# Patient Record
Sex: MALE | Race: WHITE | NOT HISPANIC OR LATINO | RURAL
[De-identification: names, ages, dates, MRNs, and addresses within clinical notes are randomized per-mention and may not be internally consistent; named-entity substitution may affect disease eponyms.]

---

## 2017-12-21 ENCOUNTER — HISTORICAL (OUTPATIENT)
Dept: ADMINISTRATIVE | Facility: HOSPITAL | Age: 52
End: 2017-12-21

## 2017-12-22 LAB
LAB AP CLINICAL INFORMATION: NORMAL
LAB AP COMMENTS: NORMAL
LAB AP DIAGNOSIS - HISTORICAL: NORMAL
LAB AP GROSS PATHOLOGY - HISTORICAL: NORMAL
LAB AP SPECIMEN SUBMITTED - HISTORICAL: NORMAL

## 2023-12-30 ENCOUNTER — HOSPITAL ENCOUNTER (EMERGENCY)
Facility: HOSPITAL | Age: 58
Discharge: HOME | End: 2023-12-30
Attending: EMERGENCY MEDICINE
Payer: OTHER GOVERNMENT

## 2023-12-30 VITALS
WEIGHT: 150 LBS | SYSTOLIC BLOOD PRESSURE: 128 MMHG | HEIGHT: 70 IN | OXYGEN SATURATION: 98 % | BODY MASS INDEX: 21.47 KG/M2 | HEART RATE: 90 BPM | RESPIRATION RATE: 16 BRPM | DIASTOLIC BLOOD PRESSURE: 70 MMHG | TEMPERATURE: 98 F

## 2023-12-30 DIAGNOSIS — F41.9 ANXIETY: ICD-10-CM

## 2023-12-30 DIAGNOSIS — J98.01 BRONCHOSPASM: ICD-10-CM

## 2023-12-30 DIAGNOSIS — J18.9 PNEUMONIA OF LEFT LOWER LOBE DUE TO INFECTIOUS ORGANISM: Primary | ICD-10-CM

## 2023-12-30 DIAGNOSIS — R06.00 DYSPNEA: ICD-10-CM

## 2023-12-30 LAB
ANION GAP SERPL CALCULATED.3IONS-SCNC: 14 MMOL/L (ref 7–16)
BASOPHILS # BLD AUTO: 0.07 K/UL (ref 0–0.2)
BASOPHILS NFR BLD AUTO: 0.8 % (ref 0–1)
BUN SERPL-MCNC: 21 MG/DL (ref 7–18)
BUN/CREAT SERPL: 14 (ref 6–20)
CALCIUM SERPL-MCNC: 9.1 MG/DL (ref 8.5–10.1)
CHLORIDE SERPL-SCNC: 103 MMOL/L (ref 98–107)
CO2 SERPL-SCNC: 25 MMOL/L (ref 21–32)
CREAT SERPL-MCNC: 1.47 MG/DL (ref 0.7–1.3)
DIFFERENTIAL METHOD BLD: ABNORMAL
EGFR (NO RACE VARIABLE) (RUSH/TITUS): 56 ML/MIN/1.73M2
EOSINOPHIL # BLD AUTO: 0.14 K/UL (ref 0–0.5)
EOSINOPHIL NFR BLD AUTO: 1.5 % (ref 1–4)
ERYTHROCYTE [DISTWIDTH] IN BLOOD BY AUTOMATED COUNT: 13.3 % (ref 11.5–14.5)
GLUCOSE SERPL-MCNC: 167 MG/DL (ref 74–106)
HCT VFR BLD AUTO: 41.7 % (ref 40–54)
HGB BLD-MCNC: 13.1 G/DL (ref 13.5–18)
IMM GRANULOCYTES # BLD AUTO: 0.04 K/UL (ref 0–0.04)
IMM GRANULOCYTES NFR BLD: 0.4 % (ref 0–0.4)
LYMPHOCYTES # BLD AUTO: 1.26 K/UL (ref 1–4.8)
LYMPHOCYTES NFR BLD AUTO: 13.7 % (ref 27–41)
MCH RBC QN AUTO: 30.9 PG (ref 27–31)
MCHC RBC AUTO-ENTMCNC: 31.4 G/DL (ref 32–36)
MCV RBC AUTO: 98.3 FL (ref 80–96)
MONOCYTES # BLD AUTO: 0.6 K/UL (ref 0–0.8)
MONOCYTES NFR BLD AUTO: 6.5 % (ref 2–6)
MPC BLD CALC-MCNC: 10.3 FL (ref 9.4–12.4)
NEUTROPHILS # BLD AUTO: 7.07 K/UL (ref 1.8–7.7)
NEUTROPHILS NFR BLD AUTO: 77.1 % (ref 53–65)
NRBC # BLD AUTO: 0 X10E3/UL
NRBC, AUTO (.00): 0 %
PLATELET # BLD AUTO: 424 K/UL (ref 150–400)
POTASSIUM SERPL-SCNC: 4.6 MMOL/L (ref 3.5–5.1)
RBC # BLD AUTO: 4.24 M/UL (ref 4.6–6.2)
SODIUM SERPL-SCNC: 137 MMOL/L (ref 136–145)
WBC # BLD AUTO: 9.18 K/UL (ref 4.5–11)

## 2023-12-30 PROCEDURE — 96366 THER/PROPH/DIAG IV INF ADDON: CPT

## 2023-12-30 PROCEDURE — 96365 THER/PROPH/DIAG IV INF INIT: CPT

## 2023-12-30 PROCEDURE — 96375 TX/PRO/DX INJ NEW DRUG ADDON: CPT

## 2023-12-30 PROCEDURE — 80048 BASIC METABOLIC PNL TOTAL CA: CPT | Performed by: EMERGENCY MEDICINE

## 2023-12-30 PROCEDURE — 99284 EMERGENCY DEPT VISIT MOD MDM: CPT | Mod: ICN,,, | Performed by: EMERGENCY MEDICINE

## 2023-12-30 PROCEDURE — 99285 EMERGENCY DEPT VISIT HI MDM: CPT | Mod: 25

## 2023-12-30 PROCEDURE — 63600175 PHARM REV CODE 636 W HCPCS: Performed by: EMERGENCY MEDICINE

## 2023-12-30 PROCEDURE — 93005 ELECTROCARDIOGRAM TRACING: CPT | Performed by: INTERNAL MEDICINE

## 2023-12-30 PROCEDURE — 25000003 PHARM REV CODE 250: Performed by: EMERGENCY MEDICINE

## 2023-12-30 PROCEDURE — 25000242 PHARM REV CODE 250 ALT 637 W/ HCPCS: Performed by: EMERGENCY MEDICINE

## 2023-12-30 PROCEDURE — 93010 ELECTROCARDIOGRAM REPORT: CPT | Mod: ICN,,, | Performed by: INTERNAL MEDICINE

## 2023-12-30 PROCEDURE — 93005 ELECTROCARDIOGRAM TRACING: CPT

## 2023-12-30 PROCEDURE — 85025 COMPLETE CBC W/AUTO DIFF WBC: CPT | Performed by: EMERGENCY MEDICINE

## 2023-12-30 RX ORDER — DEXAMETHASONE SODIUM PHOSPHATE 4 MG/ML
8 INJECTION, SOLUTION INTRA-ARTICULAR; INTRALESIONAL; INTRAMUSCULAR; INTRAVENOUS; SOFT TISSUE
Status: COMPLETED | OUTPATIENT
Start: 2023-12-30 | End: 2023-12-30

## 2023-12-30 RX ORDER — PREDNISONE 50 MG/1
50 TABLET ORAL DAILY
Qty: 6 TABLET | Refills: 0 | Status: ON HOLD | OUTPATIENT
Start: 2023-12-30 | End: 2024-01-11 | Stop reason: HOSPADM

## 2023-12-30 RX ORDER — IPRATROPIUM BROMIDE AND ALBUTEROL SULFATE 2.5; .5 MG/3ML; MG/3ML
3 SOLUTION RESPIRATORY (INHALATION)
Status: COMPLETED | OUTPATIENT
Start: 2023-12-30 | End: 2023-12-30

## 2023-12-30 RX ORDER — AMOXICILLIN AND CLAVULANATE POTASSIUM 875; 125 MG/1; MG/1
1 TABLET, FILM COATED ORAL 2 TIMES DAILY
Qty: 20 TABLET | Refills: 0 | Status: ON HOLD | OUTPATIENT
Start: 2023-12-30 | End: 2024-01-11 | Stop reason: HOSPADM

## 2023-12-30 RX ORDER — LEVALBUTEROL INHALATION SOLUTION 1.25 MG/3ML
1.25 SOLUTION RESPIRATORY (INHALATION)
Status: DISCONTINUED | OUTPATIENT
Start: 2023-12-30 | End: 2023-12-30

## 2023-12-30 RX ORDER — IPRATROPIUM BROMIDE AND ALBUTEROL SULFATE 2.5; .5 MG/3ML; MG/3ML
3 SOLUTION RESPIRATORY (INHALATION)
Status: DISCONTINUED | OUTPATIENT
Start: 2023-12-30 | End: 2023-12-30

## 2023-12-30 RX ORDER — ALBUTEROL SULFATE 90 UG/1
1-2 AEROSOL, METERED RESPIRATORY (INHALATION) EVERY 6 HOURS PRN
Qty: 6.7 G | Refills: 1 | Status: SHIPPED | OUTPATIENT
Start: 2023-12-30 | End: 2024-01-22

## 2023-12-30 RX ADMIN — IPRATROPIUM BROMIDE AND ALBUTEROL SULFATE 3 ML: .5; 3 SOLUTION RESPIRATORY (INHALATION) at 08:12

## 2023-12-30 RX ADMIN — DEXAMETHASONE SODIUM PHOSPHATE 8 MG: 4 INJECTION, SOLUTION INTRA-ARTICULAR; INTRALESIONAL; INTRAMUSCULAR; INTRAVENOUS; SOFT TISSUE at 08:12

## 2023-12-30 RX ADMIN — AMPICILLIN AND SULBACTAM 3 G: 2; 1 INJECTION, POWDER, FOR SOLUTION INTRAVENOUS at 08:12

## 2023-12-30 RX ADMIN — SODIUM CHLORIDE 1000 ML: 9 INJECTION, SOLUTION INTRAVENOUS at 08:12

## 2023-12-30 NOTE — DISCHARGE INSTRUCTIONS
Take medications as prescribed.  Use inhaler every 4 hours as needed for shortness of breath or wheezing.  Follow up in clinic with primary care provider in 2-3 days for recheck.  Return to emergency department for any worsening or further problems.

## 2023-12-30 NOTE — ED PROVIDER NOTES
Encounter Date: 12/30/2023       History     Chief Complaint   Patient presents with    Anxiety     57 Y/O MALE WITH HISTORY OF ALS AND COPD SAYS HE GOT MORE SHORT OF BREATH A COUPLE OF HOURS PRIOR TO ARRIVAL.  HE SAYS HE THINKS HE WAS ALSO HAVING A PANIC ATTACK.  HE DENIES ANY FEVER.  HE NOTES NO REMITTING OR EXACERBATING FACTORS.        Review of patient's allergies indicates:   Allergen Reactions    Azithromycin Swelling and Hives     No past medical history on file.  No past surgical history on file.  No family history on file.     Review of Systems   All other systems reviewed and are negative.      Physical Exam     Initial Vitals [12/30/23 0526]   BP Pulse Resp Temp SpO2   (!) 149/89 110 20 97.9 °F (36.6 °C) 96 %      MAP       --         Physical Exam    Nursing note and vitals reviewed.  Constitutional: He appears well-developed and well-nourished.   HENT:   Head: Normocephalic and atraumatic.   Nose: Nose normal.   Mouth/Throat: Oropharynx is clear and moist.   Eyes: Conjunctivae and EOM are normal. Pupils are equal, round, and reactive to light.   Neck: Neck supple.   Normal range of motion.  Cardiovascular:  Normal rate, regular rhythm and normal heart sounds.           Pulmonary/Chest: He has wheezes.   Abdominal: Abdomen is soft. Bowel sounds are normal.   Musculoskeletal:         General: Normal range of motion.      Cervical back: Normal range of motion and neck supple.     Neurological: He is alert and oriented to person, place, and time. GCS score is 15. GCS eye subscore is 4. GCS verbal subscore is 5. GCS motor subscore is 6.   Skin: Skin is warm and dry. Capillary refill takes less than 2 seconds.   Psychiatric:   ANXIOUS-APPEARING.           Medical Screening Exam   See Full Note    ED Course   Procedures  Labs Reviewed   BASIC METABOLIC PANEL - Abnormal; Notable for the following components:       Result Value    Glucose 167 (*)     BUN 21 (*)     Creatinine 1.47 (*)     eGFR 56 (*)     All  other components within normal limits   CBC WITH DIFFERENTIAL - Abnormal; Notable for the following components:    RBC 4.24 (*)     Hemoglobin 13.1 (*)     MCV 98.3 (*)     MCHC 31.4 (*)     Platelet Count 424 (*)     Neutrophils % 77.1 (*)     Lymphocytes % 13.7 (*)     Monocytes % 6.5 (*)     All other components within normal limits   CBC W/ AUTO DIFFERENTIAL    Narrative:     The following orders were created for panel order CBC auto differential.  Procedure                               Abnormality         Status                     ---------                               -----------         ------                     CBC with Differential[[id number]]        Abnormal            Final result                 Please view results for these tests on the individual orders.          Imaging Results              X-Ray Chest AP Portable (Final result)  Result time 12/30/23 08:54:23      Final result by Mauro Lew MD (12/30/23 08:54:23)                   Impression:      Bibasilar opacities as detailed may reflect edema or infiltrates.  Small pleural effusions.      Electronically signed by: Mauro Lew  Date:    12/30/2023  Time:    08:54               Narrative:    EXAMINATION:  XR CHEST AP PORTABLE    CLINICAL HISTORY:  Dyspnea, unspecified    TECHNIQUE:  Single frontal view of the chest was performed.    COMPARISON:  None    FINDINGS:  Cardiac silhouette obscured by interstitial and basilar densities bilaterally.  No pneumothorax.  Suspect small pleural effusions.                                       Medications   ampicillin-sulbactam (UNASYN) 3 g in sodium chloride 0.9 % 100 mL IVPB (MB+) (0 g Intravenous Stopped 12/30/23 1004)   sodium chloride 0.9% bolus 1,000 mL 1,000 mL (0 mLs Intravenous Stopped 12/30/23 0945)   dexAMETHasone injection 8 mg (8 mg Intravenous Given 12/30/23 0817)   albuterol-ipratropium 2.5 mg-0.5 mg/3 mL nebulizer solution 3 mL (3 mLs Nebulization Given 12/30/23 0828)     Medical Decision  Making  Amount and/or Complexity of Data Reviewed  Labs: ordered.  Radiology: ordered.    Risk  Prescription drug management.               ED Course as of 12/31/23 2017   Sat Dec 30, 2023   0747 Medical decision-making:  Differential diagnosis includes anxiety, panic attack, pneumonia.  All labs and imaging ordered and interpreted by me.  CBC is normal. [BB]   0748 BNP is normal except elevated glucose, BUN, and creatinine.  I do not have any old values to compare to. [BB]   0748 Chest x-ray by my interpretation shows left lower lobe pneumonia. [BB]   0753 On my exam patient has mild wheezes present. [BB]      ED Course User Index  [BB] Johnathan Torres MD                           Clinical Impression:   Final diagnoses:  [R06.00] Dyspnea  [J18.9] Pneumonia of left lower lobe due to infectious organism (Primary)  [J98.01] Bronchospasm  [F41.9] Anxiety        ED Disposition Condition    Discharge Stable          ED Prescriptions       Medication Sig Dispense Start Date End Date Auth. Provider    amoxicillin-clavulanate 875-125mg (AUGMENTIN) 875-125 mg per tablet Take 1 tablet by mouth 2 (two) times daily. 20 tablet 12/30/2023 -- Johnathan Torres MD    predniSONE (DELTASONE) 50 MG Tab Take 1 tablet (50 mg total) by mouth once daily. 6 tablet 12/30/2023 -- Johnathan Torres MD    albuterol (PROVENTIL/VENTOLIN HFA) 90 mcg/actuation inhaler Inhale 1-2 puffs into the lungs every 6 (six) hours as needed for Wheezing. Rescue 6.7 g 12/30/2023 12/29/2024 Johnathan Torres MD          Follow-up Information    None          Johnathan Torres MD  12/31/23 2017

## 2024-01-04 ENCOUNTER — HOSPITAL ENCOUNTER (INPATIENT)
Facility: HOSPITAL | Age: 59
LOS: 7 days | Discharge: HOME | DRG: 871 | End: 2024-01-11
Attending: STUDENT IN AN ORGANIZED HEALTH CARE EDUCATION/TRAINING PROGRAM | Admitting: STUDENT IN AN ORGANIZED HEALTH CARE EDUCATION/TRAINING PROGRAM
Payer: OTHER GOVERNMENT

## 2024-01-04 DIAGNOSIS — R94.39 ABNORMAL CARDIOVASCULAR STRESS TEST: ICD-10-CM

## 2024-01-04 DIAGNOSIS — A41.9 SEVERE SEPSIS: Primary | ICD-10-CM

## 2024-01-04 DIAGNOSIS — I10 PRIMARY HYPERTENSION: ICD-10-CM

## 2024-01-04 DIAGNOSIS — R65.20 SEVERE SEPSIS: Primary | ICD-10-CM

## 2024-01-04 DIAGNOSIS — I50.9 NEW ONSET OF CONGESTIVE HEART FAILURE: ICD-10-CM

## 2024-01-04 DIAGNOSIS — R07.9 CHEST PAIN: ICD-10-CM

## 2024-01-04 DIAGNOSIS — F10.11 H/O ETOH ABUSE: ICD-10-CM

## 2024-01-04 DIAGNOSIS — I50.21 ACUTE SYSTOLIC HEART FAILURE: ICD-10-CM

## 2024-01-04 DIAGNOSIS — R06.02 SOB (SHORTNESS OF BREATH): ICD-10-CM

## 2024-01-04 DIAGNOSIS — I50.9 HEART FAILURE: ICD-10-CM

## 2024-01-04 DIAGNOSIS — E78.2 MIXED HYPERLIPIDEMIA: ICD-10-CM

## 2024-01-04 DIAGNOSIS — I34.0 NONRHEUMATIC MITRAL VALVE REGURGITATION: ICD-10-CM

## 2024-01-04 DIAGNOSIS — J96.01 ACUTE HYPOXIC RESPIRATORY FAILURE: ICD-10-CM

## 2024-01-04 DIAGNOSIS — R06.02 SHORTNESS OF BREATH: ICD-10-CM

## 2024-01-04 PROBLEM — J44.1 COPD EXACERBATION: Status: ACTIVE | Noted: 2024-01-04

## 2024-01-04 PROBLEM — J44.9 OSA AND COPD OVERLAP SYNDROME: Status: ACTIVE | Noted: 2024-01-04

## 2024-01-04 PROBLEM — G47.33 OSA AND COPD OVERLAP SYNDROME: Status: ACTIVE | Noted: 2024-01-04

## 2024-01-04 PROBLEM — E78.5 HLD (HYPERLIPIDEMIA): Status: ACTIVE | Noted: 2024-01-04

## 2024-01-04 PROBLEM — J69.0 ASPIRATION PNEUMONIA: Status: ACTIVE | Noted: 2024-01-04

## 2024-01-04 PROBLEM — F32.A DEPRESSION: Status: ACTIVE | Noted: 2024-01-04

## 2024-01-04 LAB
ALBUMIN SERPL BCP-MCNC: 3.7 G/DL (ref 3.5–5)
ALBUMIN/GLOB SERPL: 1.2 {RATIO}
ALP SERPL-CCNC: 117 U/L (ref 45–115)
ALT SERPL W P-5'-P-CCNC: 143 U/L (ref 16–61)
AMORPHOUS CRYSTALS, UA (OHS): ABNORMAL /HPF
AMPHET UR QL SCN: NEGATIVE
ANION GAP SERPL CALCULATED.3IONS-SCNC: 12 MMOL/L (ref 7–16)
AST SERPL W P-5'-P-CCNC: 108 U/L (ref 15–37)
BACTERIA #/AREA URNS HPF: ABNORMAL /HPF
BARBITURATES UR QL SCN: NEGATIVE
BASOPHILS # BLD AUTO: 0.02 K/UL (ref 0–0.2)
BASOPHILS NFR BLD AUTO: 0.2 % (ref 0–1)
BENZODIAZ METAB UR QL SCN: NEGATIVE
BILIRUB SERPL-MCNC: 0.9 MG/DL (ref ?–1.2)
BILIRUB UR QL STRIP: NEGATIVE
BUN SERPL-MCNC: 33 MG/DL (ref 7–18)
BUN/CREAT SERPL: 31 (ref 6–20)
CALCIUM SERPL-MCNC: 9 MG/DL (ref 8.5–10.1)
CANNABINOIDS UR QL SCN: NEGATIVE
CHLORIDE SERPL-SCNC: 99 MMOL/L (ref 98–107)
CK SERPL-CCNC: 255 U/L (ref 39–308)
CLARITY UR: CLEAR
CO2 SERPL-SCNC: 27 MMOL/L (ref 21–32)
COCAINE UR QL SCN: NEGATIVE
COLOR UR: YELLOW
CREAT SERPL-MCNC: 1.08 MG/DL (ref 0.7–1.3)
DIFFERENTIAL METHOD BLD: ABNORMAL
EGFR (NO RACE VARIABLE) (RUSH/TITUS): 81 ML/MIN/1.73M2
EOSINOPHIL # BLD AUTO: 0.02 K/UL (ref 0–0.5)
EOSINOPHIL NFR BLD AUTO: 0.2 % (ref 1–4)
ERYTHROCYTE [DISTWIDTH] IN BLOOD BY AUTOMATED COUNT: 13 % (ref 11.5–14.5)
FLUAV AG UPPER RESP QL IA.RAPID: NEGATIVE
FLUBV AG UPPER RESP QL IA.RAPID: NEGATIVE
GLOBULIN SER-MCNC: 3.2 G/DL (ref 2–4)
GLUCOSE SERPL-MCNC: 112 MG/DL (ref 74–106)
GLUCOSE UR STRIP-MCNC: NORMAL MG/DL
HBV SURFACE AG SERPL QL IA: NORMAL
HCO3 UR-SCNC: 26.6 MMOL/L (ref 21–28)
HCT VFR BLD AUTO: 42 % (ref 40–54)
HCT VFR BLD CALC: 46 % (ref 35–51)
HCV AB SER QL: NORMAL
HGB BLD-MCNC: 14 G/DL (ref 13.5–18)
HYALINE CASTS #/AREA URNS LPF: ABNORMAL /LPF
IMM GRANULOCYTES # BLD AUTO: 0.05 K/UL (ref 0–0.04)
IMM GRANULOCYTES NFR BLD: 0.4 % (ref 0–0.4)
KETONES UR STRIP-SCNC: NEGATIVE MG/DL
LACTATE SERPL-SCNC: 2.3 MMOL/L (ref 0.4–2)
LACTATE SERPL-SCNC: 3.1 MMOL/L (ref 0.4–2)
LDH SERPL L TO P-CCNC: 1.9 MMOL/L (ref 0.3–1.2)
LEUKOCYTE ESTERASE UR QL STRIP: NEGATIVE
LYMPHOCYTES # BLD AUTO: 2.22 K/UL (ref 1–4.8)
LYMPHOCYTES NFR BLD AUTO: 17 % (ref 27–41)
MCH RBC QN AUTO: 31.5 PG (ref 27–31)
MCHC RBC AUTO-ENTMCNC: 33.3 G/DL (ref 32–36)
MCV RBC AUTO: 94.6 FL (ref 80–96)
MONOCYTES # BLD AUTO: 1.47 K/UL (ref 0–0.8)
MONOCYTES NFR BLD AUTO: 11.2 % (ref 2–6)
MPC BLD CALC-MCNC: 10.9 FL (ref 9.4–12.4)
MUCOUS, UA: ABNORMAL /LPF
NEUTROPHILS # BLD AUTO: 9.3 K/UL (ref 1.8–7.7)
NEUTROPHILS NFR BLD AUTO: 71 % (ref 53–65)
NITRITE UR QL STRIP: NEGATIVE
NRBC # BLD AUTO: 0 X10E3/UL
NRBC, AUTO (.00): 0 %
NT-PROBNP SERPL-MCNC: ABNORMAL PG/ML (ref 1–125)
OPIATES UR QL SCN: NEGATIVE
PCO2 BLDA: 41 MMHG (ref 35–48)
PCP UR QL SCN: NEGATIVE
PH SMN: 7.42 [PH] (ref 7.35–7.45)
PH UR STRIP: 5.5 PH UNITS
PLATELET # BLD AUTO: 368 K/UL (ref 150–400)
PO2 BLDA: 85 MMHG (ref 83–108)
POC BASE EXCESS: 1.9 MMOL/L (ref -2–3)
POC CO2: 27.9 MMOL/L
POC IONIZED CALCIUM: 1.02 MMOL/L (ref 1.15–1.35)
POC SATURATED O2: 97 % (ref 95–98)
POCT GLUCOSE: 134 MG/DL (ref 60–95)
POTASSIUM BLD-SCNC: 4.2 MMOL/L (ref 3.4–4.5)
POTASSIUM SERPL-SCNC: 5 MMOL/L (ref 3.5–5.1)
PROT SERPL-MCNC: 6.9 G/DL (ref 6.4–8.2)
PROT UR QL STRIP: 70
RBC # BLD AUTO: 4.44 M/UL (ref 4.6–6.2)
RBC # UR STRIP: NEGATIVE /UL
RBC #/AREA URNS HPF: 1 /HPF
SARS-COV-2 RDRP RESP QL NAA+PROBE: NEGATIVE
SODIUM BLD-SCNC: 129 MMOL/L (ref 136–145)
SODIUM SERPL-SCNC: 133 MMOL/L (ref 136–145)
SP GR UR STRIP: 1.04
SQUAMOUS #/AREA URNS LPF: ABNORMAL /HPF
TROPONIN I SERPL DL<=0.01 NG/ML-MCNC: 49.5 PG/ML
TROPONIN I SERPL DL<=0.01 NG/ML-MCNC: 52.6 PG/ML
TSH SERPL DL<=0.005 MIU/L-ACNC: 1.41 UIU/ML (ref 0.36–3.74)
UROBILINOGEN UR STRIP-ACNC: 2 MG/DL
WBC # BLD AUTO: 13.08 K/UL (ref 4.5–11)
WBC #/AREA URNS HPF: 2 /HPF

## 2024-01-04 PROCEDURE — 63600175 PHARM REV CODE 636 W HCPCS: Performed by: STUDENT IN AN ORGANIZED HEALTH CARE EDUCATION/TRAINING PROGRAM

## 2024-01-04 PROCEDURE — 63600175 PHARM REV CODE 636 W HCPCS: Performed by: NURSE PRACTITIONER

## 2024-01-04 PROCEDURE — 81003 URINALYSIS AUTO W/O SCOPE: CPT | Performed by: NURSE PRACTITIONER

## 2024-01-04 PROCEDURE — 84443 ASSAY THYROID STIM HORMONE: CPT | Performed by: STUDENT IN AN ORGANIZED HEALTH CARE EDUCATION/TRAINING PROGRAM

## 2024-01-04 PROCEDURE — 83605 ASSAY OF LACTIC ACID: CPT

## 2024-01-04 PROCEDURE — 99285 EMERGENCY DEPT VISIT HI MDM: CPT | Mod: 25

## 2024-01-04 PROCEDURE — 99285 EMERGENCY DEPT VISIT HI MDM: CPT | Mod: ,,, | Performed by: NURSE PRACTITIONER

## 2024-01-04 PROCEDURE — 86803 HEPATITIS C AB TEST: CPT | Performed by: STUDENT IN AN ORGANIZED HEALTH CARE EDUCATION/TRAINING PROGRAM

## 2024-01-04 PROCEDURE — 82803 BLOOD GASES ANY COMBINATION: CPT

## 2024-01-04 PROCEDURE — 25000003 PHARM REV CODE 250: Performed by: STUDENT IN AN ORGANIZED HEALTH CARE EDUCATION/TRAINING PROGRAM

## 2024-01-04 PROCEDURE — 83880 ASSAY OF NATRIURETIC PEPTIDE: CPT | Performed by: NURSE PRACTITIONER

## 2024-01-04 PROCEDURE — 94761 N-INVAS EAR/PLS OXIMETRY MLT: CPT

## 2024-01-04 PROCEDURE — 27000221 HC OXYGEN, UP TO 24 HOURS

## 2024-01-04 PROCEDURE — 82947 ASSAY GLUCOSE BLOOD QUANT: CPT

## 2024-01-04 PROCEDURE — 84132 ASSAY OF SERUM POTASSIUM: CPT

## 2024-01-04 PROCEDURE — 85025 COMPLETE CBC W/AUTO DIFF WBC: CPT | Performed by: NURSE PRACTITIONER

## 2024-01-04 PROCEDURE — 85014 HEMATOCRIT: CPT

## 2024-01-04 PROCEDURE — 84484 ASSAY OF TROPONIN QUANT: CPT | Performed by: NURSE PRACTITIONER

## 2024-01-04 PROCEDURE — 83605 ASSAY OF LACTIC ACID: CPT | Performed by: STUDENT IN AN ORGANIZED HEALTH CARE EDUCATION/TRAINING PROGRAM

## 2024-01-04 PROCEDURE — 94640 AIRWAY INHALATION TREATMENT: CPT

## 2024-01-04 PROCEDURE — 87340 HEPATITIS B SURFACE AG IA: CPT | Performed by: STUDENT IN AN ORGANIZED HEALTH CARE EDUCATION/TRAINING PROGRAM

## 2024-01-04 PROCEDURE — 87635 SARS-COV-2 COVID-19 AMP PRB: CPT | Performed by: NURSE PRACTITIONER

## 2024-01-04 PROCEDURE — 86704 HEP B CORE ANTIBODY TOTAL: CPT | Mod: 90 | Performed by: STUDENT IN AN ORGANIZED HEALTH CARE EDUCATION/TRAINING PROGRAM

## 2024-01-04 PROCEDURE — 84484 ASSAY OF TROPONIN QUANT: CPT | Performed by: STUDENT IN AN ORGANIZED HEALTH CARE EDUCATION/TRAINING PROGRAM

## 2024-01-04 PROCEDURE — 80307 DRUG TEST PRSMV CHEM ANLYZR: CPT | Performed by: STUDENT IN AN ORGANIZED HEALTH CARE EDUCATION/TRAINING PROGRAM

## 2024-01-04 PROCEDURE — 84295 ASSAY OF SERUM SODIUM: CPT

## 2024-01-04 PROCEDURE — 82330 ASSAY OF CALCIUM: CPT

## 2024-01-04 PROCEDURE — 25000242 PHARM REV CODE 250 ALT 637 W/ HCPCS: Performed by: STUDENT IN AN ORGANIZED HEALTH CARE EDUCATION/TRAINING PROGRAM

## 2024-01-04 PROCEDURE — 96374 THER/PROPH/DIAG INJ IV PUSH: CPT

## 2024-01-04 PROCEDURE — 87804 INFLUENZA ASSAY W/OPTIC: CPT | Performed by: NURSE PRACTITIONER

## 2024-01-04 PROCEDURE — 99223 1ST HOSP IP/OBS HIGH 75: CPT | Mod: ,,, | Performed by: STUDENT IN AN ORGANIZED HEALTH CARE EDUCATION/TRAINING PROGRAM

## 2024-01-04 PROCEDURE — 87040 BLOOD CULTURE FOR BACTERIA: CPT | Performed by: STUDENT IN AN ORGANIZED HEALTH CARE EDUCATION/TRAINING PROGRAM

## 2024-01-04 PROCEDURE — 82550 ASSAY OF CK (CPK): CPT | Performed by: NURSE PRACTITIONER

## 2024-01-04 PROCEDURE — 80053 COMPREHEN METABOLIC PANEL: CPT | Performed by: NURSE PRACTITIONER

## 2024-01-04 PROCEDURE — 11000001 HC ACUTE MED/SURG PRIVATE ROOM

## 2024-01-04 RX ORDER — LORAZEPAM 2 MG/ML
2 INJECTION INTRAMUSCULAR
Status: DISCONTINUED | OUTPATIENT
Start: 2024-01-04 | End: 2024-01-06

## 2024-01-04 RX ORDER — IPRATROPIUM BROMIDE AND ALBUTEROL SULFATE 2.5; .5 MG/3ML; MG/3ML
3 SOLUTION RESPIRATORY (INHALATION) EVERY 6 HOURS
Status: DISCONTINUED | OUTPATIENT
Start: 2024-01-04 | End: 2024-01-11 | Stop reason: HOSPADM

## 2024-01-04 RX ORDER — FUROSEMIDE 10 MG/ML
40 INJECTION INTRAMUSCULAR; INTRAVENOUS ONCE
Status: DISCONTINUED | OUTPATIENT
Start: 2024-01-04 | End: 2024-01-05

## 2024-01-04 RX ORDER — SODIUM CHLORIDE 9 MG/ML
1000 INJECTION, SOLUTION INTRAVENOUS
Status: DISCONTINUED | OUTPATIENT
Start: 2024-01-04 | End: 2024-01-04

## 2024-01-04 RX ORDER — CHLORDIAZEPOXIDE HYDROCHLORIDE 25 MG/1
25 CAPSULE, GELATIN COATED ORAL DAILY
Status: DISCONTINUED | OUTPATIENT
Start: 2024-01-04 | End: 2024-01-04

## 2024-01-04 RX ORDER — IBUPROFEN 200 MG
16 TABLET ORAL
Status: DISCONTINUED | OUTPATIENT
Start: 2024-01-04 | End: 2024-01-11 | Stop reason: HOSPADM

## 2024-01-04 RX ORDER — GLUCAGON 1 MG
1 KIT INJECTION
Status: DISCONTINUED | OUTPATIENT
Start: 2024-01-04 | End: 2024-01-11 | Stop reason: HOSPADM

## 2024-01-04 RX ORDER — FUROSEMIDE 10 MG/ML
40 INJECTION INTRAMUSCULAR; INTRAVENOUS ONCE
Status: DISCONTINUED | OUTPATIENT
Start: 2024-01-04 | End: 2024-01-04

## 2024-01-04 RX ORDER — ACETYLCYSTEINE 200 MG/ML
2 SOLUTION ORAL; RESPIRATORY (INHALATION) 3 TIMES DAILY
Status: DISCONTINUED | OUTPATIENT
Start: 2024-01-04 | End: 2024-01-05

## 2024-01-04 RX ORDER — SODIUM CHLORIDE 0.9 % (FLUSH) 0.9 %
10 SYRINGE (ML) INJECTION EVERY 12 HOURS PRN
Status: DISCONTINUED | OUTPATIENT
Start: 2024-01-04 | End: 2024-01-11 | Stop reason: HOSPADM

## 2024-01-04 RX ORDER — ACETAMINOPHEN 500 MG
1000 TABLET ORAL EVERY 8 HOURS PRN
Status: DISCONTINUED | OUTPATIENT
Start: 2024-01-04 | End: 2024-01-11 | Stop reason: HOSPADM

## 2024-01-04 RX ORDER — ENOXAPARIN SODIUM 100 MG/ML
40 INJECTION SUBCUTANEOUS EVERY 24 HOURS
Status: DISCONTINUED | OUTPATIENT
Start: 2024-01-04 | End: 2024-01-11 | Stop reason: HOSPADM

## 2024-01-04 RX ORDER — LEVOFLOXACIN 5 MG/ML
750 INJECTION, SOLUTION INTRAVENOUS
Status: COMPLETED | OUTPATIENT
Start: 2024-01-04 | End: 2024-01-08

## 2024-01-04 RX ORDER — NALOXONE HCL 0.4 MG/ML
0.02 VIAL (ML) INJECTION
Status: DISCONTINUED | OUTPATIENT
Start: 2024-01-04 | End: 2024-01-11 | Stop reason: HOSPADM

## 2024-01-04 RX ORDER — FUROSEMIDE 10 MG/ML
40 INJECTION INTRAMUSCULAR; INTRAVENOUS
Status: DISCONTINUED | OUTPATIENT
Start: 2024-01-04 | End: 2024-01-04

## 2024-01-04 RX ORDER — ONDANSETRON 2 MG/ML
4 INJECTION INTRAMUSCULAR; INTRAVENOUS EVERY 8 HOURS PRN
Status: DISCONTINUED | OUTPATIENT
Start: 2024-01-04 | End: 2024-01-11 | Stop reason: HOSPADM

## 2024-01-04 RX ORDER — ALPRAZOLAM 0.5 MG/1
1 TABLET ORAL EVERY 4 HOURS PRN
Status: DISCONTINUED | OUTPATIENT
Start: 2024-01-04 | End: 2024-01-06

## 2024-01-04 RX ORDER — IBUPROFEN 200 MG
24 TABLET ORAL
Status: DISCONTINUED | OUTPATIENT
Start: 2024-01-04 | End: 2024-01-11 | Stop reason: HOSPADM

## 2024-01-04 RX ADMIN — FUROSEMIDE 40 MG: 10 INJECTION, SOLUTION INTRAMUSCULAR; INTRAVENOUS at 05:01

## 2024-01-04 RX ADMIN — ACETYLCYSTEINE 2 ML: 200 INHALANT RESPIRATORY (INHALATION) at 10:01

## 2024-01-04 RX ADMIN — AMPICILLIN AND SULBACTAM 3 G: 2; 1 INJECTION, POWDER, FOR SOLUTION INTRAMUSCULAR; INTRAVENOUS at 05:01

## 2024-01-04 RX ADMIN — FUROSEMIDE 40 MG: 10 INJECTION, SOLUTION INTRAVENOUS at 04:01

## 2024-01-04 RX ADMIN — ENOXAPARIN SODIUM 40 MG: 40 INJECTION SUBCUTANEOUS at 05:01

## 2024-01-04 RX ADMIN — LEVOFLOXACIN 750 MG: 750 INJECTION, SOLUTION INTRAVENOUS at 05:01

## 2024-01-04 RX ADMIN — IPRATROPIUM BROMIDE AND ALBUTEROL SULFATE 3 ML: .5; 3 SOLUTION RESPIRATORY (INHALATION) at 07:01

## 2024-01-04 NOTE — ED TRIAGE NOTES
PATIENT PRESENTS TO ER WITH COMPLAINT OF SOB. REPORTS THAT HE WAS DIAGNOSED WITH PNEUMONIA AND ON STEROIDS. PROGRESSIVELY SOB PER REPORT

## 2024-01-04 NOTE — ASSESSMENT & PLAN NOTE
Patient with Hypoxic Respiratory failure which is Acute.  he is not on home oxygen. Supplemental oxygen was provided and noted-      .   Signs/symptoms of respiratory failure include- tachypnea, increased work of breathing, and respiratory distress. Contributing diagnoses includes - Aspiration, CHF, and Pneumonia Labs and images were reviewed. Patient Has not had a recent ABG. Will treat underlying causes and adjust management of respiratory failure as follows-     Abg pending  Multifactorial, fever, cough with rll opacity in the setting of etoh use concerning for aspiration pna  Will cover with unasy, levaquin, is, chest pt, mucolytics    BNP up with edema on xr and le edema  Will diurese  Hold fluids as above  Low sodium diet    Noted UA findings, complex picture

## 2024-01-04 NOTE — ASSESSMENT & PLAN NOTE
"This patient does have evidence of infective focus  My overall impression is sepsis.  Source: Respiratory  Antibiotics given-   Antibiotics (72h ago, onward)      Start     Stop Route Frequency Ordered    01/04/24 1815  ampicillin-sulbactam (UNASYN) 3 g in sodium chloride 0.9 % 100 mL IVPB (MB+)         -- IV Every 6 hours (non-standard times) 01/04/24 1707 01/04/24 1815  levoFLOXacin 750 mg/150 mL IVPB 750 mg         -- IV Every 24 hours (non-standard times) 01/04/24 1707          Latest lactate reviewed-  No results for input(s): "LACTATE" in the last 72 hours.  Organ dysfunction indicated by Acute respiratory failure    Fluid challenge Not needed - patient is not hypotensive      Post- resuscitation assessment No - Post resuscitation assessment not needed       Will Not start Pressors- Levophed for MAP of 65  Source control achieved by:     Patient volume overload, hold fluid for now  Abx  Cultures  Lactate    "

## 2024-01-04 NOTE — SUBJECTIVE & OBJECTIVE
No past medical history on file.    No past surgical history on file.    Review of patient's allergies indicates:   Allergen Reactions    Azithromycin Swelling and Hives       No current facility-administered medications on file prior to encounter.     Current Outpatient Medications on File Prior to Encounter   Medication Sig    albuterol (PROVENTIL/VENTOLIN HFA) 90 mcg/actuation inhaler Inhale 1-2 puffs into the lungs every 6 (six) hours as needed for Wheezing. Rescue    amoxicillin-clavulanate 875-125mg (AUGMENTIN) 875-125 mg per tablet Take 1 tablet by mouth 2 (two) times daily.    predniSONE (DELTASONE) 50 MG Tab Take 1 tablet (50 mg total) by mouth once daily.     Family History    None       Tobacco Use    Smoking status: Not on file    Smokeless tobacco: Not on file   Substance and Sexual Activity    Alcohol use: Not on file    Drug use: Not on file    Sexual activity: Not on file     Review of Systems   Constitutional:  Positive for fatigue and fever. Negative for chills and unexpected weight change.   HENT:  Negative for congestion, mouth sores and sore throat.    Eyes:  Negative for photophobia and visual disturbance.   Respiratory:  Positive for cough and shortness of breath. Negative for chest tightness and wheezing.    Cardiovascular:  Positive for leg swelling. Negative for chest pain and palpitations.   Gastrointestinal:  Negative for abdominal pain, diarrhea, nausea and vomiting.   Endocrine: Negative for cold intolerance and heat intolerance.   Genitourinary:  Negative for difficulty urinating, dysuria, frequency and urgency.   Musculoskeletal:  Negative for arthralgias, back pain and myalgias.   Skin:  Negative for pallor and rash.   Neurological:  Positive for weakness. Negative for tremors, seizures, syncope, numbness and headaches.   Hematological:  Does not bruise/bleed easily.   Psychiatric/Behavioral:  Negative for agitation, confusion, hallucinations and suicidal ideas.      Objective:      Vital Signs (Most Recent):  Temp: 97.8 °F (36.6 °C) (01/04/24 1417)  Pulse: (!) 112 (01/04/24 1713)  Resp: (!) 21 (01/04/24 1713)  BP: (!) 144/113 (01/04/24 1704)  SpO2: 100 % (01/04/24 1713) Vital Signs (24h Range):  Temp:  [97.8 °F (36.6 °C)] 97.8 °F (36.6 °C)  Pulse:  [111-113] 112  Resp:  [20-28] 21  SpO2:  [89 %-100 %] 100 %  BP: (124-148)/() 144/113     Weight: 68 kg (150 lb)  Body mass index is 21.52 kg/m².     Physical Exam  Vitals reviewed.   Constitutional:       Appearance: Normal appearance.   HENT:      Head: Normocephalic and atraumatic.      Nose: Nose normal.   Eyes:      Extraocular Movements: Extraocular movements intact.      Conjunctiva/sclera: Conjunctivae normal.   Neck:      Trachea: Trachea normal.   Cardiovascular:      Rate and Rhythm: Normal rate and regular rhythm.      Pulses: Normal pulses.      Heart sounds: Normal heart sounds.   Pulmonary:      Effort: Respiratory distress present.      Breath sounds: Normal air entry. Rales present.   Abdominal:      General: Bowel sounds are normal.      Palpations: Abdomen is soft.   Musculoskeletal:      Cervical back: Neck supple.      Right lower leg: Edema present.      Left lower leg: Edema present.      Comments: Moves all extremities, normal tone   Skin:     General: Skin is warm and dry.   Neurological:      General: No focal deficit present.      Mental Status: He is alert and oriented to person, place, and time.      Cranial Nerves: Cranial nerves 2-12 are intact.      Comments: Grossly normal motor and sensory function without focal deficit appreciated.   Psychiatric:         Mood and Affect: Affect normal.         Behavior: Behavior is cooperative.      Comments: Abnormal behavior and thought content                Significant Labs: All pertinent labs within the past 24 hours have been reviewed.  Cmp, cbc, cardiac biomarkers  Significant Imaging: I have reviewed all pertinent imaging results/findings within the past 24  hours.  Cxr with bilateral effusions and likely pneumonia on right
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
## 2024-01-04 NOTE — ASSESSMENT & PLAN NOTE
Chronic, controlled. Latest blood pressure and vitals reviewed-     Temp:  [97.8 °F (36.6 °C)]   Pulse:  [111-113]   Resp:  [20-28]   BP: (124-148)/()   SpO2:  [89 %-100 %] .   Home meds for hypertension were reviewed and noted below.       While in the hospital, will manage blood pressure as follows; Continue home antihypertensive regimen    Will utilize p.r.n. blood pressure medication only if patient's blood pressure greater than 180/110 and he develops symptoms such as worsening chest pain or shortness of breath.

## 2024-01-04 NOTE — ASSESSMENT & PLAN NOTE
Patient's COPD is with exacerbation noted by continued dyspnea and use of accessory muscles for breathing currently.  Patient is currently off COPD Pathway. Continue scheduled inhalers Antibiotics and Supplemental oxygen and monitor respiratory status closely.     Cpap at night, steroids if no better in am

## 2024-01-04 NOTE — ASSESSMENT & PLAN NOTE
Patient has persistent depression which is unknown and is currently controlled. Will Begin anti-depressant medications. We will not consult psychiatry at this time. Patient does not display psychosis at this time. Continue to monitor closely and adjust plan of care as needed.
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
## 2024-01-04 NOTE — Clinical Note
The catheter was inserted into the, insertion attempt was made into the and was inserted over the wire into the left ventricle.

## 2024-01-04 NOTE — ASSESSMENT & PLAN NOTE
Patient's COPD is with exacerbation noted by continued dyspnea and use of accessory muscles for breathing currently.  Patient is currently off COPD Pathway. Continue scheduled inhalers Antibiotics and Supplemental oxygen and monitor respiratory status closely.     Believe this is multifactorial, copd may be contributing. Consider steroids if no better in am

## 2024-01-04 NOTE — ED PROVIDER NOTES
Encounter Date: 1/4/2024       History     Chief Complaint   Patient presents with    Pneumonia            58 year old male presents to ED with complaint of shortness of breath. Patient reports he was seen in ED several days ago and was diagnosed with pneumonia. He was placed on steroids and antibiotics. He states shortness of breath is worsening and he is getting weaker. He reports falling off the couch on yesterday and attributes weakness to diagnosis of ALS and taking Tessalon Perles his significant other gave him. Denies fever; reports chills. Denies chest pain, vomiting; reports diarrhea with incontinent episode on yesterday after falling off the couch.     The history is provided by the patient.     Review of patient's allergies indicates:   Allergen Reactions    Azithromycin Swelling and Hives     Past Medical History:   Diagnosis Date    CHF (congestive heart failure)     Hypertension      Past Surgical History:   Procedure Laterality Date    LEFT HEART CATHETERIZATION Left 1/10/2024    Procedure: Left heart cath;  Surgeon: Elijah Lozano MD;  Location: [email or web address] CATH LAB;  Service: Cardiology;  Laterality: Left;     History reviewed. No pertinent family history.  Social History     Tobacco Use    Smoking status: Every Day     Current packs/day: 0.50     Types: Cigarettes   Substance Use Topics    Alcohol use: Yes     Comment: been in rehab before    Drug use: Not Currently     Review of Systems   Constitutional:  Positive for chills. Negative for fever.   HENT:  Negative for sinus pressure and sinus pain.    Eyes:  Negative for photophobia and visual disturbance.   Respiratory:  Positive for cough and shortness of breath.    Cardiovascular:  Negative for chest pain and palpitations.   Gastrointestinal:  Positive for diarrhea. Negative for nausea and vomiting.   Endocrine: Negative for cold intolerance and heat intolerance.   Genitourinary:  Negative for dysuria and urgency.   Musculoskeletal:  Positive  for gait problem. Negative for arthralgias.   Neurological:  Positive for weakness. Negative for dizziness.   Hematological:  Negative for adenopathy. Does not bruise/bleed easily.   Psychiatric/Behavioral:  Negative for agitation and confusion.    All other systems reviewed and are negative.      Physical Exam     Initial Vitals   BP Pulse Resp Temp SpO2   01/04/24 1414 01/04/24 1414 01/04/24 1414 01/04/24 1417 01/04/24 1414   (!) 124/93 (!) 112 20 97.8 °F (36.6 °C) 100 %      MAP       --                Physical Exam    Nursing note and vitals reviewed.  Constitutional: He appears well-developed and well-nourished.   HENT:   Head: Normocephalic and atraumatic.   Eyes: EOM are normal. Pupils are equal, round, and reactive to light.   Neck: Neck supple.   Normal range of motion.  Cardiovascular:  Normal rate and regular rhythm.           No murmur heard.  Pulmonary/Chest: He has no wheezes. He has no rhonchi.   Abdominal: Abdomen is soft. He exhibits no distension. There is no abdominal tenderness.   Musculoskeletal:         General: No tenderness or edema.      Cervical back: Normal range of motion and neck supple.     Lymphadenopathy:     He has no cervical adenopathy.   Neurological: He is alert and oriented to person, place, and time. He displays normal reflexes. No cranial nerve deficit or sensory deficit.   Skin: Skin is warm and dry.   Psychiatric: He has a normal mood and affect. Thought content normal.         Medical Screening Exam   See Full Note    ED Course   Procedures  Labs Reviewed   COMPREHENSIVE METABOLIC PANEL - Abnormal; Notable for the following components:       Result Value    Sodium 133 (*)     Glucose 112 (*)     BUN 33 (*)     BUN/Creatinine Ratio 31 (*)     Alk Phos 117 (*)      (*)      (*)     All other components within normal limits   NT-PRO NATRIURETIC PEPTIDE - Abnormal; Notable for the following components:    ProBNP 14,857 (*)     All other components within normal  limits   URINALYSIS, REFLEX TO URINE CULTURE - Abnormal; Notable for the following components:    Protein, UA 70 (*)     Urobilinogen, UA 2 (*)     Specific Gravity, UA 1.037 (*)     All other components within normal limits   CBC WITH DIFFERENTIAL - Abnormal; Notable for the following components:    WBC 13.08 (*)     RBC 4.44 (*)     MCH 31.5 (*)     Neutrophils % 71.0 (*)     Lymphocytes % 17.0 (*)     Monocytes % 11.2 (*)     Eosinophils % 0.2 (*)     Neutrophils, Abs 9.30 (*)     Monocytes, Absolute 1.47 (*)     Immature Granulocytes, Absolute 0.05 (*)     All other components within normal limits   URINALYSIS, MICROSCOPIC - Abnormal; Notable for the following components:    Bacteria, UA Occasional (*)     Squamous Epithelial Cells, UA Occasional (*)     Hyaline Casts, UA 2-5 (*)     Amorphous Crystals, UA Few (*)     Mucous Occasional (*)     All other components within normal limits   LACTIC ACID, PLASMA - Abnormal; Notable for the following components:    Lactic Acid 3.1 (*)     All other components within normal limits   LACTIC ACID, PLASMA - Abnormal; Notable for the following components:    Lactic Acid 2.3 (*)     All other components within normal limits   BASIC METABOLIC PANEL - Abnormal; Notable for the following components:    Sodium 135 (*)     Chloride 96 (*)     CO2 34 (*)     Glucose 70 (*)     BUN 29 (*)     Creatinine 1.32 (*)     BUN/Creatinine Ratio 22 (*)     All other components within normal limits   CBC WITH DIFFERENTIAL - Abnormal; Notable for the following components:    RBC 4.35 (*)     Neutrophils % 76.3 (*)     Lymphocytes % 12.6 (*)     Monocytes % 9.8 (*)     Eosinophils % 0.4 (*)     Immature Granulocytes % 0.6 (*)     Neutrophils, Abs 8.14 (*)     Monocytes, Absolute 1.05 (*)     Immature Granulocytes, Absolute 0.06 (*)     All other components within normal limits   HEPATIC FUNCTION PANEL - Abnormal; Notable for the following components:    Albumin 3.2 (*)     Bilirubin, Direct  0.3 (*)     AST 76 (*)      (*)     All other components within normal limits   POCT GLUCOSE MONITORING CONTINUOUS - Abnormal; Notable for the following components:    POC Glucose 112 (*)     All other components within normal limits   POCT GLUCOSE MONITORING CONTINUOUS - Abnormal; Notable for the following components:    POC Glucose 140 (*)     All other components within normal limits   RAPID INFLUENZA A/B - Normal   SARS-COV-2 RNA AMPLIFICATION, QUAL - Normal    Narrative:     Negative SARS-CoV results should not be used as the sole basis for treatment or patient management decisions; negative results should be considered in the context of a patient's recent exposures, history and the presene of clinical signs and symptoms consistent with COVID-19.  Negative results should be treated as presumptive and confirmed by molecular assay, if necessary for patient management.   TROPONIN I - Normal   CK - Normal   TROPONIN I - Normal   TSH - Normal   DRUG SCREEN, URINE (BEAKER) - Normal   HEPATITIS C ANTIBODY - Normal   HEPATITIS B SURFACE ANTIGEN - Normal   LACTIC ACID, PLASMA - Normal   MAGNESIUM - Normal   PHOSPHORUS - Normal   CBC W/ AUTO DIFFERENTIAL    Narrative:     The following orders were created for panel order CBC Auto Differential.  Procedure                               Abnormality         Status                     ---------                               -----------         ------                     CBC with Differential[[id number]]       Abnormal            Final result                 Please view results for these tests on the individual orders.   CBC W/ AUTO DIFFERENTIAL    Narrative:     The following orders were created for panel order CBC with Automated Differential.  Procedure                               Abnormality         Status                     ---------                               -----------         ------                     CBC with Differential[[id number]]       Abnormal             Final result                 Please view results for these tests on the individual orders.   POCT GLUCOSE MONITORING CONTINUOUS          Imaging Results              US Abdomen Limited_Liver (Final result)  Result time 01/04/24 19:41:46      Final result by Mauro Lew MD (01/04/24 19:41:46)                   Impression:      Ascites and right-sided pleural effusion.  Increased echotexture of the liver favoring fatty infiltration.  A CT might be able to assess better for cirrhosis.    Gallbladder wall thickening versus pericholecystic fluid.  Negative sonographic Roque sign.  No cholelithiasis.      Electronically signed by: Mauro Lew  Date:    01/04/2024  Time:    19:41               Narrative:    EXAMINATION:  US ABDOMEN LIMITED_LIVER    CLINICAL HISTORY:  hepatitis;    TECHNIQUE:  Limited ultrasound of the right upper quadrant of the abdomen  was performed.  Ultrasound images captured and stored.    COMPARISON:  None.    FINDINGS:  Liver: Normal in size, measuring 17.6 cm. No focal hepatic lesion.  There is increased hepatic parenchymal echogenicity.  No discrete nodularity of the liver surface is identified.  Hepatic and portal veins are patent with normal direction of flow.    Gallbladder: There is no stone.  There appears to be gallbladder wall thickening or a small amount of pericholecystic fluid.  Negative sonographic Roque sign.    Biliary system: The common duct is not dilated, measuring 4 mm.  No intrahepatic ductal dilatation.    Right kidney: Normal in size and echotexture, measuring 10 cm.    Miscellaneous: Small amount of upper abdominal ascites.  Right-sided pleural effusion is seen.                                       X-Ray Chest 1 View (Final result)  Result time 01/04/24 14:52:46      Final result by Mauro Lew MD (01/04/24 14:52:46)                   Impression:      Similar appearance of the chest.  The basilar densities with atelectasis and pleural effusions are similar.   Underlying interstitial edema may also be present.      Electronically signed by: Mauro Lew  Date:    01/04/2024  Time:    14:52               Narrative:    EXAMINATION:  XR CHEST 1 VIEW    CLINICAL HISTORY:  shortness of breath;    TECHNIQUE:  Single frontal view of the chest was performed.    COMPARISON:  12/30/2023    FINDINGS:  The cardiac silhouette is obscured secondary to interstitial and basilar opacities.  Small pleural effusions.  No pneumothorax.                                       Medications   ampicillin-sulbactam (UNASYN) 3 g in sodium chloride 0.9 % 100 mL IVPB (MB+) (0 g Intravenous Stopped 1/9/24 4035)   levoFLOXacin 750 mg/150 mL IVPB 750 mg (0 mg Intravenous Stopped 1/8/24 2015)   regadenoson injection 0.4 mg (0.4 mg Intravenous Given 1/9/24 1020)     Medical Decision Making  58 year old male presents to ED with complaint of shortness of breath. Patient reports he was seen in ED several days ago and was diagnosed with pneumonia. He was placed on steroids and antibiotics. He states shortness of breath is worsening and he is getting weaker. He reports falling off the couch on yesterday and attributes weakness to diagnosis of ALS and taking Tessalon Perles his significant other gave him. Denies fever; reports chills. Denies chest pain, vomiting; reports diarrhea with incontinent episode on yesterday after falling off the couch.     Labs, diagnostics obtained. IV Lasix, NS administered. Case discussed with Dr. Armendariz for admission    Amount and/or Complexity of Data Reviewed  Labs: ordered.     Details: Sodium 133 (*)  Glucose 112 (*)  BUN 33 (*)  BUN/Creatinine Ratio 31 (*)  Alk Phos 117 (*)   (*)   (*)  All other components within normal limits  CBC WITH DIFFERENTIAL - Abnormal; Notable for the following components:  WBC 13.08 (*)  RBC 4.44 (*)  MCH 31.5 (*)  Neutrophils % 71.0 (*)  Lymphocytes % 17.0 (*)  Monocytes % 11.2 (*)  Eosinophils % 0.2 (*)  Neutrophils, Abs 9.30  (*)  Monocytes, Absolute 1.47 (*)  Immature Granulocytes, Absolute 0.05 (*)    Radiology: ordered.     Details: Similar appearance of chest from 12/30    Risk  Prescription drug management.  Decision regarding hospitalization.                                      Clinical Impression:   Final diagnoses:  [R06.02] Shortness of breath        ED Disposition Condition    Admit                 Nelly Singh, FNP  01/25/24 7703

## 2024-01-05 PROBLEM — K75.9 HEPATITIS: Status: ACTIVE | Noted: 2024-01-05

## 2024-01-05 PROBLEM — I50.23 ACUTE ON CHRONIC SYSTOLIC HEART FAILURE: Status: ACTIVE | Noted: 2024-01-05

## 2024-01-05 PROBLEM — I50.9 NEW ONSET OF CONGESTIVE HEART FAILURE: Status: ACTIVE | Noted: 2024-01-05

## 2024-01-05 PROBLEM — J90 PLEURAL EFFUSION: Status: ACTIVE | Noted: 2024-01-05

## 2024-01-05 PROBLEM — J44.9 COPD (CHRONIC OBSTRUCTIVE PULMONARY DISEASE): Status: ACTIVE | Noted: 2024-01-04

## 2024-01-05 LAB
ALBUMIN SERPL BCP-MCNC: 3.2 G/DL (ref 3.5–5)
ALP SERPL-CCNC: 115 U/L (ref 45–115)
ALT SERPL W P-5'-P-CCNC: 129 U/L (ref 16–61)
ANION GAP SERPL CALCULATED.3IONS-SCNC: 9 MMOL/L (ref 7–16)
AORTIC ROOT ANNULUS: 2.68 CM
AORTIC VALVE CUSP SEPERATION: 2.19 CM
AST SERPL W P-5'-P-CCNC: 76 U/L (ref 15–37)
AV INDEX (PROSTH): 1.03
AV MEAN GRADIENT: 2 MMHG
AV PEAK GRADIENT: 3 MMHG
AV VALVE AREA BY VELOCITY RATIO: 2.63 CM²
AV VALVE AREA: 3.04 CM²
AV VELOCITY RATIO: 0.89
BASOPHILS # BLD AUTO: 0.03 K/UL (ref 0–0.2)
BASOPHILS NFR BLD AUTO: 0.3 % (ref 0–1)
BILIRUB DIRECT SERPL-MCNC: 0.3 MG/DL (ref 0–0.2)
BILIRUB SERPL-MCNC: 0.7 MG/DL (ref ?–1.2)
BSA FOR ECHO PROCEDURE: 1.83 M2
BUN SERPL-MCNC: 29 MG/DL (ref 7–18)
BUN/CREAT SERPL: 22 (ref 6–20)
CALCIUM SERPL-MCNC: 8.6 MG/DL (ref 8.5–10.1)
CHLORIDE SERPL-SCNC: 96 MMOL/L (ref 98–107)
CO2 SERPL-SCNC: 34 MMOL/L (ref 21–32)
CREAT SERPL-MCNC: 1.32 MG/DL (ref 0.7–1.3)
CV ECHO LV RWT: 0.3 CM
DIFFERENTIAL METHOD BLD: ABNORMAL
DOP CALC AO PEAK VEL: 0.82 M/S
DOP CALC AO VTI: 10.9 CM
DOP CALC LVOT AREA: 3 CM2
DOP CALC LVOT DIAMETER: 1.94 CM
DOP CALC LVOT PEAK VEL: 0.73 M/S
DOP CALC LVOT STROKE VOLUME: 33.09 CM3
DOP CALC MV VTI: 18.8 CM
DOP CALCLVOT PEAK VEL VTI: 11.2 CM
E WAVE DECELERATION TIME: 73.99 MSEC
ECHO LV POSTERIOR WALL: 0.9 CM (ref 0.6–1.1)
EGFR (NO RACE VARIABLE) (RUSH/TITUS): 63 ML/MIN/1.73M2
EOSINOPHIL # BLD AUTO: 0.04 K/UL (ref 0–0.5)
EOSINOPHIL NFR BLD AUTO: 0.4 % (ref 1–4)
ERYTHROCYTE [DISTWIDTH] IN BLOOD BY AUTOMATED COUNT: 12.9 % (ref 11.5–14.5)
FRACTIONAL SHORTENING: 13 % (ref 28–44)
GLUCOSE SERPL-MCNC: 112 MG/DL (ref 70–105)
GLUCOSE SERPL-MCNC: 116 MG/DL (ref 70–105)
GLUCOSE SERPL-MCNC: 140 MG/DL (ref 70–105)
GLUCOSE SERPL-MCNC: 140 MG/DL (ref 70–105)
GLUCOSE SERPL-MCNC: 70 MG/DL (ref 74–106)
GLUCOSE SERPL-MCNC: 87 MG/DL (ref 70–105)
HCT VFR BLD AUTO: 41.1 % (ref 40–54)
HGB BLD-MCNC: 13.5 G/DL (ref 13.5–18)
IMM GRANULOCYTES # BLD AUTO: 0.06 K/UL (ref 0–0.04)
IMM GRANULOCYTES NFR BLD: 0.6 % (ref 0–0.4)
INTERVENTRICULAR SEPTUM: 1.02 CM (ref 0.6–1.1)
IVC DIAMETER: 2.19 CM
LA MAJOR: 3.55 CM
LACTATE SERPL-SCNC: 1.7 MMOL/L (ref 0.4–2)
LEFT ATRIUM SIZE: 3.69 CM
LEFT INTERNAL DIMENSION IN SYSTOLE: 5.2 CM (ref 2.1–4)
LEFT VENTRICLE DIASTOLIC VOLUME INDEX: 80.13 ML/M2
LEFT VENTRICLE DIASTOLIC VOLUME: 148.24 ML
LEFT VENTRICLE MASS INDEX: 127 G/M2
LEFT VENTRICLE SYSTOLIC VOLUME INDEX: 70 ML/M2
LEFT VENTRICLE SYSTOLIC VOLUME: 129.43 ML
LEFT VENTRICULAR INTERNAL DIMENSION IN DIASTOLE: 6 CM (ref 3.5–6)
LEFT VENTRICULAR MASS: 234.22 G
LVCO: -6.3
LVOT MG: 1.2 MMHG
LVOT MV: 0.52 CM/S
LYMPHOCYTES # BLD AUTO: 1.34 K/UL (ref 1–4.8)
LYMPHOCYTES NFR BLD AUTO: 12.6 % (ref 27–41)
MAGNESIUM SERPL-MCNC: 2 MG/DL (ref 1.7–2.3)
MCH RBC QN AUTO: 31 PG (ref 27–31)
MCHC RBC AUTO-ENTMCNC: 32.8 G/DL (ref 32–36)
MCV RBC AUTO: 94.5 FL (ref 80–96)
MONOCYTES # BLD AUTO: 1.05 K/UL (ref 0–0.8)
MONOCYTES NFR BLD AUTO: 9.8 % (ref 2–6)
MPC BLD CALC-MCNC: 10.7 FL (ref 9.4–12.4)
MV MEAN GRADIENT: 3 MMHG
MV PEAK GRADIENT: 5 MMHG
MV STENOSIS PRESSURE HALF TIME: 21.46 MS
MV VALVE AREA BY CONTINUITY EQUATION: 1.76 CM2
MV VALVE AREA P 1/2 METHOD: 10.25 CM2
NEUTROPHILS # BLD AUTO: 8.14 K/UL (ref 1.8–7.7)
NEUTROPHILS NFR BLD AUTO: 76.3 % (ref 53–65)
NRBC # BLD AUTO: 0 X10E3/UL
NRBC, AUTO (.00): 0 %
OHS LV EJECTION FRACTION SIMPSONS BIPLANE MOD: 15 %
PHOSPHATE SERPL-MCNC: 3.3 MG/DL (ref 2.5–4.5)
PISA MRMAX VEL: 4.8 M/S
PISA TR MAX VEL: 3.41 M/S
PLATELET # BLD AUTO: 351 K/UL (ref 150–400)
POTASSIUM SERPL-SCNC: 3.7 MMOL/L (ref 3.5–5.1)
PROT SERPL-MCNC: 6.8 G/DL (ref 6.4–8.2)
PV PEAK GRADIENT: 1 MMHG
PV PEAK VELOCITY: 0.57 M/S
RA MAJOR: 4.48 CM
RA PRESSURE ESTIMATED: 15 MMHG
RBC # BLD AUTO: 4.35 M/UL (ref 4.6–6.2)
RIGHT VENTRICULAR END-DIASTOLIC DIMENSION: 5.17 CM
RV TB RVSP: 18 MMHG
SODIUM SERPL-SCNC: 135 MMOL/L (ref 136–145)
TDI LATERAL: 0.17 M/S
TDI SEPTAL: 0.05 M/S
TDI: 0.11 M/S
TR MAX PG: 47 MMHG
TRICUSPID ANNULAR PLANE SYSTOLIC EXCURSION: 1.55 CM
TV REST PULMONARY ARTERY PRESSURE: 62 MMHG
WBC # BLD AUTO: 10.66 K/UL (ref 4.5–11)
Z-SCORE OF LEFT VENTRICULAR DIMENSION IN END DIASTOLE: 1.55
Z-SCORE OF LEFT VENTRICULAR DIMENSION IN END SYSTOLE: 3.91

## 2024-01-05 PROCEDURE — 85025 COMPLETE CBC W/AUTO DIFF WBC: CPT | Performed by: STUDENT IN AN ORGANIZED HEALTH CARE EDUCATION/TRAINING PROGRAM

## 2024-01-05 PROCEDURE — 83735 ASSAY OF MAGNESIUM: CPT | Performed by: STUDENT IN AN ORGANIZED HEALTH CARE EDUCATION/TRAINING PROGRAM

## 2024-01-05 PROCEDURE — 25000003 PHARM REV CODE 250: Performed by: STUDENT IN AN ORGANIZED HEALTH CARE EDUCATION/TRAINING PROGRAM

## 2024-01-05 PROCEDURE — 84100 ASSAY OF PHOSPHORUS: CPT | Performed by: STUDENT IN AN ORGANIZED HEALTH CARE EDUCATION/TRAINING PROGRAM

## 2024-01-05 PROCEDURE — 94667 MNPJ CHEST WALL 1ST: CPT

## 2024-01-05 PROCEDURE — 82962 GLUCOSE BLOOD TEST: CPT

## 2024-01-05 PROCEDURE — 94640 AIRWAY INHALATION TREATMENT: CPT

## 2024-01-05 PROCEDURE — 80076 HEPATIC FUNCTION PANEL: CPT | Performed by: STUDENT IN AN ORGANIZED HEALTH CARE EDUCATION/TRAINING PROGRAM

## 2024-01-05 PROCEDURE — 63600175 PHARM REV CODE 636 W HCPCS: Performed by: STUDENT IN AN ORGANIZED HEALTH CARE EDUCATION/TRAINING PROGRAM

## 2024-01-05 PROCEDURE — 27000221 HC OXYGEN, UP TO 24 HOURS

## 2024-01-05 PROCEDURE — 99900035 HC TECH TIME PER 15 MIN (STAT)

## 2024-01-05 PROCEDURE — 80048 BASIC METABOLIC PNL TOTAL CA: CPT | Performed by: STUDENT IN AN ORGANIZED HEALTH CARE EDUCATION/TRAINING PROGRAM

## 2024-01-05 PROCEDURE — 94668 MNPJ CHEST WALL SBSQ: CPT

## 2024-01-05 PROCEDURE — 25000003 PHARM REV CODE 250: Performed by: NURSE PRACTITIONER

## 2024-01-05 PROCEDURE — 97165 OT EVAL LOW COMPLEX 30 MIN: CPT

## 2024-01-05 PROCEDURE — 27000190 HC CPAP FULL FACE MASK W/VALVE

## 2024-01-05 PROCEDURE — 94761 N-INVAS EAR/PLS OXIMETRY MLT: CPT

## 2024-01-05 PROCEDURE — 99223 1ST HOSP IP/OBS HIGH 75: CPT | Mod: ,,, | Performed by: STUDENT IN AN ORGANIZED HEALTH CARE EDUCATION/TRAINING PROGRAM

## 2024-01-05 PROCEDURE — 11000001 HC ACUTE MED/SURG PRIVATE ROOM

## 2024-01-05 PROCEDURE — 97162 PT EVAL MOD COMPLEX 30 MIN: CPT

## 2024-01-05 PROCEDURE — 25000242 PHARM REV CODE 250 ALT 637 W/ HCPCS: Performed by: STUDENT IN AN ORGANIZED HEALTH CARE EDUCATION/TRAINING PROGRAM

## 2024-01-05 PROCEDURE — 99900031 HC PATIENT EDUCATION (STAT)

## 2024-01-05 PROCEDURE — 99233 SBSQ HOSP IP/OBS HIGH 50: CPT | Mod: ,,, | Performed by: STUDENT IN AN ORGANIZED HEALTH CARE EDUCATION/TRAINING PROGRAM

## 2024-01-05 RX ORDER — LOSARTAN POTASSIUM 25 MG/1
25 TABLET ORAL DAILY
Status: DISCONTINUED | OUTPATIENT
Start: 2024-01-05 | End: 2024-01-11 | Stop reason: HOSPADM

## 2024-01-05 RX ORDER — METHOCARBAMOL 500 MG/1
500 TABLET, FILM COATED ORAL 2 TIMES DAILY
COMMUNITY
Start: 2023-10-04 | End: 2024-01-30

## 2024-01-05 RX ORDER — FUROSEMIDE 10 MG/ML
40 INJECTION INTRAMUSCULAR; INTRAVENOUS
Status: DISCONTINUED | OUTPATIENT
Start: 2024-01-05 | End: 2024-01-06

## 2024-01-05 RX ORDER — METOPROLOL SUCCINATE 200 MG/1
100 TABLET, EXTENDED RELEASE ORAL DAILY
Status: ON HOLD | COMMUNITY
Start: 2023-10-04 | End: 2024-01-11 | Stop reason: HOSPADM

## 2024-01-05 RX ORDER — LOSARTAN POTASSIUM 25 MG/1
25 TABLET ORAL DAILY
Status: DISCONTINUED | OUTPATIENT
Start: 2024-01-06 | End: 2024-01-05

## 2024-01-05 RX ORDER — ACETYLCYSTEINE 200 MG/ML
2 SOLUTION ORAL; RESPIRATORY (INHALATION)
Status: DISCONTINUED | OUTPATIENT
Start: 2024-01-05 | End: 2024-01-11 | Stop reason: HOSPADM

## 2024-01-05 RX ORDER — SILDENAFIL 100 MG/1
100 TABLET, FILM COATED ORAL WEEKLY
Status: ON HOLD | COMMUNITY
Start: 2023-10-04 | End: 2024-01-11 | Stop reason: HOSPADM

## 2024-01-05 RX ADMIN — LOSARTAN POTASSIUM 25 MG: 25 TABLET, FILM COATED ORAL at 08:01

## 2024-01-05 RX ADMIN — AMPICILLIN AND SULBACTAM 3 G: 2; 1 INJECTION, POWDER, FOR SOLUTION INTRAMUSCULAR; INTRAVENOUS at 01:01

## 2024-01-05 RX ADMIN — AMPICILLIN AND SULBACTAM 3 G: 2; 1 INJECTION, POWDER, FOR SOLUTION INTRAMUSCULAR; INTRAVENOUS at 11:01

## 2024-01-05 RX ADMIN — FUROSEMIDE 40 MG: 10 INJECTION, SOLUTION INTRAVENOUS at 12:01

## 2024-01-05 RX ADMIN — IPRATROPIUM BROMIDE AND ALBUTEROL SULFATE 3 ML: .5; 3 SOLUTION RESPIRATORY (INHALATION) at 01:01

## 2024-01-05 RX ADMIN — IPRATROPIUM BROMIDE AND ALBUTEROL SULFATE 3 ML: .5; 3 SOLUTION RESPIRATORY (INHALATION) at 06:01

## 2024-01-05 RX ADMIN — IPRATROPIUM BROMIDE AND ALBUTEROL SULFATE 3 ML: .5; 3 SOLUTION RESPIRATORY (INHALATION) at 12:01

## 2024-01-05 RX ADMIN — LEVOFLOXACIN 750 MG: 750 INJECTION, SOLUTION INTRAVENOUS at 07:01

## 2024-01-05 RX ADMIN — ACETYLCYSTEINE 2 ML: 200 INHALANT RESPIRATORY (INHALATION) at 06:01

## 2024-01-05 RX ADMIN — IPRATROPIUM BROMIDE AND ALBUTEROL SULFATE 3 ML: .5; 3 SOLUTION RESPIRATORY (INHALATION) at 07:01

## 2024-01-05 RX ADMIN — ACETAMINOPHEN 1000 MG: 500 TABLET ORAL at 10:01

## 2024-01-05 RX ADMIN — AMPICILLIN AND SULBACTAM 3 G: 2; 1 INJECTION, POWDER, FOR SOLUTION INTRAMUSCULAR; INTRAVENOUS at 06:01

## 2024-01-05 RX ADMIN — ACETYLCYSTEINE 2 ML: 200 SOLUTION ORAL; RESPIRATORY (INHALATION) at 07:01

## 2024-01-05 RX ADMIN — ACETYLCYSTEINE 2 ML: 200 SOLUTION ORAL; RESPIRATORY (INHALATION) at 01:01

## 2024-01-05 RX ADMIN — ENOXAPARIN SODIUM 40 MG: 40 INJECTION SUBCUTANEOUS at 06:01

## 2024-01-05 NOTE — PLAN OF CARE
Problem: Occupational Therapy  Goal: Occupational Therapy Goal  Description: STG:  Pt will perform grooming with setup  Pt will bathe with Lamberto with setup at EOB  Pt will perform UE dressing with Lianet  Pt will perform LE dressing with Lamberto  Pt will sit EOB x 10 min with SBA   Pt will transfer bed/chair/bsc with CGA with RW  Pt will perform standing task x 2 min with CGA with RW   Pt will tolerate 15 minutes of tx without fatigue      LT.Restore to max I with self care and mobility.      Outcome: Ongoing, Progressing
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
## 2024-01-05 NOTE — SUBJECTIVE & OBJECTIVE
Interval History: naeo    Review of Systems   Constitutional:  Positive for fatigue and fever. Negative for chills and unexpected weight change.   HENT:  Negative for congestion, mouth sores and sore throat.    Eyes:  Negative for photophobia and visual disturbance.   Respiratory:  Positive for cough and shortness of breath. Negative for chest tightness and wheezing.    Cardiovascular:  Positive for leg swelling. Negative for chest pain and palpitations.   Gastrointestinal:  Negative for abdominal pain, diarrhea, nausea and vomiting.   Endocrine: Negative for cold intolerance and heat intolerance.   Genitourinary:  Negative for difficulty urinating, dysuria, frequency and urgency.   Musculoskeletal:  Negative for arthralgias, back pain and myalgias.   Skin:  Negative for pallor and rash.   Neurological:  Positive for weakness. Negative for tremors, seizures, syncope, numbness and headaches.   Hematological:  Does not bruise/bleed easily.   Psychiatric/Behavioral:  Negative for agitation, confusion, hallucinations and suicidal ideas.      Objective:     Vital Signs (Most Recent):  Temp: 97.5 °F (36.4 °C) (01/05/24 1155)  Pulse: 103 (01/05/24 1319)  Resp: 20 (01/05/24 1319)  BP: 118/84 (01/05/24 1300)  SpO2: 100 % (01/05/24 1319) Vital Signs (24h Range):  Temp:  [97.5 °F (36.4 °C)-97.8 °F (36.6 °C)] 97.5 °F (36.4 °C)  Pulse:  [] 103  Resp:  [18-28] 20  SpO2:  [89 %-100 %] 100 %  BP: (108-148)/() 118/84     Weight: 68 kg (150 lb)  Body mass index is 21.52 kg/m².    Intake/Output Summary (Last 24 hours) at 1/5/2024 1338  Last data filed at 1/5/2024 1321  Gross per 24 hour   Intake 490 ml   Output 5500 ml   Net -5010 ml         Physical Exam  Vitals reviewed.   Constitutional:       Appearance: Normal appearance.   HENT:      Head: Normocephalic and atraumatic.      Nose: Nose normal.   Eyes:      Extraocular Movements: Extraocular movements intact.      Conjunctiva/sclera: Conjunctivae normal.   Neck:       Trachea: Trachea normal.   Cardiovascular:      Rate and Rhythm: Normal rate and regular rhythm.      Pulses: Normal pulses.      Heart sounds: Normal heart sounds.   Pulmonary:      Breath sounds: Normal air entry. Rales present.   Abdominal:      General: Bowel sounds are normal.      Palpations: Abdomen is soft.   Musculoskeletal:      Cervical back: Neck supple.      Right lower leg: Edema present.      Left lower leg: Edema present.      Comments: Moves all extremities, normal tone   Skin:     General: Skin is warm and dry.   Neurological:      General: No focal deficit present.      Mental Status: He is alert and oriented to person, place, and time.      Cranial Nerves: Cranial nerves 2-12 are intact.      Comments: Grossly normal motor and sensory function without focal deficit appreciated.   Psychiatric:         Mood and Affect: Affect normal.         Behavior: Behavior is cooperative.      Comments: Abnormal behavior and thought content             Significant Labs: All pertinent labs within the past 24 hours have been reviewed.  Cbc, bmp reviewed  Significant Imaging: I have reviewed all pertinent imaging results/findings within the past 24 hours.  Echo reviewed severely reduced LVEF

## 2024-01-05 NOTE — PT/OT/SLP EVAL
Physical Therapy Evaluation and Treatment    Patient Name: Derek Moreno   MRN: [id number]  Recent Surgery: * No surgery found *      Recommendations:     Discharge Recommendations: Low Intensity Therapy (vs moderate intensity rehab)   Discharge Equipment Recommendations: to be determined by next level of care   Barriers to discharge: Increased level of assist and Ongoing medical treatment    Assessment:     Derek Moreno is a 58 y.o. male admitted with a medical diagnosis of Severe sepsis. He presents with the following impairments/functional limitations: weakness, impaired endurance, impaired self care skills, impaired functional mobility, gait instability, impaired balance, impaired cognition, decreased lower extremity function, decreased safety awareness, pain, impaired skin, edema.   Patient participated with PT evaluation but a very unreliable historian with conflicting responses and tangential thoughts. Patient demonstrates significant edema/erythema/pain B LE limiting gait tolerance. PT to follow to optimize independent mobility. D/c plan to be determined by rate of recovery and support systems in place.    Rehab Prognosis: Good; patient would benefit from acute PT services to address these deficits and reach maximum level of function.    Plan:     During this hospitalization, patient to be seen 5 x/week to address the above listed problems via gait training, therapeutic activities, therapeutic exercises    Plan of Care Expires: 02/05/24    Subjective     Chief Complaint: Severe sepsis ; edema/pain B LE  Patient Comments/Goals: Patient with tangential thoughts and moderate distress; labile emotions  Pain/Comfort:  Pain Rating 1: 6/10 (up to 8/10 during weight bearing)  Location - Side 1: Bilateral  Location 1: foot  Pain Addressed 1: Reposition, Distraction, Cessation of Activity  Pain Rating Post-Intervention 1: 6/10    Social History:  Living Environment: Patient home situation unknown. Patient  "with different answer to the question each time asked. From conversion van to multiple different types of residences to climbing in and out of the window via a ladder.   Prior Level of Function: Prior to admission, patient was independent  Equipment Used at Home: none  DME owned (not currently used):  unknown  Assistance Upon Discharge:  unknown    Objective:     Communicated with Jesiska Delacruz RN prior to session. Patient found supine with blood pressure cuff, pulse ox (continuous), telemetry, peripheral IV, oxygen upon PT entry to room.    General Precautions: Standard, fall   Orthopedic Precautions: N/A   Braces: N/A    Respiratory Status: Nasal cannula, flow 2 L/min    Exams:  Cognition: Patient is oriented to Person and Place  RLE ROM: WFL except painful at ankle with edema/erythema  RLE Strength: Deficits: 4/5  LLE ROM:  as per right  LLE Strength: Deficits: 4/5  Sensation:    -       Intact  Skin Integrity/Edema:     -       Skin integrity: significant erythema distal to knee bilaterally  -       Edema: Moderate bilateral distal to knee    Functional Mobility:  Gait belt applied -  applied and then removed as pt claimed the belt was triggering a panic attack.  Bed Mobility  Rolling Left: stand by assistance  Rolling Right: stand by assistance  Supine to Sit: stand by assistance   Sit to Supine: stand by assistance   Transfers  Sit to Stand: contact guard assistance with hand-held assist x 2 and with cues for sequencing  Gait  Patient ambulated 5' forward and retro with hand-held assist and contact guard assistance. Patient demonstrates steady gait, decreased step length, decreased weight shift, and decreased foot clearance. C/o "needles shooting feet". All lines remained intact throughout ambulation trail.  Balance  Sitting: stand by assistance  Standing: contact guard assistance and HHA x 2      Therapeutic Activities and Exercises:   Patient educated on role of acute care PT and PT POC, safety while in " hospital including calling nurse for mobility, and call light usage  Patient educated about importance of OOB mobility and remaining up in chair most of the day.    AM-PAC 6 CLICK MOBILITY  Total Score:17    Patient left HOB elevated with all lines intact, call button in reach, RN notified, and bed alarm on.    GOALS:   Multidisciplinary Problems       Physical Therapy Goals          Problem: Physical Therapy    Goal Priority Disciplines Outcome Goal Variances Interventions   Physical Therapy Goal     PT, PT/OT Ongoing, Progressing     Description: Short Term Goals to be met by: 2024    Patient will increase functional independence with mobility by performin. Supine to sit with independently  2. Sit to stand transfer with independently using lowest level of assistive device  3. Bed to chair transfer with independently using lowest level of assistive device  4. Gait  x 100 feet with independently using lowest level of assistive device  5. Lower extremity exercise program x30 reps per handout, with assistance as needed    Long Term Goals to be met by: 2024    Pt will regain full independent functional mobility with no assistive device to return to home situation and prior activities of daily living.                        History:   History reviewed. No pertinent past medical history.    History reviewed. No pertinent surgical history.    Time Tracking:     PT Received On: 24  PT Start Time: 911  PT Stop Time: 931  PT Total Time (min): 20 min     Billable Minutes: Evaluation Moderate complexity    2024

## 2024-01-05 NOTE — ASSESSMENT & PLAN NOTE
Chronic, controlled. Latest blood pressure and vitals reviewed-     Temp:  [97.5 °F (36.4 °C)-97.8 °F (36.6 °C)]   Pulse:  []   Resp:  [18-28]   BP: (108-148)/()   SpO2:  [89 %-100 %] .   Home meds for hypertension were reviewed and noted below.       While in the hospital, will manage blood pressure as follows; Continue home antihypertensive regimen    Will utilize p.r.n. blood pressure medication only if patient's blood pressure greater than 180/110 and he develops symptoms such as worsening chest pain or shortness of breath.  Bp reasonably controlled, continue current regimen

## 2024-01-05 NOTE — ASSESSMENT & PLAN NOTE
This patient does have evidence of infective focus  My overall impression is sepsis.  Source: Respiratory  Antibiotics given-   Antibiotics (72h ago, onward)      Start     Stop Route Frequency Ordered    01/04/24 1815  ampicillin-sulbactam (UNASYN) 3 g in sodium chloride 0.9 % 100 mL IVPB (MB+)         -- IV Every 6 hours (non-standard times) 01/04/24 1707    01/04/24 1815  levoFLOXacin 750 mg/150 mL IVPB 750 mg         -- IV Every 24 hours (non-standard times) 01/04/24 1707          Latest lactate reviewed-  Recent Labs   Lab 01/04/24  1822 01/04/24  2102 01/04/24  2338   LACTATE 3.1* 2.3* 1.7     Organ dysfunction indicated by Acute respiratory failure    Fluid challenge Not needed - patient is not hypotensive      Post- resuscitation assessment No - Post resuscitation assessment not needed       Will Not start Pressors- Levophed for MAP of 65  Source control achieved by:     Patient volume overload, hold fluid for now  Abx  Cultures  Lactate    Continue abx

## 2024-01-05 NOTE — ASSESSMENT & PLAN NOTE
Patient with Hypoxic Respiratory failure which is Acute.  he is not on home oxygen. Supplemental oxygen was provided and noted- Oxygen Concentration (%):  [28-30] 28    .   Signs/symptoms of respiratory failure include- tachypnea, increased work of breathing, and respiratory distress. Contributing diagnoses includes - Aspiration, CHF, and Pneumonia Labs and images were reviewed. Patient Has not had a recent ABG. Will treat underlying causes and adjust management of respiratory failure as follows-     Abg pending  Multifactorial, fever, cough with rll opacity in the setting of etoh use concerning for aspiration pna  Will cover with unasy, levaquin, is, chest pt, mucolytics    BNP up with edema on xr and le edema  Will diurese  Hold fluids as above  Low sodium diet  Strict intake and output    Noted UA findings, complex picture    Continue diuresis  Will consult Cardiology given what appears to be severely reduced LVEF

## 2024-01-05 NOTE — PT/OT/SLP EVAL
Occupational Therapy   Evaluation    Name: Derek Moreno  MRN: [id number]  Admitting Diagnosis: Severe sepsis  Recent Surgery: * No surgery found *      Recommendations:     Discharge Recommendations: Low Intensity Therapy  Discharge Equipment Recommendations:  to be determined by next level of care  Barriers to discharge:  Decreased caregiver support, Inaccessible home environment    Assessment:     Derek Moreno is a 58 y.o. male with a medical diagnosis of Severe sepsis.  He presents with weakness and decline in ADLs. Performance deficits affecting function: weakness, impaired endurance, impaired self care skills, impaired functional mobility, gait instability, impaired balance, pain, decreased safety awareness.      Rehab Prognosis: Good; patient would benefit from acute skilled OT services to address these deficits and reach maximum level of function.       Plan:     Patient to be seen 5 x/week to address the above listed problems via self-care/home management, therapeutic activities, therapeutic exercises  Plan of Care Expires: 02/02/24  Plan of Care Reviewed with: patient    Subjective     Chief Complaint: sepsis  Patient/Family Comments/goals: pt agreeable to OT eval    Occupational Profile:  Living Environment: pt living situation unknown  Previous level of function: independent with ADL tasks  Roles and Routines: perform self care  Equipment Used at Home: none  Assistance upon Discharge: swing bed versus home with home health    Pain/Comfort:  Pain Rating 1: 6/10  Location - Side 1: Bilateral  Location 1: foot  Pain Addressed 1: Reposition, Distraction, Cessation of Activity    Patients cultural, spiritual, Pentec[employer]al conflicts given the current situation: no    Objective:     Communicated with: LUIS Rosen prior to session.  Patient found supine with blood pressure cuff, oxygen, peripheral IV, pulse ox (continuous), telemetry upon OT entry to room.    General Precautions: Standard,  fall  Orthopedic Precautions: N/A  Braces: N/A  Respiratory Status: Nasal cannula, flow 2 L/min    Occupational Performance:    Bed Mobility:    Patient completed Supine to Sit with modified independence  Patient completed Sit to Supine with modified independence    Functional Mobility/Transfers:  Patient completed Sit <> Stand Transfer with contact guard assistance  with  hand-held assist   Functional Mobility: pt performed steps in room with CGA with hand held assist    Activities of Daily Living:  Lower Body Dressing: maximal assistance to hero socks    Cognitive/Visual Perceptual:  Cognitive/Psychosocial Skills:     -       Oriented to: Person, Place, Time, and Situation   -       Follows Commands/attention:Follows multistep  commands    Physical Exam:  Balance:    -       sitting balance WFL; standing balance fair  Upper Extremity Range of Motion:     -       Right Upper Extremity: WFL  -       Left Upper Extremity: WFL  Upper Extremity Strength:    -       Right Upper Extremity: 4/5  -       Left Upper Extremity: 4/5  Gross motor coordination:   WFL    AMPAC 6 Click ADL:  AMPAC Total Score: 21    Treatment & Education:  Pt educated on OT role/POC.   Importance of OOB activity with staff assistance.  Importance of sitting up in the chair throughout the day as tolerated, especially for meals   Safety during functional t/f and mobility with use of RW  Importance of assisting with self-care activities   All questions/concerns answered within OT scope of practice      Patient left HOB elevated with all lines intact, call button in reach, and LUIS Rosen notified    GOALS:   Multidisciplinary Problems       Occupational Therapy Goals          Problem: Occupational Therapy    Goal Priority Disciplines Outcome Interventions   Occupational Therapy Goal     OT, PT/OT Ongoing, Progressing    Description: STG:  Pt will perform grooming with setup  Pt will bathe with Lamberto with setup at EOB  Pt will perform UE dressing with  Lianet  Pt will perform LE dressing with Lamberto  Pt will sit EOB x 10 min with SBA   Pt will transfer bed/chair/bsc with CGA with RW  Pt will perform standing task x 2 min with CGA with RW   Pt will tolerate 15 minutes of tx without fatigue      LT.Restore to max I with self care and mobility.                           History:     No past medical history on file.    No past surgical history on file.    Time Tracking:     OT Date of Treatment: 24  OT Start Time: 915  OT Stop Time: 932  OT Total Time (min): 17 min    Billable Minutes:Evaluation OT min complexity eval    2024

## 2024-01-05 NOTE — NURSING
Pt recv'd from ER via bed, A&Ox3, Resp even and non-labored, +BS, +Pedal pulses. No c/o voiced. NAD.

## 2024-01-05 NOTE — PHARMACY MED REC
"Admission Medication History     The home medication history was taken by America Freed.    You may go to "Admission" then "Reconcile Home Medications" tabs to review and/or act upon these items.     The home medication list has been updated by the Pharmacy department.   Please read ALL comments highlighted in yellow.   Please address this information as you see fit.    Feel free to contact us if you have any questions or require assistance.  I was unable to get a specific history from the patient, he [facility]  I spoke with [facility] pharmacies in [place], [place] and [facility] and besides what is shown on admission, the patient's medication history with them ends in 2021. This was the most current list up until 2021.  Combivent inhaler  Duloxetine 30 mg BID (patient stated he took 90 mg in the mornings)   Lisinopril 40 mg daily  Simvastatin 80 mg (1/2 tab in the evenings 40 mg)  Omeprazole 20 mg BID  Melatonin 3 mg (9 mg before bed)  Loratadine 10 mg daily  Loperamide QID for diarrrhea  Lidocaine patch for 12 hrs daily  Meclizine 25 mg  Hydroxyzine pamoate 25 mg TID  Guaifenesin 400 mg BID  Gabapentin 300 mg (600 mg BID)  Ibuprofen 800 mg (patient stated he took three times daily)  Fluticasone   Fish oil 1000 mg BID  Cyanocobalamin 1000 mcg monthly  Trazodone for sleep  [facility] stated it was noted that patient has tolerated Quetiapine 100 mg.    The patient did state he had stayed on the "third floor" of the [facility] in [facility] for rehab and mental health for a length of time.  I have been unable to determine a timeline for this stay or if he received any discharge medications.           Medications listed below were obtained from: Patient/family, [employer], and Patient's pharmacy  (Not in a hospital admission)        Current Outpatient Medications on File Prior to Encounter   Medication Sig Dispense Refill Last Dose    methocarbamoL (ROBAXIN) " 500 MG Tab Take 500 mg by mouth 2 (two) times a day.       metoprolol succinate (TOPROL-XL) 200 MG 24 hr tablet Take 100 mg by mouth once daily. Take 1/2 tablet (100 mg) daily       sildenafiL (VIAGRA) 100 MG tablet Take 100 mg by mouth once a week. As needed prior to sexual activity LIMIT 18 DOSES PER 90 DAYS       albuterol (PROVENTIL/VENTOLIN HFA) 90 mcg/actuation inhaler Inhale 1-2 puffs into the lungs every 6 (six) hours as needed for Wheezing. Rescue 6.7 g 1     amoxicillin-clavulanate 875-125mg (AUGMENTIN) 875-125 mg per tablet Take 1 tablet by mouth 2 (two) times daily. 20 tablet 0     predniSONE (DELTASONE) 50 MG Tab Take 1 tablet (50 mg total) by mouth once daily. 6 tablet 0        Potential issues to be addressed PRIOR TO DISCHARGE  Please discuss with the patient barriers to adherence with medication treatment plans  Patient requires education regarding drug therapies       America Freed  EXT [id number]                 .

## 2024-01-05 NOTE — ASSESSMENT & PLAN NOTE
Patient's COPD is with exacerbation noted by continued dyspnea and use of accessory muscles for breathing currently.  Patient is currently off COPD Pathway. Continue scheduled inhalers Antibiotics and Supplemental oxygen and monitor respiratory status closely.     Believe this is multifactorial, copd may be contributing. Consider steroids if no better in am  Not exacerbation  No steroids for now

## 2024-01-05 NOTE — ASSESSMENT & PLAN NOTE
Patient has persistent depression which is unknown and is currently controlled. Will Begin anti-depressant medications. We will not consult psychiatry at this time. Patient does not display psychosis at this time. Continue to monitor closely and adjust plan of care as needed.    In my opinion patient has some undiagnosed psychiatric illness, his line thought and speaking continues to be abnormal

## 2024-01-05 NOTE — ASSESSMENT & PLAN NOTE
Continue IV diuresis  Fluid restriction to 1.5L daily  Sodium restriction to <2g daily  Daily weights  Strict I/O  Will address GDMT prior to discharge   Consult cardiology

## 2024-01-05 NOTE — HOSPITAL COURSE
1/5-breathing better today  1/6-breathing better  1/7-agitated overnight  1/8- still with oxygen requirement    Patient admitted with hypoxic respiratory failure with pneumonia and COPD.  He ws treated with IV abx, and nebs.   Echo cardiogram done and patient was found to have EF 15%.  This was previously unknown.  Ischemic eval pursued with lexiscan that was abnormal.  This was followed by [facility] which showed old 100% RCA oclusion, Left side was clean.  There appeared to be some collateral flow from the left backfilling the RCA past the RCA occlusion. Patient was started on GDMT.  He is a  and [facility]. But he would like to see [facility].    From a pneumonia standpoint patietn gradually improved and was weaned off oxygen.

## 2024-01-05 NOTE — PLAN OF CARE
Problem: Physical Therapy  Goal: Physical Therapy Goal  Description: Short Term Goals to be met by: 2024    Patient will increase functional independence with mobility by performin. Supine to sit with independently  2. Sit to stand transfer with independently using lowest level of assistive device  3. Bed to chair transfer with independently using lowest level of assistive device  4. Gait  x 100 feet with independently using lowest level of assistive device  5. Lower extremity exercise program x30 reps per handout, with assistance as needed    Long Term Goals to be met by: 2024    Pt will regain full independent functional mobility with no assistive device to return to home situation and prior activities of daily living.   Outcome: Ongoing, Progressing     Patient participated with PT evaluation but a very unreliable historian with conflicting responses and tangential thoughts. Patient demonstrates significant edema/erythema/pain B LE limiting gait tolerance. PT to follow to optimize independent mobility. D/c plan to be determined by rate of recovery and support systems in place.

## 2024-01-05 NOTE — PLAN OF CARE
Problem: Adult Inpatient Plan of Care  Goal: Plan of Care Review  Outcome: Ongoing, Progressing  Goal: Patient-Specific Goal (Individualized)  Outcome: Ongoing, Progressing  Goal: Absence of Hospital-Acquired Illness or Injury  Outcome: Ongoing, Progressing  Goal: Optimal Comfort and Wellbeing  Outcome: Ongoing, Progressing  Goal: Readiness for Transition of Care  Outcome: Ongoing, Progressing     Problem: Gas Exchange Impaired  Goal: Optimal Gas Exchange  Outcome: Ongoing, Progressing     Problem: Airway Clearance Ineffective  Goal: Effective Airway Clearance  Outcome: Ongoing, Progressing     Problem: Adjustment to Illness (Sepsis/Septic Shock)  Goal: Optimal Coping  Outcome: Ongoing, Progressing     Problem: Bleeding (Sepsis/Septic Shock)  Goal: Absence of Bleeding  Outcome: Ongoing, Progressing     Problem: Glycemic Control Impaired (Sepsis/Septic Shock)  Goal: Blood Glucose Level Within Desired Range  Outcome: Ongoing, Progressing     Problem: Infection Progression (Sepsis/Septic Shock)  Goal: Absence of Infection Signs and Symptoms  Outcome: Ongoing, Progressing     Problem: Nutrition Impaired (Sepsis/Septic Shock)  Goal: Optimal Nutrition Intake  Outcome: Ongoing, Progressing

## 2024-01-06 LAB
ANION GAP SERPL CALCULATED.3IONS-SCNC: 9 MMOL/L (ref 7–16)
BASOPHILS # BLD AUTO: 0.05 K/UL (ref 0–0.2)
BASOPHILS NFR BLD AUTO: 0.6 % (ref 0–1)
BUN SERPL-MCNC: 30 MG/DL (ref 7–18)
BUN/CREAT SERPL: 21 (ref 6–20)
CALCIUM SERPL-MCNC: 8.7 MG/DL (ref 8.5–10.1)
CHLORIDE SERPL-SCNC: 96 MMOL/L (ref 98–107)
CO2 SERPL-SCNC: 32 MMOL/L (ref 21–32)
CREAT SERPL-MCNC: 1.4 MG/DL (ref 0.7–1.3)
DIFFERENTIAL METHOD BLD: ABNORMAL
EGFR (NO RACE VARIABLE) (RUSH/TITUS): 59 ML/MIN/1.73M2
EOSINOPHIL # BLD AUTO: 0.1 K/UL (ref 0–0.5)
EOSINOPHIL NFR BLD AUTO: 1.1 % (ref 1–4)
ERYTHROCYTE [DISTWIDTH] IN BLOOD BY AUTOMATED COUNT: 13 % (ref 11.5–14.5)
GLUCOSE SERPL-MCNC: 104 MG/DL (ref 74–106)
GLUCOSE SERPL-MCNC: 109 MG/DL (ref 70–105)
GLUCOSE SERPL-MCNC: 114 MG/DL (ref 70–105)
GLUCOSE SERPL-MCNC: 142 MG/DL (ref 70–105)
HBV CORE AB SERPL QL IA: NEGATIVE
HCT VFR BLD AUTO: 41.5 % (ref 40–54)
HGB BLD-MCNC: 13.6 G/DL (ref 13.5–18)
IMM GRANULOCYTES # BLD AUTO: 0.06 K/UL (ref 0–0.04)
IMM GRANULOCYTES NFR BLD: 0.7 % (ref 0–0.4)
LYMPHOCYTES # BLD AUTO: 1.29 K/UL (ref 1–4.8)
LYMPHOCYTES NFR BLD AUTO: 14.6 % (ref 27–41)
MAGNESIUM SERPL-MCNC: 2.3 MG/DL (ref 1.7–2.3)
MCH RBC QN AUTO: 31.2 PG (ref 27–31)
MCHC RBC AUTO-ENTMCNC: 32.8 G/DL (ref 32–36)
MCV RBC AUTO: 95.2 FL (ref 80–96)
MONOCYTES # BLD AUTO: 0.94 K/UL (ref 0–0.8)
MONOCYTES NFR BLD AUTO: 10.6 % (ref 2–6)
MPC BLD CALC-MCNC: 10.8 FL (ref 9.4–12.4)
NEUTROPHILS # BLD AUTO: 6.42 K/UL (ref 1.8–7.7)
NEUTROPHILS NFR BLD AUTO: 72.4 % (ref 53–65)
NRBC # BLD AUTO: 0 X10E3/UL
NRBC, AUTO (.00): 0 %
PHOSPHATE SERPL-MCNC: 5 MG/DL (ref 2.5–4.5)
PLATELET # BLD AUTO: 324 K/UL (ref 150–400)
POTASSIUM SERPL-SCNC: 3.9 MMOL/L (ref 3.5–5.1)
RBC # BLD AUTO: 4.36 M/UL (ref 4.6–6.2)
SODIUM SERPL-SCNC: 133 MMOL/L (ref 136–145)
WBC # BLD AUTO: 8.86 K/UL (ref 4.5–11)

## 2024-01-06 PROCEDURE — 94640 AIRWAY INHALATION TREATMENT: CPT

## 2024-01-06 PROCEDURE — 85025 COMPLETE CBC W/AUTO DIFF WBC: CPT | Performed by: STUDENT IN AN ORGANIZED HEALTH CARE EDUCATION/TRAINING PROGRAM

## 2024-01-06 PROCEDURE — 25000003 PHARM REV CODE 250: Performed by: STUDENT IN AN ORGANIZED HEALTH CARE EDUCATION/TRAINING PROGRAM

## 2024-01-06 PROCEDURE — 25000003 PHARM REV CODE 250: Performed by: NURSE PRACTITIONER

## 2024-01-06 PROCEDURE — 99232 SBSQ HOSP IP/OBS MODERATE 35: CPT | Mod: ,,, | Performed by: STUDENT IN AN ORGANIZED HEALTH CARE EDUCATION/TRAINING PROGRAM

## 2024-01-06 PROCEDURE — 83735 ASSAY OF MAGNESIUM: CPT | Performed by: STUDENT IN AN ORGANIZED HEALTH CARE EDUCATION/TRAINING PROGRAM

## 2024-01-06 PROCEDURE — 82962 GLUCOSE BLOOD TEST: CPT

## 2024-01-06 PROCEDURE — S4991 NICOTINE PATCH NONLEGEND: HCPCS | Performed by: INTERNAL MEDICINE

## 2024-01-06 PROCEDURE — 80048 BASIC METABOLIC PNL TOTAL CA: CPT | Performed by: STUDENT IN AN ORGANIZED HEALTH CARE EDUCATION/TRAINING PROGRAM

## 2024-01-06 PROCEDURE — 11000001 HC ACUTE MED/SURG PRIVATE ROOM

## 2024-01-06 PROCEDURE — 94668 MNPJ CHEST WALL SBSQ: CPT

## 2024-01-06 PROCEDURE — 25000003 PHARM REV CODE 250: Performed by: INTERNAL MEDICINE

## 2024-01-06 PROCEDURE — 99900035 HC TECH TIME PER 15 MIN (STAT)

## 2024-01-06 PROCEDURE — 63600175 PHARM REV CODE 636 W HCPCS: Performed by: STUDENT IN AN ORGANIZED HEALTH CARE EDUCATION/TRAINING PROGRAM

## 2024-01-06 PROCEDURE — 25000242 PHARM REV CODE 250 ALT 637 W/ HCPCS: Performed by: STUDENT IN AN ORGANIZED HEALTH CARE EDUCATION/TRAINING PROGRAM

## 2024-01-06 PROCEDURE — 84100 ASSAY OF PHOSPHORUS: CPT | Performed by: STUDENT IN AN ORGANIZED HEALTH CARE EDUCATION/TRAINING PROGRAM

## 2024-01-06 PROCEDURE — 94761 N-INVAS EAR/PLS OXIMETRY MLT: CPT

## 2024-01-06 RX ORDER — FUROSEMIDE 40 MG/1
40 TABLET ORAL DAILY
Status: DISCONTINUED | OUTPATIENT
Start: 2024-01-07 | End: 2024-01-08

## 2024-01-06 RX ORDER — IBUPROFEN 200 MG
1 TABLET ORAL DAILY
Status: DISCONTINUED | OUTPATIENT
Start: 2024-01-06 | End: 2024-01-11 | Stop reason: HOSPADM

## 2024-01-06 RX ADMIN — ALPRAZOLAM 1 MG: 0.5 TABLET ORAL at 02:01

## 2024-01-06 RX ADMIN — IPRATROPIUM BROMIDE AND ALBUTEROL SULFATE 3 ML: .5; 3 SOLUTION RESPIRATORY (INHALATION) at 07:01

## 2024-01-06 RX ADMIN — NICOTINE 1 PATCH: 21 PATCH, EXTENDED RELEASE TRANSDERMAL at 09:01

## 2024-01-06 RX ADMIN — AMPICILLIN AND SULBACTAM 3 G: 2; 1 INJECTION, POWDER, FOR SOLUTION INTRAMUSCULAR; INTRAVENOUS at 05:01

## 2024-01-06 RX ADMIN — ACETYLCYSTEINE 2 ML: 200 SOLUTION ORAL; RESPIRATORY (INHALATION) at 01:01

## 2024-01-06 RX ADMIN — IPRATROPIUM BROMIDE AND ALBUTEROL SULFATE 3 ML: .5; 3 SOLUTION RESPIRATORY (INHALATION) at 01:01

## 2024-01-06 RX ADMIN — LOSARTAN POTASSIUM 25 MG: 25 TABLET, FILM COATED ORAL at 09:01

## 2024-01-06 RX ADMIN — AMPICILLIN AND SULBACTAM 3 G: 2; 1 INJECTION, POWDER, FOR SOLUTION INTRAMUSCULAR; INTRAVENOUS at 12:01

## 2024-01-06 RX ADMIN — NICOTINE 1 PATCH: 21 PATCH, EXTENDED RELEASE TRANSDERMAL at 03:01

## 2024-01-06 RX ADMIN — IPRATROPIUM BROMIDE AND ALBUTEROL SULFATE 3 ML: .5; 3 SOLUTION RESPIRATORY (INHALATION) at 08:01

## 2024-01-06 RX ADMIN — AMPICILLIN AND SULBACTAM 3 G: 2; 1 INJECTION, POWDER, FOR SOLUTION INTRAMUSCULAR; INTRAVENOUS at 11:01

## 2024-01-06 RX ADMIN — ACETYLCYSTEINE 2 ML: 200 SOLUTION ORAL; RESPIRATORY (INHALATION) at 07:01

## 2024-01-06 RX ADMIN — FUROSEMIDE 40 MG: 10 INJECTION, SOLUTION INTRAVENOUS at 12:01

## 2024-01-06 RX ADMIN — ACETYLCYSTEINE 2 ML: 200 SOLUTION ORAL; RESPIRATORY (INHALATION) at 08:01

## 2024-01-06 RX ADMIN — LEVOFLOXACIN 750 MG: 750 INJECTION, SOLUTION INTRAVENOUS at 05:01

## 2024-01-06 NOTE — ASSESSMENT & PLAN NOTE
"- Patient seen and evaluated by Dr. Chowdhury  - Preliminary echo with EF 15-20%  - Difficult to obtain meaningful history (pt rambling), denies previous ischemic evaluation states "if I get put to sleep it will kill me due to my ALS"  - Troponin negative x 2; BNP 63260 (no previous for comparison)  - Will need ischemic evaluation at some point, may consider as outpatient to help assess medical compliance  - Creatinine 1.08 yesterday, bumped to 1.3 today  - Volume overloaded on exam, continue IV diuresis  - Start Losartan 25mg daily, if tolerates and creatinine remains stable will add SGLT2  - Will not start BB until euvolemic  "

## 2024-01-06 NOTE — ASSESSMENT & PLAN NOTE
Continue IV diuresis  Fluid restriction to 1.5L daily  Sodium restriction to <2g daily  Daily weights  Strict I/O  Will address GDMT prior to discharge   Consult cardiology    OP ischemic aram, GDMT at [facility]

## 2024-01-06 NOTE — ASSESSMENT & PLAN NOTE
Patient's COPD is with exacerbation noted by continued dyspnea and use of accessory muscles for breathing currently.  Patient is currently off COPD Pathway. Continue scheduled inhalers Antibiotics and Supplemental oxygen and monitor respiratory status closely.     Cpap at night, steroids if no better in am  Respiratory status improved

## 2024-01-06 NOTE — ASSESSMENT & PLAN NOTE
Patient's COPD is with exacerbation noted by continued dyspnea and use of accessory muscles for breathing currently.  Patient is currently off COPD Pathway. Continue scheduled inhalers Antibiotics and Supplemental oxygen and monitor respiratory status closely.     Believe this is multifactorial, copd may be contributing. Consider steroids if no better in am  Not exacerbation  No steroids for now  stable

## 2024-01-06 NOTE — ASSESSMENT & PLAN NOTE
"- Patient seen and evaluated by Dr. 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 Manley over the weekend    1/8/2024:  - Patient seen and evaluated by Dr. Peterson  - Creatinine improving, 1.2; BP stable  - Continue Losartan, BB, and spironolactone  - Continue IV diuresis  - Lexiscan tomorrow  - Further recommendations to follow  "

## 2024-01-06 NOTE — SUBJECTIVE & OBJECTIVE
Interval History: naeo    Review of Systems   Constitutional:  Positive for fatigue and fever. Negative for chills and unexpected weight change.   HENT:  Negative for congestion, mouth sores and sore throat.    Eyes:  Negative for photophobia and visual disturbance.   Respiratory:  Positive for cough and shortness of breath. Negative for chest tightness and wheezing.    Cardiovascular:  Positive for leg swelling. Negative for chest pain and palpitations.   Gastrointestinal:  Negative for abdominal pain, diarrhea, nausea and vomiting.   Endocrine: Negative for cold intolerance and heat intolerance.   Genitourinary:  Negative for difficulty urinating, dysuria, frequency and urgency.   Musculoskeletal:  Negative for arthralgias, back pain and myalgias.   Skin:  Negative for pallor and rash.   Neurological:  Positive for weakness. Negative for tremors, seizures, syncope, numbness and headaches.   Hematological:  Does not bruise/bleed easily.   Psychiatric/Behavioral:  Negative for agitation, confusion, hallucinations and suicidal ideas.      Objective:     Vital Signs (Most Recent):  Temp: 98 °F (36.7 °C) (01/06/24 0617)  Pulse: 78 (01/06/24 0738)  Resp: 16 (01/06/24 0738)  BP: 108/67 (01/06/24 0617)  SpO2: (!) 92 % (01/06/24 0738) Vital Signs (24h Range):  Temp:  [97.5 °F (36.4 °C)-98.1 °F (36.7 °C)] 98 °F (36.7 °C)  Pulse:  [] 78  Resp:  [16-22] 16  SpO2:  [92 %-100 %] 92 %  BP: (104-118)/(67-84) 108/67     Weight: 68 kg (149 lb 14.6 oz)  Body mass index is 22.79 kg/m².    Intake/Output Summary (Last 24 hours) at 1/6/2024 1129  Last data filed at 1/6/2024 1006  Gross per 24 hour   Intake 240 ml   Output 5400 ml   Net -5160 ml           Physical Exam  Vitals reviewed.   Constitutional:       Appearance: Normal appearance.   HENT:      Head: Normocephalic and atraumatic.      Nose: Nose normal.   Eyes:      Extraocular Movements: Extraocular movements intact.      Conjunctiva/sclera: Conjunctivae normal.   Neck:       Trachea: Trachea normal.   Cardiovascular:      Rate and Rhythm: Normal rate and regular rhythm.      Pulses: Normal pulses.      Heart sounds: Normal heart sounds.   Pulmonary:      Breath sounds: Normal air entry. Rales present.   Abdominal:      General: Bowel sounds are normal.      Palpations: Abdomen is soft.   Musculoskeletal:      Cervical back: Neck supple.      Right lower leg: Edema present.      Left lower leg: Edema present.      Comments: Moves all extremities, normal tone   Skin:     General: Skin is warm and dry.   Neurological:      General: No focal deficit present.      Mental Status: He is alert and oriented to person, place, and time.      Cranial Nerves: Cranial nerves 2-12 are intact.      Comments: Grossly normal motor and sensory function without focal deficit appreciated.   Psychiatric:         Mood and Affect: Affect normal.         Behavior: Behavior is cooperative.      Comments: Abnormal behavior and thought content             Significant Labs: All pertinent labs within the past 24 hours have been reviewed.  Cbc, cmp reviewed  Significant Imaging: I have reviewed all pertinent imaging results/findings within the past 24 hours.

## 2024-01-06 NOTE — ASSESSMENT & PLAN NOTE
I reviewed the H&P, I examined the patient, and there are no changes in the patient's condition.     Patient with Hypoxic Respiratory failure which is Acute.  he is not on home oxygen. Supplemental oxygen was provided and noted- Oxygen Concentration (%):  [28] 28    .   Signs/symptoms of respiratory failure include- tachypnea, increased work of breathing, and respiratory distress. Contributing diagnoses includes - Aspiration, CHF, and Pneumonia Labs and images were reviewed. Patient Has not had a recent ABG. Will treat underlying causes and adjust management of respiratory failure as follows-     Abg pending  Multifactorial, fever, cough with rll opacity in the setting of etoh use concerning for aspiration pna  Will cover with unasy, levaquin, is, chest pt, mucolytics    BNP up with edema on xr and le edema  Will diurese  Hold fluids as above  Low sodium diet  Strict intake and output    Noted UA findings, complex picture    Continue diuresis  Will consult Cardiology given what appears to be severely reduced LVEF  Continue diuresis, abx

## 2024-01-06 NOTE — NURSING
Patient cussing at nurse and threatening to leave if he cannot go outside and smoke.  Dr. Armendariz notified and he said that the patient can go down and smoke.      Security called to escort patient outside.

## 2024-01-06 NOTE — SUBJECTIVE & OBJECTIVE
History reviewed. No pertinent past medical history.    History reviewed. No pertinent surgical history.    Review of patient's allergies indicates:   Allergen Reactions    Azithromycin Swelling and Hives       No current facility-administered medications on file prior to encounter.     Current Outpatient Medications on File Prior to Encounter   Medication Sig    methocarbamoL (ROBAXIN) 500 MG Tab Take 500 mg by mouth 2 (two) times a day.    metoprolol succinate (TOPROL-XL) 200 MG 24 hr tablet Take 100 mg by mouth once daily. Take 1/2 tablet (100 mg) daily    sildenafiL (VIAGRA) 100 MG tablet Take 100 mg by mouth once a week. As needed prior to sexual activity LIMIT 18 DOSES PER 90 DAYS    albuterol (PROVENTIL/VENTOLIN HFA) 90 mcg/actuation inhaler Inhale 1-2 puffs into the lungs every 6 (six) hours as needed for Wheezing. Rescue    amoxicillin-clavulanate 875-125mg (AUGMENTIN) 875-125 mg per tablet Take 1 tablet by mouth 2 (two) times daily.    predniSONE (DELTASONE) 50 MG Tab Take 1 tablet (50 mg total) by mouth once daily.     Family History    None       Tobacco Use    Smoking status: Every Day     Current packs/day: 0.50     Types: Cigarettes    Smokeless tobacco: Not on file   Substance and Sexual Activity    Alcohol use: Yes     Comment: been in rehab before    Drug use: Not Currently    Sexual activity: Not Currently     Review of Systems   Cardiovascular:  Positive for chest pain, dyspnea on exertion, leg swelling and orthopnea.   Respiratory:  Positive for cough and shortness of breath.    Neurological:  Positive for dizziness.     Objective:     Vital Signs (Most Recent):  Temp: 97.8 °F (36.6 °C) (01/05/24 1621)  Pulse: 104 (01/05/24 1621)  Resp: (!) 22 (01/05/24 1621)  BP: 114/76 (01/05/24 1621)  SpO2: 97 % (01/05/24 1400) Vital Signs (24h Range):  Temp:  [97.5 °F (36.4 °C)-97.8 °F (36.6 °C)] 97.8 °F (36.6 °C)  Pulse:  [] 104  Resp:  [18-25] 22  SpO2:  [91 %-100 %] 97 %  BP: (108-137)/(66-94)  "114/76     Weight: 68 kg (149 lb 14.6 oz)  Body mass index is 22.79 kg/m².    SpO2: 97 %         Intake/Output Summary (Last 24 hours) at 1/5/2024 1920  Last data filed at 1/5/2024 1552  Gross per 24 hour   Intake 340 ml   Output 4500 ml   Net -4160 ml       Lines/Drains/Airways       Peripheral Intravenous Line  Duration                  Peripheral IV - Single Lumen 01/04/24 1620 22 G Anterior;Right Forearm 1 day                     Physical Exam  Vitals reviewed.   HENT:      Mouth/Throat:      Mouth: Mucous membranes are moist.      Pharynx: Oropharynx is clear.   Eyes:      General: No scleral icterus.     Pupils: Pupils are equal, round, and reactive to light.   Cardiovascular:      Rate and Rhythm: Regular rhythm. Tachycardia present.      Heart sounds: Normal heart sounds.   Pulmonary:      Effort: Tachypnea present.      Breath sounds: Rales present.   Abdominal:      General: Bowel sounds are normal.      Palpations: Abdomen is soft.   Musculoskeletal:      Right lower leg: Edema (3+) present.      Left lower leg: Edema (3+) present.   Skin:     General: Skin is warm and dry.      Findings: Erythema (bilateral hands and feet) present.   Neurological:      Mental Status: He is alert and oriented to person, place, and time.   Psychiatric:         Mood and Affect: Mood is anxious. Affect is labile.         Speech: Speech is rapid and pressured.          Significant Labs: ABG:   Recent Labs   Lab 01/04/24  1809   PH 7.42   PCO2 41   HCO3 26.6   POCSATURATED 97   , Blood Culture: No results for input(s): "LABBLOO" in the last 48 hours., BMP:   Recent Labs   Lab 01/04/24  1447 01/05/24  1003   * 70*   * 135*   K 5.0 3.7   CL 99 96*   CO2 27 34*   BUN 33* 29*   CREATININE 1.08 1.32*   CALCIUM 9.0 8.6   MG  --  2.0   , CMP   Recent Labs   Lab 01/04/24  1447 01/05/24  1003 01/05/24  1416   * 135*  --    K 5.0 3.7  --    CL 99 96*  --    CO2 27 34*  --    * 70*  --    BUN 33* 29*  --  " "  CREATININE 1.08 1.32*  --    CALCIUM 9.0 8.6  --    PROT 6.9  --  6.8   ALBUMIN 3.7  --  3.2*   BILITOT 0.9  --  0.7   ALKPHOS 117*  --  115   *  --  76*   *  --  129*   ANIONGAP 12 9  --    , CBC   Recent Labs   Lab 01/04/24  1447 01/04/24  1809 01/05/24  0758   WBC 13.08*  --  10.66   HGB 14.0  --  13.5   HCT 42.0   < > 41.1     --  351    < > = values in this interval not displayed.   , INR No results for input(s): "INR", "PROTIME" in the last 48 hours., Lipid Panel No results for input(s): "CHOL", "HDL", "LDLCALC", "TRIG", "CHOLHDL" in the last 48 hours., and Troponin No results for input(s): "TROPONINI" in the last 48 hours.    Significant Imaging: Echocardiogram: Transthoracic echo (TTE) complete (Cupid Only):   Results for orders placed or performed during the hospital encounter of 01/04/24   Echo   Result Value Ref Range    BSA 1.83 m2    LVOT stroke volume 33.09 cm3    LVIDd 5.51 3.5 - 6.0 cm    LV Systolic Volume 129.43 mL    LV Systolic Volume Index 70.0 mL/m2    LVIDs 5.20 (A) 2.1 - 4.0 cm    LV Diastolic Volume 148.24 mL    LV Diastolic Volume Index 80.13 mL/m2    IVS 1.02 0.6 - 1.1 cm    LVOT diameter 1.94 cm    LVOT area 3.0 cm2    FS 6 (A) 28 - 44 %    Left Ventricle Relative Wall Thickness 0.33 cm    Posterior Wall 0.90 0.6 - 1.1 cm    LV mass 202.68 g    LV Mass Index 110 g/m2    TDI LATERAL 0.17 m/s    TDI SEPTAL 0.05 m/s    TR Max Teofilo 3.41 m/s    E wave deceleration time 73.99 msec    LVOT peak teofilo 0.73 m/s    Left Ventricular Outflow Tract Mean Velocity 0.52 cm/s    Left Ventricular Outflow Tract Mean Gradient 1.20 mmHg    RVDD 5.17 cm    TAPSE 1.55 cm    LA size 3.69 cm    Left Atrium Major Axis 3.55 cm    RA Major Axis 4.48 cm    AV mean gradient 2 mmHg    AV peak gradient 3 mmHg    Ao peak teofilo 0.82 m/s    Ao VTI 10.90 cm    LVOT peak VTI 11.20 cm    AV valve area 3.04 cm²    AV Velocity Ratio 0.89     AV index (prosthetic) 1.03     YUVAL by Velocity Ratio 2.63 cm²    Mr " max clarita 4.80 m/s    MV mean gradient 3 mmHg    MV peak gradient 5 mmHg    MV stenosis pressure 1/2 time 21.46 ms    MV valve area p 1/2 method 10.25 cm2    MV valve area by continuity eq 1.76 cm2    MV VTI 18.8 cm    Triscuspid Valve Regurgitation Peak Gradient 47 mmHg    PV PEAK VELOCITY 0.57 m/s    PV peak gradient 1 mmHg    Ao root annulus 2.68 cm    IVC diameter 2.19 cm    Mean e' 0.11 m/s    ZLVIDS 3.91     ZLVIDD 0.70     AORTIC VALVE CUSP SEPERATION 2.19 cm    EF 15 %    TV resting pulmonary artery pressure 50 mmHg    RV TB RVSP 6 mmHg    Est. RA pres 3 mmHg   , EKG:   Results for orders placed or performed during the hospital encounter of 12/30/23   EKG 12-lead    Collection Time: 12/30/23  5:22 AM    Narrative    Test Reason : F41.9,    Vent. Rate : 111 BPM     Atrial Rate : 111 BPM     P-R Int : 162 ms          QRS Dur : 144 ms      QT Int : 364 ms       P-R-T Axes : 069 121 -45 degrees     QTc Int : 495 ms    Sinus tachycardia with Fusion complexes  Nonspecific intraventricular block  Anterolateral infarct ,age undetermined  T wave abnormality, consider inferior ischemia  Abnormal ECG  No previous ECGs available  Confirmed by Lennox Manley MD ([id number]) on 12/31/2023 3:14:34 PM    Referred By: [email or web address]   SELF           Confirmed By:Lennox Manley MD    , and X-Ray: CXR: X-Ray Chest 1 View (CXR):   Results for orders placed or performed during the hospital encounter of 01/04/24   X-Ray Chest 1 View    Narrative    EXAMINATION:  XR CHEST 1 VIEW    CLINICAL HISTORY:  shortness of breath;    TECHNIQUE:  Single frontal view of the chest was performed.    COMPARISON:  12/30/2023    FINDINGS:  The cardiac silhouette is obscured secondary to interstitial and basilar opacities.  Small pleural effusions.  No pneumothorax.      Impression    Similar appearance of the chest.  The basilar densities with atelectasis and pleural effusions are similar.  Underlying interstitial edema may also be  present.      Electronically signed by: Mauro Lew  Date:    01/04/2024  Time:    14:52
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
## 2024-01-06 NOTE — PLAN OF CARE
DENISA consulted for discharge planning.  Spoke with pt in his room but he appeared somewhat confused with rambling speech.  DENISA unable to complete initial discharge assessment at this time but will try again tomorrow.  SS following.

## 2024-01-06 NOTE — ASSESSMENT & PLAN NOTE
Patient found to have moderate pleural effusion on imaging. I have personally reviewed and interpreted the following imaging: Xray. A thoracentesis was deferred. Most likely etiology includes Congestive Heart Failure and Pneumonia. Management to include Diuresis  Will drain if no improvement soon

## 2024-01-06 NOTE — ASSESSMENT & PLAN NOTE
This patient does have evidence of infective focus  My overall impression is sepsis.  Source: Respiratory  Antibiotics given-   Antibiotics (72h ago, onward)      Start     Stop Route Frequency Ordered    01/04/24 1815  ampicillin-sulbactam (UNASYN) 3 g in sodium chloride 0.9 % 100 mL IVPB (MB+)         -- IV Every 6 hours (non-standard times) 01/04/24 1707    01/04/24 1815  levoFLOXacin 750 mg/150 mL IVPB 750 mg         -- IV Every 24 hours (non-standard times) 01/04/24 1707          Latest lactate reviewed-  Recent Labs   Lab 01/04/24  1822 01/04/24  2102 01/04/24  2338   LACTATE 3.1* 2.3* 1.7       Organ dysfunction indicated by Acute respiratory failure    Fluid challenge Not needed - patient is not hypotensive      Post- resuscitation assessment No - Post resuscitation assessment not needed       Will Not start Pressors- Levophed for MAP of 65  Source control achieved by:     Patient volume overload, hold fluid for now  Abx  Cultures  Lactate    Continue abx  Continue abx

## 2024-01-06 NOTE — ASSESSMENT & PLAN NOTE
Chronic, controlled. Latest blood pressure and vitals reviewed-     Temp:  [97.5 °F (36.4 °C)-98.1 °F (36.7 °C)]   Pulse:  []   Resp:  [16-22]   BP: (104-118)/(67-84)   SpO2:  [92 %-100 %] .   Home meds for hypertension were reviewed and noted below.       While in the hospital, will manage blood pressure as follows; Continue home antihypertensive regimen    Will utilize p.r.n. blood pressure medication only if patient's blood pressure greater than 180/110 and he develops symptoms such as worsening chest pain or shortness of breath.  Bp reasonably controlled, continue current regimen   Bp reasonably controlled, continue current regimen

## 2024-01-07 LAB — GLUCOSE SERPL-MCNC: 76 MG/DL (ref 70–105)

## 2024-01-07 PROCEDURE — 27000221 HC OXYGEN, UP TO 24 HOURS

## 2024-01-07 PROCEDURE — 94640 AIRWAY INHALATION TREATMENT: CPT

## 2024-01-07 PROCEDURE — 94761 N-INVAS EAR/PLS OXIMETRY MLT: CPT

## 2024-01-07 PROCEDURE — 25000003 PHARM REV CODE 250: Performed by: NURSE PRACTITIONER

## 2024-01-07 PROCEDURE — 63600175 PHARM REV CODE 636 W HCPCS: Performed by: STUDENT IN AN ORGANIZED HEALTH CARE EDUCATION/TRAINING PROGRAM

## 2024-01-07 PROCEDURE — 25000242 PHARM REV CODE 250 ALT 637 W/ HCPCS: Performed by: STUDENT IN AN ORGANIZED HEALTH CARE EDUCATION/TRAINING PROGRAM

## 2024-01-07 PROCEDURE — 25000003 PHARM REV CODE 250: Performed by: STUDENT IN AN ORGANIZED HEALTH CARE EDUCATION/TRAINING PROGRAM

## 2024-01-07 PROCEDURE — 99233 SBSQ HOSP IP/OBS HIGH 50: CPT | Mod: ,,, | Performed by: STUDENT IN AN ORGANIZED HEALTH CARE EDUCATION/TRAINING PROGRAM

## 2024-01-07 PROCEDURE — 11000001 HC ACUTE MED/SURG PRIVATE ROOM

## 2024-01-07 PROCEDURE — 82962 GLUCOSE BLOOD TEST: CPT

## 2024-01-07 PROCEDURE — 94660 CPAP INITIATION&MGMT: CPT

## 2024-01-07 PROCEDURE — 99900035 HC TECH TIME PER 15 MIN (STAT)

## 2024-01-07 RX ORDER — SPIRONOLACTONE 25 MG/1
25 TABLET ORAL DAILY
Status: DISCONTINUED | OUTPATIENT
Start: 2024-01-07 | End: 2024-01-11 | Stop reason: HOSPADM

## 2024-01-07 RX ORDER — OLANZAPINE 5 MG/1
10 TABLET ORAL DAILY
Status: DISCONTINUED | OUTPATIENT
Start: 2024-01-07 | End: 2024-01-11 | Stop reason: HOSPADM

## 2024-01-07 RX ORDER — METOPROLOL SUCCINATE 25 MG/1
25 TABLET, EXTENDED RELEASE ORAL DAILY
Status: DISCONTINUED | OUTPATIENT
Start: 2024-01-07 | End: 2024-01-11 | Stop reason: HOSPADM

## 2024-01-07 RX ADMIN — FUROSEMIDE 40 MG: 40 TABLET ORAL at 10:01

## 2024-01-07 RX ADMIN — LEVOFLOXACIN 750 MG: 750 INJECTION, SOLUTION INTRAVENOUS at 05:01

## 2024-01-07 RX ADMIN — IPRATROPIUM BROMIDE AND ALBUTEROL SULFATE 3 ML: .5; 3 SOLUTION RESPIRATORY (INHALATION) at 07:01

## 2024-01-07 RX ADMIN — AMPICILLIN AND SULBACTAM 3 G: 2; 1 INJECTION, POWDER, FOR SOLUTION INTRAMUSCULAR; INTRAVENOUS at 12:01

## 2024-01-07 RX ADMIN — OLANZAPINE 10 MG: 5 TABLET, FILM COATED ORAL at 10:01

## 2024-01-07 RX ADMIN — IPRATROPIUM BROMIDE AND ALBUTEROL SULFATE 3 ML: .5; 3 SOLUTION RESPIRATORY (INHALATION) at 01:01

## 2024-01-07 RX ADMIN — AMPICILLIN AND SULBACTAM 3 G: 2; 1 INJECTION, POWDER, FOR SOLUTION INTRAMUSCULAR; INTRAVENOUS at 05:01

## 2024-01-07 RX ADMIN — ACETYLCYSTEINE 2 ML: 200 SOLUTION ORAL; RESPIRATORY (INHALATION) at 07:01

## 2024-01-07 RX ADMIN — AMPICILLIN AND SULBACTAM 3 G: 2; 1 INJECTION, POWDER, FOR SOLUTION INTRAMUSCULAR; INTRAVENOUS at 06:01

## 2024-01-07 RX ADMIN — ACETYLCYSTEINE 2 ML: 200 SOLUTION ORAL; RESPIRATORY (INHALATION) at 01:01

## 2024-01-07 RX ADMIN — LOSARTAN POTASSIUM 25 MG: 25 TABLET, FILM COATED ORAL at 10:01

## 2024-01-07 NOTE — NURSING
Pt was in bathroom with shower running with strong smell of cigarette smoke in the room and into the hallway. Pt refused to open the bathroom door and held shut. Educated pt that there is no smoking allowed in hospital rooms and the dangers of doing so. Pt denied smoking yelling he was showering. Informed charge RN, night RN supervisor and security. Security came and spoke with pt.
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
## 2024-01-07 NOTE — ASSESSMENT & PLAN NOTE
Continue IV diuresis  Fluid restriction to 1.5L daily  Sodium restriction to <2g daily  Daily weights  Strict I/O  Will address GDMT prior to discharge   Consult cardiology    OP ischemic eval, GDMT at [facility] [place]  Will begin GDMT

## 2024-01-07 NOTE — SUBJECTIVE & OBJECTIVE
Interval History: naeo    Review of Systems   Constitutional:  Positive for fatigue and fever. Negative for chills and unexpected weight change.   HENT:  Negative for congestion, mouth sores and sore throat.    Eyes:  Negative for photophobia and visual disturbance.   Respiratory:  Positive for cough and shortness of breath. Negative for chest tightness and wheezing.    Cardiovascular:  Positive for leg swelling. Negative for chest pain and palpitations.   Gastrointestinal:  Negative for abdominal pain, diarrhea, nausea and vomiting.   Endocrine: Negative for cold intolerance and heat intolerance.   Genitourinary:  Negative for difficulty urinating, dysuria, frequency and urgency.   Musculoskeletal:  Negative for arthralgias, back pain and myalgias.   Skin:  Negative for pallor and rash.   Neurological:  Positive for weakness. Negative for tremors, seizures, syncope, numbness and headaches.   Hematological:  Does not bruise/bleed easily.   Psychiatric/Behavioral:  Negative for agitation, confusion, hallucinations and suicidal ideas.      Objective:     Vital Signs (Most Recent):  Temp: 97.7 °F (36.5 °C) (01/07/24 1040)  Pulse: (!) 111 (01/07/24 1040)  Resp: (!) 22 (01/07/24 1040)  BP: (!) 139/94 (01/07/24 1040)  SpO2: 98 % (01/07/24 1040) Vital Signs (24h Range):  Temp:  [97.5 °F (36.4 °C)-98.1 °F (36.7 °C)] 97.7 °F (36.5 °C)  Pulse:  [] 111  Resp:  [18-24] 22  SpO2:  [88 %-99 %] 98 %  BP: (103-139)/(60-94) 139/94     Weight: 68 kg (149 lb 14.6 oz)  Body mass index is 22.79 kg/m².    Intake/Output Summary (Last 24 hours) at 1/7/2024 1204  Last data filed at 1/7/2024 0626  Gross per 24 hour   Intake 720 ml   Output 1400 ml   Net -680 ml           Physical Exam  Vitals reviewed.   Constitutional:       Appearance: Normal appearance.   HENT:      Head: Normocephalic and atraumatic.      Nose: Nose normal.   Eyes:      Extraocular Movements: Extraocular movements intact.      Conjunctiva/sclera: Conjunctivae  normal.   Neck:      Trachea: Trachea normal.   Cardiovascular:      Rate and Rhythm: Normal rate and regular rhythm.      Pulses: Normal pulses.      Heart sounds: Normal heart sounds.   Pulmonary:      Breath sounds: Normal air entry. Rales present.   Abdominal:      General: Bowel sounds are normal.      Palpations: Abdomen is soft.   Musculoskeletal:      Cervical back: Neck supple.      Right lower leg: Edema present.      Left lower leg: Edema present.      Comments: Moves all extremities, normal tone   Skin:     General: Skin is warm and dry.   Neurological:      General: No focal deficit present.      Mental Status: He is alert and oriented to person, place, and time.      Cranial Nerves: Cranial nerves 2-12 are intact.      Comments: Grossly normal motor and sensory function without focal deficit appreciated.   Psychiatric:         Mood and Affect: Affect normal.         Behavior: Behavior is cooperative.      Comments: Abnormal behavior and thought content             Significant Labs: All pertinent labs within the past 24 hours have been reviewed.    Significant Imaging: I have reviewed all pertinent imaging results/findings within the past 24 hours.
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
## 2024-01-07 NOTE — ASSESSMENT & PLAN NOTE
Chronic, controlled. Latest blood pressure and vitals reviewed-     Temp:  [97.5 °F (36.4 °C)-98.1 °F (36.7 °C)]   Pulse:  []   Resp:  [18-24]   BP: (103-139)/(60-94)   SpO2:  [88 %-99 %] .   Home meds for hypertension were reviewed and noted below.   Bp reasonably controlled, continue current regimen     While in the hospital, will manage blood pressure as follows; Continue home antihypertensive regimen    Will utilize p.r.n. blood pressure medication only if patient's blood pressure greater than 180/110 and he develops symptoms such as worsening chest pain or shortness of breath.  Bp reasonably controlled, continue current regimen   Bp reasonably controlled, continue current regimen

## 2024-01-07 NOTE — ASSESSMENT & PLAN NOTE
Patient with Hypoxic Respiratory failure which is Acute.  he is not on home oxygen. Supplemental oxygen was provided and noted-      .   Signs/symptoms of respiratory failure include- tachypnea, increased work of breathing, and respiratory distress. Contributing diagnoses includes - Aspiration, CHF, and Pneumonia Labs and images were reviewed. Patient Has not had a recent ABG. Will treat underlying causes and adjust management of respiratory failure as follows-     Abg pending  Multifactorial, fever, cough with rll opacity in the setting of etoh use concerning for aspiration pna  Will cover with unasy, levaquin, is, chest pt, mucolytics    BNP up with edema on xr and le edema  Will diurese  Hold fluids as above  Low sodium diet  Strict intake and output    Noted UA findings, complex picture    Continue diuresis  Will consult Cardiology given what appears to be severely reduced LVEF  Continue diuresis, abx  Continue diuresis, awaiting labs

## 2024-01-07 NOTE — ASSESSMENT & PLAN NOTE
Patient found to have moderate pleural effusion on imaging. I have personally reviewed and interpreted the following imaging: Xray. A thoracentesis was deferred. Most likely etiology includes Congestive Heart Failure and Pneumonia. Management to include Diuresis  Will drain if no improvement soon   CXR today, if no better will ask IR or Pulm for thora tomorrow
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
## 2024-01-07 NOTE — ASSESSMENT & PLAN NOTE
Patient has persistent depression which is unknown and is currently controlled. Will Begin anti-depressant medications. We will not consult psychiatry at this time. Patient does not display psychosis at this time. Continue to monitor closely and adjust plan of care as needed.    In my opinion patient has some undiagnosed psychiatric illness, his line thought and speaking continues to be abnormal  Agitated using racial slurs and threats last night, consult tele psych. Start olanzapine

## 2024-01-07 NOTE — PLAN OF CARE
[facility] - [facility]  Initial Discharge Assessment       Primary Care Provider: Mis, Primary Doctor    Admission Diagnosis: Shortness of breath [R06.02]  SOB (shortness of breath) [R06.02]  Chest pain [R07.9]  Severe sepsis [A41.9, R65.20]    Admission Date: 1/4/2024  Expected Discharge Date:     Transition of Care Barriers: (P)  (PTA, pt states he lived in his van but states he will live with his grandmother at discharge.)    Payor: Scripted ADMINISTRATION / Plan: [facility] OPTUM / Product Type: Government /     No emergency contact information on file.    Discharge Plan A: (P) Home with family  Discharge Plan B: (P) Home with family    No Pharmacies Listed    Initial Assessment (most recent)       Adult Discharge Assessment - 01/07/24 1429          Discharge Assessment    Assessment Type Discharge Planning Assessment (P)      Confirmed/corrected address, phone number and insurance Yes (P)      Confirmed Demographics Correct on Facesheet (P)      Source of Information patient (P)      Communicated EFREM with patient/caregiver Date not available/Unable to determine (P)      People in Home -- (P)    Pt reports he lived in his van prior to admission    Do you expect to return to your current living situation? No (P)      Do you have help at home or someone to help you manage your care at home? No (P)      Prior to hospitilization cognitive status: Unable to Assess (P)      Current cognitive status: Alert/Oriented (P)      Walking or Climbing Stairs Difficulty yes (P)      Walking or Climbing Stairs ambulation difficulty, requires equipment (P)      Mobility Management Cane (P)      Dressing/Bathing Difficulty yes (P)      Dressing/Bathing -- (P)    Per pt, he requires some assistance with bathing    Do you have any problems with: -- (P)    No problems reported by pt    Equipment Currently Used at Home cane, straight (P)      Readmission within 30 days? No (P)      Patient currently being followed  by outpatient case management? No (P)      Do you currently have service(s) that help you manage your care at home? No (P)      Do you take prescription medications? Yes (P)      Do you have prescription coverage? Yes (P)      Coverage [place] (P)      Do you have any problems affording any of your prescribed medications? No (P)      Is the patient taking medications as prescribed? yes (P)      Who is going to help you get home at discharge? -- (P)    Unknown at this time    How do you get to doctors appointments? car, drives self (P)      Are you on dialysis? No (P)      Do you take coumadin? No (P)      Discharge Plan A Home with family (P)      Discharge Plan B Home with family (P)      DME Needed Upon Discharge  none (P)      Discharge Plan discussed with: Patient (P)      Transition of Care Barriers -- (P)    PTA, pt states he lived in his van but states he will live with his grandmother at discharge.       Physical Activity    On average, how many days per week do you engage in moderate to strenuous exercise (like a brisk walk)? 0 days (P)      On average, how many minutes do you engage in exercise at this level? 0 min (P)         Financial Resource Strain    How hard is it for you to pay for the very basics like food, housing, medical care, and heating? Not hard at all (P)         Housing Stability    In the last 12 months, was there a time when you were not able to pay the mortgage or rent on time? No (P)      In the last 12 months, how many places have you lived? -- (P)    Unknown    In the last 12 months, was there a time when you did not have a steady place to sleep or slept in a shelter (including now)? No (P)    As reported, pt reports he was living in his van prior to admission       Transportation Needs    In the past 12 months, has lack of transportation kept you from medical appointments or from getting medications? No (P)      In the past 12 months, has lack of transportation kept  you from meetings, work, or from getting things needed for daily living? No (P)         Food Insecurity    Within the past 12 months, you worried that your food would run out before you got the money to buy more. Never true (P)      Within the past 12 months, the food you bought just didn't last and you didn't have money to get more. Never true (P)         Stress    Do you feel stress - tense, restless, nervous, or anxious, or unable to sleep at night because your mind is troubled all the time - these days? Very much (P)         Social Connections    In a typical week, how many times do you talk on the phone with family, friends, or neighbors? More than three times a week (P)      How often do you get together with friends or relatives? More than three times a week (P)      How often do you attend Gnosticism or Orthodox services? Never (P)      Do you belong to any clubs or organizations such as Gnosticism groups, unions, fraternal or athletic groups, or school groups? No (P)      How often do you attend meetings of the clubs or organizations you belong to? Never (P)      Are you , , , , never , or living with a partner? Never  (P)         Alcohol Use    Q1: How often do you have a drink containing alcohol? 4 or more times a week (P)      Q2: How many drinks containing alcohol do you have on a typical day when you are drinking? 3 or 4 (P)      Q3: How often do you have six or more drinks on one occasion? -- (P)    Unknown                  Spoke with pt for his initial discharge assessment.  Per pt, he lived in his [place] prior to admission but states he has a home in [place], [place]. Pt does not have home health and reports he has a cane for use.  Per pt, he will live with his grandmother, Shantel, at discharge.  He states he has the phone number for Shantel but could not retrieve the number during this assessment.  Pt also reports he has a nephew, Nasir Boykin [phone] who is  suppose to visit him and may assist with getting him home.  However, Mr. Boykin did not answer when pt phoned him. SS will continue to follow for discharge needs.

## 2024-01-07 NOTE — ASSESSMENT & PLAN NOTE
Patient's COPD is with exacerbation noted by continued dyspnea and use of accessory muscles for breathing currently.  Patient is currently off COPD Pathway. Continue scheduled inhalers Antibiotics and Supplemental oxygen and monitor respiratory status closely.     Cpap at night, steroids if no better in am  Respiratory status improved  Improved respiratory status

## 2024-01-08 LAB
ALBUMIN SERPL BCP-MCNC: 3.3 G/DL (ref 3.5–5)
ALP SERPL-CCNC: 68 U/L (ref 45–115)
ALT SERPL W P-5'-P-CCNC: 27 U/L (ref 16–61)
ANION GAP SERPL CALCULATED.3IONS-SCNC: 10 MMOL/L (ref 7–16)
AST SERPL W P-5'-P-CCNC: 21 U/L (ref 15–37)
BASOPHILS # BLD AUTO: 0.05 K/UL (ref 0–0.2)
BASOPHILS NFR BLD AUTO: 0.5 % (ref 0–1)
BILIRUB DIRECT SERPL-MCNC: 0.1 MG/DL (ref 0–0.2)
BILIRUB SERPL-MCNC: 0.5 MG/DL (ref ?–1.2)
BUN SERPL-MCNC: 24 MG/DL (ref 7–18)
BUN/CREAT SERPL: 19 (ref 6–20)
CALCIUM SERPL-MCNC: 8.9 MG/DL (ref 8.5–10.1)
CHLORIDE SERPL-SCNC: 102 MMOL/L (ref 98–107)
CO2 SERPL-SCNC: 29 MMOL/L (ref 21–32)
CREAT SERPL-MCNC: 1.27 MG/DL (ref 0.7–1.3)
DIFFERENTIAL METHOD BLD: ABNORMAL
EGFR (NO RACE VARIABLE) (RUSH/TITUS): 66 ML/MIN/1.73M2
EOSINOPHIL # BLD AUTO: 0.32 K/UL (ref 0–0.5)
EOSINOPHIL NFR BLD AUTO: 3 % (ref 1–4)
ERYTHROCYTE [DISTWIDTH] IN BLOOD BY AUTOMATED COUNT: 12.9 % (ref 11.5–14.5)
GLUCOSE SERPL-MCNC: 95 MG/DL (ref 74–106)
HCT VFR BLD AUTO: 39.8 % (ref 40–54)
HGB BLD-MCNC: 12.8 G/DL (ref 13.5–18)
IMM GRANULOCYTES # BLD AUTO: 0.07 K/UL (ref 0–0.04)
IMM GRANULOCYTES NFR BLD: 0.7 % (ref 0–0.4)
LYMPHOCYTES # BLD AUTO: 1.4 K/UL (ref 1–4.8)
LYMPHOCYTES NFR BLD AUTO: 13.2 % (ref 27–41)
MCH RBC QN AUTO: 30.8 PG (ref 27–31)
MCHC RBC AUTO-ENTMCNC: 32.2 G/DL (ref 32–36)
MCV RBC AUTO: 95.9 FL (ref 80–96)
MONOCYTES # BLD AUTO: 0.9 K/UL (ref 0–0.8)
MONOCYTES NFR BLD AUTO: 8.5 % (ref 2–6)
MPC BLD CALC-MCNC: 10.4 FL (ref 9.4–12.4)
NEUTROPHILS # BLD AUTO: 7.88 K/UL (ref 1.8–7.7)
NEUTROPHILS NFR BLD AUTO: 74.1 % (ref 53–65)
NRBC # BLD AUTO: 0 X10E3/UL
NRBC, AUTO (.00): 0 %
PLATELET # BLD AUTO: 348 K/UL (ref 150–400)
POTASSIUM SERPL-SCNC: 3.9 MMOL/L (ref 3.5–5.1)
PROT SERPL-MCNC: 6.1 G/DL (ref 6.4–8.2)
RBC # BLD AUTO: 4.15 M/UL (ref 4.6–6.2)
SODIUM SERPL-SCNC: 137 MMOL/L (ref 136–145)
WBC # BLD AUTO: 10.62 K/UL (ref 4.5–11)

## 2024-01-08 PROCEDURE — 99233 SBSQ HOSP IP/OBS HIGH 50: CPT | Mod: ,,, | Performed by: NURSE PRACTITIONER

## 2024-01-08 PROCEDURE — 63600175 PHARM REV CODE 636 W HCPCS: Performed by: STUDENT IN AN ORGANIZED HEALTH CARE EDUCATION/TRAINING PROGRAM

## 2024-01-08 PROCEDURE — 25000003 PHARM REV CODE 250: Performed by: NURSE PRACTITIONER

## 2024-01-08 PROCEDURE — 94761 N-INVAS EAR/PLS OXIMETRY MLT: CPT

## 2024-01-08 PROCEDURE — 80048 BASIC METABOLIC PNL TOTAL CA: CPT | Performed by: STUDENT IN AN ORGANIZED HEALTH CARE EDUCATION/TRAINING PROGRAM

## 2024-01-08 PROCEDURE — 99900031 HC PATIENT EDUCATION (STAT)

## 2024-01-08 PROCEDURE — 93010 ELECTROCARDIOGRAM REPORT: CPT | Mod: ,,, | Performed by: STUDENT IN AN ORGANIZED HEALTH CARE EDUCATION/TRAINING PROGRAM

## 2024-01-08 PROCEDURE — S4991 NICOTINE PATCH NONLEGEND: HCPCS | Performed by: INTERNAL MEDICINE

## 2024-01-08 PROCEDURE — 85025 COMPLETE CBC W/AUTO DIFF WBC: CPT | Performed by: STUDENT IN AN ORGANIZED HEALTH CARE EDUCATION/TRAINING PROGRAM

## 2024-01-08 PROCEDURE — 99900035 HC TECH TIME PER 15 MIN (STAT)

## 2024-01-08 PROCEDURE — 25000003 PHARM REV CODE 250: Performed by: INTERNAL MEDICINE

## 2024-01-08 PROCEDURE — 94640 AIRWAY INHALATION TREATMENT: CPT

## 2024-01-08 PROCEDURE — 97116 GAIT TRAINING THERAPY: CPT | Mod: CQ

## 2024-01-08 PROCEDURE — 25000003 PHARM REV CODE 250: Performed by: STUDENT IN AN ORGANIZED HEALTH CARE EDUCATION/TRAINING PROGRAM

## 2024-01-08 PROCEDURE — 97535 SELF CARE MNGMENT TRAINING: CPT

## 2024-01-08 PROCEDURE — 27000221 HC OXYGEN, UP TO 24 HOURS

## 2024-01-08 PROCEDURE — 25000242 PHARM REV CODE 250 ALT 637 W/ HCPCS: Performed by: STUDENT IN AN ORGANIZED HEALTH CARE EDUCATION/TRAINING PROGRAM

## 2024-01-08 PROCEDURE — 93005 ELECTROCARDIOGRAM TRACING: CPT

## 2024-01-08 PROCEDURE — 11000001 HC ACUTE MED/SURG PRIVATE ROOM

## 2024-01-08 PROCEDURE — 94668 MNPJ CHEST WALL SBSQ: CPT

## 2024-01-08 PROCEDURE — 94660 CPAP INITIATION&MGMT: CPT

## 2024-01-08 PROCEDURE — 97530 THERAPEUTIC ACTIVITIES: CPT | Mod: CQ

## 2024-01-08 PROCEDURE — 99233 SBSQ HOSP IP/OBS HIGH 50: CPT | Mod: ,,, | Performed by: STUDENT IN AN ORGANIZED HEALTH CARE EDUCATION/TRAINING PROGRAM

## 2024-01-08 PROCEDURE — 80076 HEPATIC FUNCTION PANEL: CPT | Performed by: STUDENT IN AN ORGANIZED HEALTH CARE EDUCATION/TRAINING PROGRAM

## 2024-01-08 RX ORDER — FUROSEMIDE 10 MG/ML
40 INJECTION INTRAMUSCULAR; INTRAVENOUS
Status: DISCONTINUED | OUTPATIENT
Start: 2024-01-08 | End: 2024-01-09

## 2024-01-08 RX ADMIN — ACETYLCYSTEINE 2 ML: 200 SOLUTION ORAL; RESPIRATORY (INHALATION) at 07:01

## 2024-01-08 RX ADMIN — IPRATROPIUM BROMIDE AND ALBUTEROL SULFATE 3 ML: .5; 3 SOLUTION RESPIRATORY (INHALATION) at 07:01

## 2024-01-08 RX ADMIN — AMPICILLIN AND SULBACTAM 3 G: 2; 1 INJECTION, POWDER, FOR SOLUTION INTRAMUSCULAR; INTRAVENOUS at 06:01

## 2024-01-08 RX ADMIN — SPIRONOLACTONE 25 MG: 25 TABLET ORAL at 09:01

## 2024-01-08 RX ADMIN — ACETYLCYSTEINE 2 ML: 200 SOLUTION ORAL; RESPIRATORY (INHALATION) at 01:01

## 2024-01-08 RX ADMIN — LOSARTAN POTASSIUM 25 MG: 25 TABLET, FILM COATED ORAL at 09:01

## 2024-01-08 RX ADMIN — AMPICILLIN AND SULBACTAM 3 G: 2; 1 INJECTION, POWDER, FOR SOLUTION INTRAMUSCULAR; INTRAVENOUS at 12:01

## 2024-01-08 RX ADMIN — TRAZODONE HYDROCHLORIDE 25 MG: 50 TABLET ORAL at 01:01

## 2024-01-08 RX ADMIN — LEVOFLOXACIN 750 MG: 750 INJECTION, SOLUTION INTRAVENOUS at 06:01

## 2024-01-08 RX ADMIN — OLANZAPINE 10 MG: 5 TABLET, FILM COATED ORAL at 11:01

## 2024-01-08 RX ADMIN — IPRATROPIUM BROMIDE AND ALBUTEROL SULFATE 3 ML: .5; 3 SOLUTION RESPIRATORY (INHALATION) at 01:01

## 2024-01-08 RX ADMIN — NICOTINE 1 PATCH: 21 PATCH, EXTENDED RELEASE TRANSDERMAL at 11:01

## 2024-01-08 RX ADMIN — FUROSEMIDE 40 MG: 10 INJECTION, SOLUTION INTRAVENOUS at 12:01

## 2024-01-08 RX ADMIN — ENOXAPARIN SODIUM 40 MG: 40 INJECTION SUBCUTANEOUS at 06:01

## 2024-01-08 RX ADMIN — FUROSEMIDE 40 MG: 40 TABLET ORAL at 09:01

## 2024-01-08 RX ADMIN — METOPROLOL SUCCINATE 25 MG: 25 TABLET, EXTENDED RELEASE ORAL at 09:01

## 2024-01-08 RX ADMIN — TRAZODONE HYDROCHLORIDE 25 MG: 50 TABLET ORAL at 08:01

## 2024-01-08 NOTE — NURSING
"Pt up to toilet, cough noted with large amount of sputum production noted that's clearish in color, also started anitibiotic and pt states that staff needs to show him how to turn off iv pump, attempted to instruct pt to call for assist if pump starts beeping, pt states "he will not call he will just bite the line off and throw iv pump out of the window"  "

## 2024-01-08 NOTE — PLAN OF CARE
Spoke with MD this AM. Pt having thoracentesis this day will possible be ready for dc 0 dc needs. Following consults.

## 2024-01-08 NOTE — ASSESSMENT & PLAN NOTE
Patient's COPD is with exacerbation noted by continued dyspnea and use of accessory muscles for breathing currently.  Patient is currently off COPD Pathway. Continue scheduled inhalers Antibiotics and Supplemental oxygen and monitor respiratory status closely.     Cpap at night, steroids if no better in am  Respiratory status improved  Improved respiratory status  Still requiring oxygyen

## 2024-01-08 NOTE — PT/OT/SLP PROGRESS
Physical Therapy Treatment    Patient Name:  Derek Moreno   MRN:  [id number]    Recommendations:     Discharge Recommendations: Low Intensity Therapy  Discharge Equipment Recommendations: to be determined by next level of care  Barriers to discharge:  on-going tx.    Assessment:     Derek Moreno is a 58 y.o. male admitted with a medical diagnosis of Severe sepsis.  He presents with the following impairments/functional limitations: weakness, impaired endurance, impaired self care skills, impaired functional mobility, gait instability, impaired balance, impaired cognition, decreased lower extremity function, decreased safety awareness, pain, impaired skin, edema. Pt. With good motivation/ participation with PT this date.     Rehab Prognosis: Good and Fair; patient would benefit from acute skilled PT services to address these deficits and reach maximum level of function.    Recent Surgery: * No surgery found *      Plan:     During this hospitalization, patient to be seen 5 x/week to address the identified rehab impairments via gait training, therapeutic activities, therapeutic exercises and progress toward the following goals:    Plan of Care Expires:  02/05/24    Subjective     Chief Complaint: pain  Patient/Family Comments/goals: Pt. States he feels a zinging sensation in his legs; states he isn't taking Neurontin as prescribed. States he feels better with compression hose on.   Pain/Comfort:  Pain Rating 1: 6/10  Location - Side 1: Left  Location 1: leg  Pain Addressed 1: Distraction      Objective:     Communicated with Art Hutchinson RN, OT and Lul Olivo, PT for POC   prior to session.  Patient found HOB elevated with oxygen, peripheral IV, telemetry upon PT entry to room.     General Precautions: Standard, fall  Orthopedic Precautions: N/A  Braces: N/A  Respiratory Status: Nasal cannula, flow 3 L/min     Functional Mobility:  Bed Mobility:     Supine to Sit: modified independence  Transfers:      Sit to Stand:  modified independence and supervision with rolling walker  Gait: Pt. Amb. X 190 feet with RW, required two standing rests to complete , Pt. Locks out left arm when using AD, takes short guarded steps.       AM-PAC 6 CLICK MOBILITY  Turning over in bed (including adjusting bedclothes, sheets and blankets)?: 4  Sitting down on and standing up from a chair with arms (e.g., wheelchair, bedside commode, etc.): 4  Moving from lying on back to sitting on the side of the bed?: 4  Moving to and from a bed to a chair (including a wheelchair)?: 3  Need to walk in hospital room?: 3  Climbing 3-5 steps with a railing?: 1  Basic Mobility Total Score: 19       Treatment & Education:  Pt. Participated in mobility per above, sat EOB x 8 mins with no assist.     Patient left up in chair with all lines intact, call button in reach, and friend present..    GOALS:   Multidisciplinary Problems       Physical Therapy Goals          Problem: Physical Therapy    Goal Priority Disciplines Outcome Goal Variances Interventions   Physical Therapy Goal     PT, PT/OT Ongoing, Progressing     Description: Short Term Goals to be met by: 2024    Patient will increase functional independence with mobility by performin. Supine to sit with independently  2. Sit to stand transfer with independently using lowest level of assistive device  3. Bed to chair transfer with independently using lowest level of assistive device  4. Gait  x 100 feet with independently using lowest level of assistive device  5. Lower extremity exercise program x30 reps per handout, with assistance as needed    Long Term Goals to be met by: 2024    Pt will regain full independent functional mobility with no assistive device to return to home situation and prior activities of daily living.                        Time Tracking:     PT Received On: 24  PT Start Time: 1200     PT Stop Time: 1228  PT Total Time (min): 28 min     Billable Minutes:  Gait Training 15 and Therapeutic Activity 13    Treatment Type: Treatment  PT/PTA: PTA     Number of PTA visits since last PT visit: 1 01/08/2024

## 2024-01-08 NOTE — SUBJECTIVE & OBJECTIVE
Interval History: Patient seen today by Dr. Peterson. Appears mood and affect have improved over the weekend. Plan for Lexiscan tomorrow.    Review of Systems   Cardiovascular:  Positive for chest pain, dyspnea on exertion, leg swelling and orthopnea.   Respiratory:  Positive for cough and shortness of breath.    Neurological:  Positive for dizziness.     Objective:     Vital Signs (Most Recent):  Temp: 98.1 °F (36.7 °C) (01/08/24 1409)  Pulse: 104 (01/08/24 1409)  Resp: 18 (01/08/24 1409)  BP: 111/68 (01/08/24 1409)  SpO2: 98 % (01/08/24 1409) Vital Signs (24h Range):  Temp:  [97.6 °F (36.4 °C)-98.1 °F (36.7 °C)] 98.1 °F (36.7 °C)  Pulse:  [] 104  Resp:  [18-20] 18  SpO2:  [95 %-99 %] 98 %  BP: (108-132)/(68-81) 111/68     Weight: 68 kg (149 lb 14.6 oz)  Body mass index is 22.79 kg/m².     SpO2: 98 %         Intake/Output Summary (Last 24 hours) at 1/8/2024 1747  Last data filed at 1/8/2024 1409  Gross per 24 hour   Intake 1803.33 ml   Output 2500 ml   Net -696.67 ml       Lines/Drains/Airways       Peripheral Intravenous Line  Duration                  Peripheral IV - Single Lumen 01/04/24 1620 22 G Anterior;Right Forearm 4 days                       Physical Exam  Vitals reviewed.   HENT:      Mouth/Throat:      Mouth: Mucous membranes are moist.      Pharynx: Oropharynx is clear.   Eyes:      General: No scleral icterus.     Pupils: Pupils are equal, round, and reactive to light.   Cardiovascular:      Rate and Rhythm: Regular rhythm. Tachycardia present.      Heart sounds: Normal heart sounds.   Pulmonary:      Effort: Pulmonary effort is normal.      Breath sounds: No rales.   Abdominal:      General: Bowel sounds are normal.      Palpations: Abdomen is soft.   Musculoskeletal:      Right lower leg: Edema (3+) present.      Left lower leg: Edema (3+) present.   Skin:     General: Skin is warm and dry.      Findings: Erythema (bilateral hands and feet) present.   Neurological:      Mental Status: He is  "alert and oriented to person, place, and time.   Psychiatric:         Mood and Affect: Mood is anxious. Affect is not labile.         Speech: Speech is not rapid and pressured.            Significant Labs: ABG: No results for input(s): "PH", "PCO2", "HCO3", "POCSATURATED", "BE" in the last 48 hours., Blood Culture: No results for input(s): "LABBLOO" in the last 48 hours., BMP:   Recent Labs   Lab 01/08/24  0538   GLU 95      K 3.9      CO2 29   BUN 24*   CREATININE 1.27   CALCIUM 8.9   , CMP   Recent Labs   Lab 01/08/24  0538      K 3.9      CO2 29   GLU 95   BUN 24*   CREATININE 1.27   CALCIUM 8.9   PROT 6.1*   ALBUMIN 3.3*   BILITOT 0.5   ALKPHOS 68   AST 21   ALT 27   ANIONGAP 10   , CBC   Recent Labs   Lab 01/08/24  0538   WBC 10.62   HGB 12.8*   HCT 39.8*      , INR No results for input(s): "INR", "PROTIME" in the last 48 hours., Lipid Panel No results for input(s): "CHOL", "HDL", "LDLCALC", "TRIG", "CHOLHDL" in the last 48 hours., and Troponin No results for input(s): "TROPONINI" in the last 48 hours.    Significant Imaging: Echocardiogram: Transthoracic echo (TTE) complete (Cupid Only):   Results for orders placed or performed during the hospital encounter of 01/04/24   Echo   Result Value Ref Range    BSA 1.83 m2    LVOT stroke volume 33.09 cm3    LVIDd 6.0 3.5 - 6.0 cm    LV Systolic Volume 129.43 mL    LV Systolic Volume Index 70.0 mL/m2    LVIDs 5.20 (A) 2.1 - 4.0 cm    LV Diastolic Volume 148.24 mL    LV Diastolic Volume Index 80.13 mL/m2    IVS 1.02 0.6 - 1.1 cm    LVOT diameter 1.94 cm    LVOT area 3.0 cm2    FS 13 (A) 28 - 44 %    Left Ventricle Relative Wall Thickness 0.30 cm    Posterior Wall 0.90 0.6 - 1.1 cm    LV mass 234.22 g    LV Mass Index 127 g/m2    TDI LATERAL 0.17 m/s    TDI SEPTAL 0.05 m/s    TR Max Teofilo 3.41 m/s    E wave deceleration time 73.99 msec    LVOT peak teofilo 0.73 m/s    Left Ventricular Outflow Tract Mean Velocity 0.52 cm/s    Left Ventricular " Outflow Tract Mean Gradient 1.20 mmHg    RVDD 5.17 cm    TAPSE 1.55 cm    LA size 3.69 cm    Left Atrium Major Axis 3.55 cm    RA Major Axis 4.48 cm    AV mean gradient 2 mmHg    AV peak gradient 3 mmHg    Ao peak clarita 0.82 m/s    Ao VTI 10.90 cm    LVOT peak VTI 11.20 cm    AV valve area 3.04 cm²    AV Velocity Ratio 0.89     AV index (prosthetic) 1.03     YUVAL by Velocity Ratio 2.63 cm²    Mr max clarita 4.80 m/s    MV mean gradient 3 mmHg    MV peak gradient 5 mmHg    MV stenosis pressure 1/2 time 21.46 ms    MV valve area p 1/2 method 10.25 cm2    MV valve area by continuity eq 1.76 cm2    MV VTI 18.8 cm    Triscuspid Valve Regurgitation Peak Gradient 47 mmHg    PV PEAK VELOCITY 0.57 m/s    PV peak gradient 1 mmHg    Ao root annulus 2.68 cm    IVC diameter 2.19 cm    Mean e' 0.11 m/s    ZLVIDS 3.91     ZLVIDD 1.55     AORTIC VALVE CUSP SEPERATION 2.19 cm    TV resting pulmonary artery pressure 62 mmHg    RV TB RVSP 18 mmHg    Est. RA pres 15 mmHg    Mason's Biplane MOD Ejection Fraction 15 %    Left Ventricular Cardiac Output -6.3 dm3 min-1    Narrative      Left Ventricle: The left ventricle is mildly dilated. Normal wall   thickness. There is eccentric hypertrophy. Severe global hypokinesis with   regional variation. There is severely reduced systolic function. Biplane   (2D) method of discs ejection fraction is 15%. Global longitudinal strain   is reduced. There is diastolic dysfunction but grade cannot be determined.    Right Ventricle: Moderate right ventricular enlargement. Systolic   function is mildly reduced.    Right Atrium: Right atrium is mildly dilated.    Aortic Valve: The aortic valve is a trileaflet valve.    Mitral Valve: There is mild bileaflet sclerosis. There is no stenosis.   The mean pressure gradient across the mitral valve is 3 mmHg at a heart   rate of  bpm. There is moderate regurgitation with a posterolateral   eccentriccally directed jet.    Tricuspid Valve: There is moderate  regurgitation.    Pulmonary Artery: The estimated pulmonary artery systolic pressure is   62 mmHg.    IVC/SVC: Elevated venous pressure at 15 mmHg.    Pericardium: Large left pleural effusion.

## 2024-01-08 NOTE — ASSESSMENT & PLAN NOTE
Patient with Hypoxic Respiratory failure which is Acute.  he is not on home oxygen. Supplemental oxygen was provided and noted- Oxygen Concentration (%):  [28] 28    .   Signs/symptoms of respiratory failure include- tachypnea, increased work of breathing, and respiratory distress. Contributing diagnoses includes - Aspiration, CHF, and Pneumonia Labs and images were reviewed. Patient Has not had a recent ABG. Will treat underlying causes and adjust management of respiratory failure as follows-     Abg pending  Multifactorial, fever, cough with rll opacity in the setting of etoh use concerning for aspiration pna  Will cover with unasy, levaquin, is, chest pt, mucolytics    BNP up with edema on xr and le edema  Will diurese  Hold fluids as above  Low sodium diet  Strict intake and output    Noted UA findings, complex picture    Continue diuresis  Will consult Cardiology given what appears to be severely reduced LVEF  Continue diuresis, abx  Continue diuresis, awaiting labs  Renal function improved, will begin IV diuresis again. He is still requiring oxygen

## 2024-01-08 NOTE — ASSESSMENT & PLAN NOTE
Chronic, controlled. Latest blood pressure and vitals reviewed-     Temp:  [97.6 °F (36.4 °C)-97.9 °F (36.6 °C)]   Pulse:  []   Resp:  [16-20]   BP: (108-132)/(61-81)   SpO2:  [91 %-99 %] .   Home meds for hypertension were reviewed and noted below.   Bp reasonably controlled, continue current regimen     While in the hospital, will manage blood pressure as follows; Continue home antihypertensive regimen    Will utilize p.r.n. blood pressure medication only if patient's blood pressure greater than 180/110 and he develops symptoms such as worsening chest pain or shortness of breath.  Bp reasonably controlled, continue current regimen

## 2024-01-08 NOTE — ASSESSMENT & PLAN NOTE
Patient has persistent depression which is unknown and is currently controlled. Will Begin anti-depressant medications. We will not consult psychiatry at this time. Patient does not display psychosis at this time. Continue to monitor closely and adjust plan of care as needed.    In my opinion patient has some undiagnosed psychiatric illness, his line thought and speaking continues to be abnormal  Agitated using racial slurs and threats last night, consult tele psych. Start olanzapine  Telepsych consulted

## 2024-01-08 NOTE — PT/OT/SLP PROGRESS
Occupational Therapy   Treatment    Name: Derek Moreno  MRN: [id number]  Admitting Diagnosis:  Severe sepsis       Recommendations:     Discharge Recommendations: Low Intensity Therapy  Discharge Equipment Recommendations:  to be determined by next level of care  Barriers to discharge:  Decreased caregiver support, Inaccessible home environment    Assessment:     Derek Moreno is a 58 y.o. male with a medical diagnosis of Severe sepsis.  He presents with weakness and decline in ADLs. Performance deficits affecting function are weakness, impaired endurance, impaired self care skills, impaired functional mobility, gait instability, impaired balance, pain, decreased safety awareness.     Rehab Prognosis:  Fair; patient would benefit from acute skilled OT services to address these deficits and reach maximum level of function.       Plan:     Patient to be seen 5 x/week to address the above listed problems via self-care/home management, therapeutic activities, therapeutic exercises  Plan of Care Expires: 02/02/24  Plan of Care Reviewed with: patient    Subjective     Chief Complaint: sepsis  Patient/Family Comments/goals: pt agreeable to OT tx  Pain/Comfort:  Pain Rating 1: 6/10  Location - Side 1: Bilateral  Location 1: foot    Objective:     Communicated with: LUIS Cordova prior to session.  Patient found sitting edge of bed with oxygen, peripheral IV, telemetry upon OT entry to room.    General Precautions: Standard, fall    Orthopedic Precautions:N/A  Braces: N/A  Respiratory Status: Nasal cannula, flow 3 L/min     Occupational Performance:     Bed Mobility:    Not performed     Functional Mobility/Transfers:  Patient completed Sit <> Stand Transfer with stand by assistance  with  rolling walker   Patient completed Bed <> Chair Transfer using Step Transfer technique with stand by assistance with rolling walker  Functional Mobility: pt performed steps to wheelchair with SBA with RW    Activities of Daily  Living:  Lower Body Dressing: modified independence to hero shoes with shoe horn      AMPAC 6 Click ADL:      Treatment & Education:  Pt performed ADL training as listed above.     Patient left  in wheelchiar in front lobby  with all lines intact and LUIS Cordova notified    GOALS:   Multidisciplinary Problems       Occupational Therapy Goals          Problem: Occupational Therapy    Goal Priority Disciplines Outcome Interventions   Occupational Therapy Goal     OT, PT/OT Ongoing, Progressing    Description: STG:  Pt will perform grooming with setup  Pt will bathe with Lamberto with setup at EOB  Pt will perform UE dressing with Lianet  Pt will perform LE dressing with Lamberto  Pt will sit EOB x 10 min with SBA   Pt will transfer bed/chair/bsc with CGA with RW  Pt will perform standing task x 2 min with CGA with RW   Pt will tolerate 15 minutes of tx without fatigue      LT.Restore to max I with self care and mobility.                           Time Tracking:     OT Date of Treatment: 24  OT Start Time: 0959  OT Stop Time: 1020  OT Total Time (min): 21 min    Billable Minutes:Self Care/Home Management 15 minutes    OT/ANAY: OT          2024
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
## 2024-01-08 NOTE — ASSESSMENT & PLAN NOTE
Patient found to have moderate pleural effusion on imaging. I have personally reviewed and interpreted the following imaging: Xray. A thoracentesis was deferred. Most likely etiology includes Congestive Heart Failure and Pneumonia. Management to include Diuresis  Will drain if no improvement soon   CXR today, if no better will ask IR or Pulm for thora tomorrow  CXR better effusions decreased, discussed case with Dr. Chowdhury but will hold off on thora for now given improvement with diuresis.

## 2024-01-08 NOTE — ASSESSMENT & PLAN NOTE
Continue IV diuresis  Fluid restriction to 1.5L daily  Sodium restriction to <2g daily  Daily weights  Strict I/O  Will address GDMT prior to discharge   Consult cardiology    OP ischemic eval, GDMT at [facility]  Will begin GDMT   Still requiring oxygen, cxr improved, will continue with IV diuresis

## 2024-01-08 NOTE — SUBJECTIVE & OBJECTIVE
Interval History: naeo    Review of Systems   Constitutional:  Positive for fatigue and fever. Negative for chills and unexpected weight change.   HENT:  Negative for congestion, mouth sores and sore throat.    Eyes:  Negative for photophobia and visual disturbance.   Respiratory:  Positive for cough and shortness of breath. Negative for chest tightness and wheezing.    Cardiovascular:  Positive for leg swelling. Negative for chest pain and palpitations.   Gastrointestinal:  Negative for abdominal pain, diarrhea, nausea and vomiting.   Endocrine: Negative for cold intolerance and heat intolerance.   Genitourinary:  Negative for difficulty urinating, dysuria, frequency and urgency.   Musculoskeletal:  Negative for arthralgias, back pain and myalgias.   Skin:  Negative for pallor and rash.   Neurological:  Positive for weakness. Negative for tremors, seizures, syncope, numbness and headaches.   Hematological:  Does not bruise/bleed easily.   Psychiatric/Behavioral:  Negative for agitation, confusion, hallucinations and suicidal ideas.      Objective:     Vital Signs (Most Recent):  Temp: 97.6 °F (36.4 °C) (01/08/24 0740)  Pulse: 98 (01/08/24 0740)  Resp: 18 (01/08/24 0740)  BP: 108/68 (01/08/24 0740)  SpO2: 98 % (01/08/24 0740) Vital Signs (24h Range):  Temp:  [97.6 °F (36.4 °C)-97.9 °F (36.6 °C)] 97.6 °F (36.4 °C)  Pulse:  [] 98  Resp:  [16-20] 18  SpO2:  [91 %-99 %] 98 %  BP: (108-132)/(61-81) 108/68     Weight: 68 kg (149 lb 14.6 oz)  Body mass index is 22.79 kg/m².    Intake/Output Summary (Last 24 hours) at 1/8/2024 1124  Last data filed at 1/8/2024 0659  Gross per 24 hour   Intake 1803.33 ml   Output 1100 ml   Net 703.33 ml           Physical Exam  Vitals reviewed.   Constitutional:       Appearance: Normal appearance.   HENT:      Head: Normocephalic and atraumatic.      Nose: Nose normal.   Eyes:      Extraocular Movements: Extraocular movements intact.      Conjunctiva/sclera: Conjunctivae normal.    Neck:      Trachea: Trachea normal.   Cardiovascular:      Rate and Rhythm: Normal rate and regular rhythm.      Pulses: Normal pulses.      Heart sounds: Normal heart sounds.   Pulmonary:      Breath sounds: Normal air entry. Rales present.   Abdominal:      General: Bowel sounds are normal.      Palpations: Abdomen is soft.   Musculoskeletal:      Cervical back: Neck supple.      Right lower leg: Edema present.      Left lower leg: Edema present.      Comments: Moves all extremities, normal tone   Skin:     General: Skin is warm and dry.   Neurological:      General: No focal deficit present.      Mental Status: He is alert and oriented to person, place, and time.      Cranial Nerves: Cranial nerves 2-12 are intact.      Comments: Grossly normal motor and sensory function without focal deficit appreciated.   Psychiatric:         Mood and Affect: Affect normal.         Behavior: Behavior is cooperative.      Comments: Abnormal behavior and thought content             Significant Labs: All pertinent labs within the past 24 hours have been reviewed.  Bmp, cbc reviewed  Significant Imaging: I have reviewed all pertinent imaging results/findings within the past 24 hours.  Cxr reviewed, improved from prior. Bilateral effusions smaller. No consolidation

## 2024-01-08 NOTE — PLAN OF CARE
Problem: Adult Inpatient Plan of Care  Goal: Plan of Care Review  1/8/2024 1704 by Art Hutchinson RN  Outcome: Ongoing, Progressing  1/8/2024 1703 by Art Hutchinson RN  Outcome: Ongoing, Progressing  Goal: Patient-Specific Goal (Individualized)  1/8/2024 1704 by Art Hutchinson RN  Outcome: Ongoing, Progressing  1/8/2024 1703 by Art Hutchinson RN  Outcome: Ongoing, Progressing  Goal: Absence of Hospital-Acquired Illness or Injury  1/8/2024 1704 by Art Hutchinson RN  Outcome: Ongoing, Progressing  1/8/2024 1703 by Art Hutchinson RN  Outcome: Ongoing, Progressing  Goal: Optimal Comfort and Wellbeing  1/8/2024 1704 by Art Hutchinson RN  Outcome: Ongoing, Progressing  1/8/2024 1703 by Art Hutchinson, RN  Outcome: Ongoing, Progressing  Goal: Readiness for Transition of Care  1/8/2024 1704 by Art Hutchinson, RN  Outcome: Ongoing, Progressing  1/8/2024 1703 by Art Hutchinson RN  Outcome: Ongoing, Progressing     Problem: Gas Exchange Impaired  Goal: Optimal Gas Exchange  1/8/2024 1704 by Art Hutchinson RN  Outcome: Ongoing, Progressing  1/8/2024 1703 by Art Hutchinson RN  Outcome: Ongoing, Progressing     Problem: Airway Clearance Ineffective  Goal: Effective Airway Clearance  1/8/2024 1704 by Art Hutchinson, RN  Outcome: Ongoing, Progressing  1/8/2024 1703 by Art Hutchinson RN  Outcome: Ongoing, Progressing     Problem: Adjustment to Illness (Sepsis/Septic Shock)  Goal: Optimal Coping  1/8/2024 1704 by Art Hutchinson, RN  Outcome: Ongoing, Progressing  1/8/2024 1703 by Art Hutchinson RN  Outcome: Ongoing, Progressing     Problem: Bleeding (Sepsis/Septic Shock)  Goal: Absence of Bleeding  1/8/2024 1704 by Art Hutchinson, RN  Outcome: Ongoing, Progressing  1/8/2024 1703 by Hutchinson, Art S., RN  Outcome: Ongoing, Progressing     Problem: Glycemic Control Impaired (Sepsis/Septic Shock)  Goal: Blood Glucose Level Within Desired Range  1/8/2024  1704 by Art Hutchinson RN  Outcome: Ongoing, Progressing  1/8/2024 1703 by Art Hutchinson RN  Outcome: Ongoing, Progressing     Problem: Infection Progression (Sepsis/Septic Shock)  Goal: Absence of Infection Signs and Symptoms  1/8/2024 1704 by Art Hutchinson, RN  Outcome: Ongoing, Progressing  1/8/2024 1703 by Art Hutchinson RN  Outcome: Ongoing, Progressing     Problem: Nutrition Impaired (Sepsis/Septic Shock)  Goal: Optimal Nutrition Intake  1/8/2024 1704 by Art Hutchinson RN  Outcome: Ongoing, Progressing  1/8/2024 1703 by Art Hutchinson RN  Outcome: Ongoing, Progressing     Problem: Fall Injury Risk  Goal: Absence of Fall and Fall-Related Injury  1/8/2024 1704 by Art Hutchinson RN  Outcome: Ongoing, Progressing  1/8/2024 1703 by Art Hutchinson RN  Outcome: Ongoing, Progressing

## 2024-01-08 NOTE — PLAN OF CARE
Problem: Adult Inpatient Plan of Care  Goal: Plan of Care Review  Outcome: Ongoing, Progressing  Goal: Patient-Specific Goal (Individualized)  Outcome: Ongoing, Progressing  Goal: Absence of Hospital-Acquired Illness or Injury  Outcome: Ongoing, Progressing  Goal: Optimal Comfort and Wellbeing  Outcome: Ongoing, Progressing  Goal: Readiness for Transition of Care  Outcome: Ongoing, Progressing     Problem: Gas Exchange Impaired  Goal: Optimal Gas Exchange  Outcome: Ongoing, Progressing     Problem: Airway Clearance Ineffective  Goal: Effective Airway Clearance  Outcome: Ongoing, Progressing     Problem: Adjustment to Illness (Sepsis/Septic Shock)  Goal: Optimal Coping  Outcome: Ongoing, Progressing     Problem: Bleeding (Sepsis/Septic Shock)  Goal: Absence of Bleeding  Outcome: Ongoing, Progressing     Problem: Glycemic Control Impaired (Sepsis/Septic Shock)  Goal: Blood Glucose Level Within Desired Range  Outcome: Ongoing, Progressing     Problem: Infection Progression (Sepsis/Septic Shock)  Goal: Absence of Infection Signs and Symptoms  Outcome: Ongoing, Progressing     Problem: Nutrition Impaired (Sepsis/Septic Shock)  Goal: Optimal Nutrition Intake  Outcome: Ongoing, Progressing     Problem: Fall Injury Risk  Goal: Absence of Fall and Fall-Related Injury  Outcome: Ongoing, Progressing
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
## 2024-01-08 NOTE — ASSESSMENT & PLAN NOTE
"This patient does have evidence of infective focus  My overall impression is sepsis.  Source: Respiratory  Antibiotics given-   Antibiotics (72h ago, onward)      Start     Stop Route Frequency Ordered    01/04/24 1815  ampicillin-sulbactam (UNASYN) 3 g in sodium chloride 0.9 % 100 mL IVPB (MB+)         01/09/24 1814 IV Every 6 hours (non-standard times) 01/04/24 1707    01/04/24 1815  levoFLOXacin 750 mg/150 mL IVPB 750 mg         01/09/24 1814 IV Every 24 hours (non-standard times) 01/04/24 1707          Latest lactate reviewed-  No results for input(s): "LACTATE" in the last 72 hours.    Organ dysfunction indicated by Acute respiratory failure    Fluid challenge Not needed - patient is not hypotensive      Post- resuscitation assessment No - Post resuscitation assessment not needed       Will Not start Pressors- Levophed for MAP of 65  Source control achieved by:     Patient volume overload, hold fluid for now  Abx  Cultures  Lactate  Continue abx    "

## 2024-01-09 PROBLEM — R94.39 ABNORMAL CARDIOVASCULAR STRESS TEST: Status: ACTIVE | Noted: 2024-01-09

## 2024-01-09 LAB
ANION GAP SERPL CALCULATED.3IONS-SCNC: 12 MMOL/L (ref 7–16)
BASOPHILS # BLD AUTO: 0.05 K/UL (ref 0–0.2)
BASOPHILS NFR BLD AUTO: 0.5 % (ref 0–1)
BUN SERPL-MCNC: 32 MG/DL (ref 7–18)
BUN/CREAT SERPL: 20 (ref 6–20)
CALCIUM SERPL-MCNC: 8.9 MG/DL (ref 8.5–10.1)
CHLORIDE SERPL-SCNC: 102 MMOL/L (ref 98–107)
CO2 SERPL-SCNC: 28 MMOL/L (ref 21–32)
CREAT SERPL-MCNC: 1.6 MG/DL (ref 0.7–1.3)
DIFFERENTIAL METHOD BLD: ABNORMAL
EGFR (NO RACE VARIABLE) (RUSH/TITUS): 50 ML/MIN/1.73M2
EOSINOPHIL # BLD AUTO: 0.36 K/UL (ref 0–0.5)
EOSINOPHIL NFR BLD AUTO: 3.9 % (ref 1–4)
ERYTHROCYTE [DISTWIDTH] IN BLOOD BY AUTOMATED COUNT: 12.8 % (ref 11.5–14.5)
GLUCOSE SERPL-MCNC: 81 MG/DL (ref 74–106)
HCT VFR BLD AUTO: 39.4 % (ref 40–54)
HGB BLD-MCNC: 12.9 G/DL (ref 13.5–18)
IMM GRANULOCYTES # BLD AUTO: 0.06 K/UL (ref 0–0.04)
IMM GRANULOCYTES NFR BLD: 0.7 % (ref 0–0.4)
LYMPHOCYTES # BLD AUTO: 1.61 K/UL (ref 1–4.8)
LYMPHOCYTES NFR BLD AUTO: 17.5 % (ref 27–41)
MCH RBC QN AUTO: 31.2 PG (ref 27–31)
MCHC RBC AUTO-ENTMCNC: 32.7 G/DL (ref 32–36)
MCV RBC AUTO: 95.4 FL (ref 80–96)
MONOCYTES # BLD AUTO: 0.93 K/UL (ref 0–0.8)
MONOCYTES NFR BLD AUTO: 10.1 % (ref 2–6)
MPC BLD CALC-MCNC: 10.5 FL (ref 9.4–12.4)
NEUTROPHILS # BLD AUTO: 6.2 K/UL (ref 1.8–7.7)
NEUTROPHILS NFR BLD AUTO: 67.3 % (ref 53–65)
NRBC # BLD AUTO: 0 X10E3/UL
NRBC, AUTO (.00): 0 %
PLATELET # BLD AUTO: 329 K/UL (ref 150–400)
POTASSIUM SERPL-SCNC: 3.7 MMOL/L (ref 3.5–5.1)
RBC # BLD AUTO: 4.13 M/UL (ref 4.6–6.2)
SODIUM SERPL-SCNC: 138 MMOL/L (ref 136–145)
WBC # BLD AUTO: 9.21 K/UL (ref 4.5–11)

## 2024-01-09 PROCEDURE — 25000242 PHARM REV CODE 250 ALT 637 W/ HCPCS: Performed by: STUDENT IN AN ORGANIZED HEALTH CARE EDUCATION/TRAINING PROGRAM

## 2024-01-09 PROCEDURE — 94761 N-INVAS EAR/PLS OXIMETRY MLT: CPT

## 2024-01-09 PROCEDURE — 80048 BASIC METABOLIC PNL TOTAL CA: CPT | Performed by: STUDENT IN AN ORGANIZED HEALTH CARE EDUCATION/TRAINING PROGRAM

## 2024-01-09 PROCEDURE — 25000003 PHARM REV CODE 250: Performed by: NURSE PRACTITIONER

## 2024-01-09 PROCEDURE — 94668 MNPJ CHEST WALL SBSQ: CPT

## 2024-01-09 PROCEDURE — 99900035 HC TECH TIME PER 15 MIN (STAT)

## 2024-01-09 PROCEDURE — 63600175 PHARM REV CODE 636 W HCPCS: Performed by: STUDENT IN AN ORGANIZED HEALTH CARE EDUCATION/TRAINING PROGRAM

## 2024-01-09 PROCEDURE — 11000001 HC ACUTE MED/SURG PRIVATE ROOM

## 2024-01-09 PROCEDURE — 94660 CPAP INITIATION&MGMT: CPT

## 2024-01-09 PROCEDURE — 25000003 PHARM REV CODE 250: Performed by: INTERNAL MEDICINE

## 2024-01-09 PROCEDURE — 85025 COMPLETE CBC W/AUTO DIFF WBC: CPT | Performed by: STUDENT IN AN ORGANIZED HEALTH CARE EDUCATION/TRAINING PROGRAM

## 2024-01-09 PROCEDURE — 99233 SBSQ HOSP IP/OBS HIGH 50: CPT | Mod: ,,, | Performed by: FAMILY MEDICINE

## 2024-01-09 PROCEDURE — 27000221 HC OXYGEN, UP TO 24 HOURS

## 2024-01-09 PROCEDURE — 63600175 PHARM REV CODE 636 W HCPCS: Performed by: FAMILY MEDICINE

## 2024-01-09 PROCEDURE — 25000003 PHARM REV CODE 250: Performed by: FAMILY MEDICINE

## 2024-01-09 PROCEDURE — 25000003 PHARM REV CODE 250: Performed by: STUDENT IN AN ORGANIZED HEALTH CARE EDUCATION/TRAINING PROGRAM

## 2024-01-09 PROCEDURE — 94640 AIRWAY INHALATION TREATMENT: CPT

## 2024-01-09 PROCEDURE — S4991 NICOTINE PATCH NONLEGEND: HCPCS | Performed by: INTERNAL MEDICINE

## 2024-01-09 PROCEDURE — 99233 SBSQ HOSP IP/OBS HIGH 50: CPT | Mod: ,,, | Performed by: NURSE PRACTITIONER

## 2024-01-09 RX ORDER — FUROSEMIDE 40 MG/1
40 TABLET ORAL DAILY
Status: DISCONTINUED | OUTPATIENT
Start: 2024-01-10 | End: 2024-01-11 | Stop reason: HOSPADM

## 2024-01-09 RX ORDER — GABAPENTIN 300 MG/1
300 CAPSULE ORAL 3 TIMES DAILY
Status: DISCONTINUED | OUTPATIENT
Start: 2024-01-09 | End: 2024-01-11 | Stop reason: HOSPADM

## 2024-01-09 RX ORDER — REGADENOSON 0.08 MG/ML
0.4 INJECTION, SOLUTION INTRAVENOUS ONCE
Status: COMPLETED | OUTPATIENT
Start: 2024-01-09 | End: 2024-01-09

## 2024-01-09 RX ADMIN — REGADENOSON 0.4 MG: 0.08 INJECTION, SOLUTION INTRAVENOUS at 10:01

## 2024-01-09 RX ADMIN — LOSARTAN POTASSIUM 25 MG: 25 TABLET, FILM COATED ORAL at 12:01

## 2024-01-09 RX ADMIN — AMPICILLIN AND SULBACTAM 3 G: 2; 1 INJECTION, POWDER, FOR SOLUTION INTRAMUSCULAR; INTRAVENOUS at 12:01

## 2024-01-09 RX ADMIN — TRAZODONE HYDROCHLORIDE 25 MG: 50 TABLET ORAL at 08:01

## 2024-01-09 RX ADMIN — IPRATROPIUM BROMIDE AND ALBUTEROL SULFATE 3 ML: .5; 3 SOLUTION RESPIRATORY (INHALATION) at 07:01

## 2024-01-09 RX ADMIN — IPRATROPIUM BROMIDE AND ALBUTEROL SULFATE 3 ML: .5; 3 SOLUTION RESPIRATORY (INHALATION) at 02:01

## 2024-01-09 RX ADMIN — ACETYLCYSTEINE 2 ML: 200 SOLUTION ORAL; RESPIRATORY (INHALATION) at 07:01

## 2024-01-09 RX ADMIN — NICOTINE 1 PATCH: 21 PATCH, EXTENDED RELEASE TRANSDERMAL at 12:01

## 2024-01-09 RX ADMIN — METOPROLOL SUCCINATE 25 MG: 25 TABLET, EXTENDED RELEASE ORAL at 12:01

## 2024-01-09 RX ADMIN — ENOXAPARIN SODIUM 40 MG: 40 INJECTION SUBCUTANEOUS at 05:01

## 2024-01-09 RX ADMIN — IPRATROPIUM BROMIDE AND ALBUTEROL SULFATE 3 ML: .5; 3 SOLUTION RESPIRATORY (INHALATION) at 08:01

## 2024-01-09 RX ADMIN — AMPICILLIN AND SULBACTAM 3 G: 2; 1 INJECTION, POWDER, FOR SOLUTION INTRAMUSCULAR; INTRAVENOUS at 05:01

## 2024-01-09 RX ADMIN — FUROSEMIDE 40 MG: 10 INJECTION, SOLUTION INTRAVENOUS at 06:01

## 2024-01-09 RX ADMIN — ACETAMINOPHEN 1000 MG: 500 TABLET ORAL at 01:01

## 2024-01-09 RX ADMIN — AMPICILLIN AND SULBACTAM 3 G: 2; 1 INJECTION, POWDER, FOR SOLUTION INTRAMUSCULAR; INTRAVENOUS at 01:01

## 2024-01-09 RX ADMIN — SPIRONOLACTONE 25 MG: 25 TABLET ORAL at 12:01

## 2024-01-09 RX ADMIN — ACETYLCYSTEINE 2 ML: 200 SOLUTION ORAL; RESPIRATORY (INHALATION) at 08:01

## 2024-01-09 RX ADMIN — GABAPENTIN 300 MG: 300 CAPSULE ORAL at 09:01

## 2024-01-09 RX ADMIN — ACETYLCYSTEINE 2 ML: 200 SOLUTION ORAL; RESPIRATORY (INHALATION) at 02:01

## 2024-01-09 NOTE — PT/OT/SLP PROGRESS
Physical Therapy      Patient Name:  Derek Moreno   MRN:  [id number]    Patient not seen today secondary to Off the floor for procedure/surgery. Will follow-up 1/10/24.

## 2024-01-09 NOTE — SUBJECTIVE & OBJECTIVE
Interval History: Patient seen today, underwent NM stress test that was positive for ischemia and large defect that improves with stress, likely artifact. Reports sob and lower extremity edema improved with diuresis. Plan for [facility] tomorrow.    Review of Systems   Cardiovascular:  Positive for chest pain, dyspnea on exertion, leg swelling and orthopnea.   Respiratory:  Positive for cough and shortness of breath.    Neurological:  Positive for dizziness.     Objective:     Vital Signs (Most Recent):  Temp: 97.9 °F (36.6 °C) (01/09/24 1440)  Pulse: 99 (01/09/24 1440)  Resp: 18 (01/09/24 1440)  BP: 106/69 (01/09/24 1440)  SpO2: 97 % (01/09/24 1440) Vital Signs (24h Range):  Temp:  [97.3 °F (36.3 °C)-98.3 °F (36.8 °C)] 97.9 °F (36.6 °C)  Pulse:  [] 99  Resp:  [18-26] 18  SpO2:  [97 %-99 %] 97 %  BP: (106-129)/(65-81) 106/69     Weight: 68 kg (149 lb 14.6 oz)  Body mass index is 22.79 kg/m².     SpO2: 97 %         Intake/Output Summary (Last 24 hours) at 1/9/2024 1740  Last data filed at 1/9/2024 1200  Gross per 24 hour   Intake 1000 ml   Output 2600 ml   Net -1600 ml       Lines/Drains/Airways       Peripheral Intravenous Line  Duration                  Peripheral IV - Single Lumen 01/04/24 1620 22 G Anterior;Right Forearm 5 days                       Physical Exam  Vitals reviewed.   HENT:      Mouth/Throat:      Mouth: Mucous membranes are moist.      Pharynx: Oropharynx is clear.   Eyes:      General: No scleral icterus.     Pupils: Pupils are equal, round, and reactive to light.   Cardiovascular:      Rate and Rhythm: Regular rhythm. Tachycardia present.      Heart sounds: Normal heart sounds.   Pulmonary:      Effort: Pulmonary effort is normal.      Breath sounds: No rales.   Abdominal:      General: Bowel sounds are normal.      Palpations: Abdomen is soft.   Musculoskeletal:      Right lower leg: Edema (improving) present.      Left lower leg: Edema (improving) present.   Skin:     General: Skin is warm and  "dry.      Findings: Erythema (bilateral hands and feet) present.   Neurological:      Mental Status: He is alert and oriented to person, place, and time.   Psychiatric:         Mood and Affect: Mood is anxious. Affect is not labile.         Speech: Speech is not rapid and pressured.            Significant Labs: ABG: No results for input(s): "PH", "PCO2", "HCO3", "POCSATURATED", "BE" in the last 48 hours., Blood Culture: No results for input(s): "LABBLOO" in the last 48 hours., BMP:   Recent Labs   Lab 01/08/24  0538 01/09/24  0302   GLU 95 81    138   K 3.9 3.7    102   CO2 29 28   BUN 24* 32*   CREATININE 1.27 1.60*   CALCIUM 8.9 8.9   , CMP   Recent Labs   Lab 01/08/24  0538 01/09/24  0302    138   K 3.9 3.7    102   CO2 29 28   GLU 95 81   BUN 24* 32*   CREATININE 1.27 1.60*   CALCIUM 8.9 8.9   PROT 6.1*  --    ALBUMIN 3.3*  --    BILITOT 0.5  --    ALKPHOS 68  --    AST 21  --    ALT 27  --    ANIONGAP 10 12   , CBC   Recent Labs   Lab 01/08/24 0538 01/09/24  0302   WBC 10.62 9.21   HGB 12.8* 12.9*   HCT 39.8* 39.4*    329   , INR No results for input(s): "INR", "PROTIME" in the last 48 hours., Lipid Panel No results for input(s): "CHOL", "HDL", "LDLCALC", "TRIG", "CHOLHDL" in the last 48 hours., and Troponin No results for input(s): "TROPONINI" in the last 48 hours.    Significant Imaging: Echocardiogram: Transthoracic echo (TTE) complete (Cupid Only):   Results for orders placed or performed during the hospital encounter of 01/04/24   Echo   Result Value Ref Range    BSA 1.83 m2    LVOT stroke volume 33.09 cm3    LVIDd 6.0 3.5 - 6.0 cm    LV Systolic Volume 129.43 mL    LV Systolic Volume Index 70.0 mL/m2    LVIDs 5.20 (A) 2.1 - 4.0 cm    LV Diastolic Volume 148.24 mL    LV Diastolic Volume Index 80.13 mL/m2    IVS 1.02 0.6 - 1.1 cm    LVOT diameter 1.94 cm    LVOT area 3.0 cm2    FS 13 (A) 28 - 44 %    Left Ventricle Relative Wall Thickness 0.30 cm    Posterior Wall 0.90 0.6 - " 1.1 cm    LV mass 234.22 g    LV Mass Index 127 g/m2    TDI LATERAL 0.17 m/s    TDI SEPTAL 0.05 m/s    TR Max Teofilo 3.41 m/s    E wave deceleration time 73.99 msec    LVOT peak teofilo 0.73 m/s    Left Ventricular Outflow Tract Mean Velocity 0.52 cm/s    Left Ventricular Outflow Tract Mean Gradient 1.20 mmHg    RVDD 5.17 cm    TAPSE 1.55 cm    LA size 3.69 cm    Left Atrium Major Axis 3.55 cm    RA Major Axis 4.48 cm    AV mean gradient 2 mmHg    AV peak gradient 3 mmHg    Ao peak teofilo 0.82 m/s    Ao VTI 10.90 cm    LVOT peak VTI 11.20 cm    AV valve area 3.04 cm²    AV Velocity Ratio 0.89     AV index (prosthetic) 1.03     YUVAL by Velocity Ratio 2.63 cm²    Mr max teofilo 4.80 m/s    MV mean gradient 3 mmHg    MV peak gradient 5 mmHg    MV stenosis pressure 1/2 time 21.46 ms    MV valve area p 1/2 method 10.25 cm2    MV valve area by continuity eq 1.76 cm2    MV VTI 18.8 cm    Triscuspid Valve Regurgitation Peak Gradient 47 mmHg    PV PEAK VELOCITY 0.57 m/s    PV peak gradient 1 mmHg    Ao root annulus 2.68 cm    IVC diameter 2.19 cm    Mean e' 0.11 m/s    ZLVIDS 3.91     ZLVIDD 1.55     AORTIC VALVE CUSP SEPERATION 2.19 cm    TV resting pulmonary artery pressure 62 mmHg    RV TB RVSP 18 mmHg    Est. RA pres 15 mmHg    Mason's Biplane MOD Ejection Fraction 15 %    Left Ventricular Cardiac Output -6.3 dm3 min-1    Narrative      Left Ventricle: The left ventricle is mildly dilated. Normal wall   thickness. There is eccentric hypertrophy. Severe global hypokinesis with   regional variation. There is severely reduced systolic function. Biplane   (2D) method of discs ejection fraction is 15%. Global longitudinal strain   is reduced. There is diastolic dysfunction but grade cannot be determined.    Right Ventricle: Moderate right ventricular enlargement. Systolic   function is mildly reduced.    Right Atrium: Right atrium is mildly dilated.    Aortic Valve: The aortic valve is a trileaflet valve.    Mitral Valve: There is mild  bileaflet sclerosis. There is no stenosis.   The mean pressure gradient across the mitral valve is 3 mmHg at a heart   rate of  bpm. There is moderate regurgitation with a posterolateral   eccentriccally directed jet.    Tricuspid Valve: There is moderate regurgitation.    Pulmonary Artery: The estimated pulmonary artery systolic pressure is   62 mmHg.    IVC/SVC: Elevated venous pressure at 15 mmHg.    Pericardium: Large left pleural effusion.        and Stress Test: NM Myocardial Perfusion Spect Multi Pharmacologic  Order: [id number]  Status: Final result       Visible to patient: Yes (not seen)       Next appt: None    0 Result Notes  Details    Reading Physician Reading Date Result Priority   Ignacio Chowdhury MD  [phone] 1/9/2024 Routine     Narrative & Impression  EXAMINATION:  NM MYOCARDIAL PERFUSION SPECT MULTI PHARM     CLINICAL HISTORY:  Heart failure, known or suspected, initial workup;     TECHNIQUE:  SPECT images in short, vertical and horizontal long axis were acquired 30 minutes after the injection of 11 mCi of Tc-99m sestamibi at rest and 37 mCi during a cardiac stress. The clinical stress and ECG portion of the study is to be read separately.     COMPARISON:  None.     FINDINGS:  The quality of the study is good VS is compromised by patient motion/GI activity adjacent to the inferior wall.     Large size, severe intensity inferior defect which is improved with stress as compared to rest, likely artifact.  There is small size, medium intensity apical defect worse with stress as compared to rest, possible ischemia.  Wall motion is globally impaired.     The gated post-stress images reveal impaired wall motion and diminished systolic wall thickening with an estimated LVEF of 31 %. The LV cavity (is) dilated with an end-diastolic volume of (263 ml- normal less than 140) ml and an end-systolic volume of (181 ml- normal less than 70) ml.     Normal TID ratio- 1.07     Impression:     1. Large size, severe  intensity inferior defect which is improved with stress as compared to rest, likely artifact.  There is reversible small size, medium intensity apical defect, possible ischemia.  Wall motion is globally impaired.  2. the global left ventricular systolic function is severely reduced with an LV ejection fraction of 31 % and evidence of LV dilatation. Wall motion is impaired.        Electronically signed by: Ignacio Chowdhury  Date:                                            01/09/2024  Time:                                           12:48
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
## 2024-01-09 NOTE — ASSESSMENT & PLAN NOTE
"- Patient seen and evaluated by Dr. Chowdhury  - Preliminary echo with EF 15-20%  - Difficult to obtain meaningful history (pt rambling), denies previous ischemic evaluation states "if I get put to sleep it will kill me, I have ALS"  - Troponin negative x 2; BNP 81700 (no previous for comparison)  - Will need ischemic evaluation at some point, may consider as outpatient to help assess medical compliance  - Creatinine 1.08 yesterday, bumped to 1.3 today  - Volume overloaded on exam, continue IV diuresis  - Start Losartan 25mg daily, if tolerates and creatinine remains stable will add SGLT2  - Will not start BB until euvolemic    1/7/2024:  - Seen by Dr. Manley over the weekend    1/8/2024:  - Patient seen and evaluated by Dr. Peterson  - Creatinine improving, 1.2; BP stable  - Continue Losartan, BB, and spironolactone  - Continue IV diuresis  - Lexiscan tomorrow  - Further recommendations to follow    1/9/2024:  - Patient seen and evaluated by Dr. Galvan  - Jhoniscan abnormal. Plan for [facility] tomorrow. Procedure, risk, and benefits discussed in detail with patient. He verbalized understanding and wishes to proceed. Consent obtained and on chart.  - Creatinine bumped to 1.6, continue diuresis and repeat BMP in am  - Further recommendations to follow  "

## 2024-01-09 NOTE — NURSING
Pt has been educated about being npo several times this shift for procedure tomorrow, pt found to be drinking at least 1 cup of ice water, states if he wants to drink anything he will drink, no one can tell him what to do and that he has not fully decided of he is going to let procedure be done at present time

## 2024-01-09 NOTE — PT/OT/SLP PROGRESS
Occupational Therapy      Patient Name:  Derek Moreno   MRN:  [id number]    Patient not seen today secondary to Off the floor for procedure/surgery (pt gone for lexiscan). Will follow-up 1/10/24.    1/9/2024

## 2024-01-10 LAB
ANION GAP SERPL CALCULATED.3IONS-SCNC: 11 MMOL/L (ref 7–16)
BACTERIA BLD CULT: NORMAL
BACTERIA BLD CULT: NORMAL
BASOPHILS # BLD AUTO: 0.08 K/UL (ref 0–0.2)
BASOPHILS NFR BLD AUTO: 0.9 % (ref 0–1)
BUN SERPL-MCNC: 30 MG/DL (ref 7–18)
BUN/CREAT SERPL: 21 (ref 6–20)
CALCIUM SERPL-MCNC: 8.9 MG/DL (ref 8.5–10.1)
CHLORIDE SERPL-SCNC: 100 MMOL/L (ref 98–107)
CO2 SERPL-SCNC: 30 MMOL/L (ref 21–32)
CREAT SERPL-MCNC: 1.4 MG/DL (ref 0.7–1.3)
CV STRESS BASE HR: 90 BPM
DIASTOLIC BLOOD PRESSURE: 80 MMHG
DIFFERENTIAL METHOD BLD: ABNORMAL
EGFR (NO RACE VARIABLE) (RUSH/TITUS): 59 ML/MIN/1.73M2
EOSINOPHIL # BLD AUTO: 0.36 K/UL (ref 0–0.5)
EOSINOPHIL NFR BLD AUTO: 4 % (ref 1–4)
ERYTHROCYTE [DISTWIDTH] IN BLOOD BY AUTOMATED COUNT: 12.9 % (ref 11.5–14.5)
GLUCOSE SERPL-MCNC: 96 MG/DL (ref 74–106)
HCT VFR BLD AUTO: 40.6 % (ref 40–54)
HGB BLD-MCNC: 13.3 G/DL (ref 13.5–18)
IMM GRANULOCYTES # BLD AUTO: 0.08 K/UL (ref 0–0.04)
IMM GRANULOCYTES NFR BLD: 0.9 % (ref 0–0.4)
LYMPHOCYTES # BLD AUTO: 2.18 K/UL (ref 1–4.8)
LYMPHOCYTES NFR BLD AUTO: 24 % (ref 27–41)
MCH RBC QN AUTO: 31.4 PG (ref 27–31)
MCHC RBC AUTO-ENTMCNC: 32.8 G/DL (ref 32–36)
MCV RBC AUTO: 95.8 FL (ref 80–96)
MONOCYTES # BLD AUTO: 0.93 K/UL (ref 0–0.8)
MONOCYTES NFR BLD AUTO: 10.2 % (ref 2–6)
MPC BLD CALC-MCNC: 10.3 FL (ref 9.4–12.4)
NEUTROPHILS # BLD AUTO: 5.46 K/UL (ref 1.8–7.7)
NEUTROPHILS NFR BLD AUTO: 60 % (ref 53–65)
NRBC # BLD AUTO: 0 X10E3/UL
NRBC, AUTO (.00): 0 %
OHS CV CPX 1 MINUTE RECOVERY HEART RATE: 96 BPM
OHS CV CPX 85 PERCENT MAX PREDICTED HEART RATE MALE: 139
OHS CV CPX ESTIMATED METS: 1
OHS CV CPX MAX PREDICTED HEART RATE: 164
OHS CV CPX PATIENT IS FEMALE: 0
OHS CV CPX PATIENT IS MALE: 1
OHS CV CPX PEAK DIASTOLIC BLOOD PRESSURE: 80 MMHG
OHS CV CPX PEAK HEAR RATE: 99 BPM
OHS CV CPX PEAK RATE PRESSURE PRODUCT: NORMAL
OHS CV CPX PEAK SYSTOLIC BLOOD PRESSURE: 125 MMHG
OHS CV CPX PERCENT MAX PREDICTED HEART RATE ACHIEVED: 60
OHS CV CPX RATE PRESSURE PRODUCT PRESENTING: 9990
PLATELET # BLD AUTO: 308 K/UL (ref 150–400)
POTASSIUM SERPL-SCNC: 4.1 MMOL/L (ref 3.5–5.1)
RBC # BLD AUTO: 4.24 M/UL (ref 4.6–6.2)
SODIUM SERPL-SCNC: 137 MMOL/L (ref 136–145)
STRESS ECHO POST EXERCISE DUR MIN: 1 MINUTES
SYSTOLIC BLOOD PRESSURE: 111 MMHG
WBC # BLD AUTO: 9.09 K/UL (ref 4.5–11)

## 2024-01-10 PROCEDURE — 11000001 HC ACUTE MED/SURG PRIVATE ROOM

## 2024-01-10 PROCEDURE — 99232 SBSQ HOSP IP/OBS MODERATE 35: CPT | Mod: ,,, | Performed by: FAMILY MEDICINE

## 2024-01-10 PROCEDURE — 99152 MOD SED SAME PHYS/QHP 5/>YRS: CPT | Mod: ,,, | Performed by: HOSPITALIST

## 2024-01-10 PROCEDURE — B2111ZZ FLUOROSCOPY OF MULTIPLE CORONARY ARTERIES USING LOW OSMOLAR CONTRAST: ICD-10-PCS | Performed by: HOSPITALIST

## 2024-01-10 PROCEDURE — 99152 MOD SED SAME PHYS/QHP 5/>YRS: CPT | Performed by: HOSPITALIST

## 2024-01-10 PROCEDURE — C1894 INTRO/SHEATH, NON-LASER: HCPCS | Performed by: HOSPITALIST

## 2024-01-10 PROCEDURE — 27000221 HC OXYGEN, UP TO 24 HOURS

## 2024-01-10 PROCEDURE — 25000003 PHARM REV CODE 250: Performed by: FAMILY MEDICINE

## 2024-01-10 PROCEDURE — 25000003 PHARM REV CODE 250: Performed by: STUDENT IN AN ORGANIZED HEALTH CARE EDUCATION/TRAINING PROGRAM

## 2024-01-10 PROCEDURE — 25000003 PHARM REV CODE 250: Performed by: NURSE PRACTITIONER

## 2024-01-10 PROCEDURE — 94668 MNPJ CHEST WALL SBSQ: CPT

## 2024-01-10 PROCEDURE — 85025 COMPLETE CBC W/AUTO DIFF WBC: CPT | Performed by: STUDENT IN AN ORGANIZED HEALTH CARE EDUCATION/TRAINING PROGRAM

## 2024-01-10 PROCEDURE — 4A023N7 MEASUREMENT OF CARDIAC SAMPLING AND PRESSURE, LEFT HEART, PERCUTANEOUS APPROACH: ICD-10-PCS | Performed by: HOSPITALIST

## 2024-01-10 PROCEDURE — 94640 AIRWAY INHALATION TREATMENT: CPT

## 2024-01-10 PROCEDURE — 93458 L HRT ARTERY/VENTRICLE ANGIO: CPT | Mod: 26,,, | Performed by: HOSPITALIST

## 2024-01-10 PROCEDURE — 25000242 PHARM REV CODE 250 ALT 637 W/ HCPCS: Performed by: STUDENT IN AN ORGANIZED HEALTH CARE EDUCATION/TRAINING PROGRAM

## 2024-01-10 PROCEDURE — 99153 MOD SED SAME PHYS/QHP EA: CPT | Performed by: HOSPITALIST

## 2024-01-10 PROCEDURE — 93458 L HRT ARTERY/VENTRICLE ANGIO: CPT | Performed by: HOSPITALIST

## 2024-01-10 PROCEDURE — 25000003 PHARM REV CODE 250: Performed by: INTERNAL MEDICINE

## 2024-01-10 PROCEDURE — 63600175 PHARM REV CODE 636 W HCPCS: Performed by: HOSPITALIST

## 2024-01-10 PROCEDURE — 94761 N-INVAS EAR/PLS OXIMETRY MLT: CPT

## 2024-01-10 PROCEDURE — 80048 BASIC METABOLIC PNL TOTAL CA: CPT | Performed by: STUDENT IN AN ORGANIZED HEALTH CARE EDUCATION/TRAINING PROGRAM

## 2024-01-10 PROCEDURE — 25000003 PHARM REV CODE 250: Performed by: HOSPITALIST

## 2024-01-10 PROCEDURE — 25500020 PHARM REV CODE 255: Performed by: HOSPITALIST

## 2024-01-10 PROCEDURE — 99900035 HC TECH TIME PER 15 MIN (STAT)

## 2024-01-10 PROCEDURE — C1887 CATHETER, GUIDING: HCPCS | Performed by: HOSPITALIST

## 2024-01-10 RX ORDER — HEPARIN SODIUM 5000 [USP'U]/ML
INJECTION, SOLUTION INTRAVENOUS; SUBCUTANEOUS
Status: DISCONTINUED | OUTPATIENT
Start: 2024-01-10 | End: 2024-01-10 | Stop reason: HOSPADM

## 2024-01-10 RX ORDER — VERAPAMIL HYDROCHLORIDE 2.5 MG/ML
INJECTION, SOLUTION INTRAVENOUS
Status: DISCONTINUED | OUTPATIENT
Start: 2024-01-10 | End: 2024-01-10 | Stop reason: HOSPADM

## 2024-01-10 RX ORDER — LIDOCAINE HYDROCHLORIDE 10 MG/ML
INJECTION INFILTRATION; PERINEURAL
Status: DISCONTINUED | OUTPATIENT
Start: 2024-01-10 | End: 2024-01-10 | Stop reason: HOSPADM

## 2024-01-10 RX ORDER — MIDAZOLAM HYDROCHLORIDE 1 MG/ML
INJECTION INTRAMUSCULAR; INTRAVENOUS
Status: DISCONTINUED | OUTPATIENT
Start: 2024-01-10 | End: 2024-01-10 | Stop reason: HOSPADM

## 2024-01-10 RX ORDER — SODIUM CHLORIDE 9 MG/ML
INJECTION, SOLUTION INTRAVENOUS
Status: DISCONTINUED | OUTPATIENT
Start: 2024-01-10 | End: 2024-01-11 | Stop reason: HOSPADM

## 2024-01-10 RX ORDER — FENTANYL CITRATE 50 UG/ML
INJECTION, SOLUTION INTRAMUSCULAR; INTRAVENOUS
Status: DISCONTINUED | OUTPATIENT
Start: 2024-01-10 | End: 2024-01-10 | Stop reason: HOSPADM

## 2024-01-10 RX ADMIN — IPRATROPIUM BROMIDE AND ALBUTEROL SULFATE 3 ML: .5; 3 SOLUTION RESPIRATORY (INHALATION) at 12:01

## 2024-01-10 RX ADMIN — IPRATROPIUM BROMIDE AND ALBUTEROL SULFATE 3 ML: .5; 3 SOLUTION RESPIRATORY (INHALATION) at 07:01

## 2024-01-10 RX ADMIN — ACETAMINOPHEN 1000 MG: 500 TABLET ORAL at 09:01

## 2024-01-10 RX ADMIN — GABAPENTIN 300 MG: 300 CAPSULE ORAL at 09:01

## 2024-01-10 RX ADMIN — METOPROLOL SUCCINATE 25 MG: 25 TABLET, EXTENDED RELEASE ORAL at 12:01

## 2024-01-10 RX ADMIN — ACETYLCYSTEINE 2 ML: 200 SOLUTION ORAL; RESPIRATORY (INHALATION) at 12:01

## 2024-01-10 RX ADMIN — TRAZODONE HYDROCHLORIDE 25 MG: 50 TABLET ORAL at 09:01

## 2024-01-10 RX ADMIN — ACETYLCYSTEINE 2 ML: 200 SOLUTION ORAL; RESPIRATORY (INHALATION) at 07:01

## 2024-01-10 RX ADMIN — FUROSEMIDE 40 MG: 40 TABLET ORAL at 12:01

## 2024-01-10 RX ADMIN — LOSARTAN POTASSIUM 25 MG: 25 TABLET, FILM COATED ORAL at 12:01

## 2024-01-10 RX ADMIN — ACETAMINOPHEN 1000 MG: 500 TABLET ORAL at 12:01

## 2024-01-10 RX ADMIN — GABAPENTIN 300 MG: 300 CAPSULE ORAL at 12:01

## 2024-01-10 RX ADMIN — SPIRONOLACTONE 25 MG: 25 TABLET ORAL at 12:01

## 2024-01-10 NOTE — NURSING
"Patient up in , states is going outside to smoke. Explained to patient that he is scheduled for a left heart catheterization any moment, and that he needs to stay on the floor. Patient becomes belligerent, yells profanities and states that he will indeed leave the floor, further states "the doctor told me I could." Again attempted to explain to the patient that he is scheduled for procedure any moment and should not leave the floor, patient yells profanities and "yells I can do any (expletive) thing I please, you can't stop me!" Patient states that he wants to take his medicine before he leaves floor; attempted to explain to patient that it would be dangerous to give the patient medication that could make him weak or disoriented.Patient yells "then keep your (expletive) medicine!" Patient leaves floor in , no immediate distress noted. Charge nurse and manager notified.  "

## 2024-01-10 NOTE — ASSESSMENT & PLAN NOTE
Patient's COPD is with exacerbation noted by continued dyspnea and use of accessory muscles for breathing currently.  Patient is currently off COPD Pathway. Continue scheduled inhalers Antibiotics and Supplemental oxygen and monitor respiratory status closely.     Believe this is multifactorial, copd may be contributing. Consider steroids if no better in am  Not exacerbation  No steroids for now  Stable  1/9 - patient is not benefitting from ongoing smoking

## 2024-01-10 NOTE — ASSESSMENT & PLAN NOTE
Patient with Hypoxic Respiratory failure which is Acute.  he is not on home oxygen. Supplemental oxygen was provided and noted- Oxygen Concentration (%):  [28-35] 28    .   Signs/symptoms of respiratory failure include- tachypnea, increased work of breathing, and respiratory distress. Contributing diagnoses includes - Aspiration, CHF, and Pneumonia Labs and images were reviewed. Patient Has not had a recent ABG. Will treat underlying causes and adjust management of respiratory failure as follows-     Abg pending  Multifactorial, fever, cough with rll opacity in the setting of etoh use concerning for aspiration pna  Will cover with unasy, levaquin, is, chest pt, mucolytics    BNP up with edema on xr and le edema  Will diurese  Hold fluids as above  Low sodium diet  Strict intake and output    Noted UA findings, complex picture    Continue diuresis  Will consult Cardiology given what appears to be severely reduced LVEF  Continue diuresis, abx  Continue diuresis, awaiting labs  Renal function improved, will begin IV diuresis again. He is still requiring oxygen

## 2024-01-10 NOTE — PT/OT/SLP PROGRESS
Physical Therapy      Patient Name:  Derek Moreno   MRN:  [id number]    Patient not seen today secondary to Off the floor for procedure/surgery. Will follow-up tomorrow.

## 2024-01-10 NOTE — ASSESSMENT & PLAN NOTE
"This patient does have evidence of infective focus  My overall impression is sepsis.  Source: Respiratory  Antibiotics given-   Antibiotics (72h ago, onward)      None          Latest lactate reviewed-  No results for input(s): "LACTATE" in the last 72 hours.    Organ dysfunction indicated by Acute respiratory failure    Fluid challenge Not needed - patient is not hypotensive      Post- resuscitation assessment No - Post resuscitation assessment not needed       Will Not start Pressors- Levophed for MAP of 65  Source control achieved by:     Patient volume overload, hold fluid for now  Abx  Cultures  Lactate  Continue abx  1/9 - abx complete, sepsis resolved    "

## 2024-01-10 NOTE — ASSESSMENT & PLAN NOTE
Patient has persistent depression which is unknown and is currently controlled. Will Begin anti-depressant medications. We will not consult psychiatry at this time. Patient does not display psychosis at this time. Continue to monitor closely and adjust plan of care as needed.    In my opinion patient has some undiagnosed psychiatric illness, his line thought and speaking continues to be abnormal  Agitated using racial slurs and threats last night, consult tele psych. Start olanzapine  Telepsych consulted    1/9 - agree with above. Continue olanzapine

## 2024-01-10 NOTE — SUBJECTIVE & OBJECTIVE
Interval History: two visits today with patient, before and after valeria. Explained results. Agreeable to heart cath tomorrow.    Review of Systems  Objective:     Vital Signs (Most Recent):  Temp: 98.1 °F (36.7 °C) (01/09/24 1810)  Pulse: 103 (01/09/24 2022)  Resp: 18 (01/09/24 2022)  BP: 115/74 (01/09/24 1810)  SpO2: 98 % (01/09/24 2022) Vital Signs (24h Range):  Temp:  [97.3 °F (36.3 °C)-98.3 °F (36.8 °C)] 98.1 °F (36.7 °C)  Pulse:  [] 103  Resp:  [18-26] 18  SpO2:  [96 %-99 %] 98 %  BP: (106-129)/(65-81) 115/74     Weight: 68 kg (149 lb 14.6 oz)  Body mass index is 22.79 kg/m².    Intake/Output Summary (Last 24 hours) at 1/9/2024 2034  Last data filed at 1/9/2024 1200  Gross per 24 hour   Intake 1000 ml   Output 2600 ml   Net -1600 ml         Physical Exam  Vitals reviewed.   Constitutional:       General: He is not in acute distress.     Appearance: He is not toxic-appearing.   HENT:      Head: Normocephalic and atraumatic.   Cardiovascular:      Rate and Rhythm: Normal rate and regular rhythm.      Heart sounds: Normal heart sounds.   Pulmonary:      Effort: Pulmonary effort is normal. No respiratory distress.      Breath sounds: Rhonchi present.   Abdominal:      General: Abdomen is flat. There is no distension.      Palpations: Abdomen is soft.      Tenderness: There is no abdominal tenderness.   Skin:     General: Skin is warm and dry.   Neurological:      General: No focal deficit present.      Mental Status: He is alert and oriented to person, place, and time.   Psychiatric:      Comments: Thought content -disorganized              Significant Labs: All pertinent labs within the past 24 hours have been reviewed.  BMP:   Recent Labs   Lab 01/09/24 0302   GLU 81      K 3.7      CO2 28   BUN 32*   CREATININE 1.60*   CALCIUM 8.9     CBC:   Recent Labs   Lab 01/08/24  0538 01/09/24 0302   WBC 10.62 9.21   HGB 12.8* 12.9*   HCT 39.8* 39.4*    329       Significant Imaging: I have  reviewed all pertinent imaging results/findings within the past 24 hours.

## 2024-01-10 NOTE — NURSING
Initiated 20 ga IV catheter to left forearm, good blood return noted. Removed old 22 ga IV catheter from right forearm, tip intact.

## 2024-01-10 NOTE — NURSING
Patient now AAOx4. Resp even and unlabored. Reminded patient again of NPO status, verbalized understanding.
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
## 2024-01-10 NOTE — ASSESSMENT & PLAN NOTE
Chronic, controlled. Latest blood pressure and vitals reviewed-     Temp:  [97.3 °F (36.3 °C)-98.3 °F (36.8 °C)]   Pulse:  []   Resp:  [18-26]   BP: (106-129)/(65-81)   SpO2:  [96 %-99 %] .   Home meds for hypertension were reviewed and noted below.   Bp reasonably controlled, continue current regimen     While in the hospital, will manage blood pressure as follows; Continue home antihypertensive regimen    Will utilize p.r.n. blood pressure medication only if patient's blood pressure greater than 180/110 and he develops symptoms such as worsening chest pain or shortness of breath.  Bp reasonably controlled, continue current regimen

## 2024-01-10 NOTE — NURSING
Spoke with Kami Aguilar NP (cardiology) via phone; states they will call before they send for patient for procedure. Further stated to allow patient to stay off unit smoking until time for procedure, as it may be awhile.

## 2024-01-10 NOTE — PT/OT/SLP PROGRESS
Occupational Therapy      Patient Name:  Derek Moreno   MRN:  [id number]    Patient not seen today secondary to Off the floor for procedure/surgery (pt gone for heart cath). Will follow-up 1/11/24.    1/10/2024

## 2024-01-11 VITALS
DIASTOLIC BLOOD PRESSURE: 81 MMHG | WEIGHT: 149.94 LBS | BODY MASS INDEX: 22.72 KG/M2 | OXYGEN SATURATION: 96 % | TEMPERATURE: 98 F | SYSTOLIC BLOOD PRESSURE: 117 MMHG | RESPIRATION RATE: 20 BRPM | HEIGHT: 68 IN | HEART RATE: 100 BPM

## 2024-01-11 LAB
ANION GAP SERPL CALCULATED.3IONS-SCNC: 11 MMOL/L (ref 7–16)
BASOPHILS # BLD AUTO: 0.07 K/UL (ref 0–0.2)
BASOPHILS NFR BLD AUTO: 0.8 % (ref 0–1)
BUN SERPL-MCNC: 31 MG/DL (ref 7–18)
BUN/CREAT SERPL: 19 (ref 6–20)
CALCIUM SERPL-MCNC: 9 MG/DL (ref 8.5–10.1)
CHLORIDE SERPL-SCNC: 105 MMOL/L (ref 98–107)
CO2 SERPL-SCNC: 28 MMOL/L (ref 21–32)
CREAT SERPL-MCNC: 1.6 MG/DL (ref 0.7–1.3)
DIFFERENTIAL METHOD BLD: ABNORMAL
EGFR (NO RACE VARIABLE) (RUSH/TITUS): 50 ML/MIN/1.73M2
EOSINOPHIL # BLD AUTO: 0.26 K/UL (ref 0–0.5)
EOSINOPHIL NFR BLD AUTO: 3.1 % (ref 1–4)
ERYTHROCYTE [DISTWIDTH] IN BLOOD BY AUTOMATED COUNT: 13.1 % (ref 11.5–14.5)
GLUCOSE SERPL-MCNC: 115 MG/DL (ref 74–106)
HCT VFR BLD AUTO: 40.9 % (ref 40–54)
HGB BLD-MCNC: 13.2 G/DL (ref 13.5–18)
IMM GRANULOCYTES # BLD AUTO: 0.06 K/UL (ref 0–0.04)
IMM GRANULOCYTES NFR BLD: 0.7 % (ref 0–0.4)
LYMPHOCYTES # BLD AUTO: 1.79 K/UL (ref 1–4.8)
LYMPHOCYTES NFR BLD AUTO: 21.3 % (ref 27–41)
MCH RBC QN AUTO: 31.1 PG (ref 27–31)
MCHC RBC AUTO-ENTMCNC: 32.3 G/DL (ref 32–36)
MCV RBC AUTO: 96.2 FL (ref 80–96)
MONOCYTES # BLD AUTO: 0.86 K/UL (ref 0–0.8)
MONOCYTES NFR BLD AUTO: 10.3 % (ref 2–6)
MPC BLD CALC-MCNC: 10.2 FL (ref 9.4–12.4)
NEUTROPHILS # BLD AUTO: 5.35 K/UL (ref 1.8–7.7)
NEUTROPHILS NFR BLD AUTO: 63.8 % (ref 53–65)
NRBC # BLD AUTO: 0 X10E3/UL
NRBC, AUTO (.00): 0 %
PLATELET # BLD AUTO: 302 K/UL (ref 150–400)
POTASSIUM SERPL-SCNC: 4 MMOL/L (ref 3.5–5.1)
RBC # BLD AUTO: 4.25 M/UL (ref 4.6–6.2)
SODIUM SERPL-SCNC: 140 MMOL/L (ref 136–145)
WBC # BLD AUTO: 8.39 K/UL (ref 4.5–11)

## 2024-01-11 PROCEDURE — 85025 COMPLETE CBC W/AUTO DIFF WBC: CPT | Performed by: STUDENT IN AN ORGANIZED HEALTH CARE EDUCATION/TRAINING PROGRAM

## 2024-01-11 PROCEDURE — 94761 N-INVAS EAR/PLS OXIMETRY MLT: CPT

## 2024-01-11 PROCEDURE — 94640 AIRWAY INHALATION TREATMENT: CPT

## 2024-01-11 PROCEDURE — S4991 NICOTINE PATCH NONLEGEND: HCPCS | Performed by: INTERNAL MEDICINE

## 2024-01-11 PROCEDURE — 25000003 PHARM REV CODE 250: Performed by: STUDENT IN AN ORGANIZED HEALTH CARE EDUCATION/TRAINING PROGRAM

## 2024-01-11 PROCEDURE — 99900035 HC TECH TIME PER 15 MIN (STAT)

## 2024-01-11 PROCEDURE — 25000242 PHARM REV CODE 250 ALT 637 W/ HCPCS: Performed by: STUDENT IN AN ORGANIZED HEALTH CARE EDUCATION/TRAINING PROGRAM

## 2024-01-11 PROCEDURE — 25000003 PHARM REV CODE 250: Performed by: FAMILY MEDICINE

## 2024-01-11 PROCEDURE — 99239 HOSP IP/OBS DSCHRG MGMT >30: CPT | Mod: ,,, | Performed by: FAMILY MEDICINE

## 2024-01-11 PROCEDURE — 80048 BASIC METABOLIC PNL TOTAL CA: CPT | Performed by: STUDENT IN AN ORGANIZED HEALTH CARE EDUCATION/TRAINING PROGRAM

## 2024-01-11 PROCEDURE — 99233 SBSQ HOSP IP/OBS HIGH 50: CPT | Mod: ,,, | Performed by: STUDENT IN AN ORGANIZED HEALTH CARE EDUCATION/TRAINING PROGRAM

## 2024-01-11 PROCEDURE — 25000003 PHARM REV CODE 250: Performed by: INTERNAL MEDICINE

## 2024-01-11 PROCEDURE — 25000003 PHARM REV CODE 250: Performed by: NURSE PRACTITIONER

## 2024-01-11 RX ORDER — SPIRONOLACTONE 25 MG/1
25 TABLET ORAL DAILY
Qty: 30 TABLET | Refills: 11 | Status: SHIPPED | OUTPATIENT
Start: 2024-01-12 | End: 2025-01-11

## 2024-01-11 RX ORDER — OLANZAPINE 10 MG/1
10 TABLET ORAL DAILY
Qty: 30 TABLET | Refills: 11 | Status: ON HOLD | OUTPATIENT
Start: 2024-01-12 | End: 2024-02-02 | Stop reason: HOSPADM

## 2024-01-11 RX ORDER — LOSARTAN POTASSIUM 25 MG/1
25 TABLET ORAL DAILY
Qty: 90 TABLET | Refills: 3 | Status: ON HOLD | OUTPATIENT
Start: 2024-01-12 | End: 2024-02-02 | Stop reason: HOSPADM

## 2024-01-11 RX ORDER — FUROSEMIDE 40 MG/1
40 TABLET ORAL DAILY
Qty: 30 TABLET | Refills: 11 | Status: ON HOLD | OUTPATIENT
Start: 2024-01-12 | End: 2024-02-02 | Stop reason: HOSPADM

## 2024-01-11 RX ORDER — METOPROLOL SUCCINATE 25 MG/1
25 TABLET, EXTENDED RELEASE ORAL DAILY
Qty: 30 TABLET | Refills: 11 | Status: SHIPPED | OUTPATIENT
Start: 2024-01-12 | End: 2024-01-25 | Stop reason: DRUGHIGH

## 2024-01-11 RX ORDER — GABAPENTIN 300 MG/1
300 CAPSULE ORAL 3 TIMES DAILY
Qty: 90 CAPSULE | Refills: 11 | Status: SHIPPED | OUTPATIENT
Start: 2024-01-11 | End: 2025-01-10

## 2024-01-11 RX ADMIN — SPIRONOLACTONE 25 MG: 25 TABLET ORAL at 09:01

## 2024-01-11 RX ADMIN — NICOTINE 1 PATCH: 21 PATCH, EXTENDED RELEASE TRANSDERMAL at 09:01

## 2024-01-11 RX ADMIN — IPRATROPIUM BROMIDE AND ALBUTEROL SULFATE 3 ML: .5; 3 SOLUTION RESPIRATORY (INHALATION) at 12:01

## 2024-01-11 RX ADMIN — METOPROLOL SUCCINATE 25 MG: 25 TABLET, EXTENDED RELEASE ORAL at 09:01

## 2024-01-11 RX ADMIN — GABAPENTIN 300 MG: 300 CAPSULE ORAL at 09:01

## 2024-01-11 RX ADMIN — OLANZAPINE 10 MG: 5 TABLET, FILM COATED ORAL at 09:01

## 2024-01-11 RX ADMIN — IPRATROPIUM BROMIDE AND ALBUTEROL SULFATE 3 ML: .5; 3 SOLUTION RESPIRATORY (INHALATION) at 07:01

## 2024-01-11 RX ADMIN — ACETYLCYSTEINE 2 ML: 200 SOLUTION ORAL; RESPIRATORY (INHALATION) at 07:01

## 2024-01-11 RX ADMIN — LOSARTAN POTASSIUM 25 MG: 25 TABLET, FILM COATED ORAL at 09:01

## 2024-01-11 RX ADMIN — FUROSEMIDE 40 MG: 40 TABLET ORAL at 09:01

## 2024-01-11 NOTE — ASSESSMENT & PLAN NOTE
Patient with Hypoxic Respiratory failure which is Acute.  he is not on home oxygen. Supplemental oxygen was provided and noted- Oxygen Concentration (%):  [28] 28    .   Signs/symptoms of respiratory failure include- tachypnea, increased work of breathing, and respiratory distress. Contributing diagnoses includes - Aspiration, CHF, and Pneumonia Labs and images were reviewed. Patient Has not had a recent ABG. Will treat underlying causes and adjust management of respiratory failure as follows-     Abg pending  Multifactorial, fever, cough with rll opacity in the setting of etoh use concerning for aspiration pna  Will cover with unasy, levaquin, is, chest pt, mucolytics    BNP up with edema on xr and le edema  Will diurese  Hold fluids as above  Low sodium diet  Strict intake and output    Noted UA findings, complex picture    Continue diuresis  Will consult Cardiology given what appears to be severely reduced LVEF  Continue diuresis, abx  Continue diuresis, awaiting labs  Renal function improved, will begin IV diuresis again. He is still requiring oxygen    1/10 - clinically pneumonia has resolved.  D/c planning.

## 2024-01-11 NOTE — ASSESSMENT & PLAN NOTE
"- Patient seen and evaluated by Dr. Chowdhury  - Preliminary echo with EF 15-20%  - Difficult to obtain meaningful history (pt rambling), denies previous ischemic evaluation states "if I get put to sleep it will kill me, I have ALS"  - Troponin negative x 2; BNP 97993 (no previous for comparison)  - Will need ischemic evaluation at some point, may consider as outpatient to help assess medical compliance  - Creatinine 1.08 yesterday, bumped to 1.3 today  - Volume overloaded on exam, continue IV diuresis  - Start Losartan 25mg daily, if tolerates and creatinine remains stable will add SGLT2  - Will not start BB until euvolemic    1/7/2024:  - Seen by Dr. Manley over the weekend    1/8/2024:  - Patient seen and evaluated by Dr. Peterson  - Creatinine improving, 1.2; BP stable  - Continue Losartan, BB, and spironolactone  - Continue IV diuresis  - Lexiscan tomorrow  - Further recommendations to follow    1/9/2024:  - Patient seen and evaluated by Dr. Galvan  [facility]. Procedure, risk, and benefits discussed in detail with patient. He verbalized understanding and wishes to proceed. Consent obtained and on chart.  - Creatinine bumped to 1.6, continue diuresis and repeat BMP in am  - Further recommendations to follow    1/11/2024:  - Patient seen and evaluated by Dr. Chowdhury  - [facility] with  of RCA, mixed cardiomyopathy  - Continue Losartan, Toprol, and aldactone at discharge  - Consider adding SGLT2 as outpatient  - Follow up with MADHAVI Lagos NP in 2 weeks    "

## 2024-01-11 NOTE — PLAN OF CARE
[facility]  Discharge Final Note    Primary Care Provider: No, Primary Doctor    Expected Discharge Date: 1/11/2024    Final Discharge Note (most recent)       Final Note - 01/11/24 1000          Final Note    Assessment Type Final Discharge Note     Anticipated Discharge Disposition Home or Self Care        Post-Acute Status    Discharge Delays None known at this time                     Important Message from Medicare             Contact Info       Lisette Lagos ACNP   Specialty: Cardiology    [address] [facility] [address]   Phone: [phone]       Next Steps: Follow up in 2 week(s)    Instructions: Schedule follow up with MADHAVI Lagos NP in 2 weeks; Hospital discharge, seen by Dr. Manley inpatient        Pt for dc home today with no needs

## 2024-01-11 NOTE — ASSESSMENT & PLAN NOTE
Patient's COPD is with exacerbation noted by continued dyspnea and use of accessory muscles for breathing currently.  Patient is currently off COPD Pathway. Continue scheduled inhalers Antibiotics and Supplemental oxygen and monitor respiratory status closely.     Believe this is multifactorial, copd may be contributing. Consider steroids if no better in am  Not exacerbation  No steroids for now  Stable  1/9 - patient is not benefitting from ongoing smoking  1/10 - Encouraged smoking cessation.
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
## 2024-01-11 NOTE — SUBJECTIVE & OBJECTIVE
Interval History: Patient seen today s/p [facility] yesterday which revealed  of RCA, left dominant system, CAD does not correlate with degree of heart failure; likely mixed cardiomyopathy.    Review of Systems   Cardiovascular:  Positive for chest pain, dyspnea on exertion, leg swelling and orthopnea.   Respiratory:  Positive for cough and shortness of breath.    Neurological:  Positive for dizziness.     Objective:     Vital Signs (Most Recent):  Temp: 98 °F (36.7 °C) (01/11/24 1014)  Pulse: 100 (01/11/24 1236)  Resp: 20 (01/11/24 1236)  BP: 117/81 (01/11/24 1014)  SpO2: 96 % (01/11/24 1236) Vital Signs (24h Range):  Temp:  [97.6 °F (36.4 °C)-98 °F (36.7 °C)] 98 °F (36.7 °C)  Pulse:  [] 100  Resp:  [14-20] 20  SpO2:  [95 %-99 %] 96 %  BP: ()/(47-81) 117/81     Weight: 68 kg (149 lb 14.6 oz)  Body mass index is 22.79 kg/m².     SpO2: 96 %         Intake/Output Summary (Last 24 hours) at 1/11/2024 1642  Last data filed at 1/11/2024 0646  Gross per 24 hour   Intake 1200 ml   Output 400 ml   Net 800 ml       Lines/Drains/Airways       None                      Physical Exam  Vitals reviewed.   HENT:      Mouth/Throat:      Mouth: Mucous membranes are moist.      Pharynx: Oropharynx is clear.   Eyes:      General: No scleral icterus.     Pupils: Pupils are equal, round, and reactive to light.   Cardiovascular:      Rate and Rhythm: Normal rate and regular rhythm.      Heart sounds: Normal heart sounds.   Pulmonary:      Effort: Pulmonary effort is normal.      Breath sounds: No rales.   Abdominal:      General: Bowel sounds are normal.      Palpations: Abdomen is soft.   Musculoskeletal:      Right lower leg: Edema (improving) present.      Left lower leg: Edema (improving) present.   Skin:     General: Skin is warm and dry.      Findings: No erythema.   Neurological:      Mental Status: He is alert and oriented to person, place, and time.   Psychiatric:         Mood and Affect: Mood is anxious. Affect is not  "labile.         Speech: Speech is not rapid and pressured.            Significant Labs: ABG: No results for input(s): "PH", "PCO2", "HCO3", "POCSATURATED", "BE" in the last 48 hours., Blood Culture: No results for input(s): "LABBLOO" in the last 48 hours., BMP:   Recent Labs   Lab 01/10/24  0429 01/11/24  0322   GLU 96 115*    140   K 4.1 4.0    105   CO2 30 28   BUN 30* 31*   CREATININE 1.40* 1.60*   CALCIUM 8.9 9.0   , CMP   Recent Labs   Lab 01/10/24  0429 01/11/24  0322    140   K 4.1 4.0    105   CO2 30 28   GLU 96 115*   BUN 30* 31*   CREATININE 1.40* 1.60*   CALCIUM 8.9 9.0   ANIONGAP 11 11   , CBC   Recent Labs   Lab 01/10/24  0429 01/11/24  0322   WBC 9.09 8.39   HGB 13.3* 13.2*   HCT 40.6 40.9    302   , INR No results for input(s): "INR", "PROTIME" in the last 48 hours., Lipid Panel No results for input(s): "CHOL", "HDL", "LDLCALC", "TRIG", "CHOLHDL" in the last 48 hours., and Troponin No results for input(s): "TROPONINI" in the last 48 hours.    Significant Imaging: Cardiac Cath: Physicians    Panel Physicians Referring Physician Case Authorizing Physician   Elijah Lozano MD (Primary) Self, Aaareferral Kami Aguilar, FNP     Indications    New onset of congestive heart failure [I50.9 (ICD-10-CM)]   Abnormal cardiovascular stress test [R94.39 (ICD-10-CM)]     Summary         The Ost RCA to Prox RCA lesion was 100% stenosed.    The pre-procedure left ventricular end diastolic pressure was 22.    The estimated blood loss was none.     The procedure log was documented by Documenter: Marian Quintanilla and verified by Elijah Lozano MD.     Date: 1/10/2024  Time: 4:44 PM     and Echocardiogram: Transthoracic echo (TTE) complete (Cupid Only):   Results for orders placed or performed during the hospital encounter of 01/04/24   Echo   Result Value Ref Range    BSA 1.83 m2    LVOT stroke volume 33.09 cm3    LVIDd 6.0 3.5 - 6.0 cm    LV Systolic Volume 129.43 mL    LV " Systolic Volume Index 70.0 mL/m2    LVIDs 5.20 (A) 2.1 - 4.0 cm    LV Diastolic Volume 148.24 mL    LV Diastolic Volume Index 80.13 mL/m2    IVS 1.02 0.6 - 1.1 cm    LVOT diameter 1.94 cm    LVOT area 3.0 cm2    FS 13 (A) 28 - 44 %    Left Ventricle Relative Wall Thickness 0.30 cm    Posterior Wall 0.90 0.6 - 1.1 cm    LV mass 234.22 g    LV Mass Index 127 g/m2    TDI LATERAL 0.17 m/s    TDI SEPTAL 0.05 m/s    TR Max Teofilo 3.41 m/s    E wave deceleration time 73.99 msec    LVOT peak teofilo 0.73 m/s    Left Ventricular Outflow Tract Mean Velocity 0.52 cm/s    Left Ventricular Outflow Tract Mean Gradient 1.20 mmHg    RVDD 5.17 cm    TAPSE 1.55 cm    LA size 3.69 cm    Left Atrium Major Axis 3.55 cm    RA Major Axis 4.48 cm    AV mean gradient 2 mmHg    AV peak gradient 3 mmHg    Ao peak teofilo 0.82 m/s    Ao VTI 10.90 cm    LVOT peak VTI 11.20 cm    AV valve area 3.04 cm²    AV Velocity Ratio 0.89     AV index (prosthetic) 1.03     YUVAL by Velocity Ratio 2.63 cm²    Mr max teofilo 4.80 m/s    MV mean gradient 3 mmHg    MV peak gradient 5 mmHg    MV stenosis pressure 1/2 time 21.46 ms    MV valve area p 1/2 method 10.25 cm2    MV valve area by continuity eq 1.76 cm2    MV VTI 18.8 cm    Triscuspid Valve Regurgitation Peak Gradient 47 mmHg    PV PEAK VELOCITY 0.57 m/s    PV peak gradient 1 mmHg    Ao root annulus 2.68 cm    IVC diameter 2.19 cm    Mean e' 0.11 m/s    ZLVIDS 3.91     ZLVIDD 1.55     AORTIC VALVE CUSP SEPERATION 2.19 cm    TV resting pulmonary artery pressure 62 mmHg    RV TB RVSP 18 mmHg    Est. RA pres 15 mmHg    Mason's Biplane MOD Ejection Fraction 15 %    Left Ventricular Cardiac Output -6.3 dm3 min-1    Narrative      Left Ventricle: The left ventricle is mildly dilated. Normal wall   thickness. There is eccentric hypertrophy. Severe global hypokinesis with   regional variation. There is severely reduced systolic function. Biplane   (2D) method of discs ejection fraction is 15%. Global longitudinal strain   is  reduced. There is diastolic dysfunction but grade cannot be determined.    Right Ventricle: Moderate right ventricular enlargement. Systolic   function is mildly reduced.    Right Atrium: Right atrium is mildly dilated.    Aortic Valve: The aortic valve is a trileaflet valve.    Mitral Valve: There is mild bileaflet sclerosis. There is no stenosis.   The mean pressure gradient across the mitral valve is 3 mmHg at a heart   rate of  bpm. There is moderate regurgitation with a posterolateral   eccentriccally directed jet.    Tricuspid Valve: There is moderate regurgitation.    Pulmonary Artery: The estimated pulmonary artery systolic pressure is   62 mmHg.    IVC/SVC: Elevated venous pressure at 15 mmHg.    Pericardium: Large left pleural effusion.

## 2024-01-11 NOTE — ASSESSMENT & PLAN NOTE
1/10 - [facility] today. Patient with Single vessel CAD, old total occlusion of RCA.

## 2024-01-11 NOTE — PROGRESS NOTES
[facility] - [facility] Medical Telemetry  Cardiology  Progress Note    Patient Name: Derek Moreno  MRN: [id number]  Admission Date: 1/4/2024  Hospital Length of Stay: 7 days  Code Status: Prior   Attending Physician: No att. providers found   Primary Care Physician: No, Primary Doctor  Expected Discharge Date: 1/11/2024  Principal Problem:Severe sepsis    Subjective:     Hospital Course:   No notes on file    Interval History: Patient seen today s/p LHC yesterday which revealed  of RCA, left dominant system, CAD does not correlate with degree of heart failure; likely mixed cardiomyopathy.    Review of Systems   Cardiovascular:  Positive for chest pain, dyspnea on exertion, leg swelling and orthopnea.   Respiratory:  Positive for cough and shortness of breath.    Neurological:  Positive for dizziness.     Objective:     Vital Signs (Most Recent):  Temp: 98 °F (36.7 °C) (01/11/24 1014)  Pulse: 100 (01/11/24 1236)  Resp: 20 (01/11/24 1236)  BP: 117/81 (01/11/24 1014)  SpO2: 96 % (01/11/24 1236) Vital Signs (24h Range):  Temp:  [97.6 °F (36.4 °C)-98 °F (36.7 °C)] 98 °F (36.7 °C)  Pulse:  [] 100  Resp:  [14-20] 20  SpO2:  [95 %-99 %] 96 %  BP: ()/(47-81) 117/81     Weight: 68 kg (149 lb 14.6 oz)  Body mass index is 22.79 kg/m².     SpO2: 96 %         Intake/Output Summary (Last 24 hours) at 1/11/2024 1642  Last data filed at 1/11/2024 0646  Gross per 24 hour   Intake 1200 ml   Output 400 ml   Net 800 ml       Lines/Drains/Airways       None                      Physical Exam  Vitals reviewed.   HENT:      Mouth/Throat:      Mouth: Mucous membranes are moist.      Pharynx: Oropharynx is clear.   Eyes:      General: No scleral icterus.     Pupils: Pupils are equal, round, and reactive to light.   Cardiovascular:      Rate and Rhythm: Normal rate and regular rhythm.      Heart sounds: Normal heart sounds.   Pulmonary:      Effort: Pulmonary effort is normal.      Breath sounds: No rales.  "  Abdominal:      General: Bowel sounds are normal.      Palpations: Abdomen is soft.   Musculoskeletal:      Right lower leg: Edema (improving) present.      Left lower leg: Edema (improving) present.   Skin:     General: Skin is warm and dry.      Findings: No erythema.   Neurological:      Mental Status: He is alert and oriented to person, place, and time.   Psychiatric:         Mood and Affect: Mood is anxious. Affect is not labile.         Speech: Speech is not rapid and pressured.            Significant Labs: ABG: No results for input(s): "PH", "PCO2", "HCO3", "POCSATURATED", "BE" in the last 48 hours., Blood Culture: No results for input(s): "LABBLOO" in the last 48 hours., BMP:   Recent Labs   Lab 01/10/24  0429 01/11/24  0322   GLU 96 115*    140   K 4.1 4.0    105   CO2 30 28   BUN 30* 31*   CREATININE 1.40* 1.60*   CALCIUM 8.9 9.0   , CMP   Recent Labs   Lab 01/10/24  0429 01/11/24  0322    140   K 4.1 4.0    105   CO2 30 28   GLU 96 115*   BUN 30* 31*   CREATININE 1.40* 1.60*   CALCIUM 8.9 9.0   ANIONGAP 11 11   , CBC   Recent Labs   Lab 01/10/24  0429 01/11/24  0322   WBC 9.09 8.39   HGB 13.3* 13.2*   HCT 40.6 40.9    302   , INR No results for input(s): "INR", "PROTIME" in the last 48 hours., Lipid Panel No results for input(s): "CHOL", "HDL", "LDLCALC", "TRIG", "CHOLHDL" in the last 48 hours., and Troponin No results for input(s): "TROPONINI" in the last 48 hours.    Significant Imaging: Cardiac Cath: Physicians    Panel Physicians Referring Physician Case Authorizing Physician   Elijah Lozano MD (Primary) Self, Aaareferral Kami Aguilar, FNP     Indications    New onset of congestive heart failure [I50.9 (ICD-10-CM)]   Abnormal cardiovascular stress test [R94.39 (ICD-10-CM)]     Summary         The Ost RCA to Prox RCA lesion was 100% stenosed.    The pre-procedure left ventricular end diastolic pressure was 22.    The estimated blood loss was none.     The " procedure log was documented by Documenter: Marian Quintanilla and verified by Elijah Lozano MD.     Date: 1/10/2024  Time: 4:44 PM     and Echocardiogram: Transthoracic echo (TTE) complete (Cupid Only):   Results for orders placed or performed during the hospital encounter of 01/04/24   Echo   Result Value Ref Range    BSA 1.83 m2    LVOT stroke volume 33.09 cm3    LVIDd 6.0 3.5 - 6.0 cm    LV Systolic Volume 129.43 mL    LV Systolic Volume Index 70.0 mL/m2    LVIDs 5.20 (A) 2.1 - 4.0 cm    LV Diastolic Volume 148.24 mL    LV Diastolic Volume Index 80.13 mL/m2    IVS 1.02 0.6 - 1.1 cm    LVOT diameter 1.94 cm    LVOT area 3.0 cm2    FS 13 (A) 28 - 44 %    Left Ventricle Relative Wall Thickness 0.30 cm    Posterior Wall 0.90 0.6 - 1.1 cm    LV mass 234.22 g    LV Mass Index 127 g/m2    TDI LATERAL 0.17 m/s    TDI SEPTAL 0.05 m/s    TR Max Teofilo 3.41 m/s    E wave deceleration time 73.99 msec    LVOT peak teofilo 0.73 m/s    Left Ventricular Outflow Tract Mean Velocity 0.52 cm/s    Left Ventricular Outflow Tract Mean Gradient 1.20 mmHg    RVDD 5.17 cm    TAPSE 1.55 cm    LA size 3.69 cm    Left Atrium Major Axis 3.55 cm    RA Major Axis 4.48 cm    AV mean gradient 2 mmHg    AV peak gradient 3 mmHg    Ao peak teofilo 0.82 m/s    Ao VTI 10.90 cm    LVOT peak VTI 11.20 cm    AV valve area 3.04 cm²    AV Velocity Ratio 0.89     AV index (prosthetic) 1.03     YUVAL by Velocity Ratio 2.63 cm²    Mr max teofilo 4.80 m/s    MV mean gradient 3 mmHg    MV peak gradient 5 mmHg    MV stenosis pressure 1/2 time 21.46 ms    MV valve area p 1/2 method 10.25 cm2    MV valve area by continuity eq 1.76 cm2    MV VTI 18.8 cm    Triscuspid Valve Regurgitation Peak Gradient 47 mmHg    PV PEAK VELOCITY 0.57 m/s    PV peak gradient 1 mmHg    Ao root annulus 2.68 cm    IVC diameter 2.19 cm    Mean e' 0.11 m/s    ZLVIDS 3.91     ZLVIDD 1.55     AORTIC VALVE CUSP SEPERATION 2.19 cm    TV resting pulmonary artery pressure 62 mmHg    RV TB RVSP 18 mmHg     "Est. RA pres 15 mmHg    Mason's Biplane MOD Ejection Fraction 15 %    Left Ventricular Cardiac Output -6.3 dm3 min-1    Narrative      Left Ventricle: The left ventricle is mildly dilated. Normal wall   thickness. There is eccentric hypertrophy. Severe global hypokinesis with   regional variation. There is severely reduced systolic function. Biplane   (2D) method of discs ejection fraction is 15%. Global longitudinal strain   is reduced. There is diastolic dysfunction but grade cannot be determined.    Right Ventricle: Moderate right ventricular enlargement. Systolic   function is mildly reduced.    Right Atrium: Right atrium is mildly dilated.    Aortic Valve: The aortic valve is a trileaflet valve.    Mitral Valve: There is mild bileaflet sclerosis. There is no stenosis.   The mean pressure gradient across the mitral valve is 3 mmHg at a heart   rate of  bpm. There is moderate regurgitation with a posterolateral   eccentriccally directed jet.    Tricuspid Valve: There is moderate regurgitation.    Pulmonary Artery: The estimated pulmonary artery systolic pressure is   62 mmHg.    IVC/SVC: Elevated venous pressure at 15 mmHg.    Pericardium: Large left pleural effusion.       Assessment and Plan:     Brief HPI: 57 yo wm with pmh of COPD, heavy alcohol abuse, depression, other undiagnosed psychiatric illness, HLD, VISHAL who presents to ED for progressively worsening SOB and fatigue over the last few weeks.  Denies any chest pain. Has had cough and subjective fever at home. Admits to drinking 1 32oz beer daily.  Has experienced lower extremity swelling over the last few weeks as well.  He reports a history of "ALS" that he was diagnosed with 30years ago but he has "beat" it.  His speech is scattered and pressured.  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  Will check a UDS.  ROS otherwise negative.     Cardiology consulted " "for new heart failure.    New onset of congestive heart failure  - Patient seen and evaluated by Dr. Chowdhury  - Preliminary echo with EF 15-20%  - Difficult to obtain meaningful history (pt rambling), denies previous ischemic evaluation states "if I get put to sleep it will kill me, I have ALS"  - Troponin negative x 2; BNP 35213 (no previous for comparison)  - Will need ischemic evaluation at some point, may consider as outpatient to help assess medical compliance  - Creatinine 1.08 yesterday, bumped to 1.3 today  - Volume overloaded on exam, continue IV diuresis  - Start Losartan 25mg daily, if tolerates and creatinine remains stable will add SGLT2  - Will not start BB until euvolemic    1/7/2024:  - Seen by Dr. Manley over the weekend    1/8/2024:  - Patient seen and evaluated by Dr. Peterson  - Creatinine improving, 1.2; BP stable  - Continue Losartan, BB, and spironolactone  - Continue IV diuresis  - Lexiscan tomorrow  - Further recommendations to follow    1/9/2024:  - Patient seen and evaluated by Dr. Galvan  [facility]. Procedure, risk, and benefits discussed in detail with patient. He verbalized understanding and wishes to proceed. Consent obtained and on chart.  - Creatinine bumped to 1.6, continue diuresis and repeat BMP in am  - Further recommendations to follow    1/11/2024:  - Patient seen and evaluated by Dr. Chowdhury  - [facility] with  of RCA, mixed cardiomyopathy  - Continue Losartan, Toprol, and aldactone at discharge  - Consider adding SGLT2 as outpatient  - Follow up with MADHAVI Lagos NP in 2 weeks          VTE Risk Mitigation (From admission, onward)           Ordered     IP VTE HIGH RISK PATIENT  Once         01/04/24 1707                    Kami Aguilar, ANA PAULA  Cardiology  [facility] - [address] [place] [address]    "

## 2024-01-11 NOTE — SUBJECTIVE & OBJECTIVE
Interval History: Underwent LHC today.  Old 100% RCA,  Clean left side with distal RCA filling.     Review of Systems  Objective:     Vital Signs (Most Recent):  Temp: 97.9 °F (36.6 °C) (01/10/24 1830)  Pulse: 89 (01/10/24 1946)  Resp: 18 (01/10/24 1946)  BP: 110/61 (01/10/24 1830)  SpO2: 97 % (01/10/24 1946) Vital Signs (24h Range):  Temp:  [97.4 °F (36.3 °C)-98.1 °F (36.7 °C)] 97.9 °F (36.6 °C)  Pulse:  [] 89  Resp:  [14-20] 18  SpO2:  [93 %-99 %] 97 %  BP: ()/(56-74) 110/61     Weight: 68 kg (149 lb 14.6 oz)  Body mass index is 22.79 kg/m².    Intake/Output Summary (Last 24 hours) at 1/10/2024 2224  Last data filed at 1/10/2024 0624  Gross per 24 hour   Intake --   Output 1400 ml   Net -1400 ml         Physical Exam  Vitals reviewed.   Constitutional:       General: He is not in acute distress.     Appearance: He is not toxic-appearing.   HENT:      Head: Normocephalic and atraumatic.   Cardiovascular:      Rate and Rhythm: Normal rate and regular rhythm.      Heart sounds: Normal heart sounds.   Pulmonary:      Effort: Pulmonary effort is normal. No respiratory distress.      Breath sounds: Rhonchi present.   Abdominal:      General: Abdomen is flat. There is no distension.      Palpations: Abdomen is soft.      Tenderness: There is no abdominal tenderness.   Skin:     General: Skin is warm and dry.   Neurological:      General: No focal deficit present.      Mental Status: He is alert and oriented to person, place, and time.   Psychiatric:      Comments: Acknowledges depressed mood.  No DONATO.  Speech less pressured, perhaps olanzapine helping.               Significant Labs: All pertinent labs within the past 24 hours have been reviewed.  BMP:   Recent Labs   Lab 01/10/24  0429   GLU 96      K 4.1      CO2 30   BUN 30*   CREATININE 1.40*   CALCIUM 8.9     CBC:   Recent Labs   Lab 01/09/24  0302 01/10/24  0429   WBC 9.21 9.09   HGB 12.9* 13.3*   HCT 39.4* 40.6    308        Significant Imaging: I have reviewed all pertinent imaging results/findings within the past 24 hours.
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
## 2024-01-16 NOTE — ASSESSMENT & PLAN NOTE
1/10 - [facility] today. Patient with Single vessel CAD, old total occlusion of RCA.
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
## 2024-01-16 NOTE — ASSESSMENT & PLAN NOTE
Patient with Hypoxic Respiratory failure which is Acute.  he is not on home oxygen. Supplemental oxygen was provided and noted-      .   Signs/symptoms of respiratory failure include- tachypnea, increased work of breathing, and respiratory distress. Contributing diagnoses includes - Aspiration, CHF, and Pneumonia Labs and images were reviewed. Patient Has not had a recent ABG. Will treat underlying causes and adjust management of respiratory failure as follows-     Abg pending  Multifactorial, fever, cough with rll opacity in the setting of etoh use concerning for aspiration pna  Will cover with unasy, levaquin, is, chest pt, mucolytics    BNP up with edema on xr and le edema  Will diurese  Hold fluids as above  Low sodium diet  Strict intake and output    Noted UA findings, complex picture    Continue diuresis  Will consult Cardiology given what appears to be severely reduced LVEF  Continue diuresis, abx  Continue diuresis, awaiting labs  Renal function improved, will begin IV diuresis again. He is still requiring oxygen    1/10 - clinically pneumonia has resolved.  D/c planning.

## 2024-01-21 ENCOUNTER — HOSPITAL ENCOUNTER (OUTPATIENT)
Facility: HOSPITAL | Age: 59
LOS: 1 days | Discharge: HOME | End: 2024-01-22
Attending: EMERGENCY MEDICINE | Admitting: FAMILY MEDICINE
Payer: MEDICARE

## 2024-01-21 DIAGNOSIS — R07.9 CHEST PAIN, UNSPECIFIED TYPE: ICD-10-CM

## 2024-01-21 DIAGNOSIS — R07.9 CHEST PAIN: ICD-10-CM

## 2024-01-21 DIAGNOSIS — J18.9 PNEUMONIA DUE TO INFECTIOUS ORGANISM, UNSPECIFIED LATERALITY, UNSPECIFIED PART OF LUNG: Primary | ICD-10-CM

## 2024-01-21 LAB
ALBUMIN SERPL BCP-MCNC: 3.4 G/DL (ref 3.5–5)
ALBUMIN/GLOB SERPL: 0.9 {RATIO}
ALP SERPL-CCNC: 100 U/L (ref 45–115)
ALT SERPL W P-5'-P-CCNC: 55 U/L (ref 16–61)
ANION GAP SERPL CALCULATED.3IONS-SCNC: 10 MMOL/L (ref 7–16)
APTT PPP: 30.2 SECONDS (ref 25.2–37.3)
AST SERPL W P-5'-P-CCNC: 23 U/L (ref 15–37)
BASOPHILS # BLD AUTO: 0.08 K/UL (ref 0–0.2)
BASOPHILS NFR BLD AUTO: 0.9 % (ref 0–1)
BILIRUB SERPL-MCNC: 0.5 MG/DL (ref ?–1.2)
BUN SERPL-MCNC: 18 MG/DL (ref 7–18)
BUN/CREAT SERPL: 15 (ref 6–20)
CALCIUM SERPL-MCNC: 8.8 MG/DL (ref 8.5–10.1)
CHLORIDE SERPL-SCNC: 106 MMOL/L (ref 98–107)
CO2 SERPL-SCNC: 25 MMOL/L (ref 21–32)
CREAT SERPL-MCNC: 1.21 MG/DL (ref 0.7–1.3)
DIFFERENTIAL METHOD BLD: ABNORMAL
EGFR (NO RACE VARIABLE) (RUSH/TITUS): 70 ML/MIN/1.73M2
EOSINOPHIL # BLD AUTO: 0.13 K/UL (ref 0–0.5)
EOSINOPHIL NFR BLD AUTO: 1.4 % (ref 1–4)
ERYTHROCYTE [DISTWIDTH] IN BLOOD BY AUTOMATED COUNT: 13.1 % (ref 11.5–14.5)
GLOBULIN SER-MCNC: 3.9 G/DL (ref 2–4)
GLUCOSE SERPL-MCNC: 104 MG/DL (ref 74–106)
HCT VFR BLD AUTO: 38.4 % (ref 40–54)
HGB BLD-MCNC: 13 G/DL (ref 13.5–18)
IMM GRANULOCYTES # BLD AUTO: 0.05 K/UL (ref 0–0.04)
IMM GRANULOCYTES NFR BLD: 0.6 % (ref 0–0.4)
INR BLD: 1.09
LACTATE SERPL-SCNC: 1.2 MMOL/L (ref 0.4–2)
LYMPHOCYTES # BLD AUTO: 1.9 K/UL (ref 1–4.8)
LYMPHOCYTES NFR BLD AUTO: 21 % (ref 27–41)
MAGNESIUM SERPL-MCNC: 2 MG/DL (ref 1.7–2.3)
MCH RBC QN AUTO: 30.8 PG (ref 27–31)
MCHC RBC AUTO-ENTMCNC: 33.9 G/DL (ref 32–36)
MCV RBC AUTO: 91 FL (ref 80–96)
MONOCYTES # BLD AUTO: 0.56 K/UL (ref 0–0.8)
MONOCYTES NFR BLD AUTO: 6.2 % (ref 2–6)
MPC BLD CALC-MCNC: 11.2 FL (ref 9.4–12.4)
NEUTROPHILS # BLD AUTO: 6.32 K/UL (ref 1.8–7.7)
NEUTROPHILS NFR BLD AUTO: 69.9 % (ref 53–65)
NRBC # BLD AUTO: 0 X10E3/UL
NRBC, AUTO (.00): 0 %
NT-PROBNP SERPL-MCNC: 4949 PG/ML (ref 1–125)
PLATELET # BLD AUTO: 379 K/UL (ref 150–400)
POTASSIUM SERPL-SCNC: 3.6 MMOL/L (ref 3.5–5.1)
PROT SERPL-MCNC: 7.3 G/DL (ref 6.4–8.2)
PROTHROMBIN TIME: 14 SECONDS (ref 11.7–14.7)
RBC # BLD AUTO: 4.22 M/UL (ref 4.6–6.2)
SODIUM SERPL-SCNC: 137 MMOL/L (ref 136–145)
TROPONIN I SERPL DL<=0.01 NG/ML-MCNC: 18.5 PG/ML
WBC # BLD AUTO: 9.04 K/UL (ref 4.5–11)

## 2024-01-21 PROCEDURE — 99285 EMERGENCY DEPT VISIT HI MDM: CPT | Mod: ,,, | Performed by: EMERGENCY MEDICINE

## 2024-01-21 PROCEDURE — 83735 ASSAY OF MAGNESIUM: CPT | Performed by: EMERGENCY MEDICINE

## 2024-01-21 PROCEDURE — 80053 COMPREHEN METABOLIC PANEL: CPT | Performed by: EMERGENCY MEDICINE

## 2024-01-21 PROCEDURE — 83880 ASSAY OF NATRIURETIC PEPTIDE: CPT | Performed by: EMERGENCY MEDICINE

## 2024-01-21 PROCEDURE — 99285 EMERGENCY DEPT VISIT HI MDM: CPT | Mod: 25

## 2024-01-21 PROCEDURE — 25000003 PHARM REV CODE 250: Performed by: EMERGENCY MEDICINE

## 2024-01-21 PROCEDURE — 96365 THER/PROPH/DIAG IV INF INIT: CPT

## 2024-01-21 PROCEDURE — 85730 THROMBOPLASTIN TIME PARTIAL: CPT | Performed by: EMERGENCY MEDICINE

## 2024-01-21 PROCEDURE — 63600175 PHARM REV CODE 636 W HCPCS: Performed by: EMERGENCY MEDICINE

## 2024-01-21 PROCEDURE — 93010 ELECTROCARDIOGRAM REPORT: CPT | Mod: ,,, | Performed by: INTERNAL MEDICINE

## 2024-01-21 PROCEDURE — 25000242 PHARM REV CODE 250 ALT 637 W/ HCPCS: Performed by: EMERGENCY MEDICINE

## 2024-01-21 PROCEDURE — 85025 COMPLETE CBC W/AUTO DIFF WBC: CPT | Performed by: EMERGENCY MEDICINE

## 2024-01-21 PROCEDURE — 85610 PROTHROMBIN TIME: CPT | Performed by: EMERGENCY MEDICINE

## 2024-01-21 PROCEDURE — 83605 ASSAY OF LACTIC ACID: CPT | Performed by: EMERGENCY MEDICINE

## 2024-01-21 PROCEDURE — 99222 1ST HOSP IP/OBS MODERATE 55: CPT | Mod: GC,,, | Performed by: INTERNAL MEDICINE

## 2024-01-21 PROCEDURE — 87040 BLOOD CULTURE FOR BACTERIA: CPT | Performed by: EMERGENCY MEDICINE

## 2024-01-21 PROCEDURE — 84484 ASSAY OF TROPONIN QUANT: CPT | Performed by: EMERGENCY MEDICINE

## 2024-01-21 PROCEDURE — 93005 ELECTROCARDIOGRAM TRACING: CPT

## 2024-01-21 RX ORDER — IPRATROPIUM BROMIDE AND ALBUTEROL SULFATE 2.5; .5 MG/3ML; MG/3ML
6 SOLUTION RESPIRATORY (INHALATION)
Status: COMPLETED | OUTPATIENT
Start: 2024-01-21 | End: 2024-01-21

## 2024-01-21 RX ADMIN — IPRATROPIUM BROMIDE AND ALBUTEROL SULFATE 6 ML: .5; 3 SOLUTION RESPIRATORY (INHALATION) at 07:01

## 2024-01-21 RX ADMIN — SODIUM CHLORIDE 1000 ML: 9 INJECTION, SOLUTION INTRAVENOUS at 11:01

## 2024-01-21 RX ADMIN — DEXTROSE MONOHYDRATE 2 G: 5 INJECTION INTRAVENOUS at 11:01

## 2024-01-21 NOTE — Clinical Note
Diagnosis: Pneumonia due to infectious organism, unspecified laterality, unspecified part of lung [[id number]]   Future Attending Provider: VIRGINIA ZAVALA JR [[id number]]   Reason for IP Medical Treatment  (Clinical interventions that can only be accomplished in the IP setting? ) :: acute   I certify that Inpatient services for greater than or equal to 2 midnights are medically necessary:: Yes   Plans for Post-Acute care--if anticipated (pick the single best option):: A. No post acute care anticipated at this time

## 2024-01-22 VITALS
BODY MASS INDEX: 21.47 KG/M2 | HEART RATE: 100 BPM | HEIGHT: 70 IN | OXYGEN SATURATION: 95 % | TEMPERATURE: 98 F | SYSTOLIC BLOOD PRESSURE: 131 MMHG | DIASTOLIC BLOOD PRESSURE: 80 MMHG | RESPIRATION RATE: 12 BRPM | WEIGHT: 150 LBS

## 2024-01-22 PROBLEM — I25.10 CAD (CORONARY ARTERY DISEASE): Status: ACTIVE | Noted: 2024-01-22

## 2024-01-22 LAB
ANION GAP SERPL CALCULATED.3IONS-SCNC: 10 MMOL/L (ref 7–16)
BASOPHILS # BLD AUTO: 0.05 K/UL (ref 0–0.2)
BASOPHILS NFR BLD AUTO: 0.7 % (ref 0–1)
BILIRUB UR QL STRIP: NEGATIVE
BUN SERPL-MCNC: 22 MG/DL (ref 7–18)
BUN/CREAT SERPL: 16 (ref 6–20)
CALCIUM SERPL-MCNC: 8.5 MG/DL (ref 8.5–10.1)
CHLORIDE SERPL-SCNC: 108 MMOL/L (ref 98–107)
CLARITY UR: CLEAR
CO2 SERPL-SCNC: 25 MMOL/L (ref 21–32)
COLOR UR: YELLOW
CREAT SERPL-MCNC: 1.38 MG/DL (ref 0.7–1.3)
DIFFERENTIAL METHOD BLD: ABNORMAL
EGFR (NO RACE VARIABLE) (RUSH/TITUS): 60 ML/MIN/1.73M2
EOSINOPHIL # BLD AUTO: 0.22 K/UL (ref 0–0.5)
EOSINOPHIL NFR BLD AUTO: 3.2 % (ref 1–4)
ERYTHROCYTE [DISTWIDTH] IN BLOOD BY AUTOMATED COUNT: 13.2 % (ref 11.5–14.5)
GLUCOSE SERPL-MCNC: 86 MG/DL (ref 74–106)
GLUCOSE UR STRIP-MCNC: NORMAL MG/DL
HCT VFR BLD AUTO: 36.1 % (ref 40–54)
HGB BLD-MCNC: 12.1 G/DL (ref 13.5–18)
IMM GRANULOCYTES # BLD AUTO: 0.04 K/UL (ref 0–0.04)
IMM GRANULOCYTES NFR BLD: 0.6 % (ref 0–0.4)
KETONES UR STRIP-SCNC: NEGATIVE MG/DL
LEUKOCYTE ESTERASE UR QL STRIP: NEGATIVE
LYMPHOCYTES # BLD AUTO: 1.71 K/UL (ref 1–4.8)
LYMPHOCYTES NFR BLD AUTO: 24.6 % (ref 27–41)
MCH RBC QN AUTO: 30.9 PG (ref 27–31)
MCHC RBC AUTO-ENTMCNC: 33.5 G/DL (ref 32–36)
MCV RBC AUTO: 92.3 FL (ref 80–96)
MONOCYTES # BLD AUTO: 0.66 K/UL (ref 0–0.8)
MONOCYTES NFR BLD AUTO: 9.5 % (ref 2–6)
MPC BLD CALC-MCNC: 11.5 FL (ref 9.4–12.4)
NEUTROPHILS # BLD AUTO: 4.27 K/UL (ref 1.8–7.7)
NEUTROPHILS NFR BLD AUTO: 61.4 % (ref 53–65)
NITRITE UR QL STRIP: NEGATIVE
NRBC # BLD AUTO: 0 X10E3/UL
NRBC, AUTO (.00): 0 %
PH UR STRIP: 6 PH UNITS
PLATELET # BLD AUTO: 300 K/UL (ref 150–400)
POTASSIUM SERPL-SCNC: 3.7 MMOL/L (ref 3.5–5.1)
PROT UR QL STRIP: 10
RBC # BLD AUTO: 3.91 M/UL (ref 4.6–6.2)
RBC # UR STRIP: NEGATIVE /UL
SARS-COV-2 RDRP RESP QL NAA+PROBE: NEGATIVE
SODIUM SERPL-SCNC: 139 MMOL/L (ref 136–145)
SP GR UR STRIP: 1.03
TROPONIN I SERPL DL<=0.01 NG/ML-MCNC: 22.4 PG/ML
UROBILINOGEN UR STRIP-ACNC: 4 MG/DL
WBC # BLD AUTO: 6.95 K/UL (ref 4.5–11)

## 2024-01-22 PROCEDURE — 99900035 HC TECH TIME PER 15 MIN (STAT)

## 2024-01-22 PROCEDURE — 94640 AIRWAY INHALATION TREATMENT: CPT

## 2024-01-22 PROCEDURE — 25000003 PHARM REV CODE 250

## 2024-01-22 PROCEDURE — 25000003 PHARM REV CODE 250: Performed by: FAMILY MEDICINE

## 2024-01-22 PROCEDURE — 81003 URINALYSIS AUTO W/O SCOPE: CPT | Performed by: EMERGENCY MEDICINE

## 2024-01-22 PROCEDURE — 87635 SARS-COV-2 COVID-19 AMP PRB: CPT

## 2024-01-22 PROCEDURE — 85025 COMPLETE CBC W/AUTO DIFF WBC: CPT

## 2024-01-22 PROCEDURE — 96366 THER/PROPH/DIAG IV INF ADDON: CPT

## 2024-01-22 PROCEDURE — 80048 BASIC METABOLIC PNL TOTAL CA: CPT

## 2024-01-22 PROCEDURE — 63600175 PHARM REV CODE 636 W HCPCS

## 2024-01-22 PROCEDURE — G0378 HOSPITAL OBSERVATION PER HR: HCPCS

## 2024-01-22 PROCEDURE — 84484 ASSAY OF TROPONIN QUANT: CPT | Performed by: INTERNAL MEDICINE

## 2024-01-22 PROCEDURE — 96367 TX/PROPH/DG ADDL SEQ IV INF: CPT

## 2024-01-22 PROCEDURE — 63600175 PHARM REV CODE 636 W HCPCS: Performed by: FAMILY MEDICINE

## 2024-01-22 PROCEDURE — 25000242 PHARM REV CODE 250 ALT 637 W/ HCPCS

## 2024-01-22 PROCEDURE — 96375 TX/PRO/DX INJ NEW DRUG ADDON: CPT

## 2024-01-22 PROCEDURE — 99239 HOSP IP/OBS DSCHRG MGMT >30: CPT | Mod: ,,, | Performed by: FAMILY MEDICINE

## 2024-01-22 RX ORDER — ENOXAPARIN SODIUM 100 MG/ML
40 INJECTION SUBCUTANEOUS EVERY 24 HOURS
Status: DISCONTINUED | OUTPATIENT
Start: 2024-01-22 | End: 2024-01-22 | Stop reason: HOSPADM

## 2024-01-22 RX ORDER — ACETAMINOPHEN 500 MG
1000 TABLET ORAL EVERY 8 HOURS PRN
Status: DISCONTINUED | OUTPATIENT
Start: 2024-01-22 | End: 2024-01-22 | Stop reason: HOSPADM

## 2024-01-22 RX ORDER — SODIUM CHLORIDE 0.9 % (FLUSH) 0.9 %
10 SYRINGE (ML) INJECTION EVERY 12 HOURS PRN
Status: DISCONTINUED | OUTPATIENT
Start: 2024-01-22 | End: 2024-01-22 | Stop reason: HOSPADM

## 2024-01-22 RX ORDER — NALOXONE HCL 0.4 MG/ML
0.02 VIAL (ML) INJECTION
Status: DISCONTINUED | OUTPATIENT
Start: 2024-01-22 | End: 2024-01-22 | Stop reason: HOSPADM

## 2024-01-22 RX ORDER — GABAPENTIN 300 MG/1
300 CAPSULE ORAL 3 TIMES DAILY
Status: DISCONTINUED | OUTPATIENT
Start: 2024-01-22 | End: 2024-01-22 | Stop reason: HOSPADM

## 2024-01-22 RX ORDER — SPIRONOLACTONE 25 MG/1
25 TABLET ORAL DAILY
Status: DISCONTINUED | OUTPATIENT
Start: 2024-01-22 | End: 2024-01-22 | Stop reason: HOSPADM

## 2024-01-22 RX ORDER — TALC
6 POWDER (GRAM) TOPICAL NIGHTLY PRN
Status: DISCONTINUED | OUTPATIENT
Start: 2024-01-22 | End: 2024-01-22 | Stop reason: HOSPADM

## 2024-01-22 RX ORDER — GLUCAGON 1 MG
1 KIT INJECTION
Status: DISCONTINUED | OUTPATIENT
Start: 2024-01-22 | End: 2024-01-22 | Stop reason: HOSPADM

## 2024-01-22 RX ORDER — BUDESONIDE 0.5 MG/2ML
0.5 INHALANT ORAL EVERY 12 HOURS
Status: DISCONTINUED | OUTPATIENT
Start: 2024-01-22 | End: 2024-01-22 | Stop reason: HOSPADM

## 2024-01-22 RX ORDER — LOSARTAN POTASSIUM 25 MG/1
25 TABLET ORAL DAILY
Status: DISCONTINUED | OUTPATIENT
Start: 2024-01-22 | End: 2024-01-22 | Stop reason: HOSPADM

## 2024-01-22 RX ORDER — FUROSEMIDE 10 MG/ML
20 INJECTION INTRAMUSCULAR; INTRAVENOUS ONCE
Status: COMPLETED | OUTPATIENT
Start: 2024-01-22 | End: 2024-01-22

## 2024-01-22 RX ORDER — OLANZAPINE 5 MG/1
5 TABLET ORAL ONCE
Status: COMPLETED | OUTPATIENT
Start: 2024-01-22 | End: 2024-01-22

## 2024-01-22 RX ORDER — LEVOFLOXACIN 5 MG/ML
750 INJECTION, SOLUTION INTRAVENOUS
Status: DISCONTINUED | OUTPATIENT
Start: 2024-01-22 | End: 2024-01-22 | Stop reason: HOSPADM

## 2024-01-22 RX ORDER — IBUPROFEN 200 MG
24 TABLET ORAL
Status: DISCONTINUED | OUTPATIENT
Start: 2024-01-22 | End: 2024-01-22 | Stop reason: HOSPADM

## 2024-01-22 RX ORDER — ONDANSETRON 4 MG/1
8 TABLET, ORALLY DISINTEGRATING ORAL EVERY 8 HOURS PRN
Status: DISCONTINUED | OUTPATIENT
Start: 2024-01-22 | End: 2024-01-22 | Stop reason: HOSPADM

## 2024-01-22 RX ORDER — IBUPROFEN 200 MG
16 TABLET ORAL
Status: DISCONTINUED | OUTPATIENT
Start: 2024-01-22 | End: 2024-01-22 | Stop reason: HOSPADM

## 2024-01-22 RX ORDER — METOPROLOL SUCCINATE 25 MG/1
25 TABLET, EXTENDED RELEASE ORAL DAILY
Status: DISCONTINUED | OUTPATIENT
Start: 2024-01-22 | End: 2024-01-22 | Stop reason: HOSPADM

## 2024-01-22 RX ORDER — OLANZAPINE 5 MG/1
10 TABLET ORAL DAILY
Status: DISCONTINUED | OUTPATIENT
Start: 2024-01-22 | End: 2024-01-22 | Stop reason: HOSPADM

## 2024-01-22 RX ORDER — FUROSEMIDE 40 MG/1
40 TABLET ORAL DAILY
Status: DISCONTINUED | OUTPATIENT
Start: 2024-01-22 | End: 2024-01-22 | Stop reason: HOSPADM

## 2024-01-22 RX ORDER — KETOROLAC TROMETHAMINE 30 MG/ML
15 INJECTION, SOLUTION INTRAMUSCULAR; INTRAVENOUS ONCE
Status: COMPLETED | OUTPATIENT
Start: 2024-01-22 | End: 2024-01-22

## 2024-01-22 RX ORDER — ALBUTEROL SULFATE 90 UG/1
2 AEROSOL, METERED RESPIRATORY (INHALATION) EVERY 6 HOURS PRN
Qty: 6.7 G | Refills: 11 | Status: SHIPPED | OUTPATIENT
Start: 2024-01-22 | End: 2025-01-21

## 2024-01-22 RX ORDER — IPRATROPIUM BROMIDE AND ALBUTEROL SULFATE 2.5; .5 MG/3ML; MG/3ML
3 SOLUTION RESPIRATORY (INHALATION)
Status: DISCONTINUED | OUTPATIENT
Start: 2024-01-22 | End: 2024-01-22 | Stop reason: HOSPADM

## 2024-01-22 RX ADMIN — IPRATROPIUM BROMIDE AND ALBUTEROL SULFATE 3 ML: .5; 3 SOLUTION RESPIRATORY (INHALATION) at 07:01

## 2024-01-22 RX ADMIN — AMPICILLIN AND SULBACTAM 3 G: 2; 1 INJECTION, POWDER, FOR SOLUTION INTRAVENOUS at 10:01

## 2024-01-22 RX ADMIN — METOPROLOL SUCCINATE 25 MG: 25 TABLET, EXTENDED RELEASE ORAL at 10:01

## 2024-01-22 RX ADMIN — LOSARTAN POTASSIUM 25 MG: 25 TABLET, FILM COATED ORAL at 10:01

## 2024-01-22 RX ADMIN — OLANZAPINE 5 MG: 5 TABLET, FILM COATED ORAL at 10:01

## 2024-01-22 RX ADMIN — LEVOFLOXACIN 750 MG: 5 INJECTION, SOLUTION INTRAVENOUS at 04:01

## 2024-01-22 RX ADMIN — KETOROLAC TROMETHAMINE 15 MG: 30 INJECTION, SOLUTION INTRAMUSCULAR; INTRAVENOUS at 02:01

## 2024-01-22 RX ADMIN — GABAPENTIN 300 MG: 300 CAPSULE ORAL at 10:01

## 2024-01-22 RX ADMIN — SPIRONOLACTONE 25 MG: 25 TABLET ORAL at 10:01

## 2024-01-22 RX ADMIN — FUROSEMIDE 20 MG: 10 INJECTION, SOLUTION INTRAMUSCULAR; INTRAVENOUS at 10:01

## 2024-01-22 RX ADMIN — AMPICILLIN AND SULBACTAM 3 G: 2; 1 INJECTION, POWDER, FOR SOLUTION INTRAVENOUS at 02:01

## 2024-01-22 RX ADMIN — BUDESONIDE 0.5 MG: 0.5 SUSPENSION RESPIRATORY (INHALATION) at 07:01

## 2024-01-22 RX ADMIN — FUROSEMIDE 40 MG: 40 TABLET ORAL at 10:01

## 2024-01-22 RX ADMIN — IPRATROPIUM BROMIDE AND ALBUTEROL SULFATE 3 ML: .5; 3 SOLUTION RESPIRATORY (INHALATION) at 01:01

## 2024-01-22 NOTE — HPI
"Pt is a 59 yo male who presented to [facility] with a cc of "chest pain and sob." Pt states that he has had symptoms since 1/19/24. He was recently discharged from here on 1/11/24 after receiving treatment for COPD and Pneumonia for which he completed a course of Unasyn and Levaquin, as well as steroids. He also had a LHC at that time that showed 100% occlusion of RCA and 15% EF. Of note pt also states he has a history of ALS, depression, and PTSD. Pt states that he currently has no where to stay, and has been living with his nephew, tho he doesn't think he can go back there.    While in the ED, CXR showed pneumonia, tho this looks improved from last xray on 1/7/24. proBNP was 4,949, tho this is decreased from 27412 last hospitalization. He was noted to have mild anemia with H&H of 13/38.4, which appears to be pts baseline. EKG and Trop I were unremarkable.    Will admit patient for observation under the direct supervision of Dr. Abad for further evaluation and management. Pt is likely to discharge soon.   "

## 2024-01-22 NOTE — ASSESSMENT & PLAN NOTE
CXR shows pneumonia, tho improved from last CXR  - Will give unasyn and levaquin, as pt is a known alcoholic and there is risk for aspiration  - Duoneb q6h prn  - budesonide q12h  - CURB-65 of 0, pt will likely be able to discharge soon with PO abx    1/22 - This has been treated and has improving appearance on xray. I dont feel additional abx required at this time.

## 2024-01-22 NOTE — ED PROVIDER NOTES
Encounter Date: 1/21/2024    SCRIBE #1 NOTE: I, Jessica Gilberto, am scribing for, and in the presence of,  Donaldo Aguiar MD.       History     Chief Complaint   Patient presents with    Chest Pain     Patient is a 57 y/o Male that presents to the ED with complaints of chest pain and SOB. Patient states the chest pain started 1/19/2024. Patient states the pain feels like a belt across his chest and he took some medication and it knocked him out. Patient states he thought the pain would be gone but it did not go away. Patient states he has ALS and that his legs are usually swollen but for the past 4 days he has been in the bed and not walking around, so his legs would not be swollen. Patient also states it feels like his heart is pushed up into his throat. PMHx. CHF, and HTN. There are no other issues to report at this    The history is provided by the patient and the EMS personnel. No  was used.     Review of patient's allergies indicates:   Allergen Reactions    Azithromycin Swelling and Hives    Levofloxacin Itching and Rash     Past Medical History:   Diagnosis Date    CHF (congestive heart failure)     Hypertension      Past Surgical History:   Procedure Laterality Date    LEFT HEART CATHETERIZATION Left 1/10/2024    Procedure: Left heart cath;  Surgeon: Elijah Lozano MD;  Location: [facility] CATH LAB;  Service: Cardiology;  Laterality: Left;     History reviewed. No pertinent family history.  Social History     Tobacco Use    Smoking status: Every Day     Current packs/day: 0.50     Types: Cigarettes   Substance Use Topics    Alcohol use: Yes     Comment: been in rehab before    Drug use: Not Currently     Review of Systems   Cardiovascular:  Positive for chest pain. Negative for leg swelling.   Gastrointestinal:  Negative for abdominal pain.   Allergic/Immunologic:        Azithromycin   All other systems reviewed and are negative.      Physical Exam     Initial Vitals [01/21/24 1823]    BP Pulse Resp Temp SpO2   139/84 (!) 118 (!) 23 97.6 °F (36.4 °C) 95 %      MAP       --         Physical Exam    Nursing note and vitals reviewed.  Constitutional: He appears well-developed and well-nourished.   HENT:   Head: Normocephalic and atraumatic.   Mouth/Throat: Oropharynx is clear and moist.   Eyes: Conjunctivae and EOM are normal. Pupils are equal, round, and reactive to light.   Neck: Neck supple.   Normal range of motion.  Cardiovascular:  Normal rate, regular rhythm and normal heart sounds.           Chest Pain   Pulmonary/Chest: Breath sounds normal. He exhibits no tenderness.   Abdominal: Abdomen is soft.   Musculoskeletal:         General: Normal range of motion.      Cervical back: Normal range of motion and neck supple.     Neurological: He is alert and oriented to person, place, and time. GCS score is 15. GCS eye subscore is 4. GCS verbal subscore is 5. GCS motor subscore is 6.   Skin: Skin is warm.         ED Course   Procedures  Labs Reviewed   NT-PRO NATRIURETIC PEPTIDE - Abnormal; Notable for the following components:       Result Value    ProBNP 4,949 (*)     All other components within normal limits   COMPREHENSIVE METABOLIC PANEL - Abnormal; Notable for the following components:    Albumin 3.4 (*)     All other components within normal limits   CBC WITH DIFFERENTIAL - Abnormal; Notable for the following components:    RBC 4.22 (*)     Hemoglobin 13.0 (*)     Hematocrit 38.4 (*)     Neutrophils % 69.9 (*)     Lymphocytes % 21.0 (*)     Monocytes % 6.2 (*)     Immature Granulocytes % 0.6 (*)     Immature Granulocytes, Absolute 0.05 (*)     All other components within normal limits   URINALYSIS, REFLEX TO URINE CULTURE - Abnormal; Notable for the following components:    Protein, UA 10 (*)     Urobilinogen, UA 4 (*)     All other components within normal limits   BASIC METABOLIC PANEL - Abnormal; Notable for the following components:    Chloride 108 (*)     BUN 22 (*)     Creatinine 1.38  (*)     All other components within normal limits   CBC WITH DIFFERENTIAL - Abnormal; Notable for the following components:    RBC 3.91 (*)     Hemoglobin 12.1 (*)     Hematocrit 36.1 (*)     Lymphocytes % 24.6 (*)     Monocytes % 9.5 (*)     Immature Granulocytes % 0.6 (*)     All other components within normal limits   APTT - Normal   LACTIC ACID, PLASMA - Normal   MAGNESIUM - Normal   PROTIME-INR - Normal   TROPONIN I - Normal   SARS-COV-2 RNA AMPLIFICATION, QUAL - Normal    Narrative:     Negative SARS-CoV results should not be used as the sole basis for treatment or patient management decisions; negative results should be considered in the context of a patient's recent exposures, history and the presene of clinical signs and symptoms consistent with COVID-19.  Negative results should be treated as presumptive and confirmed by molecular assay, if necessary for patient management.   TROPONIN I - Normal   CULTURE, BLOOD   CULTURE, BLOOD   CBC W/ AUTO DIFFERENTIAL    Narrative:     The following orders were created for panel order CBC auto differential.  Procedure                               Abnormality         Status                     ---------                               -----------         ------                     CBC with Differential[[id number]]       Abnormal            Final result                 Please view results for these tests on the individual orders.   CBC W/ AUTO DIFFERENTIAL    Narrative:     The following orders were created for panel order CBC with Automated Differential.  Procedure                               Abnormality         Status                     ---------                               -----------         ------                     CBC with Differential[[id number]]       Abnormal            Final result                 Please view results for these tests on the individual orders.   EXTRA TUBES    Narrative:     The following orders were created for panel order EXTRA  TUBES.  Procedure                               Abnormality         Status                     ---------                               -----------         ------                     Light Blue Top Hold[[id number]]                             In process                 Light Green Top Hold[[id number]]                            In process                 Light Green Top Hold[[id number]]                            In process                 Lavender Top Hold[[id number]]                               In process                 Gold Top Hold[[id number]]                                   In process                   Please view results for these tests on the individual orders.   LIGHT BLUE TOP HOLD   LIGHT GREEN TOP HOLD   LIGHT GREEN TOP HOLD   LAVENDER TOP HOLD   GOLD TOP HOLD   EXTRA TUBES    Narrative:     The following orders were created for panel order EXTRA TUBES.  Procedure                               Abnormality         Status                     ---------                               -----------         ------                     Light Green Top Hold[[id number]]                            In process                 Lavender Top Hold[[id number]]                               In process                 Gold Top Hold[[id number]]                                   In process                   Please view results for these tests on the individual orders.   LIGHT GREEN TOP HOLD   LAVENDER TOP HOLD   GOLD TOP HOLD     EKG Readings: (Independently Interpreted)   Heart Rate: 113bpm.   Sinus tachycardia   Short UT interval   Possible left atrial abnormality   Rightward axis  IV conduction defect   Lateral infarct - age undetermined   QRS changes V3/V4 may be due to LVH but cannot rule out anterior infarct   Left ventricular hypertrophy   Inferior ST-T abnormality may be due to the hypertrophy and/or ischemia Abnormal ECG ..     ECG Results              EKG 12-lead (Final result)  Result time 01/23/24 04:06:24       Final result by Interface, Lab In [facility] (01/23/24 04:06:24)                   Narrative:    Test Reason : R07.9,    Vent. Rate : 113 BPM     Atrial Rate : 000 BPM     P-R Int : 090 ms          QRS Dur : 142 ms      QT Int : 368 ms       P-R-T Axes : 058 103 -67 degrees     QTc Int : 461 ms    Sinus tachycardia  Short KS interval  Possible left atrial abnormality  Rightward axis  IV conduction defect  Lateral infarct - age undetermined  QRS changes V3/V4 may be due to LVH but cannot rule out anterior infarct  Left ventricular hypertrophy  Inferior ST-T abnormality may be due to the hypertrophy and/or ischemia  Abnormal ECG    Confirmed by Pasquale Abad MD ([id number]) on 1/23/2024 4:06:12 AM    Referred By: [email or web address]   SELF           Confirmed By:Pasquale Abad MD                                  Imaging Results              X-Ray Chest AP Portable (Final result)  Result time 01/21/24 20:11:43      Final result by John Rushing DO (01/21/24 20:11:43)                   Impression:      As above.    Point of Service: [facility]      Electronically signed by: John Rushing  Date:    01/21/2024  Time:    20:11               Narrative:    EXAMINATION:  XR CHEST AP PORTABLE    CLINICAL HISTORY:  Chest pain, unspecified    COMPARISON:  Chest x-ray January 7, 2024    TECHNIQUE:  Frontal view/views of the chest.    FINDINGS:  Left lower lung atelectasis/infiltrate as well as right basilar atelectasis/infiltrate suspicious for pneumonia.  There is small left greater than right pleural fluid.  There is bilateral interstitial infiltrate/edema.  Heart partially obscured.  Suspect at least mild cardiomegaly and or pericardial effusion.  Visualized osseous and surrounding soft tissue structures appear grossly unchanged.                                       Medications   albuterol-ipratropium 2.5 mg-0.5 mg/3 mL nebulizer solution 6 mL (6 mLs Nebulization Given 1/21/24 1923)   sodium chloride 0.9% bolus 1,000 mL 1,000 mL (0  mLs Intravenous Stopped 1/22/24 0027)   cefTRIAXone (ROCEPHIN) 2 g in dextrose 5 % in water (D5W) 100 mL IVPB (MB+) (0 g Intravenous Stopped 1/22/24 0003)   ketorolac injection 15 mg (15 mg Intravenous Given 1/22/24 0226)   OLANZapine tablet 5 mg (5 mg Oral Given 1/22/24 1026)   furosemide injection 20 mg (20 mg Intravenous Given 1/22/24 1017)     Medical Decision Making  DYSPNEA, WEAKNESS, CHEST PAIN    Dxl  PNEUMONIA VS CHF VS OTHER    ADMIT    Amount and/or Complexity of Data Reviewed  Labs: ordered. Decision-making details documented in ED Course.  Radiology: ordered. Decision-making details documented in ED Course.  ECG/medicine tests: ordered. Decision-making details documented in ED Course.  Discussion of management or test interpretation with external provider(s): DISCUSSED WITH ADMITTING SERVICE.      Risk  Prescription drug management.              Attending Attestation:           Physician Attestation for Scribe:  Physician Attestation Statement for Scribe #1: I, Donaldo Aguiar MD, reviewed documentation, as scribed by Jessica Macias in my presence, and it is both accurate and complete.                                    Clinical Impression:  Final diagnoses:  [R07.9] Chest pain  [J18.9] Pneumonia due to infectious organism, unspecified laterality, unspecified part of lung (Primary)  [R07.9] Chest pain, unspecified type          ED Disposition Condition    Observation                 Donaldo Aguiar MD  03/06/24 9147

## 2024-01-22 NOTE — H&P
"  [facility] - Emergency Department  Hospital Medicine  History & Physical    Patient Name: Derek Moreon  MRN: [id number]  Patient Class: OP- Observation  Admission Date: 1/21/2024  Attending Physician: Pasquale Abad MD   Primary Care Provider: Mis Primary Doctor         Patient information was obtained from patient, past medical records, and ER records.     Subjective:     Principal Problem:Pneumonia due to infectious organism    Chief Complaint:   Chief Complaint   Patient presents with    Chest Pain        HPI: Pt is a 57 yo male who presented to [facility] ED with a cc of "chest pain and sob." Pt states that he has had symptoms since 1/19/24. He was recently discharged from here on 1/11/24 after receiving treatment for COPD and Pneumonia for which he completed a course of Unasyn and Levaquin, as well as steroids. He also had a LHC at that time that showed 100% occlusion of RCA and 15% EF. Of note pt also states he has a history of ALS, depression, and PTSD. Pt states that he currently has no where to stay, and has been living with his nephew, tho he doesn't think he can go back there.    While in the ED, CXR showed pneumonia, tho this looks improved from last xray on 1/7/24. proBNP was 4,949, tho this is decreased from 68011 last hospitalization. He was noted to have mild anemia with H&H of 13/38.4, which appears to be pts baseline. EKG and Trop I were unremarkable.    Will admit patient for observation under the direct supervision of Dr. Abad for further evaluation and management. Pt is likely to discharge soon.     Past Medical History:   Diagnosis Date    CHF (congestive heart failure)     Hypertension        Past Surgical History:   Procedure Laterality Date    LEFT HEART CATHETERIZATION Left 1/10/2024    Procedure: Left heart cath;  Surgeon: Elijah Lozano MD;  Location: [facility] CATH LAB;  Service: Cardiology;  Laterality: Left;       Review of patient's allergies indicates:   Allergen " Reactions    Azithromycin Swelling and Hives       No current facility-administered medications on file prior to encounter.     Current Outpatient Medications on File Prior to Encounter   Medication Sig    albuterol (PROVENTIL/VENTOLIN HFA) 90 mcg/actuation inhaler Inhale 1-2 puffs into the lungs every 6 (six) hours as needed for Wheezing. Rescue    furosemide (LASIX) 40 MG tablet Take 1 tablet (40 mg total) by mouth once daily.    gabapentin (NEURONTIN) 300 MG capsule Take 1 capsule (300 mg total) by mouth 3 (three) times daily.    losartan (COZAAR) 25 MG tablet Take 1 tablet (25 mg total) by mouth once daily.    methocarbamoL (ROBAXIN) 500 MG Tab Take 500 mg by mouth 2 (two) times a day.    metoprolol succinate (TOPROL-XL) 25 MG 24 hr tablet Take 1 tablet (25 mg total) by mouth once daily.    OLANZapine (ZYPREXA) 10 MG tablet Take 1 tablet (10 mg total) by mouth once daily.    spironolactone (ALDACTONE) 25 MG tablet Take 1 tablet (25 mg total) by mouth once daily.     Family History    None       Tobacco Use    Smoking status: Every Day     Current packs/day: 0.50     Types: Cigarettes    Smokeless tobacco: Not on file   Substance and Sexual Activity    Alcohol use: Yes     Comment: been in rehab before    Drug use: Not Currently    Sexual activity: Not Currently     Review of Systems   Constitutional:  Negative for chills, diaphoresis, fatigue and fever.   HENT:  Negative for congestion, rhinorrhea, sinus pressure, sinus pain and sore throat.    Respiratory:  Positive for chest tightness and shortness of breath. Negative for cough and wheezing.    Cardiovascular:  Positive for chest pain. Negative for palpitations and leg swelling.   Gastrointestinal:  Negative for abdominal distention, abdominal pain, constipation, diarrhea, nausea and vomiting.   Musculoskeletal:  Negative for arthralgias and myalgias.   Skin:  Negative for rash and wound.   Neurological:  Negative for dizziness, syncope, weakness,  light-headedness and headaches.   All other systems reviewed and are negative.    Objective:     Vital Signs (Most Recent):  Temp: 97.6 °F (36.4 °C) (01/21/24 1823)  Pulse: 95 (01/21/24 2333)  Resp: (!) 22 (01/21/24 2333)  BP: 121/76 (01/21/24 2333)  SpO2: 95 % (01/21/24 2333) Vital Signs (24h Range):  Temp:  [97.6 °F (36.4 °C)] 97.6 °F (36.4 °C)  Pulse:  [] 95  Resp:  [22-31] 22  SpO2:  [93 %-98 %] 95 %  BP: (116-139)/(65-86) 121/76     Weight: 68 kg (150 lb)  Body mass index is 21.52 kg/m².     Physical Exam  Vitals and nursing note reviewed.   Constitutional:       General: He is not in acute distress.     Appearance: Normal appearance. He is not ill-appearing or diaphoretic.   HENT:      Head: Normocephalic and atraumatic.      Right Ear: External ear normal.      Left Ear: External ear normal.      Nose: Nose normal.      Mouth/Throat:      Mouth: Mucous membranes are moist.      Pharynx: Oropharynx is clear.   Eyes:      General: No scleral icterus.     Conjunctiva/sclera: Conjunctivae normal.   Cardiovascular:      Rate and Rhythm: Normal rate and regular rhythm.      Pulses: Normal pulses.      Heart sounds: Normal heart sounds. No murmur heard.     No friction rub. No gallop.   Pulmonary:      Effort: Pulmonary effort is normal. No respiratory distress.      Breath sounds: Normal breath sounds. No wheezing, rhonchi or rales.   Abdominal:      General: Abdomen is flat. Bowel sounds are normal. There is no distension.      Palpations: Abdomen is soft.      Tenderness: There is no abdominal tenderness. There is no guarding or rebound.   Musculoskeletal:      Right lower leg: No edema.      Left lower leg: No edema.   Skin:     General: Skin is warm.      Coloration: Skin is not jaundiced.   Neurological:      General: No focal deficit present.      Mental Status: He is alert and oriented to person, place, and time. Mental status is at baseline.   Psychiatric:         Mood and Affect: Mood normal.          Behavior: Behavior normal.                Significant Labs: All pertinent labs within the past 24 hours have been reviewed.  Recent Lab Results         01/22/24  0056   01/21/24  1905   01/21/24  1836        Albumin/Globulin Ratio     0.9       Albumin     3.4       ALP     100       ALT     55       Anion Gap     10       Appearance, UA Clear           aPTT     30.2       AST     23       Baso #     0.08       Basophil %     0.9       Bilirubin (UA) Negative           BILIRUBIN TOTAL     0.5       BUN     18       BUN/CREAT RATIO     15       Calcium     8.8       Chloride     106       CO2     25       Color, UA Yellow           Creatinine     1.21       Differential Method     Auto       eGFR     70       Eos #     0.13       Eosinophil %     1.4       Globulin, Total     3.9       Glucose     104       Glucose, UA Normal           Hematocrit     38.4       Hemoglobin     13.0       Immature Grans (Abs)     0.05       Immature Granulocytes     0.6       INR     1.09       Ketones, UA Negative           Lactate, Vicente   1.2         Leukocytes, UA Negative           Lymph #     1.90       Lymph %     21.0       Magnesium      2.0       MCH     30.8       MCHC     33.9       MCV     91.0       Mono #     0.56       Mono %     6.2       MPV     11.2       Neutrophils, Abs     6.32       Neutrophils Relative     69.9       NITRITE UA Negative           nRBC     0.0       NT-proBNP     4,949       NUCLEATED RBC ABSOLUTE     0.00       Occult Blood UA Negative           pH, UA 6.0           Platelet Count     379       Potassium     3.6       PROTEIN TOTAL     7.3       Protein, UA 10           Protime     14.0       RBC     4.22       RDW     13.1       Sodium     137       Specific [place], UA 1.027           Troponin I High Sensitivity     18.5       UROBILINOGEN UA 4           WBC     9.04               Significant Imaging: I have reviewed all pertinent imaging results/findings within the past 24  hours.  Assessment/Plan:     * Pneumonia due to infectious organism  CXR shows pneumonia, tho improved from last CXR  - Will give unasyn and levaquin, as pt is a known alcoholic and there is risk for aspiration  - Duoneb q6h prn  - budesonide q12h  - CURB-65 of 0, pt will likely be able to discharge soon with PO abx      CAD (coronary artery disease)  - Recent LHC showed 100% occlusion of RCA, EF 15%  - Continue home meds    Depression  - Continue home meds    COPD (chronic obstructive pulmonary disease)  Patient's COPD is controlled currently.  Patient is currently off COPD Pathway. Continue scheduled inhalers Antibiotics and Supplemental oxygen and monitor respiratory status closely.   - Does not appear to be in acute exacerbation, will give abx as above, hold off on steroids for now      VTE Risk Mitigation (From admission, onward)           Ordered     enoxaparin injection 40 mg  Daily         01/22/24 0147     IP VTE HIGH RISK PATIENT  Once         01/22/24 0147     Place sequential compression device  Until discontinued         01/22/24 0147                           On 01/22/2024, patient should be placed in hospital observation services under my care in collaboration with Dr. Abad.         Ezequiel Rollins MD  Department of Hospital Medicine  [facility] - Emergency Department

## 2024-01-22 NOTE — ASSESSMENT & PLAN NOTE
Patient's COPD is controlled currently.  Patient is currently off COPD Pathway. Continue scheduled inhalers Antibiotics and Supplemental oxygen and monitor respiratory status closely.   - Does not appear to be in acute exacerbation, will give abx as above, hold off on steroids for now

## 2024-01-22 NOTE — ASSESSMENT & PLAN NOTE
CXR shows pneumonia, tho improved from last CXR  - Will give unasyn and levaquin, as pt is a known alcoholic and there is risk for aspiration  - Duoneb q6h prn  - xopenex q12h  - CURB-65 of 0, pt will likely be able to discharge soon with PO abx

## 2024-01-22 NOTE — SUBJECTIVE & OBJECTIVE
Past Medical History:   Diagnosis Date    CHF (congestive heart failure)     Hypertension        Past Surgical History:   Procedure Laterality Date    LEFT HEART CATHETERIZATION Left 1/10/2024    Procedure: Left heart cath;  Surgeon: Elijah Lozano MD;  Location: [facility], [place] CATH LAB;  Service: Cardiology;  Laterality: Left;       Review of patient's allergies indicates:   Allergen Reactions    Azithromycin Swelling and Hives       No current facility-administered medications on file prior to encounter.     Current Outpatient Medications on File Prior to Encounter   Medication Sig    albuterol (PROVENTIL/VENTOLIN HFA) 90 mcg/actuation inhaler Inhale 1-2 puffs into the lungs every 6 (six) hours as needed for Wheezing. Rescue    furosemide (LASIX) 40 MG tablet Take 1 tablet (40 mg total) by mouth once daily.    gabapentin (NEURONTIN) 300 MG capsule Take 1 capsule (300 mg total) by mouth 3 (three) times daily.    losartan (COZAAR) 25 MG tablet Take 1 tablet (25 mg total) by mouth once daily.    methocarbamoL (ROBAXIN) 500 MG Tab Take 500 mg by mouth 2 (two) times a day.    metoprolol succinate (TOPROL-XL) 25 MG 24 hr tablet Take 1 tablet (25 mg total) by mouth once daily.    OLANZapine (ZYPREXA) 10 MG tablet Take 1 tablet (10 mg total) by mouth once daily.    spironolactone (ALDACTONE) 25 MG tablet Take 1 tablet (25 mg total) by mouth once daily.     Family History    None       Tobacco Use    Smoking status: Every Day     Current packs/day: 0.50     Types: Cigarettes    Smokeless tobacco: Not on file   Substance and Sexual Activity    Alcohol use: Yes     Comment: been in rehab before    Drug use: Not Currently    Sexual activity: Not Currently     Review of Systems   Constitutional:  Negative for chills, diaphoresis, fatigue and fever.   HENT:  Negative for congestion, rhinorrhea, sinus pressure, sinus pain and sore throat.    Respiratory:  Positive for chest tightness and shortness of breath. Negative for cough  and wheezing.    Cardiovascular:  Positive for chest pain. Negative for palpitations and leg swelling.   Gastrointestinal:  Negative for abdominal distention, abdominal pain, constipation, diarrhea, nausea and vomiting.   Musculoskeletal:  Negative for arthralgias and myalgias.   Skin:  Negative for rash and wound.   Neurological:  Negative for dizziness, syncope, weakness, light-headedness and headaches.   All other systems reviewed and are negative.    Objective:     Vital Signs (Most Recent):  Temp: 97.6 °F (36.4 °C) (01/21/24 1823)  Pulse: 95 (01/21/24 2333)  Resp: (!) 22 (01/21/24 2333)  BP: 121/76 (01/21/24 2333)  SpO2: 95 % (01/21/24 2333) Vital Signs (24h Range):  Temp:  [97.6 °F (36.4 °C)] 97.6 °F (36.4 °C)  Pulse:  [] 95  Resp:  [22-31] 22  SpO2:  [93 %-98 %] 95 %  BP: (116-139)/(65-86) 121/76     Weight: 68 kg (150 lb)  Body mass index is 21.52 kg/m².     Physical Exam  Vitals and nursing note reviewed.   Constitutional:       General: He is not in acute distress.     Appearance: Normal appearance. He is not ill-appearing or diaphoretic.   HENT:      Head: Normocephalic and atraumatic.      Right Ear: External ear normal.      Left Ear: External ear normal.      Nose: Nose normal.      Mouth/Throat:      Mouth: Mucous membranes are moist.      Pharynx: Oropharynx is clear.   Eyes:      General: No scleral icterus.     Conjunctiva/sclera: Conjunctivae normal.   Cardiovascular:      Rate and Rhythm: Normal rate and regular rhythm.      Pulses: Normal pulses.      Heart sounds: Normal heart sounds. No murmur heard.     No friction rub. No gallop.   Pulmonary:      Effort: Pulmonary effort is normal. No respiratory distress.      Breath sounds: Normal breath sounds. No wheezing, rhonchi or rales.   Abdominal:      General: Abdomen is flat. Bowel sounds are normal. There is no distension.      Palpations: Abdomen is soft.      Tenderness: There is no abdominal tenderness. There is no guarding or rebound.    Musculoskeletal:      Right lower leg: No edema.      Left lower leg: No edema.   Skin:     General: Skin is warm.      Coloration: Skin is not jaundiced.   Neurological:      General: No focal deficit present.      Mental Status: He is alert and oriented to person, place, and time. Mental status is at baseline.   Psychiatric:         Mood and Affect: Mood normal.         Behavior: Behavior normal.                Significant Labs: All pertinent labs within the past 24 hours have been reviewed.  Recent Lab Results         01/22/24  0056   01/21/24  1905   01/21/24  1836        Albumin/Globulin Ratio     0.9       Albumin     3.4       ALP     100       ALT     55       Anion Gap     10       Appearance, UA Clear           aPTT     30.2       AST     23       Baso #     0.08       Basophil %     0.9       Bilirubin (UA) Negative           BILIRUBIN TOTAL     0.5       BUN     18       BUN/CREAT RATIO     15       Calcium     8.8       Chloride     106       CO2     25       Color, UA Yellow           Creatinine     1.21       Differential Method     Auto       eGFR     70       Eos #     0.13       Eosinophil %     1.4       Globulin, Total     3.9       Glucose     104       Glucose, UA Normal           Hematocrit     38.4       Hemoglobin     13.0       Immature Grans (Abs)     0.05       Immature Granulocytes     0.6       INR     1.09       Ketones, UA Negative           Lactate, Vicente   1.2         Leukocytes, UA Negative           Lymph #     1.90       Lymph %     21.0       Magnesium      2.0       MCH     30.8       MCHC     33.9       MCV     91.0       Mono #     0.56       Mono %     6.2       MPV     11.2       Neutrophils, Abs     6.32       Neutrophils Relative     69.9       NITRITE UA Negative           nRBC     0.0       NT-proBNP     4,949       NUCLEATED RBC ABSOLUTE     0.00       Occult Blood UA Negative           pH, UA 6.0           Platelet Count     379       Potassium     3.6       PROTEIN  TOTAL     7.3       Protein, UA 10           Protime     14.0       RBC     4.22       RDW     13.1       Sodium     137       Specific [employer], UA 1.027           Troponin I High Sensitivity     18.5       UROBILINOGEN UA 4           WBC     9.04               Significant Imaging: I have reviewed all pertinent imaging results/findings within the past 24 hours.

## 2024-01-24 NOTE — HOSPITAL COURSE
Patient is a 59 y/o   well known to me from recent admission.  Patient admitted with some chest tightness and SOB.  Infiltrate seen on CXR which is improved from discharge where he was adequately treated with abx.  No elevated WBC, no fever or purulent sputum.  Patient does have EF in 15 - 20 % range.  Had LHC prior to recent d/c.  Has total occlusion or Right with clean LAD and circumflex.  RCA backfilled with collaterals from left circulation.      Since arrival patient improved with a dose of lasix.  He feels he may have gotten anxious given difficult living situation.  He is discharged on 1/22 and instructed to keep cardiology appt. Next week.

## 2024-01-24 NOTE — DISCHARGE SUMMARY
"[facility] - Emergency Department  Hospital Medicine  Discharge Summary      Patient Name: Derek Moreno  MRN: [id number]  MED: [id number]  Patient Class: OP- Observation  Admission Date: 1/21/2024  Hospital Length of Stay: 1 days  Discharge Date and Time: 1/22/2024  3:40 PM  Attending Physician: Mis att. providers found   Discharging Provider: Jack Valdivia Jr, MD  Primary Care Provider: Mis, Primary Doctor    Primary Care Team: Networked reference to record PCT     HPI:   Pt is a 57 yo male who presented to [facility] ED with a cc of "chest pain and sob." Pt states that he has had symptoms since 1/19/24. He was recently discharged from here on 1/11/24 after receiving treatment for COPD and Pneumonia for which he completed a course of Unasyn and Levaquin, as well as steroids. He also had a LHC at that time that showed 100% occlusion of RCA and 15% EF. Of note pt also states he has a history of ALS, depression, and PTSD. Pt states that he currently has no where to stay, and has been living with his nephew, tho he doesn't think he can go back there.    While in the ED, CXR showed pneumonia, tho this looks improved from last xray on 1/7/24. proBNP was 4,949, tho this is decreased from 16860 last hospitalization. He was noted to have mild anemia with H&H of 13/38.4, which appears to be pts baseline. EKG and Trop I were unremarkable.    Will admit patient for observation under the direct supervision of Dr. Abad for further evaluation and management. Pt is likely to discharge soon.     * No surgery found *      Hospital Course:   Patient is a 57 y/o WM  well known to me from recent admission.  Patient admitted with some chest tightness and SOB.  Infiltrate seen on CXR which is improved from discharge where he was adequately treated with abx.  No elevated WBC, no fever or purulent sputum.  Patient does have EF in 15 - 20 % range.  Had LHC prior to recent d/c.  Has total occlusion or Right with clean " LAD and circumflex.  RCA backfilled with collaterals from left circulation.      Since arrival patient improved with a dose of lasix.  He feels he may have gotten anxious given difficult living situation.  He is discharged on 1/22 and instructed to keep cardiology appt. Next week.      Goals of Care Treatment Preferences:  Code Status: Full Code      Consults:     Psychiatric  Depression  - Continue home meds, olanzapine 10mg qHS. [facility]      Pulmonary  * Pneumonia due to infectious organism  CXR shows pneumonia, tho improved from last CXR  - Will give unasyn and levaquin, as pt is a known alcoholic and there is risk for aspiration  - Duoneb q6h prn  - budesonide q12h  - CURB-65 of 0, pt will likely be able to discharge soon with PO abx    1/22 - This has been treated and has improving appearance on xray. I dont feel additional abx required at this time.       COPD (chronic obstructive pulmonary disease)  Patient's COPD is controlled currently.  Patient is currently off COPD Pathway. Continue scheduled inhalers Antibiotics and Supplemental oxygen and monitor respiratory status closely.   - Does not appear to be in acute exacerbation, will give abx as above, hold off on steroids for now    Cardiac/Vascular  CAD (coronary artery disease)  - Recent LHC showed 100% occlusion of RCA, EF 15%  - Continue home meds      Final Active Diagnoses:    Diagnosis Date Noted POA    PRINCIPAL PROBLEM:  Pneumonia due to infectious organism [J18.9] 01/21/2024 Yes    CAD (coronary artery disease) [I25.10] 01/22/2024 Yes    COPD (chronic obstructive pulmonary disease) [J44.9] 01/04/2024 Yes    Depression [F32.A] 01/04/2024 Yes      Problems Resolved During this Admission:       Discharged Condition: good    Disposition: Home or Self Care    Follow Up:    Patient Instructions:   No discharge procedures on file.    Significant Diagnostic Studies: N/A    Pending Diagnostic Studies:       Procedure Component  Value Units Date/Time    EXTRA TUBES [[id number]] Collected: 01/21/24 1905    Order Status: Sent Lab Status: In process Updated: 01/21/24 1913    Specimen: Blood, Venous     Narrative:      The following orders were created for panel order EXTRA TUBES.  Procedure                               Abnormality         Status                     ---------                               -----------         ------                     Light Green Top Hold[[id number]]                            In process                 Lavender Top Hold[[id number]]                               In process                 Gold Top Hold[[id number]]                                   In process                   Please view results for these tests on the individual orders.    EXTRA TUBES [[id number]] Collected: 01/21/24 1836    Order Status: Sent Lab Status: In process Updated: 01/21/24 1840    Specimen: Blood, Venous     Narrative:      The following orders were created for panel order EXTRA TUBES.  Procedure                               Abnormality         Status                     ---------                               -----------         ------                     Light Blue Top Hold[[id number]]                             In process                 Light Green Top Hold[[id number]]                            In process                 Light Green Top Hold[[id number]]                            In process                 Lavender Top Hold[[id number]]                               In process                 Gold Top Hold[[id number]]                                   In process                   Please view results for these tests on the individual orders.           Medications:  Reconciled Home Medications:      Medication List        CHANGE how you take these medications      albuterol 90 mcg/actuation inhaler  Commonly known as: PROVENTIL/VENTOLIN HFA  Inhale 2 puffs into the lungs every 6 (six) hours as needed for Wheezing. Rescue  What  changed: how much to take            CONTINUE taking these medications      furosemide 40 MG tablet  Commonly known as: LASIX  Take 1 tablet (40 mg total) by mouth once daily.     gabapentin 300 MG capsule  Commonly known as: NEURONTIN  Take 1 capsule (300 mg total) by mouth 3 (three) times daily.     losartan 25 MG tablet  Commonly known as: COZAAR  Take 1 tablet (25 mg total) by mouth once daily.     methocarbamoL 500 MG Tab  Commonly known as: ROBAXIN  Take 500 mg by mouth 2 (two) times a day.     metoprolol succinate 25 MG 24 hr tablet  Commonly known as: TOPROL-XL  Take 1 tablet (25 mg total) by mouth once daily.     OLANZapine 10 MG tablet  Commonly known as: ZyPREXA  Take 1 tablet (10 mg total) by mouth once daily.     spironolactone 25 MG tablet  Commonly known as: ALDACTONE  Take 1 tablet (25 mg total) by mouth once daily.              Indwelling Lines/Drains at time of discharge:   Lines/Drains/Airways       None                   Time spent on the discharge of patient: 35 minutes         Jack Valdivia Jr, MD  Department of Hospital Medicine  [facility] - Emergency Department

## 2024-01-25 ENCOUNTER — OFFICE VISIT (OUTPATIENT)
Dept: CARDIOLOGY | Facility: CLINIC | Age: 59
End: 2024-01-25
Payer: OTHER GOVERNMENT

## 2024-01-25 VITALS
HEIGHT: 68 IN | SYSTOLIC BLOOD PRESSURE: 110 MMHG | DIASTOLIC BLOOD PRESSURE: 78 MMHG | WEIGHT: 157.81 LBS | BODY MASS INDEX: 23.92 KG/M2 | OXYGEN SATURATION: 95 %

## 2024-01-25 DIAGNOSIS — R00.0 TACHYCARDIA: Primary | ICD-10-CM

## 2024-01-25 DIAGNOSIS — I25.10 CORONARY ARTERY DISEASE INVOLVING NATIVE CORONARY ARTERY OF NATIVE HEART WITHOUT ANGINA PECTORIS: ICD-10-CM

## 2024-01-25 DIAGNOSIS — I10 HYPERTENSION, UNSPECIFIED TYPE: ICD-10-CM

## 2024-01-25 DIAGNOSIS — I50.20: ICD-10-CM

## 2024-01-25 DIAGNOSIS — I51.9 LEFT VENTRICULAR DIASTOLIC DYSFUNCTION: ICD-10-CM

## 2024-01-25 DIAGNOSIS — R06.02 SHORTNESS OF BREATH: ICD-10-CM

## 2024-01-25 DIAGNOSIS — F10.10 ETOH ABUSE: ICD-10-CM

## 2024-01-25 DIAGNOSIS — E78.5 HYPERLIPIDEMIA, UNSPECIFIED HYPERLIPIDEMIA TYPE: ICD-10-CM

## 2024-01-25 PROCEDURE — 99214 OFFICE O/P EST MOD 30 MIN: CPT | Mod: S$PBB,,, | Performed by: NURSE PRACTITIONER

## 2024-01-25 PROCEDURE — 99214 OFFICE O/P EST MOD 30 MIN: CPT | Mod: PBBFAC | Performed by: NURSE PRACTITIONER

## 2024-01-25 PROCEDURE — 93005 ELECTROCARDIOGRAM TRACING: CPT | Mod: PBBFAC | Performed by: INTERNAL MEDICINE

## 2024-01-25 PROCEDURE — 93010 ELECTROCARDIOGRAM REPORT: CPT | Mod: S$PBB,,, | Performed by: INTERNAL MEDICINE

## 2024-01-25 RX ORDER — METOPROLOL SUCCINATE 50 MG/1
50 TABLET, EXTENDED RELEASE ORAL DAILY
Qty: 30 TABLET | Refills: 11 | Status: ON HOLD | OUTPATIENT
Start: 2024-01-25 | End: 2024-02-02 | Stop reason: HOSPADM

## 2024-01-26 PROBLEM — J96.01 ACUTE HYPOXIC RESPIRATORY FAILURE: Status: RESOLVED | Noted: 2024-01-04 | Resolved: 2024-01-26

## 2024-01-26 PROBLEM — I51.9 LEFT VENTRICULAR DIASTOLIC DYSFUNCTION: Status: ACTIVE | Noted: 2024-01-26

## 2024-01-26 PROBLEM — I50.20: Status: ACTIVE | Noted: 2024-01-05

## 2024-01-26 PROBLEM — F10.10 ETOH ABUSE: Status: ACTIVE | Noted: 2024-01-04

## 2024-01-26 PROBLEM — R07.9 CHEST PAIN: Status: RESOLVED | Noted: 2024-01-21 | Resolved: 2024-01-26

## 2024-01-26 RX ORDER — ASPIRIN 81 MG/1
81 TABLET ORAL DAILY
Qty: 90 TABLET | Refills: 3 | Status: SHIPPED | OUTPATIENT
Start: 2024-01-26 | End: 2025-01-25

## 2024-01-26 NOTE — ASSESSMENT & PLAN NOTE
[facility] 1/10/2024- Dr. Lozano  Single vessel CAD with  of the Ostto prox RCA with collaterals from the left.  LVEDP 22 mmHg    No chest pain  Patient discharged from hospital without ASA. Start ASA 81 mg po daily  Patient is not on a statin. I reviewed his labs and and feel that this is most likely due to elevated liver function tests.  No lipid panel results available from recent hospital admission. He has not been seen in this system prior to admission. [facility] and [facility] the [facility]. We will need access to those records. I will defer statin therapy to his primary cardiologist- Dr. Chowdhury upon her review [facility]

## 2024-01-26 NOTE — ASSESSMENT & PLAN NOTE
No statin due to elevated LFT's  The patient has had issues with ETOHism for many years and continues to abuse ETOH. He states that he has been in [facility]. [facility] the [facility] but is currently not interested in rehab.

## 2024-01-26 NOTE — ASSESSMENT & PLAN NOTE
"T he patient has had issues with ETOHism for many years and continues to abuse ETOH. He states that he has been in [facility]. [facility] but is currently not interested in rehab. He states, " I carry a 32 ounce beer with me everywhere and drink it most but not every day.   "

## 2024-01-26 NOTE — ASSESSMENT & PLAN NOTE
"Patient is identified as having Combined Systolic and Diastolic heart failure that is Chronic. CHF is currently controlled. Latest ECHO performed and demonstrates- Results for orders placed during the hospital encounter of 01/04/24    Echo    Interpretation Summary    Left Ventricle: The left ventricle is mildly dilated. Normal wall thickness. There is eccentric hypertrophy. Severe global hypokinesis with regional variation. There is severely reduced systolic function. Biplane (2D) method of discs ejection fraction is 15%. Global longitudinal strain is reduced. There is diastolic dysfunction but grade cannot be determined.    Right Ventricle: Moderate right ventricular enlargement. Systolic function is mildly reduced.    Right Atrium: Right atrium is mildly dilated.    Aortic Valve: The aortic valve is a trileaflet valve.    Mitral Valve: There is mild bileaflet sclerosis. There is no stenosis. The mean pressure gradient across the mitral valve is 3 mmHg at a heart rate of  bpm. There is moderate regurgitation with a posterolateral eccentriccally directed jet.    Tricuspid Valve: There is moderate regurgitation.    Pulmonary Artery: The estimated pulmonary artery systolic pressure is 62 mmHg.    IVC/SVC: Elevated venous pressure at 15 mmHg.    Pericardium: Large left pleural effusion.  . Continue Beta Blocker, ACE/ARB, Furosemide, and Aldactone and monitor clinical status closely.     Patient continues to be SOB "all the time" but denies orthopnea or PND. He reports after he takes his Zyprexa that he has palpitations described as his heart racing. He would like to decrease the dose but I have recommended that he contact his PCP or psychiatric provider prior to making in changes.   I will increase his metoprolol to 50 mg po daily given his euvolemic state.   "

## 2024-01-26 NOTE — PROGRESS NOTES
PCP: No, Primary Doctor    Referring Provider:     Subjective:   Derek Moreno is a 58 y.o. male with hx of COPD, heavy ETOH abuse, depression, [facility] illness [facility]), LHD, VISHAL, and combined systolic and diastolic HF (LVEF 15% with moderate MR and LVEDP 22mmHg),  who presents for HD follow up from admission 1/4/2024-1/11/2024 with hypoxic respiratory failure due to pneumonia/COPD and new onset CHF. He presented to [facility] on 1/21/2023-1/22/2024  with decompensated CHF and responded to IV lasix.   [facility] 1/10/2024- Dr. Lozano  Single vessel CAD with  of the Ostto prox RCA with collaterals from the left.  LVEDP 22 mmHg    No chest pain  Patient discharged from hospital without ASA. Start ASA 81 mg po daily  Patient is not on a statin. I reviewed his labs and and feel that this is most likely due to elevated liver function tests.  No lipid panel results available from recent hospital admission. He has not been seen in this system prior to admission. [facility] and most likely has had recent lipid panel by the [facility]. We will need access to those records. I will defer statin therapy to his primary cardiologist- Dr. Chowdhury upon her review of his [facility] records    No statin due to elevated LFT's  The patient has had issues with ETOHism for many years and continues to abuse ETOH. He states that he has been in rehab before [facility]. [facility] but is currently not interested in rehab.        Fhx:History reviewed. No pertinent family history.  Shx:   Social History     Socioeconomic History    Marital status: Single   Tobacco Use    Smoking status: Every Day     Current packs/day: 0.50     Types: Cigarettes   Substance and Sexual Activity    Alcohol use: Yes     Comment: been in rehab before    Drug use: Not Currently    Sexual activity: Not Currently     Social Determinants of Health     Financial Resource Strain: Low Risk  (1/7/2024)    Overall Financial Resource Strain  (CARDIA)     Difficulty of Paying Living Expenses: Not hard at all   Food Insecurity: No Food Insecurity (1/7/2024)    Hunger Vital Sign     Worried About Running Out of Food in the Last Year: Never true     Ran Out of Food in the Last Year: Never true   Transportation Needs: No Transportation Needs (1/7/2024)    PRAPARE - Transportation     Lack of Transportation (Medical): No     Lack of Transportation (Non-Medical): No   Physical Activity: Inactive (1/7/2024)    Exercise Vital Sign     Days of Exercise per Week: 0 days     Minutes of Exercise per Session: 0 min   Stress: Stress Concern Present (1/7/2024)    [employer] - Occupational Stress Questionnaire     Feeling of Stress : Very much   Social Connections: Socially Isolated (1/7/2024)    Social Connection and Isolation Panel [NHANES]     Frequency of Communication with Friends and Family: More than three times a week     Frequency of Social Gatherings with Friends and Family: More than three times a week     Attends Sikh Services: Never     Active Member of Clubs or Organizations: No     Attends Club or Organization Meetings: Never     Marital Status: Never    Housing Stability: Unknown (1/7/2024)    Housing Stability Vital Sign     Unable to Pay for Housing in the Last Year: No     Unstable Housing in the Last Year: No       EKG 1/25/2024 sinus tachycardia;  bpm; LV with QRS widening and repolarization  1/21/2024 sinus tachycardia;  bpm; short OR interval; possible Left atrial abnormality; rightward axis; IV conduction defect; lateral infarct; LVH; inferior ST-TWA      1/9/2024 Lexiscan  Impression:     1. Large size, severe intensity inferior defect which is improved with stress as compared to rest, likely artifact.  There is reversible small size, medium intensity apical defect, possible ischemia.  Wall motion is globally impaired.  2. the global left ventricular systolic function is severely reduced with  an LV ejection fraction of 31 % and evidence of LV dilatation. Wall motion is impaired.        Electronically signed by: Ignacio Chowdhury  Date:                                            01/09/2024  Time:                                           12:48    ECHO Results for orders placed during the hospital encounter of 01/04/24    Echo    Interpretation Summary    Left Ventricle: The left ventricle is mildly dilated. Normal wall thickness. There is eccentric hypertrophy. Severe global hypokinesis with regional variation. There is severely reduced systolic function. Biplane (2D) method of discs ejection fraction is 15%. Global longitudinal strain is reduced. There is diastolic dysfunction but grade cannot be determined.    Right Ventricle: Moderate right ventricular enlargement. Systolic function is mildly reduced.    Right Atrium: Right atrium is mildly dilated.    Aortic Valve: The aortic valve is a trileaflet valve.    Mitral Valve: There is mild bileaflet sclerosis. There is no stenosis. The mean pressure gradient across the mitral valve is 3 mmHg at a heart rate of  bpm. There is moderate regurgitation with a posterolateral eccentriccally directed jet.    Tricuspid Valve: There is moderate regurgitation.    Pulmonary Artery: The estimated pulmonary artery systolic pressure is 62 mmHg.    IVC/SVC: Elevated venous pressure at 15 mmHg.    Pericardium: Large left pleural effusion.      [facility] Results for orders placed during the hospital encounter of 01/04/24    Cardiac catheterization    Conclusion     oRCA w/ good collateral L->R supply from anterior septals, filling retrograde from RPL branch; otherwise LCx w/ anomalous origin, MLI only    The Ost RCA to Prox RCA lesion was 100% stenosed.    The pre-procedure left ventricular end diastolic pressure was 22.    The estimated blood loss was <50 mL.    The procedure log was documented by Documenter: Marian Quintanilla and verified by Elijah Lozano MD.    Date:  "1/10/2024  Time: 4:44 PM        Lab Results   Component Value Date     01/22/2024    K 3.7 01/22/2024     (H) 01/22/2024    CO2 25 01/22/2024    BUN 22 (H) 01/22/2024    CREATININE 1.38 (H) 01/22/2024    CALCIUM 8.5 01/22/2024    ANIONGAP 10 01/22/2024       No results found for: "CHOL"  No results found for: "HDL"  No results found for: "LDLCALC"  No results found for: "TRIG"  No results found for: "CHOLHDL"    Lab Results   Component Value Date    WBC 6.95 01/22/2024    HGB 12.1 (L) 01/22/2024    HCT 36.1 (L) 01/22/2024    MCV 92.3 01/22/2024     01/22/2024           Current Outpatient Medications:     albuterol (PROVENTIL/VENTOLIN HFA) 90 mcg/actuation inhaler, Inhale 2 puffs into the lungs every 6 (six) hours as needed for Wheezing. Rescue, Disp: 6.7 g, Rfl: 11    furosemide (LASIX) 40 MG tablet, Take 1 tablet (40 mg total) by mouth once daily., Disp: 30 tablet, Rfl: 11    gabapentin (NEURONTIN) 300 MG capsule, Take 1 capsule (300 mg total) by mouth 3 (three) times daily., Disp: 90 capsule, Rfl: 11    losartan (COZAAR) 25 MG tablet, Take 1 tablet (25 mg total) by mouth once daily., Disp: 90 tablet, Rfl: 3    OLANZapine (ZYPREXA) 10 MG tablet, Take 1 tablet (10 mg total) by mouth once daily., Disp: 30 tablet, Rfl: 11    spironolactone (ALDACTONE) 25 MG tablet, Take 1 tablet (25 mg total) by mouth once daily., Disp: 30 tablet, Rfl: 11    methocarbamoL (ROBAXIN) 500 MG Tab, Take 500 mg by mouth 2 (two) times a day., Disp: , Rfl:     metoprolol succinate (TOPROL-XL) 50 MG 24 hr tablet, Take 1 tablet (50 mg total) by mouth once daily., Disp: 30 tablet, Rfl: 11  Meds reviewed and reconciled.      Review of Systems   Respiratory:  Positive for shortness of breath.    Cardiovascular:  Positive for leg swelling. Negative for chest pain, palpitations, orthopnea, claudication and PND.        Lower ext edema prior to admission; now resolved   Neurological:  Negative for dizziness, loss of consciousness " "and weakness.           Objective:   /78 (BP Location: Left arm, Patient Position: Sitting)   Ht 5' 8" (1.727 m)   Wt 71.6 kg (157 lb 12.8 oz)   SpO2 95%   BMI 23.99 kg/m²     Physical Exam  Vitals and nursing note reviewed.   Constitutional:       Appearance: Normal appearance. He is normal weight.      Comments: In a wheel chair   HENT:      Head: Normocephalic and atraumatic.   Neck:      Vascular: No carotid bruit.   Cardiovascular:      Rate and Rhythm: Normal rate and regular rhythm.      Pulses: Normal pulses.      Heart sounds: Normal heart sounds.   Pulmonary:      Effort: Pulmonary effort is normal.      Breath sounds: Normal breath sounds.   Abdominal:      Palpations: Abdomen is soft.   Musculoskeletal:      Cervical back: Neck supple.      Right lower leg: No edema.      Left lower leg: No edema.   Skin:     General: Skin is warm and dry.      Capillary Refill: Capillary refill takes less than 2 seconds.   Neurological:      Mental Status: He is alert.           Assessment:     1. Tachycardia  EKG 12-lead    metoprolol succinate (TOPROL-XL) 50 MG 24 hr tablet    EKG 12-lead      2. Shortness of breath  EKG 12-lead    EKG 12-lead      3. Coronary artery disease involving native coronary artery of native heart without angina pectoris        4. Hyperlipidemia, unspecified hyperlipidemia type        5. Hypertension, unspecified type        6. Systolic congestive heart failure with reduced left ventricular function, NYHA class 4        7. ETOH abuse        8. Left ventricular diastolic dysfunction              Plan:   HLD (hyperlipidemia)  No statin due to elevated LFT's  The patient has had issues with ETOHism for many years and continues to abuse ETOH. He states that he has been in [facility]. [facility] but is currently not interested in rehab.    HTN (hypertension)  110/78 mmHg    Systolic congestive heart failure with reduced left ventricular function, NYHA class " "4  Patient is identified as having Combined Systolic and Diastolic heart failure that is Chronic. CHF is currently controlled. Latest ECHO performed and demonstrates- Results for orders placed during the hospital encounter of 01/04/24    Echo    Interpretation Summary    Left Ventricle: The left ventricle is mildly dilated. Normal wall thickness. There is eccentric hypertrophy. Severe global hypokinesis with regional variation. There is severely reduced systolic function. Biplane (2D) method of discs ejection fraction is 15%. Global longitudinal strain is reduced. There is diastolic dysfunction but grade cannot be determined.    Right Ventricle: Moderate right ventricular enlargement. Systolic function is mildly reduced.    Right Atrium: Right atrium is mildly dilated.    Aortic Valve: The aortic valve is a trileaflet valve.    Mitral Valve: There is mild bileaflet sclerosis. There is no stenosis. The mean pressure gradient across the mitral valve is 3 mmHg at a heart rate of  bpm. There is moderate regurgitation with a posterolateral eccentriccally directed jet.    Tricuspid Valve: There is moderate regurgitation.    Pulmonary Artery: The estimated pulmonary artery systolic pressure is 62 mmHg.    IVC/SVC: Elevated venous pressure at 15 mmHg.    Pericardium: Large left pleural effusion.  . Continue Beta Blocker, ACE/ARB, Furosemide, and Aldactone and monitor clinical status closely.     Patient continues to be SOB "all the time" but denies orthopnea or PND. He reports after he takes his Zyprexa that he has palpitations described as his heart racing. He would like to decrease the dose but I have recommended that he contact his PCP or psychiatric provider prior to making in changes.   I will increase his metoprolol to 50 mg po daily given his euvolemic state.     ETOH abuse  T he patient has had issues with ETOHism for many years and continues to abuse ETOH. He states that he has been in [facility]. He " "is [facility] by [facility] but is currently not interested in rehab. He states, " I carry a 32 ounce beer with me everywhere and drink it most but not every day.     Left ventricular diastolic dysfunction  LVEDP 22 mmHg    RTC: 2/9/2024 at 9:15 with Dr. Chowdhury    "

## 2024-01-27 LAB
BACTERIA BLD CULT: NORMAL
BACTERIA BLD CULT: NORMAL

## 2024-01-30 ENCOUNTER — HOSPITAL ENCOUNTER (INPATIENT)
Facility: HOSPITAL | Age: 59
LOS: 3 days | Discharge: HOME | DRG: 291 | End: 2024-02-02
Attending: EMERGENCY MEDICINE | Admitting: HOSPITALIST
Payer: OTHER GOVERNMENT

## 2024-01-30 DIAGNOSIS — F10.10 ETOH ABUSE: ICD-10-CM

## 2024-01-30 DIAGNOSIS — G12.21 ALS (AMYOTROPHIC LATERAL SCLEROSIS): ICD-10-CM

## 2024-01-30 DIAGNOSIS — R07.9 CHEST PAIN: ICD-10-CM

## 2024-01-30 DIAGNOSIS — F17.210 TOBACCO SMOKER, LESS THAN 10 CIGARETTES PER DAY: ICD-10-CM

## 2024-01-30 DIAGNOSIS — I25.10 CORONARY ARTERY DISEASE INVOLVING NATIVE CORONARY ARTERY OF NATIVE HEART WITHOUT ANGINA PECTORIS: ICD-10-CM

## 2024-01-30 DIAGNOSIS — J44.9 CHRONIC OBSTRUCTIVE PULMONARY DISEASE, UNSPECIFIED COPD TYPE: ICD-10-CM

## 2024-01-30 DIAGNOSIS — G47.33 OSA AND COPD OVERLAP SYNDROME: ICD-10-CM

## 2024-01-30 DIAGNOSIS — J18.9 PNEUMONIA DUE TO INFECTIOUS ORGANISM, UNSPECIFIED LATERALITY, UNSPECIFIED PART OF LUNG: ICD-10-CM

## 2024-01-30 DIAGNOSIS — I50.43 ACUTE ON CHRONIC COMBINED SYSTOLIC AND DIASTOLIC HEART FAILURE: ICD-10-CM

## 2024-01-30 DIAGNOSIS — I50.9 ACUTE ON CHRONIC CONGESTIVE HEART FAILURE, UNSPECIFIED HEART FAILURE TYPE: Primary | ICD-10-CM

## 2024-01-30 DIAGNOSIS — I50.9 CHF (CONGESTIVE HEART FAILURE): ICD-10-CM

## 2024-01-30 DIAGNOSIS — J90 PLEURAL EFFUSION: ICD-10-CM

## 2024-01-30 DIAGNOSIS — J44.9 OSA AND COPD OVERLAP SYNDROME: ICD-10-CM

## 2024-01-30 DIAGNOSIS — I27.20 PULMONARY HYPERTENSION: ICD-10-CM

## 2024-01-30 DIAGNOSIS — F10.20 ALCOHOLISM: ICD-10-CM

## 2024-01-30 PROBLEM — I11.0: Status: ACTIVE | Noted: 2024-01-30

## 2024-01-30 PROBLEM — E55.9 VITAMIN D DEFICIENCY: Status: ACTIVE | Noted: 2024-01-30

## 2024-01-30 PROBLEM — I50.40: Status: ACTIVE | Noted: 2024-01-30

## 2024-01-30 LAB
25(OH)D3 SERPL-MCNC: 8.5 NG/ML
ALBUMIN SERPL BCP-MCNC: 3.4 G/DL (ref 3.5–5)
ALBUMIN/GLOB SERPL: 0.8 {RATIO}
ALP SERPL-CCNC: 88 U/L (ref 45–115)
ALT SERPL W P-5'-P-CCNC: 36 U/L (ref 16–61)
AMMONIA PLAS-SCNC: 27 ΜMOL/L (ref 11–32)
ANION GAP SERPL CALCULATED.3IONS-SCNC: 9 MMOL/L (ref 7–16)
AST SERPL W P-5'-P-CCNC: 36 U/L (ref 15–37)
BASOPHILS # BLD AUTO: 0.06 K/UL (ref 0–0.2)
BASOPHILS NFR BLD AUTO: 0.5 % (ref 0–1)
BILIRUB SERPL-MCNC: 1 MG/DL (ref ?–1.2)
BUN SERPL-MCNC: 17 MG/DL (ref 7–18)
BUN/CREAT SERPL: 13 (ref 6–20)
CALCIUM SERPL-MCNC: 8.6 MG/DL (ref 8.5–10.1)
CHLORIDE SERPL-SCNC: 99 MMOL/L (ref 98–107)
CO2 SERPL-SCNC: 25 MMOL/L (ref 21–32)
CREAT SERPL-MCNC: 1.27 MG/DL (ref 0.7–1.3)
CRP SERPL-MCNC: 10.4 MG/DL (ref 0–0.8)
D DIMER PPP FEU-MCNC: 3.28 ΜG/ML (ref 0–0.47)
DIFFERENTIAL METHOD BLD: ABNORMAL
EGFR (NO RACE VARIABLE) (RUSH/TITUS): 66 ML/MIN/1.73M2
EOSINOPHIL # BLD AUTO: 0.01 K/UL (ref 0–0.5)
EOSINOPHIL NFR BLD AUTO: 0.1 % (ref 1–4)
ERYTHROCYTE [DISTWIDTH] IN BLOOD BY AUTOMATED COUNT: 13.1 % (ref 11.5–14.5)
FOLATE SERPL-MCNC: >20 NG/ML (ref 3.1–17.5)
GLOBULIN SER-MCNC: 4.3 G/DL (ref 2–4)
GLUCOSE SERPL-MCNC: 130 MG/DL (ref 74–106)
HAV IGM SER QL: NORMAL
HBV CORE IGM SER QL: NORMAL
HBV SURFACE AG SERPL QL IA: NORMAL
HCO3 UR-SCNC: 24.8 MMOL/L (ref 24–28)
HCT VFR BLD AUTO: 38.5 % (ref 40–54)
HCT VFR BLD CALC: 43 % (ref 35–51)
HCV AB SER QL: NORMAL
HGB BLD-MCNC: 12.9 G/DL (ref 13.5–18)
HIV 1+O+2 AB SERPL QL: NORMAL
IMM GRANULOCYTES # BLD AUTO: 0.04 K/UL (ref 0–0.04)
IMM GRANULOCYTES NFR BLD: 0.3 % (ref 0–0.4)
INR BLD: 1.2
LACTATE SERPL-SCNC: 1.5 MMOL/L (ref 0.4–2)
LDH SERPL L TO P-CCNC: 1.3 MMOL/L (ref 0.3–1.2)
LYMPHOCYTES # BLD AUTO: 1.42 K/UL (ref 1–4.8)
LYMPHOCYTES NFR BLD AUTO: 11.8 % (ref 27–41)
MCH RBC QN AUTO: 30.8 PG (ref 27–31)
MCHC RBC AUTO-ENTMCNC: 33.5 G/DL (ref 32–36)
MCV RBC AUTO: 91.9 FL (ref 80–96)
MONOCYTES # BLD AUTO: 0.82 K/UL (ref 0–0.8)
MONOCYTES NFR BLD AUTO: 6.8 % (ref 2–6)
MPC BLD CALC-MCNC: 11.3 FL (ref 9.4–12.4)
NEUTROPHILS # BLD AUTO: 9.73 K/UL (ref 1.8–7.7)
NEUTROPHILS NFR BLD AUTO: 80.5 % (ref 53–65)
NRBC # BLD AUTO: 0 X10E3/UL
NRBC, AUTO (.00): 0 %
NT-PROBNP SERPL-MCNC: 9683 PG/ML (ref 1–125)
PCO2 BLDA: 42 MMHG (ref 41–51)
PH SMN: 7.38 [PH] (ref 7.32–7.42)
PLATELET # BLD AUTO: 359 K/UL (ref 150–400)
PLATELET MORPHOLOGY: ABNORMAL
PO2 BLDA: 62 MMHG (ref 25–40)
POC BASE EXCESS: -0.4 MMOL/L (ref -2–3)
POC CO2: 26.1 MMOL/L
POC IONIZED CALCIUM: 1.03 MMOL/L (ref 1.15–1.35)
POC SATURATED O2: 91 % (ref 40–70)
POCT GLUCOSE: 143 MG/DL (ref 60–95)
POTASSIUM BLD-SCNC: 4.3 MMOL/L (ref 3.4–4.5)
POTASSIUM SERPL-SCNC: 4.5 MMOL/L (ref 3.5–5.1)
PROT SERPL-MCNC: 7.7 G/DL (ref 6.4–8.2)
PROTHROMBIN TIME: 15.1 SECONDS (ref 11.7–14.7)
RBC # BLD AUTO: 4.19 M/UL (ref 4.6–6.2)
RBC MORPH BLD: NORMAL
SARS-COV-2 RDRP RESP QL NAA+PROBE: NEGATIVE
SODIUM BLD-SCNC: 130 MMOL/L (ref 136–145)
SODIUM SERPL-SCNC: 128 MMOL/L (ref 136–145)
TROPONIN I SERPL DL<=0.01 NG/ML-MCNC: 27.8 PG/ML
URATE SERPL-MCNC: 7.6 MG/DL (ref 3.5–7.2)
VIT B12 SERPL-MCNC: 360 PG/ML (ref 193–986)
WBC # BLD AUTO: 12.08 K/UL (ref 4.5–11)

## 2024-01-30 PROCEDURE — 82803 BLOOD GASES ANY COMBINATION: CPT

## 2024-01-30 PROCEDURE — 87635 SARS-COV-2 COVID-19 AMP PRB: CPT | Performed by: FAMILY MEDICINE

## 2024-01-30 PROCEDURE — 93010 ELECTROCARDIOGRAM REPORT: CPT | Mod: ,,, | Performed by: HOSPITALIST

## 2024-01-30 PROCEDURE — 83880 ASSAY OF NATRIURETIC PEPTIDE: CPT | Performed by: EMERGENCY MEDICINE

## 2024-01-30 PROCEDURE — 63600175 PHARM REV CODE 636 W HCPCS: Performed by: EMERGENCY MEDICINE

## 2024-01-30 PROCEDURE — 93005 ELECTROCARDIOGRAM TRACING: CPT

## 2024-01-30 PROCEDURE — 87641 MR-STAPH DNA AMP PROBE: CPT | Performed by: HOSPITALIST

## 2024-01-30 PROCEDURE — 94640 AIRWAY INHALATION TREATMENT: CPT

## 2024-01-30 PROCEDURE — 83605 ASSAY OF LACTIC ACID: CPT | Performed by: EMERGENCY MEDICINE

## 2024-01-30 PROCEDURE — 83605 ASSAY OF LACTIC ACID: CPT

## 2024-01-30 PROCEDURE — 84132 ASSAY OF SERUM POTASSIUM: CPT

## 2024-01-30 PROCEDURE — 94761 N-INVAS EAR/PLS OXIMETRY MLT: CPT

## 2024-01-30 PROCEDURE — 99406 BEHAV CHNG SMOKING 3-10 MIN: CPT | Mod: ,,, | Performed by: HOSPITALIST

## 2024-01-30 PROCEDURE — 25000003 PHARM REV CODE 250: Performed by: HOSPITALIST

## 2024-01-30 PROCEDURE — 99285 EMERGENCY DEPT VISIT HI MDM: CPT | Mod: ,,, | Performed by: EMERGENCY MEDICINE

## 2024-01-30 PROCEDURE — 82746 ASSAY OF FOLIC ACID SERUM: CPT | Performed by: HOSPITALIST

## 2024-01-30 PROCEDURE — 96375 TX/PRO/DX INJ NEW DRUG ADDON: CPT

## 2024-01-30 PROCEDURE — 25000242 PHARM REV CODE 250 ALT 637 W/ HCPCS: Performed by: INTERNAL MEDICINE

## 2024-01-30 PROCEDURE — 85014 HEMATOCRIT: CPT

## 2024-01-30 PROCEDURE — 85025 COMPLETE CBC W/AUTO DIFF WBC: CPT | Performed by: EMERGENCY MEDICINE

## 2024-01-30 PROCEDURE — 85379 FIBRIN DEGRADATION QUANT: CPT | Performed by: EMERGENCY MEDICINE

## 2024-01-30 PROCEDURE — 84295 ASSAY OF SERUM SODIUM: CPT

## 2024-01-30 PROCEDURE — 86140 C-REACTIVE PROTEIN: CPT | Performed by: HOSPITALIST

## 2024-01-30 PROCEDURE — 87389 HIV-1 AG W/HIV-1&-2 AB AG IA: CPT | Performed by: HOSPITALIST

## 2024-01-30 PROCEDURE — 84550 ASSAY OF BLOOD/URIC ACID: CPT | Performed by: HOSPITALIST

## 2024-01-30 PROCEDURE — 99222 1ST HOSP IP/OBS MODERATE 55: CPT | Mod: ,,, | Performed by: INTERNAL MEDICINE

## 2024-01-30 PROCEDURE — 11000001 HC ACUTE MED/SURG PRIVATE ROOM

## 2024-01-30 PROCEDURE — 80053 COMPREHEN METABOLIC PANEL: CPT | Performed by: EMERGENCY MEDICINE

## 2024-01-30 PROCEDURE — 87040 BLOOD CULTURE FOR BACTERIA: CPT | Mod: 91 | Performed by: EMERGENCY MEDICINE

## 2024-01-30 PROCEDURE — 96366 THER/PROPH/DIAG IV INF ADDON: CPT

## 2024-01-30 PROCEDURE — 63600175 PHARM REV CODE 636 W HCPCS: Performed by: HOSPITALIST

## 2024-01-30 PROCEDURE — 99900035 HC TECH TIME PER 15 MIN (STAT)

## 2024-01-30 PROCEDURE — 99285 EMERGENCY DEPT VISIT HI MDM: CPT | Mod: 25

## 2024-01-30 PROCEDURE — 82330 ASSAY OF CALCIUM: CPT

## 2024-01-30 PROCEDURE — 96365 THER/PROPH/DIAG IV INF INIT: CPT

## 2024-01-30 PROCEDURE — 84484 ASSAY OF TROPONIN QUANT: CPT | Performed by: EMERGENCY MEDICINE

## 2024-01-30 PROCEDURE — 25000242 PHARM REV CODE 250 ALT 637 W/ HCPCS: Performed by: EMERGENCY MEDICINE

## 2024-01-30 PROCEDURE — 84145 PROCALCITONIN (PCT): CPT | Mod: 90 | Performed by: HOSPITALIST

## 2024-01-30 PROCEDURE — 25500020 PHARM REV CODE 255: Performed by: EMERGENCY MEDICINE

## 2024-01-30 PROCEDURE — 82306 VITAMIN D 25 HYDROXY: CPT | Performed by: HOSPITALIST

## 2024-01-30 PROCEDURE — 85610 PROTHROMBIN TIME: CPT | Performed by: EMERGENCY MEDICINE

## 2024-01-30 PROCEDURE — 99223 1ST HOSP IP/OBS HIGH 75: CPT | Mod: 25,,, | Performed by: HOSPITALIST

## 2024-01-30 PROCEDURE — 82140 ASSAY OF AMMONIA: CPT | Performed by: HOSPITALIST

## 2024-01-30 PROCEDURE — 80074 ACUTE HEPATITIS PANEL: CPT | Performed by: HOSPITALIST

## 2024-01-30 PROCEDURE — G0378 HOSPITAL OBSERVATION PER HR: HCPCS

## 2024-01-30 PROCEDURE — 27000221 HC OXYGEN, UP TO 24 HOURS

## 2024-01-30 PROCEDURE — 82947 ASSAY GLUCOSE BLOOD QUANT: CPT

## 2024-01-30 RX ORDER — FUROSEMIDE 10 MG/ML
40 INJECTION INTRAMUSCULAR; INTRAVENOUS DAILY
Status: DISCONTINUED | OUTPATIENT
Start: 2024-01-30 | End: 2024-01-31

## 2024-01-30 RX ORDER — ONDANSETRON HYDROCHLORIDE 2 MG/ML
4 INJECTION, SOLUTION INTRAVENOUS EVERY 8 HOURS PRN
Status: DISCONTINUED | OUTPATIENT
Start: 2024-01-30 | End: 2024-02-02 | Stop reason: HOSPADM

## 2024-01-30 RX ORDER — DEXAMETHASONE SODIUM PHOSPHATE 4 MG/ML
8 INJECTION, SOLUTION INTRA-ARTICULAR; INTRALESIONAL; INTRAMUSCULAR; INTRAVENOUS; SOFT TISSUE
Status: COMPLETED | OUTPATIENT
Start: 2024-01-30 | End: 2024-01-30

## 2024-01-30 RX ORDER — LORAZEPAM 2 MG/ML
1 INJECTION INTRAMUSCULAR
Status: COMPLETED | OUTPATIENT
Start: 2024-01-30 | End: 2024-01-30

## 2024-01-30 RX ORDER — SODIUM CHLORIDE 0.9 % (FLUSH) 0.9 %
10 SYRINGE (ML) INJECTION
Status: DISCONTINUED | OUTPATIENT
Start: 2024-01-30 | End: 2024-02-02 | Stop reason: HOSPADM

## 2024-01-30 RX ORDER — LORAZEPAM 2 MG/ML
2 INJECTION INTRAMUSCULAR
Status: DISCONTINUED | OUTPATIENT
Start: 2024-01-30 | End: 2024-02-02 | Stop reason: HOSPADM

## 2024-01-30 RX ORDER — ACETAMINOPHEN 325 MG/1
650 TABLET ORAL EVERY 4 HOURS PRN
Status: DISCONTINUED | OUTPATIENT
Start: 2024-01-30 | End: 2024-02-02 | Stop reason: HOSPADM

## 2024-01-30 RX ORDER — LOSARTAN POTASSIUM 25 MG/1
25 TABLET ORAL DAILY
Status: DISCONTINUED | OUTPATIENT
Start: 2024-01-30 | End: 2024-01-31

## 2024-01-30 RX ORDER — CHOLECALCIFEROL (VITAMIN D3) 25 MCG
1000 TABLET ORAL DAILY
Status: DISCONTINUED | OUTPATIENT
Start: 2024-01-30 | End: 2024-02-02 | Stop reason: HOSPADM

## 2024-01-30 RX ORDER — ALLOPURINOL 100 MG/1
200 TABLET ORAL DAILY
Status: DISCONTINUED | OUTPATIENT
Start: 2024-01-31 | End: 2024-02-02 | Stop reason: HOSPADM

## 2024-01-30 RX ORDER — MORPHINE SULFATE 4 MG/ML
4 INJECTION, SOLUTION INTRAMUSCULAR; INTRAVENOUS
Status: COMPLETED | OUTPATIENT
Start: 2024-01-30 | End: 2024-01-30

## 2024-01-30 RX ORDER — METOPROLOL SUCCINATE 50 MG/1
50 TABLET, EXTENDED RELEASE ORAL DAILY
Status: DISCONTINUED | OUTPATIENT
Start: 2024-01-30 | End: 2024-01-31

## 2024-01-30 RX ORDER — NALOXONE HCL 0.4 MG/ML
0.02 VIAL (ML) INJECTION
Status: DISCONTINUED | OUTPATIENT
Start: 2024-01-30 | End: 2024-02-02 | Stop reason: HOSPADM

## 2024-01-30 RX ORDER — PROMETHAZINE HYDROCHLORIDE 25 MG/1
25 TABLET ORAL EVERY 6 HOURS PRN
Status: DISCONTINUED | OUTPATIENT
Start: 2024-01-30 | End: 2024-02-02 | Stop reason: HOSPADM

## 2024-01-30 RX ORDER — ASPIRIN 81 MG/1
81 TABLET ORAL DAILY
Status: DISCONTINUED | OUTPATIENT
Start: 2024-01-30 | End: 2024-02-02 | Stop reason: HOSPADM

## 2024-01-30 RX ORDER — LEVOFLOXACIN 5 MG/ML
750 INJECTION, SOLUTION INTRAVENOUS
Status: DISCONTINUED | OUTPATIENT
Start: 2024-01-30 | End: 2024-02-01

## 2024-01-30 RX ORDER — SPIRONOLACTONE 25 MG/1
25 TABLET ORAL DAILY
Status: DISCONTINUED | OUTPATIENT
Start: 2024-01-30 | End: 2024-02-02 | Stop reason: HOSPADM

## 2024-01-30 RX ORDER — IPRATROPIUM BROMIDE AND ALBUTEROL SULFATE 2.5; .5 MG/3ML; MG/3ML
3 SOLUTION RESPIRATORY (INHALATION) EVERY 6 HOURS
Status: DISCONTINUED | OUTPATIENT
Start: 2024-01-30 | End: 2024-02-02 | Stop reason: HOSPADM

## 2024-01-30 RX ORDER — GABAPENTIN 300 MG/1
300 CAPSULE ORAL 3 TIMES DAILY
Status: DISCONTINUED | OUTPATIENT
Start: 2024-01-30 | End: 2024-02-02 | Stop reason: HOSPADM

## 2024-01-30 RX ORDER — ENOXAPARIN SODIUM 100 MG/ML
40 INJECTION SUBCUTANEOUS EVERY 24 HOURS
Status: DISCONTINUED | OUTPATIENT
Start: 2024-01-30 | End: 2024-02-02 | Stop reason: HOSPADM

## 2024-01-30 RX ORDER — IPRATROPIUM BROMIDE AND ALBUTEROL SULFATE 2.5; .5 MG/3ML; MG/3ML
9 SOLUTION RESPIRATORY (INHALATION)
Status: COMPLETED | OUTPATIENT
Start: 2024-01-30 | End: 2024-01-30

## 2024-01-30 RX ORDER — ONDANSETRON HYDROCHLORIDE 2 MG/ML
4 INJECTION, SOLUTION INTRAVENOUS
Status: COMPLETED | OUTPATIENT
Start: 2024-01-30 | End: 2024-01-30

## 2024-01-30 RX ORDER — ALBUTEROL SULFATE 90 UG/1
2 AEROSOL, METERED RESPIRATORY (INHALATION) EVERY 6 HOURS PRN
Status: DISCONTINUED | OUTPATIENT
Start: 2024-01-30 | End: 2024-02-02 | Stop reason: HOSPADM

## 2024-01-30 RX ORDER — ATORVASTATIN CALCIUM 40 MG/1
40 TABLET, FILM COATED ORAL NIGHTLY
Status: DISCONTINUED | OUTPATIENT
Start: 2024-01-30 | End: 2024-02-02 | Stop reason: HOSPADM

## 2024-01-30 RX ORDER — THIAMINE HCL 100 MG
100 TABLET ORAL DAILY
Status: DISCONTINUED | OUTPATIENT
Start: 2024-01-30 | End: 2024-02-02 | Stop reason: HOSPADM

## 2024-01-30 RX ORDER — FUROSEMIDE 10 MG/ML
60 INJECTION INTRAMUSCULAR; INTRAVENOUS
Status: COMPLETED | OUTPATIENT
Start: 2024-01-30 | End: 2024-01-30

## 2024-01-30 RX ORDER — GUAIFENESIN 600 MG/1
600 TABLET, EXTENDED RELEASE ORAL 2 TIMES DAILY
Status: DISCONTINUED | OUTPATIENT
Start: 2024-01-30 | End: 2024-02-02 | Stop reason: HOSPADM

## 2024-01-30 RX ORDER — TALC
6 POWDER (GRAM) TOPICAL NIGHTLY PRN
Status: DISCONTINUED | OUTPATIENT
Start: 2024-01-30 | End: 2024-02-02 | Stop reason: HOSPADM

## 2024-01-30 RX ORDER — SODIUM CHLORIDE 0.9 % (FLUSH) 0.9 %
10 SYRINGE (ML) INJECTION EVERY 12 HOURS PRN
Status: DISCONTINUED | OUTPATIENT
Start: 2024-01-30 | End: 2024-02-02 | Stop reason: HOSPADM

## 2024-01-30 RX ORDER — OLANZAPINE 5 MG/1
5 TABLET ORAL DAILY
Status: DISCONTINUED | OUTPATIENT
Start: 2024-01-30 | End: 2024-02-02 | Stop reason: HOSPADM

## 2024-01-30 RX ADMIN — OLANZAPINE 5 MG: 5 TABLET, FILM COATED ORAL at 12:01

## 2024-01-30 RX ADMIN — IPRATROPIUM BROMIDE AND ALBUTEROL SULFATE 3 ML: 2.5; .5 SOLUTION RESPIRATORY (INHALATION) at 08:01

## 2024-01-30 RX ADMIN — GUAIFENESIN 600 MG: 600 TABLET, EXTENDED RELEASE ORAL at 08:01

## 2024-01-30 RX ADMIN — LORAZEPAM 1 MG: 2 INJECTION, SOLUTION INTRAMUSCULAR; INTRAVENOUS at 03:01

## 2024-01-30 RX ADMIN — IOPAMIDOL 100 ML: 755 INJECTION, SOLUTION INTRAVENOUS at 04:01

## 2024-01-30 RX ADMIN — ONDANSETRON 4 MG: 2 INJECTION INTRAMUSCULAR; INTRAVENOUS at 03:01

## 2024-01-30 RX ADMIN — MORPHINE SULFATE 4 MG: 4 INJECTION, SOLUTION INTRAMUSCULAR; INTRAVENOUS at 03:01

## 2024-01-30 RX ADMIN — FUROSEMIDE 40 MG: 10 INJECTION, SOLUTION INTRAVENOUS at 12:01

## 2024-01-30 RX ADMIN — IPRATROPIUM BROMIDE AND ALBUTEROL SULFATE 9 ML: 2.5; .5 SOLUTION RESPIRATORY (INHALATION) at 03:01

## 2024-01-30 RX ADMIN — ASPIRIN 81 MG: 81 TABLET, COATED ORAL at 12:01

## 2024-01-30 RX ADMIN — ENOXAPARIN SODIUM 40 MG: 100 INJECTION SUBCUTANEOUS at 05:01

## 2024-01-30 RX ADMIN — ATORVASTATIN CALCIUM 40 MG: 40 TABLET, FILM COATED ORAL at 08:01

## 2024-01-30 RX ADMIN — LOSARTAN POTASSIUM 25 MG: 25 TABLET, FILM COATED ORAL at 12:01

## 2024-01-30 RX ADMIN — GABAPENTIN 300 MG: 300 CAPSULE ORAL at 08:01

## 2024-01-30 RX ADMIN — FUROSEMIDE 60 MG: 10 INJECTION, SOLUTION INTRAVENOUS at 06:01

## 2024-01-30 RX ADMIN — SPIRONOLACTONE 25 MG: 25 TABLET ORAL at 12:01

## 2024-01-30 RX ADMIN — DEXAMETHASONE SODIUM PHOSPHATE 8 MG: 4 INJECTION, SOLUTION INTRA-ARTICULAR; INTRALESIONAL; INTRAMUSCULAR; INTRAVENOUS; SOFT TISSUE at 03:01

## 2024-01-30 RX ADMIN — Medication 1000 UNITS: at 03:01

## 2024-01-30 RX ADMIN — METOPROLOL SUCCINATE 50 MG: 50 TABLET, EXTENDED RELEASE ORAL at 12:01

## 2024-01-30 RX ADMIN — LEVOFLOXACIN 750 MG: 5 INJECTION, SOLUTION INTRAVENOUS at 07:01

## 2024-01-30 RX ADMIN — Medication 100 MG: at 03:01

## 2024-01-30 RX ADMIN — GABAPENTIN 300 MG: 300 CAPSULE ORAL at 03:01
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
## 2024-01-30 NOTE — ASSESSMENT & PLAN NOTE
Mr. 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  States he was in a wheelchair for 30 years.  Tells that he was able to unlock his joints and been able to walk improved.  Tells that his mother side of family has several people with ALS.  Patient states his mother  of ALS after 10 years.  Had an uncle also with ALS. Of note ALS can be familial but is generally not inherited.  He states he would like to see someone locally and will refer him to Neurology outpatient.

## 2024-01-30 NOTE — ASSESSMENT & PLAN NOTE
Patient found to have moderate pleural effusion on imaging. I have personally reviewed and interpreted the following imaging: Xray and CT. A thoracentesis was deferred. Most likely etiology includes Congestive Heart Failure. Management to include Diuresis and pulmonary consult to consider thoracentesis.  Cardiology to review.  Of note on recent CT scan there was question of lung mass that will need to be address going forward

## 2024-01-30 NOTE — ASSESSMENT & PLAN NOTE
Patient with known CAD s/p  treated medically , which is controlled Will continue ASA and Statin and monitor for S/Sx of angina/ACS. Continue to monitor on telemetry.     [facility] Summary  01/10/24          oRCA w/ good collateral L->R supply from anterior septals, filling retrograde from RPL branch; otherwise LCx w/ anomalous origin, MLI only    The Ost RCA to Prox RCA lesion was 100% stenosed.    The pre-procedure left ventricular end diastolic pressure was 22.    The estimated blood loss was <50 mL.     The procedure log was documented by Documenter: Marian Quintanilla and verified by Elijah Lozano MD.     Date: 1/10/2024  Time: 4:44 PM

## 2024-01-30 NOTE — ED PROVIDER NOTES
Encounter Date: 1/30/2024       History     Chief Complaint   Patient presents with    Chest Pain    Shortness of Breath     Pt states shortness of breath onset a month but got worse tonight - pt states he was just release from the hospital a week ago     Derek Moreno is a 59 yo M with a PMH of CHF, COPD, and alcoholism who presents with a complaint of chest pain. Pain began on the afternoon of 1/29/24. Patient claims he is SOB and refused to localize pain. Patient did not think he had had a heart attack in the past. ECG is consitent with previous ECGs.       Review of patient's allergies indicates:   Allergen Reactions    Azithromycin Swelling and Hives     Past Medical History:   Diagnosis Date    CHF (congestive heart failure)     Hypertension      Past Surgical History:   Procedure Laterality Date    LEFT HEART CATHETERIZATION Left 1/10/2024    Procedure: Left heart cath;  Surgeon: Elijah Lozano MD;  Location: [facility] CATH LAB;  Service: Cardiology;  Laterality: Left;     History reviewed. No pertinent family history.  Social History     Tobacco Use    Smoking status: Every Day     Current packs/day: 0.50     Types: Cigarettes   Substance Use Topics    Alcohol use: Yes     Comment: been in rehab before    Drug use: Not Currently     Review of Systems   Constitutional:  Positive for diaphoresis and fever. Negative for chills.   Respiratory:  Positive for cough, chest tightness, shortness of breath and wheezing.    Cardiovascular:  Positive for chest pain. Negative for leg swelling.   All other systems reviewed and are negative.      Physical Exam     Initial Vitals [01/30/24 0323]   BP Pulse Resp Temp SpO2   (!) 137/101 (!) 121 (!) 30 98.7 °F (37.1 °C) 98 %      MAP       --         Physical Exam    Constitutional: He appears well-developed and well-nourished. He is diaphoretic. He appears distressed.   HENT:   Head: Normocephalic and atraumatic.   Cardiovascular:    Tachycardia present.          Pulmonary/Chest: Tachypnea noted. He has wheezes.           Medical Screening Exam   See Full Note    ED Course   Procedures  Labs Reviewed   COMPREHENSIVE METABOLIC PANEL - Abnormal; Notable for the following components:       Result Value    Sodium 128 (*)     Glucose 130 (*)     Albumin 3.4 (*)     Globulin 4.3 (*)     All other components within normal limits   PROTIME-INR - Abnormal; Notable for the following components:    PT 15.1 (*)     All other components within normal limits   CBC WITH DIFFERENTIAL - Abnormal; Notable for the following components:    WBC 12.08 (*)     RBC 4.19 (*)     Hemoglobin 12.9 (*)     Hematocrit 38.5 (*)     Neutrophils % 80.5 (*)     Lymphocytes % 11.8 (*)     Monocytes % 6.8 (*)     Eosinophils % 0.1 (*)     Neutrophils, Abs 9.73 (*)     Monocytes, Absolute 0.82 (*)     All other components within normal limits   NT-PRO NATRIURETIC PEPTIDE - Abnormal; Notable for the following components:    ProBNP 9,683 (*)     All other components within normal limits   D DIMER, QUANTITATIVE - Abnormal; Notable for the following components:    D-Dimer 3.28 (*)     All other components within normal limits   CBC MORPHOLOGY - Abnormal; Notable for the following components:    Platelet Morphology Few Large Platelets (*)     All other components within normal limits   VITAMIN B12/FOLATE, SERUM PANEL - Abnormal; Notable for the following components:    Folate >20.0 (*)     All other components within normal limits   URIC ACID - Abnormal; Notable for the following components:    Uric Acid 7.6 (*)     All other components within normal limits   C-REACTIVE PROTEIN - Abnormal; Notable for the following components:    CRP 10.40 (*)     All other components within normal limits   TROPONIN I - Normal   LACTIC ACID, PLASMA - Normal   SARS-COV-2 RNA AMPLIFICATION, QUAL - Normal    Narrative:     Negative SARS-CoV results should not be used as the sole basis for treatment or patient management decisions;  negative results should be considered in the context of a patient's recent exposures, history and the presene of clinical signs and symptoms consistent with COVID-19.  Negative results should be treated as presumptive and confirmed by molecular assay, if necessary for patient management.   AMMONIA - Normal   HIV 1 / 2 ANTIBODY - Normal   HEPATITIS PANEL, ACUTE - Normal   CULTURE, BLOOD   CULTURE, BLOOD   CBC W/ AUTO DIFFERENTIAL    Narrative:     The following orders were created for panel order CBC auto differential.  Procedure                               Abnormality         Status                     ---------                               -----------         ------                     CBC with Differential[[id number]]       Abnormal            Final result                 Please view results for these tests on the individual orders.   VITAMIN D   METHICILLIN-RESISTANT S. AUREUS, PCR   PROCALCITONIN          Imaging Results              US Lower Extremity Veins Bilateral (Final result)  Result time 01/30/24 14:15:25      Final result by Celso Carey II, MD (01/30/24 14:15:25)                   Impression:      No evidence of deep venous thrombosis.    Ultrasound images stored and captured.      Electronically signed by: Celso Carey  Date:    01/30/2024  Time:    14:15               Narrative:    EXAMINATION:  US LOWER EXTREMITY VEINS BILATERAL    CLINICAL HISTORY:  Short of breath;    TECHNIQUE:  Duplex and color flow Doppler and dynamic compression was performed of the veins was performed.    COMPARISON:  None.    FINDINGS:  No evidence of echogenic, non-compressible thrombus seen in the visualized veins of the extremities.  Color Doppler venous waveform pattern is within normal limits.                                       CTA Chest Non-Coronary (PE Studies) (Final result)  Result time 01/30/24 07:34:55      Final result by Tarik Talamantes DO (01/30/24 07:34:55)                   Impression:       No evidence to suggest pulmonary embolism.    Focal opacity versus pulmonary nodule located within the inferolateral left upper lobe measuring 4.1 x 2.4 cm.  CT of the chest recommended in 4-6 weeks after medical treatment.    Moderate bilateral pleural effusions.    Heart is enlarged, similar. Small pericardial effusion.    Reflux of contrast in into the IVC, findings in keeping with heart failure.      Electronically signed by: Tarik Talamantes  Date:    01/30/2024  Time:    07:34               Narrative:    EXAMINATION:  CTA CHEST NON CORONARY (PE STUDIES)    CLINICAL HISTORY:  Pulmonary embolism (PE) suspected, positive D-dimer;    TECHNIQUE:  Multiplanar CT of the chest with PE protocol.  MIP projections obtained.  This exam is adequate for interpretation.  100 cc of isovue 370.    Dose reduction: The CT exam was performed using one or more dose reduction techniques: Automatic exposure control, automated adjustment of the MA and/or kVP according to patient size, or use of iterative reconstruction technique.    COMPARISON:  1/30/24    FINDINGS:  Pulmonary artery: Patent    Lower neck: Normal    Chest/airways: Mild to moderate emphysema.  Focal opacity versus pulmonary nodule located within the inferolateral left upper lobe measuring 4.1 x 2.4 cm.  Moderate bilateral pleural effusions.    Mediastinum: Heart is enlarged, similar.  Small pericardial effusion.  Reflux of contrast in into the IVC, findings in keeping with heart failure.    Chest wall: Normal    Bones: Multilevel degenerative changes of the thoracic spine.    Upper abdomen: Multiple small simple cyst or hemangioma within the liver.                                       X-Ray Chest AP Portable (Final result)  Result time 01/30/24 07:34:32   Procedure changed from X-Ray Chest PA And Lateral     Final result by Mauro Lew MD (01/30/24 07:34:32)                   Impression:      Bilateral pleural effusions.  Suspect edema as  well.      Electronically signed by: Mauro Lew  Date:    01/30/2024  Time:    07:34               Narrative:    EXAMINATION:  XR CHEST AP PORTABLE    CLINICAL HISTORY:  chest pain;Chest Pain;    TECHNIQUE:  Single frontal view of the chest was performed.    COMPARISON:  01/21/2024    FINDINGS:  Cardiac silhouette is mildly enlarged.  Prominent interstitial markings.  Small left pleural effusion.  No pneumothorax.  Trace right pleural effusion.                                       Medications   levoFLOXacin 750 mg/150 mL IVPB 750 mg (0 mg Intravenous Stopped 1/30/24 0953)   albuterol inhaler 2 puff (has no administration in time range)   aspirin EC tablet 81 mg (81 mg Oral Given 1/30/24 1232)   gabapentin capsule 300 mg (300 mg Oral Given 1/30/24 2010)   losartan tablet 25 mg (25 mg Oral Given 1/30/24 1231)   metoprolol succinate (TOPROL-XL) 24 hr tablet 50 mg (50 mg Oral Given 1/30/24 1231)   OLANZapine tablet 5 mg (5 mg Oral Given 1/30/24 1231)   spironolactone tablet 25 mg (25 mg Oral Given 1/30/24 1232)   sodium chloride 0.9% flush 10 mL (has no administration in time range)   naloxone 0.4 mg/mL injection 0.02 mg (has no administration in time range)   enoxaparin injection 40 mg (40 mg Subcutaneous Given 1/30/24 1736)   acetaminophen tablet 650 mg (has no administration in time range)   ondansetron injection 4 mg (has no administration in time range)   promethazine tablet 25 mg (has no administration in time range)   melatonin tablet 6 mg (has no administration in time range)   furosemide injection 40 mg (40 mg Intravenous Given 1/30/24 1231)   LORazepam injection 2 mg (has no administration in time range)   thiamine tablet 100 mg (100 mg Oral Given 1/30/24 1504)   multivitamin tablet (1 tablet Oral Not Given 1/30/24 1430)   guaiFENesin 12 hr tablet 600 mg (600 mg Oral Given 1/30/24 2010)   atorvastatin tablet 40 mg (40 mg Oral Given 1/30/24 2010)   allopurinoL tablet 200 mg (has no administration in time range)    vitamin D 1000 units tablet 1,000 Units (1,000 Units Oral Given 1/30/24 2261)   sodium chloride 0.9% flush 10 mL (has no administration in time range)   albuterol-ipratropium 2.5 mg-0.5 mg/3 mL nebulizer solution 3 mL (3 mLs Nebulization Given 1/30/24 2016)   albuterol-ipratropium 2.5 mg-0.5 mg/3 mL nebulizer solution 9 mL (9 mLs Nebulization Given 1/30/24 0350)   dexAMETHasone injection 8 mg (8 mg Intravenous Given 1/30/24 0357)   morphine injection 4 mg (4 mg Intravenous Given 1/30/24 0357)   ondansetron injection 4 mg (4 mg Intravenous Given 1/30/24 0355)   LORazepam injection 1 mg (1 mg Intravenous Given 1/30/24 0357)   iopamidoL (ISOVUE-370) injection 100 mL (100 mLs Intravenous Given 1/30/24 0459)   furosemide injection 60 mg (60 mg Intravenous Given 1/30/24 0639)     Medical Decision Making  Derek Moreno is a 59 yo M with SOB  DD  1. STEMI  2. Pulmonary embolus  3. NSTEMI    Amount and/or Complexity of Data Reviewed  Labs: ordered.  Radiology: ordered.    Risk  Prescription drug management.  Decision regarding hospitalization.               ED Course as of 01/30/24 2323 Tue Jan 30, 2024   0558 Sign out.  H/o COPD.  Recent pneumonia.  Chest pain and SOB.  Wheezing on exam was mild.  Increased respiratory effort.  Has had steroids and nebs.  Given Ativan. [PK]   0695 Hoping to admit this patient for CHF exacerbation.  He was just in the hospital with pneumonia.  Patient was advised to go to a swing bed but he refused at that time.  He comes back to the hospital for increased shortness of breath.  Patient was found to have pulmonary hypertension very low gradient across the mitral valve.  He was a chronic alcoholic.  He is reduced his alcohol intake over the past several days.  CT PE protocol done based on my view no PEs but the pleural effusions or worse.  This most likely is CHF exacerbation proBNP is elevated more and he was JVD and reports worsened edema over the past several days.  He does have  some features of possible pneumonia will treat empirically with antibiotics.  Last blood cultures were negative.  Treating with Lasix.  Also requiring oxygen 2 L to maintain sats above 90. [PK]   0640 Discussed with Dr. Hamilton; admit to Dr. Small [PK]   0758 (Note not shared)   [PK]      ED Course User Index  [PK] Atr Hernandez MD                           Clinical Impression:   Final diagnoses:  [R07.9] Chest pain  [I50.9] Acute on chronic congestive heart failure, unspecified heart failure type (Primary)  [I27.20] Pulmonary hypertension  [F10.20] Alcoholism  [J18.9] Pneumonia due to infectious organism, unspecified laterality, unspecified part of lung  [J90] Pleural effusion        ED Disposition Condition    Admit                 Johnathan Torres MD  01/30/24 1784

## 2024-01-30 NOTE — ASSESSMENT & PLAN NOTE
Patient's COPD is controlled currently.  Patient is currently off COPD Pathway. Continue scheduled inhalers  and monitor respiratory status closely.

## 2024-01-30 NOTE — HPI
Chief complaint:  Short of breath.    History of present illness:    Mr. Moreno is a 58-year-old presented to emergency room with worsening short of breath.  Patient had just been discharged from this facility 01/24.  States he was better but still short of breath at discharge.  Had moved into a house that had a lot of draft and was cold.  Deport like he progressively worsened there.  Developed cough productive of some greenish sputum, sweats, subjective fever and chills.  Breathing worsened.  Complained of 3 pillow orthopnea.  States he could hardly breathe and came back to the emergency depart.  This is his 3rd admission this month to this facility.  Had previously been offered but declined swing bed.  During previous hospital admissions was noted to have ischemic cardiomyopathy.  Findings on recent echocardiogram include greatly reduced ejection fraction to 15%, moderate MR and pulmonary hypertension with RSVP 62.  Noted previously on examinations to have bilateral pleural effusions.  Patient be placed back in hospital for further evaluation and treat.     Review of system:  See above.  Some slight sore throat earlier now resolved.  Patient with some occasional sharp pleuritic chest pain recently.  Some cough as above mentioned productive of some greenish sputum which he had previously been clear.  Complained of dyspnea worse with exertion.  Sleeps on 3 pillows because of orthopnea that is he feels slight worse.  No nausea vomiting.  Recently states when he is eaten he has stool shortly after that seems like jelly.  Last bowel movement 2 days earlier.  States ambulation is affected from history of ALS.   was in a wheelchair for 30 years but able to ambulate some now.  Review of systems otherwise negative.    Past medical history:  -ischemic cardiomyopathy with reduced systolic function with ejection fraction of 15%  -coronary artery disease with recent past left heart catheterization with 100% occlusion  of right coronary artery with collaterals from the left with plan to treat medically  -pulmonary hypertension by recent echocardiogram  -hypertension times last 40 years  -COPD  -alcohol dependence  -history of ALS  -history of colon polyps  -states Zyprexa was given to him at this hospital and denies any known diagnosis of psychiatric illness    Past surgical history:  No surgery    Allergy azithromycin  States allergic to the mycins    Social history:   lives by himself  Smokes 1/2 pack of cigarettes per week; encouraged patient to stop  Drinks alcohol he states he is greatly cut down on and now drinking 1 qt per week of beer; last alcohol prior day    Family history:  States everyone in family has a bad heart  Bypass procedure with 1st surgery in his 40s  Grandmother had myocardial infarction and  in her 50s  States mother side of the family has ALS, states mother and uncle and others all had ALS    Health maintenance issues:  [facility] but  is seen in [place] and has not been seen in a while  [facility] how about his ALS  Did not get flu shot this winter  Has had previous Pneumovax  He is convinced he had COVID in 2017 but has not had COVID these wear of since   No COVID vaccination  Several colonoscopies last of which 3 years ago and had some small polyps removed, he has not sure when he should go back for repeat colonoscopy

## 2024-01-30 NOTE — ASSESSMENT & PLAN NOTE
"Patient is identified as having Systolic (HFrEF) heart failure that is Acute on chronic. CHF is currently uncontrolled due to Pulmonary edema/pleural effusion on CXR. Latest ECHO performed and demonstrates- Results for orders placed during the hospital encounter of 01/04/24    Echo  01/05/24    Interpretation Summary    Left Ventricle: The left ventricle is mildly dilated. Normal wall thickness. There is eccentric hypertrophy. Severe global hypokinesis with regional variation. There is severely reduced systolic function. Biplane (2D) method of discs ejection fraction is 15%. Global longitudinal strain is reduced. There is diastolic dysfunction but grade cannot be determined.    Right Ventricle: Moderate right ventricular enlargement. Systolic function is mildly reduced.    Right Atrium: Right atrium is mildly dilated.    Aortic Valve: The aortic valve is a trileaflet valve.    Mitral Valve: There is mild bileaflet sclerosis. There is no stenosis. The mean pressure gradient across the mitral valve is 3 mmHg at a heart rate of  bpm. There is moderate regurgitation with a posterolateral eccentriccally directed jet.    Tricuspid Valve: There is moderate regurgitation.    Pulmonary Artery: The estimated pulmonary artery systolic pressure is 62 mmHg.    IVC/SVC: Elevated venous pressure at 15 mmHg.    Pericardium: Large left pleural effusion.  . Continue Beta Blocker, ACE/ARB, Furosemide, and Aldactone and monitor clinical status closely. Monitor on telemetry. Patient is on CHF pathway.  Monitor strict Is&Os and daily weights.  Place on fluid restriction of 1.5 L. Cardiology has been consulted. Continue to stress to patient importance of self efficacy and  on diet for CHF. Last BNP reviewed- and noted below No results for input(s): "BNP", "BNPTRIAGEBLO" in the last 168 hours.  "

## 2024-01-30 NOTE — HPI
58-year-old male who has been in the hospital 3 times over the last month for ongoing shortness of breath he is very distinct medical history including ejection fraction 15% there is some questionable history of ALS that has stabilized patient is a smoker drinks alcohol has history of some psychiatric illness I have been asked

## 2024-01-30 NOTE — ASSESSMENT & PLAN NOTE
"Patient is identified as having Combined Systolic and Diastolic heart failure that is Acute on chronic. CHF is currently uncontrolled due to Pulmonary edema/pleural effusion on CXR. Latest ECHO performed and demonstrates- Results for orders placed during the hospital encounter of 01/04/24    Echo  01/05/24    Interpretation Summary    Left Ventricle: The left ventricle is mildly dilated. Normal wall thickness. There is eccentric hypertrophy. Severe global hypokinesis with regional variation. There is severely reduced systolic function. Biplane (2D) method of discs ejection fraction is 15%. Global longitudinal strain is reduced. There is diastolic dysfunction but grade cannot be determined.    Right Ventricle: Moderate right ventricular enlargement. Systolic function is mildly reduced.    Right Atrium: Right atrium is mildly dilated.    Aortic Valve: The aortic valve is a trileaflet valve.    Mitral Valve: There is mild bileaflet sclerosis. There is no stenosis. The mean pressure gradient across the mitral valve is 3 mmHg at a heart rate of  bpm. There is moderate regurgitation with a posterolateral eccentriccally directed jet.    Tricuspid Valve: There is moderate regurgitation.    Pulmonary Artery: The estimated pulmonary artery systolic pressure is 62 mmHg.    IVC/SVC: Elevated venous pressure at 15 mmHg.    Pericardium: Large left pleural effusion.  . Continue Beta Blocker, ACE/ARB, Furosemide, and Aldactone and monitor clinical status closely. Monitor on telemetry. Patient is on CHF pathway.  Monitor strict Is&Os and daily weights.  Place on fluid restriction of 1.5 L. Cardiology has been consulted. Continue to stress to patient importance of self efficacy and  on diet for CHF. Last BNP reviewed- and noted below No results for input(s): "BNP", "BNPTRIAGEBLO" in the last 168 hours.        "

## 2024-01-30 NOTE — ED NOTES
Pt is more calm and cooperative at this time. Breathing treatment continues. Audible expiratory wheezes noted.

## 2024-01-30 NOTE — ASSESSMENT & PLAN NOTE
With patient only drinking a qt of beer a week, feel he is at low risk for delirium tremens.  Encouraged patient to continue to stop alcohol consumption

## 2024-01-30 NOTE — CONSULTS
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 stabilized patient is a smoker drinks alcohol has history of some psychiatric illness I have been asked    Hospital/ICU Course:  No notes on file    Past Medical History:   Diagnosis Date    CHF (congestive heart failure)     Hypertension        Past Surgical History:   Procedure Laterality Date    LEFT HEART CATHETERIZATION Left 1/10/2024    Procedure: Left heart cath;  Surgeon: Elijah Lozano MD;  Location: [facility] CATH LAB;  Service: Cardiology;  Laterality: Left;       Review of patient's allergies indicates:   Allergen Reactions    Azithromycin Swelling and Hives       Family History    None       Tobacco Use    Smoking status: Every Day     Current packs/day: 0.50     Types: Cigarettes    Smokeless tobacco: Not on file   Substance and Sexual Activity    Alcohol use: Yes     Comment: been in rehab before    Drug use: Not Currently    Sexual activity: Not Currently      Review of Systems  Objective:     Vital Signs (Most Recent):  Temp: 98.7 °F (37.1 °C) (01/30/24 0323)  Pulse: 93 (01/30/24 1601)  Resp: 17 (01/30/24 1601)  BP: 118/81 (01/30/24 1601)  SpO2: 97 % (01/30/24 1601) Vital Signs (24h Range):  Temp:  [98.7 °F (37.1 °C)] 98.7 °F (37.1 °C)  Pulse:  [] 93  Resp:  [17-50] 17  SpO2:  [87 %-98 %] 97 %  BP: (118-143)/() 118/81   Weight: 71.2 kg (157 lb)  Body mass index is 23.87  kg/m².      Intake/Output Summary (Last 24 hours) at 1/30/2024 1657  Last data filed at 1/30/2024 1115  Gross per 24 hour   Intake --   Output 2740 ml   Net -2740 ml          Physical Exam  Vitals reviewed.   Constitutional:       Appearance: Normal appearance.      Interventions: He is not intubated.     Comments: He is definitely diaphoretic but in no acute distress at this time   HENT:      Head: Normocephalic and atraumatic.      Nose: Nose normal.      Mouth/Throat:      Mouth: Mucous membranes are dry.      Pharynx: Oropharynx is clear.   Eyes:      Extraocular Movements: Extraocular movements intact.      Conjunctiva/sclera: Conjunctivae normal.      Pupils: Pupils are equal, round, and reactive to light.   Neck:      Thyroid: No thyroid mass or thyromegaly.   Cardiovascular:      Rate and Rhythm: Normal rate and regular rhythm.      Pulses: Normal pulses.      Heart sounds: Normal heart sounds. No murmur heard.  Pulmonary:      Effort: Pulmonary effort is normal. Tachypnea present. No accessory muscle usage or respiratory distress. He is not intubated.      Breath sounds: Normal breath sounds. No stridor. No wheezing, rhonchi or rales.      Comments: Decreased breath sounds in the bases occasional rhonchi poor movement of the chest wall  Abdominal:      General: Abdomen is flat. Bowel sounds are normal.      Palpations: Abdomen is soft.   Musculoskeletal:         General: Normal range of motion.      Cervical back: Normal range of motion and neck supple.      Right lower leg: No edema.      Left lower leg: No edema.   Lymphadenopathy:      Cervical: No cervical adenopathy.   Skin:     General: Skin is warm and dry.      Capillary Refill: Capillary refill takes less than 2 seconds.   Neurological:      General: No focal deficit present.      Mental Status: He is alert and oriented to person, place, and time.   Psychiatric:         Mood and Affect: Mood normal.         Behavior: Behavior normal.             Vents:     Lines/Drains/Airways       Peripheral Intravenous Line  Duration                  Peripheral IV - Single Lumen 01/30/24 0355 20 G Anterior;Left Forearm <1 day                  Significant Labs:    CBC/Anemia Profile:  Recent Labs   Lab 01/30/24  0401 01/30/24  0713 01/30/24  1249   WBC 12.08*  --   --    HGB 12.9*  --   --    HCT 38.5* 43  --      --   --    MCV 91.9  --   --    RDW 13.1  --   --    FOLATE  --   --  >20.0*   [id number]  --   --  360        Chemistries:  Recent Labs   Lab 01/30/24  0400   *   K 4.5   CL 99   CO2 25   BUN 17   CREATININE 1.27   CALCIUM 8.6   ALBUMIN 3.4*   PROT 7.7   BILITOT 1.0   ALKPHOS 88   ALT 36   AST 36       Recent Lab Results  (Last 5 results in the past 24 hours)        01/30/24  1249   01/30/24  0713   01/30/24  0711   01/30/24  0703   01/30/24  0401        POC CO2   26.1             Ammonia 27               Baso #         0.06       Basophil %         0.5       ID NOW COVID-19, (CYNTHIA)       Negative         CRP 10.40               Differential Method         Scan Smear       Eos #         0.01       Eosinophil %         0.1       Folate >20.0               Hematocrit         38.5       Hemoglobin         12.9       Hep A IgM Non-Reactive               Hep B C IgM Non-Reactive               Hepatitis B Surface Ag Non-Reactive               Hepatitis C Ab Non-Reactive               HIV 1/2 Ag/Ab Non-Reactive               Immature Grans (Abs)         0.04       Immature Granulocytes         0.3       Lactate, Vicente     1.5           Lymph #         1.42       Lymph %         11.8       MCH         30.8       MCHC         33.5       MCV         91.9       Mono #         0.82       Mono %         6.8       MPV         11.3       Neutrophils, Abs         9.73       Neutrophils Relative         80.5       nRBC         0.0       NUCLEATED RBC ABSOLUTE         0.00       PLATELET MORPHOLOGY         Few Large Platelets       Platelet Count         359        POC Base Excess   -0.4             POC HCO3   24.8             POC Hematocrit   43             POC Ionized Calcium   1.03             POC Lactate   1.3             POC PCO2   42             POC PH   7.38             POC PO2   62             Potassium, Blood Gas   4.3             POC SATURATED O2   91             Sodium, Blood Gas   130             POCT Glucose   143             RBC         4.19       RBC Morphology         Normal       RDW         13.1       Uric Acid 7.6               Vitamin D 8.5  Comment: Vitamin D 25-OH Adult Reference Values:  Deficiency: <20 ng/mL  Insufficiency: 20 - <30 ng/mL  Sufficiency: 30 -100 ng/mL    Vitamin D 25-OH Pediatric Reference Values:  Deficiency: <15 ng/mL  Insufficiency: 15 - <20 ng/mL  Sufficiency: 20 - 100 ng/mL               Vitamin B-12 360               WBC         12.08                              Significant Imaging: I have reviewed all pertinent imaging results/findings within the past 24 hours.    ABG  Recent Labs   Lab 01/30/24  0713   PH 7.38   PO2 62*   PCO2 42   HCO3 24.8     Assessment/Plan:     Pulmonary  COPD (chronic obstructive pulmonary disease)  Patient has a history of smoking and has x-ray findings consistent with the obstructive lung disease is noted to be hypoxic on on arterial blood gases.  There has no evidence of pulmonary emboli there has also an area on his left upper lobe in the inferior lateral portion of an area of what is called a mass but it looks more like an atelectasis and will have to be dealt with some point I think the priorities here for to treat underlying COPD get pulmonary functions when we think it is possible aggressively diurese him, but I think the effusions are probably related to his heart failure we could do diagnostic /therapeutic thoracentesis while he is here.  I will review his medicines and put him on aggressive respiratory regimen.  We can clear up is effusions then we maybe able to get a better look at this area in  his left lung and make a decision whether bronchoscopy needle biopsies next step.  Do not know how his ALS plays into this and I am not sure who made that diagnosis worry is on the prognosis spectrum for that           Thank you for your consult. I will follow-up with patient. Please contact us if you have any additional questions.     Saqib English MD  Critical Care Medicine  [facility] - Emergency Department

## 2024-01-30 NOTE — SUBJECTIVE & OBJECTIVE
Past Medical History:   Diagnosis Date    CHF (congestive heart failure)     Hypertension        Past Surgical History:   Procedure Laterality Date    LEFT HEART CATHETERIZATION Left 1/10/2024    Procedure: Left heart cath;  Surgeon: Elijah Lozano MD;  Location: [facility] CATH LAB;  Service: Cardiology;  Laterality: Left;       Review of patient's allergies indicates:   Allergen Reactions    Azithromycin Swelling and Hives       Family History    None       Tobacco Use    Smoking status: Every Day     Current packs/day: 0.50     Types: Cigarettes    Smokeless tobacco: Not on file   Substance and Sexual Activity    Alcohol use: Yes     Comment: been in rehab before    Drug use: Not Currently    Sexual activity: Not Currently      Review of Systems  Objective:     Vital Signs (Most Recent):  Temp: 98.7 °F (37.1 °C) (01/30/24 0323)  Pulse: 93 (01/30/24 1601)  Resp: 17 (01/30/24 1601)  BP: 118/81 (01/30/24 1601)  SpO2: 97 % (01/30/24 1601) Vital Signs (24h Range):  Temp:  [98.7 °F (37.1 °C)] 98.7 °F (37.1 °C)  Pulse:  [] 93  Resp:  [17-50] 17  SpO2:  [87 %-98 %] 97 %  BP: (118-143)/() 118/81   Weight: 71.2 kg (157 lb)  Body mass index is 23.87 kg/m².      Intake/Output Summary (Last 24 hours) at 1/30/2024 1657  Last data filed at 1/30/2024 1115  Gross per 24 hour   Intake --   Output 2740 ml   Net -2740 ml          Physical Exam  Vitals reviewed.   Constitutional:       Appearance: Normal appearance.      Interventions: He is not intubated.     Comments: He is definitely diaphoretic but in no acute distress at this time   HENT:      Head: Normocephalic and atraumatic.      Nose: Nose normal.      Mouth/Throat:      Mouth: Mucous membranes are dry.      Pharynx: Oropharynx is clear.   Eyes:      Extraocular Movements: Extraocular movements intact.      Conjunctiva/sclera: Conjunctivae normal.      Pupils: Pupils are equal, round, and reactive to light.   Neck:      Thyroid: No thyroid mass or  thyromegaly.   Cardiovascular:      Rate and Rhythm: Normal rate and regular rhythm.      Pulses: Normal pulses.      Heart sounds: Normal heart sounds. No murmur heard.  Pulmonary:      Effort: Pulmonary effort is normal. Tachypnea present. No accessory muscle usage or respiratory distress. He is not intubated.      Breath sounds: Normal breath sounds. No stridor. No wheezing, rhonchi or rales.      Comments: Decreased breath sounds in the bases occasional rhonchi poor movement of the chest wall  Abdominal:      General: Abdomen is flat. Bowel sounds are normal.      Palpations: Abdomen is soft.   Musculoskeletal:         General: Normal range of motion.      Cervical back: Normal range of motion and neck supple.      Right lower leg: No edema.      Left lower leg: No edema.   Lymphadenopathy:      Cervical: No cervical adenopathy.   Skin:     General: Skin is warm and dry.      Capillary Refill: Capillary refill takes less than 2 seconds.   Neurological:      General: No focal deficit present.      Mental Status: He is alert and oriented to person, place, and time.   Psychiatric:         Mood and Affect: Mood normal.         Behavior: Behavior normal.            Vents:     Lines/Drains/Airways       Peripheral Intravenous Line  Duration                  Peripheral IV - Single Lumen 01/30/24 0355 20 G Anterior;Left Forearm <1 day                  Significant Labs:    CBC/Anemia Profile:  Recent Labs   Lab 01/30/24  0401 01/30/24  0713 01/30/24  1249   WBC 12.08*  --   --    HGB 12.9*  --   --    HCT 38.5* 43  --      --   --    MCV 91.9  --   --    RDW 13.1  --   --    FOLATE  --   --  >20.0*   [id number]  --   --  360        Chemistries:  Recent Labs   Lab 01/30/24  0400   *   K 4.5   CL 99   CO2 25   BUN 17   CREATININE 1.27   CALCIUM 8.6   ALBUMIN 3.4*   PROT 7.7   BILITOT 1.0   ALKPHOS 88   ALT 36   AST 36       Recent Lab Results  (Last 5 results in the past 24 hours)        01/30/24  124    01/30/24  0713   01/30/24  0711   01/30/24  0703   01/30/24  0401        POC CO2   26.1             Ammonia 27               Baso #         0.06       Basophil %         0.5       ID NOW COVID-19, (CYNTHIA)       Negative         CRP 10.40               Differential Method         Scan Smear       Eos #         0.01       Eosinophil %         0.1       Folate >20.0               Hematocrit         38.5       Hemoglobin         12.9       Hep A IgM Non-Reactive               Hep B C IgM Non-Reactive               Hepatitis B Surface Ag Non-Reactive               Hepatitis C Ab Non-Reactive               HIV 1/2 Ag/Ab Non-Reactive               Immature Grans (Abs)         0.04       Immature Granulocytes         0.3       Lactate, Vicente     1.5           Lymph #         1.42       Lymph %         11.8       MCH         30.8       MCHC         33.5       MCV         91.9       Mono #         0.82       Mono %         6.8       MPV         11.3       Neutrophils, Abs         9.73       Neutrophils Relative         80.5       nRBC         0.0       NUCLEATED RBC ABSOLUTE         0.00       PLATELET MORPHOLOGY         Few Large Platelets       Platelet Count         359       POC Base Excess   -0.4             POC HCO3   24.8             POC Hematocrit   43             POC Ionized Calcium   1.03             POC Lactate   1.3             POC PCO2   42             POC PH   7.38             POC PO2   62             Potassium, Blood Gas   4.3             POC SATURATED O2   91             Sodium, Blood Gas   130             POCT Glucose   143             RBC         4.19       RBC Morphology         Normal       RDW         13.1       Uric Acid 7.6               Vitamin D 8.5  Comment: Vitamin D 25-OH Adult Reference Values:  Deficiency: <20 ng/mL  Insufficiency: 20 - <30 ng/mL  Sufficiency: 30 -100 ng/mL    Vitamin D 25-OH Pediatric Reference Values:  Deficiency: <15 ng/mL  Insufficiency: 15 - <20 ng/mL  Sufficiency: 20 - 100  ng/mL               Vitamin B-12 360               WBC         12.08                              Significant Imaging: I have reviewed all pertinent imaging results/findings within the past 24 hours.

## 2024-01-30 NOTE — ASSESSMENT & PLAN NOTE
Patient counseled 4 minutes on importance to stopping use of tobacco. Patient was told that stopping smoking was one of the most important actions that could be taken to improve personal health. Discussed tobacco increases risk for poor outcomes in cardiovascular disease, COPD and cancer. Informed stopping smoking reduces risk of premature death by as much as 10 years. Numerous other benefits associated with quitting including helping others by protection from risks associated with secondhand smoke. Told written information with treatment options and help lines will be given at discharge. Patient given opportunity to ask questions.     States he is trying to quit smoking cigarettes altogether and greatly encouraged him to do this

## 2024-01-30 NOTE — PHARMACY MED REC
"Admission Medication History     The home medication history was taken by Marta Rubio.    You may go to "Admission" then "Reconcile Home Medications" tabs to review and/or act upon these items.     The home medication list has been updated by the Pharmacy department.   Please read ALL comments highlighted in yellow.   Please address this information as you see fit.    Feel free to contact us if you have any questions or require assistance.      The medications listed below were removed from the home medication list. Please reorder if appropriate:  Methocarbamol 500 mg    Patient reports no longer taking the following medication(s):  Aspirin 81 mg    Medications listed below were obtained from: Patient/family and Analytic software- [employer]  (Not in a hospital admission)        Current Outpatient Medications on File Prior to Encounter   Medication Sig Dispense Refill Last Dose    albuterol (PROVENTIL/VENTOLIN HFA) 90 mcg/actuation inhaler Inhale 2 puffs into the lungs every 6 (six) hours as needed for Wheezing. Rescue 6.7 g 11 1/29/2024    furosemide (LASIX) 40 MG tablet Take 1 tablet (40 mg total) by mouth once daily. 30 tablet 11 1/29/2024    gabapentin (NEURONTIN) 300 MG capsule Take 1 capsule (300 mg total) by mouth 3 (three) times daily. 90 capsule 11 1/29/2024    losartan (COZAAR) 25 MG tablet Take 1 tablet (25 mg total) by mouth once daily. 90 tablet 3 1/29/2024    metoprolol succinate (TOPROL-XL) 50 MG 24 hr tablet Take 1 tablet (50 mg total) by mouth once daily. 30 tablet 11 1/29/2024    spironolactone (ALDACTONE) 25 MG tablet Take 1 tablet (25 mg total) by mouth once daily. 30 tablet 11 1/29/2024    aspirin (ECOTRIN) 81 MG EC tablet Take 1 tablet (81 mg total) by mouth once daily. 90 tablet 3     OLANZapine (ZYPREXA) 10 MG tablet Take 1 tablet (10 mg total) by mouth once daily. (Patient taking differently: Take 5 mg by mouth once daily.) 30 tablet 11     [DISCONTINUED] methocarbamoL (ROBAXIN) 500 MG Tab " Take 500 mg by mouth 2 (two) times a day.            Potential issues to be addressed PRIOR TO DISCHARGE  Patient reported not taking the following medications: (Asprin 81 mg). These medications remain on the home medication list. Please address accordingly.   Patient has been taking half a tablet of Olanzapine because he says it makes his heart race. He's interested in getting off of this medication and might require an alternative.    Marta Rubio  [facility] Specialist - Medication History  [phone]. [id number]    .

## 2024-01-30 NOTE — Clinical Note
Diagnosis: Acute on chronic congestive heart failure, unspecified heart failure type [[id number]]   Future Attending Provider: BEE MENDEZ [[id number]]

## 2024-01-30 NOTE — ASSESSMENT & PLAN NOTE
Chronic, controlled. Latest blood pressure and vitals reviewed-     Temp:  [98.7 °F (37.1 °C)]   Pulse:  []   Resp:  [17-50]   BP: (118-143)/()   SpO2:  [87 %-98 %] .   Home meds for hypertension were reviewed and noted below.   Hypertension Medications               furosemide (LASIX) 40 MG tablet Take 1 tablet (40 mg total) by mouth once daily.    losartan (COZAAR) 25 MG tablet Take 1 tablet (25 mg total) by mouth once daily.    metoprolol succinate (TOPROL-XL) 50 MG 24 hr tablet Take 1 tablet (50 mg total) by mouth once daily.    spironolactone (ALDACTONE) 25 MG tablet Take 1 tablet (25 mg total) by mouth once daily.            While in the hospital, will manage blood pressure as follows; Continue home antihypertensive regimen    Will utilize p.r.n. blood pressure medication only if patient's blood pressure greater than 180/110 and he develops symptoms such as worsening chest pain or shortness of breath.

## 2024-01-30 NOTE — SUBJECTIVE & OBJECTIVE
Past Medical History:   Diagnosis Date    CHF (congestive heart failure)     Hypertension        Past Surgical History:   Procedure Laterality Date    LEFT HEART CATHETERIZATION Left 1/10/2024    Procedure: Left heart cath;  Surgeon: Elijah Lozano MD;  Location: [facility] CATH LAB;  Service: Cardiology;  Laterality: Left;       Review of patient's allergies indicates:   Allergen Reactions    Azithromycin Swelling and Hives       No current facility-administered medications on file prior to encounter.     Current Outpatient Medications on File Prior to Encounter   Medication Sig    albuterol (PROVENTIL/VENTOLIN HFA) 90 mcg/actuation inhaler Inhale 2 puffs into the lungs every 6 (six) hours as needed for Wheezing. Rescue    furosemide (LASIX) 40 MG tablet Take 1 tablet (40 mg total) by mouth once daily.    gabapentin (NEURONTIN) 300 MG capsule Take 1 capsule (300 mg total) by mouth 3 (three) times daily.    losartan (COZAAR) 25 MG tablet Take 1 tablet (25 mg total) by mouth once daily.    metoprolol succinate (TOPROL-XL) 50 MG 24 hr tablet Take 1 tablet (50 mg total) by mouth once daily.    spironolactone (ALDACTONE) 25 MG tablet Take 1 tablet (25 mg total) by mouth once daily.    aspirin (ECOTRIN) 81 MG EC tablet Take 1 tablet (81 mg total) by mouth once daily.    OLANZapine (ZYPREXA) 10 MG tablet Take 1 tablet (10 mg total) by mouth once daily. (Patient taking differently: Take 5 mg by mouth once daily.)    [DISCONTINUED] methocarbamoL (ROBAXIN) 500 MG Tab Take 500 mg by mouth 2 (two) times a day.     Family History    None       Tobacco Use    Smoking status: Every Day     Current packs/day: 0.50     Types: Cigarettes    Smokeless tobacco: Not on file   Substance and Sexual Activity    Alcohol use: Yes     Comment: been in rehab before    Drug use: Not Currently    Sexual activity: Not Currently     Review of Systems   Constitutional:  Positive for fatigue and fever. Negative for appetite change.   HENT:   Negative for congestion, hearing loss and trouble swallowing.    Respiratory:  Positive for cough and shortness of breath. Negative for chest tightness and wheezing.    Cardiovascular:  Negative for chest pain and palpitations.   Gastrointestinal:  Positive for diarrhea. Negative for abdominal pain, constipation and nausea.   Genitourinary:  Negative for difficulty urinating and dysuria.   Musculoskeletal:  Positive for gait problem. Negative for back pain and neck stiffness.   Skin:  Negative for pallor and rash.   Neurological:  Negative for dizziness, speech difficulty and headaches.   Psychiatric/Behavioral:  Negative for confusion and suicidal ideas.      Objective:     Vital Signs (Most Recent):  Temp: 98.7 °F (37.1 °C) (01/30/24 0323)  Pulse: 93 (01/30/24 1104)  Resp: 17 (01/30/24 1104)  BP: 118/83 (01/30/24 1104)  SpO2: 96 % (01/30/24 1300) Vital Signs (24h Range):  Temp:  [98.7 °F (37.1 °C)] 98.7 °F (37.1 °C)  Pulse:  [] 93  Resp:  [17-50] 17  SpO2:  [87 %-98 %] 96 %  BP: (118-143)/() 118/83     Weight: 71.2 kg (157 lb)  Body mass index is 23.87 kg/m².     Physical Exam  Vitals reviewed.   Constitutional:       General: He is awake. He is not in acute distress.     Appearance: He is well-developed. He is not toxic-appearing.   HENT:      Head: Normocephalic.      Nose: Nose normal.      Mouth/Throat:      Pharynx: Oropharynx is clear.   Eyes:      Extraocular Movements: Extraocular movements intact.      Pupils: Pupils are equal, round, and reactive to light.   Neck:      Thyroid: No thyroid mass.      Vascular: No carotid bruit.   Cardiovascular:      Rate and Rhythm: Normal rate and regular rhythm.      Pulses: Normal pulses.      Heart sounds: Normal heart sounds. No murmur heard.  Pulmonary:      Effort: Pulmonary effort is normal.      Breath sounds: Normal air entry. Rales present. No wheezing.   Abdominal:      General: Bowel sounds are normal. There is no distension.      Palpations:  Abdomen is soft.      Tenderness: There is no abdominal tenderness.   Musculoskeletal:         General: Normal range of motion.      Cervical back: Neck supple. No rigidity.   Skin:     General: Skin is warm.      Coloration: Skin is not jaundiced.      Findings: No lesion.   Neurological:      General: No focal deficit present.      Mental Status: He is alert and oriented to person, place, and time.      Cranial Nerves: No cranial nerve deficit.      Comments: Patient is able to use extremities seemingly normally and able to raise his arms above his head  Noted no fasciculations   Psychiatric:         Attention and Perception: Attention normal.         Mood and Affect: Mood normal.         Behavior: Behavior normal. Behavior is cooperative.         Thought Content: Thought content normal.         Cognition and Memory: Cognition normal.              CRANIAL NERVES     CN III, IV, VI   Pupils are equal, round, and reactive to light.       Significant Labs: All pertinent labs within the past 24 hours have been reviewed.  BMP:   Recent Labs   Lab 01/30/24  0400   *   *   K 4.5   CL 99   CO2 25   BUN 17   CREATININE 1.27   CALCIUM 8.6     CBC:   Recent Labs   Lab 01/30/24  0401 01/30/24  0713   WBC 12.08*  --    HGB 12.9*  --    HCT 38.5* 43     --      CMP:   Recent Labs   Lab 01/30/24  0400   *   K 4.5   CL 99   CO2 25   *   BUN 17   CREATININE 1.27   CALCIUM 8.6   PROT 7.7   ALBUMIN 3.4*   BILITOT 1.0   ALKPHOS 88   AST 36   ALT 36   ANIONGAP 9       Significant Imaging: I have reviewed all pertinent imaging results/findings within the past 24 hours.    Imaging Results              US Lower Extremity Veins Bilateral (Final result)  Result time 01/30/24 14:15:25      Final result by Celso Carey II, MD (01/30/24 14:15:25)                   Impression:      No evidence of deep venous thrombosis.    Ultrasound images stored and captured.      Electronically signed by: Celso  Cherry  Date:    01/30/2024  Time:    14:15               Narrative:    EXAMINATION:  US LOWER EXTREMITY VEINS BILATERAL    CLINICAL HISTORY:  Short of breath;    TECHNIQUE:  Duplex and color flow Doppler and dynamic compression was performed of the veins was performed.    COMPARISON:  None.    FINDINGS:  No evidence of echogenic, non-compressible thrombus seen in the visualized veins of the extremities.  Color Doppler venous waveform pattern is within normal limits.                                       CTA Chest Non-Coronary (PE Studies) (Final result)  Result time 01/30/24 07:34:55      Final result by Tarik Talamantes DO (01/30/24 07:34:55)                   Impression:      No evidence to suggest pulmonary embolism.    Focal opacity versus pulmonary nodule located within the inferolateral left upper lobe measuring 4.1 x 2.4 cm.  CT of the chest recommended in 4-6 weeks after medical treatment.    Moderate bilateral pleural effusions.    Heart is enlarged, similar. Small pericardial effusion.    Reflux of contrast in into the IVC, findings in keeping with heart failure.      Electronically signed by: Tarik Talamantes  Date:    01/30/2024  Time:    07:34               Narrative:    EXAMINATION:  CTA CHEST NON CORONARY (PE STUDIES)    CLINICAL HISTORY:  Pulmonary embolism (PE) suspected, positive D-dimer;    TECHNIQUE:  Multiplanar CT of the chest with PE protocol.  MIP projections obtained.  This exam is adequate for interpretation.  100 cc of isovue 370.    Dose reduction: The CT exam was performed using one or more dose reduction techniques: Automatic exposure control, automated adjustment of the MA and/or kVP according to patient size, or use of iterative reconstruction technique.    COMPARISON:  1/30/24    FINDINGS:  Pulmonary artery: Patent    Lower neck: Normal    Chest/airways: Mild to moderate emphysema.  Focal opacity versus pulmonary nodule located within the inferolateral left upper lobe measuring  4.1 x 2.4 cm.  Moderate bilateral pleural effusions.    Mediastinum: Heart is enlarged, similar.  Small pericardial effusion.  Reflux of contrast in into the IVC, findings in keeping with heart failure.    Chest wall: Normal    Bones: Multilevel degenerative changes of the thoracic spine.    Upper abdomen: Multiple small simple cyst or hemangioma within the liver.                                       X-Ray Chest AP Portable (Final result)  Result time 01/30/24 07:34:32   Procedure changed from X-Ray Chest PA And Lateral     Final result by Mauro Lew MD (01/30/24 07:34:32)                   Impression:      Bilateral pleural effusions.  Suspect edema as well.      Electronically signed by: Mauro Lew  Date:    01/30/2024  Time:    07:34               Narrative:    EXAMINATION:  XR CHEST AP PORTABLE    CLINICAL HISTORY:  chest pain;Chest Pain;    TECHNIQUE:  Single frontal view of the chest was performed.    COMPARISON:  01/21/2024    FINDINGS:  Cardiac silhouette is mildly enlarged.  Prominent interstitial markings.  Small left pleural effusion.  No pneumothorax.  Trace right pleural effusion.                                    Intake/Output - Last 3 Shifts         01/28 0700 01/29 0659 01/29 0700 01/30 0659 01/30 0700 01/31 0659    Urine (mL/kg/hr)   2740 (4.9)    Total Output   2740    Net   -2740                 Microbiology Results (last 7 days)       Procedure Component Value Units Date/Time    Blood culture #2 [[id number]] Collected: 01/30/24 0711    Order Status: Sent Specimen: Blood Updated: 01/30/24 0718    Blood culture #1 [[id number]] Collected: 01/30/24 0705    Order Status: Sent Specimen: Blood Updated: 01/30/24 0718

## 2024-01-30 NOTE — ASSESSMENT & PLAN NOTE
Patient has a history of smoking and has x-ray findings consistent with the obstructive lung disease is noted to be hypoxic on on arterial blood gases.  There has no evidence of pulmonary emboli there has also an area on his left upper lobe in the inferior lateral portion of an area of what is called a mass but it looks more like an atelectasis and will have to be dealt with some point I think the priorities here for to treat underlying COPD get pulmonary functions when we think it is possible aggressively diurese him, but I think the effusions are probably related to his heart failure we could do diagnostic /therapeutic thoracentesis while he is here.  I will review his medicines and put him on aggressive respiratory regimen.  We can clear up is effusions then we maybe able to get a better look at this area in his left lung and make a decision whether bronchoscopy needle biopsies next step.  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

## 2024-01-31 PROBLEM — I50.43 ACUTE ON CHRONIC COMBINED SYSTOLIC AND DIASTOLIC HEART FAILURE: Status: ACTIVE | Noted: 2024-01-31

## 2024-01-31 PROBLEM — I27.20 PULMONARY HYPERTENSION: Status: ACTIVE | Noted: 2024-01-31

## 2024-01-31 LAB
ANION GAP SERPL CALCULATED.3IONS-SCNC: 10 MMOL/L (ref 7–16)
BASOPHILS # BLD AUTO: 0.02 K/UL (ref 0–0.2)
BASOPHILS NFR BLD AUTO: 0.2 % (ref 0–1)
BUN SERPL-MCNC: 29 MG/DL (ref 7–18)
BUN/CREAT SERPL: 22 (ref 6–20)
CALCIUM SERPL-MCNC: 8.9 MG/DL (ref 8.5–10.1)
CHLORIDE SERPL-SCNC: 99 MMOL/L (ref 98–107)
CHOLEST SERPL-MCNC: 114 MG/DL (ref 0–200)
CHOLEST/HDLC SERPL: 1.8 {RATIO}
CO2 SERPL-SCNC: 25 MMOL/L (ref 21–32)
CREAT SERPL-MCNC: 1.33 MG/DL (ref 0.7–1.3)
DIFFERENTIAL METHOD BLD: ABNORMAL
EGFR (NO RACE VARIABLE) (RUSH/TITUS): 63 ML/MIN/1.73M2
EOSINOPHIL # BLD AUTO: 0.01 K/UL (ref 0–0.5)
EOSINOPHIL NFR BLD AUTO: 0.1 % (ref 1–4)
ERYTHROCYTE [DISTWIDTH] IN BLOOD BY AUTOMATED COUNT: 13 % (ref 11.5–14.5)
GLUCOSE SERPL-MCNC: 138 MG/DL (ref 74–106)
HCT VFR BLD AUTO: 37.3 % (ref 40–54)
HDLC SERPL-MCNC: 63 MG/DL (ref 40–60)
HGB BLD-MCNC: 12.4 G/DL (ref 13.5–18)
IMM GRANULOCYTES # BLD AUTO: 0.07 K/UL (ref 0–0.04)
IMM GRANULOCYTES NFR BLD: 0.6 % (ref 0–0.4)
LDLC SERPL CALC-MCNC: 29 MG/DL
LYMPHOCYTES # BLD AUTO: 0.8 K/UL (ref 1–4.8)
LYMPHOCYTES NFR BLD AUTO: 6.4 % (ref 27–41)
MAGNESIUM SERPL-MCNC: 2.3 MG/DL (ref 1.7–2.3)
MCH RBC QN AUTO: 29.9 PG (ref 27–31)
MCHC RBC AUTO-ENTMCNC: 33.2 G/DL (ref 32–36)
MCV RBC AUTO: 89.9 FL (ref 80–96)
METHICILLIN RESISTANT STAPHYLOCOCCUS AUREUS: POSITIVE
MONOCYTES # BLD AUTO: 1.15 K/UL (ref 0–0.8)
MONOCYTES NFR BLD AUTO: 9.3 % (ref 2–6)
MPC BLD CALC-MCNC: 11 FL (ref 9.4–12.4)
NEUTROPHILS # BLD AUTO: 10.36 K/UL (ref 1.8–7.7)
NEUTROPHILS NFR BLD AUTO: 83.4 % (ref 53–65)
NONHDLC SERPL-MCNC: 51 MG/DL
NRBC # BLD AUTO: 0 X10E3/UL
NRBC, AUTO (.00): 0 %
PLATELET # BLD AUTO: 322 K/UL (ref 150–400)
POTASSIUM SERPL-SCNC: 4.2 MMOL/L (ref 3.5–5.1)
PROCALCITONIN SERPL-MCNC: 0.39 NG/ML
RBC # BLD AUTO: 4.15 M/UL (ref 4.6–6.2)
SODIUM SERPL-SCNC: 130 MMOL/L (ref 136–145)
TRIGL SERPL-MCNC: 110 MG/DL (ref 35–150)
VLDLC SERPL-MCNC: 22 MG/DL
WBC # BLD AUTO: 12.41 K/UL (ref 4.5–11)

## 2024-01-31 PROCEDURE — 94640 AIRWAY INHALATION TREATMENT: CPT

## 2024-01-31 PROCEDURE — 97165 OT EVAL LOW COMPLEX 30 MIN: CPT

## 2024-01-31 PROCEDURE — 85025 COMPLETE CBC W/AUTO DIFF WBC: CPT | Performed by: HOSPITALIST

## 2024-01-31 PROCEDURE — 99233 SBSQ HOSP IP/OBS HIGH 50: CPT | Mod: ,,, | Performed by: NURSE PRACTITIONER

## 2024-01-31 PROCEDURE — 99900035 HC TECH TIME PER 15 MIN (STAT)

## 2024-01-31 PROCEDURE — 63600175 PHARM REV CODE 636 W HCPCS: Performed by: HOSPITALIST

## 2024-01-31 PROCEDURE — 25000003 PHARM REV CODE 250: Performed by: HOSPITALIST

## 2024-01-31 PROCEDURE — 83735 ASSAY OF MAGNESIUM: CPT | Performed by: HOSPITALIST

## 2024-01-31 PROCEDURE — 97162 PT EVAL MOD COMPLEX 30 MIN: CPT

## 2024-01-31 PROCEDURE — 80061 LIPID PANEL: CPT | Performed by: NURSE PRACTITIONER

## 2024-01-31 PROCEDURE — 99232 SBSQ HOSP IP/OBS MODERATE 35: CPT | Mod: ,,, | Performed by: HOSPITALIST

## 2024-01-31 PROCEDURE — 80048 BASIC METABOLIC PNL TOTAL CA: CPT | Performed by: HOSPITALIST

## 2024-01-31 PROCEDURE — S4991 NICOTINE PATCH NONLEGEND: HCPCS | Performed by: HOSPITALIST

## 2024-01-31 PROCEDURE — 27000221 HC OXYGEN, UP TO 24 HOURS

## 2024-01-31 PROCEDURE — 63600175 PHARM REV CODE 636 W HCPCS: Performed by: STUDENT IN AN ORGANIZED HEALTH CARE EDUCATION/TRAINING PROGRAM

## 2024-01-31 PROCEDURE — 11000001 HC ACUTE MED/SURG PRIVATE ROOM

## 2024-01-31 PROCEDURE — 25000003 PHARM REV CODE 250: Performed by: STUDENT IN AN ORGANIZED HEALTH CARE EDUCATION/TRAINING PROGRAM

## 2024-01-31 PROCEDURE — 25000242 PHARM REV CODE 250 ALT 637 W/ HCPCS: Performed by: INTERNAL MEDICINE

## 2024-01-31 PROCEDURE — 63600175 PHARM REV CODE 636 W HCPCS: Performed by: EMERGENCY MEDICINE

## 2024-01-31 PROCEDURE — 99232 SBSQ HOSP IP/OBS MODERATE 35: CPT | Mod: ,,, | Performed by: INTERNAL MEDICINE

## 2024-01-31 PROCEDURE — 94761 N-INVAS EAR/PLS OXIMETRY MLT: CPT

## 2024-01-31 RX ORDER — FUROSEMIDE 10 MG/ML
60 INJECTION INTRAMUSCULAR; INTRAVENOUS
Status: DISCONTINUED | OUTPATIENT
Start: 2024-01-31 | End: 2024-02-01

## 2024-01-31 RX ORDER — IBUPROFEN 200 MG
1 TABLET ORAL DAILY
Status: DISCONTINUED | OUTPATIENT
Start: 2024-01-31 | End: 2024-01-31

## 2024-01-31 RX ORDER — FUROSEMIDE 10 MG/ML
40 INJECTION INTRAMUSCULAR; INTRAVENOUS
Status: DISCONTINUED | OUTPATIENT
Start: 2024-01-31 | End: 2024-01-31

## 2024-01-31 RX ORDER — IBUPROFEN 200 MG
1 TABLET ORAL DAILY
Status: DISCONTINUED | OUTPATIENT
Start: 2024-01-31 | End: 2024-02-02 | Stop reason: HOSPADM

## 2024-01-31 RX ORDER — DOPAMINE HCL IN DEXTROSE 5 % 400MG/.25L
2 INFUSION BOTTLE (ML) INTRAVENOUS CONTINUOUS
Status: DISCONTINUED | OUTPATIENT
Start: 2024-01-31 | End: 2024-02-02

## 2024-01-31 RX ADMIN — Medication 100 MG: at 08:01

## 2024-01-31 RX ADMIN — FUROSEMIDE 40 MG: 10 INJECTION, SOLUTION INTRAVENOUS at 08:01

## 2024-01-31 RX ADMIN — NICOTINE 1 PATCH: 21 PATCH, EXTENDED RELEASE TRANSDERMAL at 10:01

## 2024-01-31 RX ADMIN — GABAPENTIN 300 MG: 300 CAPSULE ORAL at 03:01

## 2024-01-31 RX ADMIN — NICOTINE 1 PATCH: 14 PATCH, EXTENDED RELEASE TRANSDERMAL at 01:01

## 2024-01-31 RX ADMIN — SPIRONOLACTONE 25 MG: 25 TABLET ORAL at 08:01

## 2024-01-31 RX ADMIN — FUROSEMIDE 60 MG: 10 INJECTION, SOLUTION INTRAMUSCULAR; INTRAVENOUS at 03:01

## 2024-01-31 RX ADMIN — GUAIFENESIN 600 MG: 600 TABLET, EXTENDED RELEASE ORAL at 08:01

## 2024-01-31 RX ADMIN — THERA TABS 1 TABLET: TAB at 08:01

## 2024-01-31 RX ADMIN — OLANZAPINE 5 MG: 5 TABLET, FILM COATED ORAL at 08:01

## 2024-01-31 RX ADMIN — LEVOFLOXACIN 750 MG: 5 INJECTION, SOLUTION INTRAVENOUS at 08:01

## 2024-01-31 RX ADMIN — GABAPENTIN 300 MG: 300 CAPSULE ORAL at 10:01

## 2024-01-31 RX ADMIN — ASPIRIN 81 MG: 81 TABLET, COATED ORAL at 08:01

## 2024-01-31 RX ADMIN — Medication 1000 UNITS: at 08:01

## 2024-01-31 RX ADMIN — IPRATROPIUM BROMIDE AND ALBUTEROL SULFATE 3 ML: 2.5; .5 SOLUTION RESPIRATORY (INHALATION) at 12:01

## 2024-01-31 RX ADMIN — ENOXAPARIN SODIUM 40 MG: 100 INJECTION SUBCUTANEOUS at 05:01

## 2024-01-31 RX ADMIN — ATORVASTATIN CALCIUM 40 MG: 40 TABLET, FILM COATED ORAL at 10:01

## 2024-01-31 RX ADMIN — DOPAMINE HYDROCHLORIDE 2 MCG/KG/MIN: 160 INJECTION, SOLUTION INTRAVENOUS at 11:01

## 2024-01-31 RX ADMIN — Medication 6 MG: at 10:01

## 2024-01-31 RX ADMIN — GUAIFENESIN 600 MG: 600 TABLET, EXTENDED RELEASE ORAL at 10:01

## 2024-01-31 RX ADMIN — ALLOPURINOL 200 MG: 100 TABLET ORAL at 08:01

## 2024-01-31 RX ADMIN — SACUBITRIL AND VALSARTAN 1 TABLET: 24; 26 TABLET, FILM COATED ORAL at 10:01

## 2024-01-31 RX ADMIN — IPRATROPIUM BROMIDE AND ALBUTEROL SULFATE 3 ML: 2.5; .5 SOLUTION RESPIRATORY (INHALATION) at 07:01

## 2024-01-31 RX ADMIN — IPRATROPIUM BROMIDE AND ALBUTEROL SULFATE 3 ML: 2.5; .5 SOLUTION RESPIRATORY (INHALATION) at 01:01

## 2024-01-31 RX ADMIN — GABAPENTIN 300 MG: 300 CAPSULE ORAL at 08:01

## 2024-01-31 RX ADMIN — SACUBITRIL AND VALSARTAN 1 TABLET: 24; 26 TABLET, FILM COATED ORAL at 11:01

## 2024-01-31 NOTE — SUBJECTIVE & OBJECTIVE
Interval History/Significant Events:  Patient without complaints    Review of Systems  Objective:     Vital Signs (Most Recent):  Temp: 98.7 °F (37.1 °C) (01/30/24 0323)  Pulse: 101 (01/31/24 0401)  Resp: 20 (01/31/24 0401)  BP: 105/72 (01/31/24 0207)  SpO2: 98 % (01/31/24 0401) Vital Signs (24h Range):  Pulse:  [] 101  Resp:  [12-27] 20  SpO2:  [94 %-100 %] 98 %  BP: (105-118)/(72-87) 105/72   Weight: 71.2 kg (157 lb)  Body mass index is 23.87 kg/m².      Intake/Output Summary (Last 24 hours) at 1/31/2024 0547  Last data filed at 1/31/2024 0114  Gross per 24 hour   Intake --   Output 4060 ml   Net -4060 ml          Physical Exam  Vitals reviewed.   Constitutional:       Appearance: Normal appearance.      Interventions: He is not intubated.  HENT:      Head: Normocephalic and atraumatic.      Nose: Nose normal.      Mouth/Throat:      Mouth: Mucous membranes are dry.      Pharynx: Oropharynx is clear.   Eyes:      Extraocular Movements: Extraocular movements intact.      Conjunctiva/sclera: Conjunctivae normal.      Pupils: Pupils are equal, round, and reactive to light.   Cardiovascular:      Rate and Rhythm: Normal rate.      Heart sounds: Normal heart sounds. No murmur heard.  Pulmonary:      Effort: Pulmonary effort is normal. He is not intubated.      Breath sounds: Normal breath sounds.   Abdominal:      General: Abdomen is flat. Bowel sounds are normal.      Palpations: Abdomen is soft.   Musculoskeletal:         General: Normal range of motion.      Cervical back: Normal range of motion and neck supple.      Right lower leg: No edema.      Left lower leg: No edema.   Skin:     General: Skin is warm and dry.      Capillary Refill: Capillary refill takes less than 2 seconds.   Neurological:      General: No focal deficit present.      Mental Status: He is alert and oriented to person, place, and time.   Psychiatric:         Mood and Affect: Mood normal.         Behavior: Behavior normal.             Vents:  Oxygen Concentration (%): 28 (01/31/24 0053)  Lines/Drains/Airways       Peripheral Intravenous Line  Duration                  Peripheral IV - Single Lumen 01/30/24 0355 20 G Anterior;Left Forearm 1 day                  Significant Labs:    CBC/Anemia Profile:  Recent Labs   Lab 01/30/24  0401 01/30/24  0713 01/30/24  1249 01/31/24  0432   WBC 12.08*  --   --  12.41*   HGB 12.9*  --   --  12.4*   HCT 38.5* 43  --  37.3*     --   --  322   MCV 91.9  --   --  89.9   RDW 13.1  --   --  13.0   FOLATE  --   --  >20.0*  --    [id number]  --   --  360  --         Chemistries:  Recent Labs   Lab 01/30/24  0400 01/31/24  0432   * 130*   K 4.5 4.2   CL 99 99   CO2 25 25   BUN 17 29*   CREATININE 1.27 1.33*   CALCIUM 8.6 8.9   ALBUMIN 3.4*  --    PROT 7.7  --    BILITOT 1.0  --    ALKPHOS 88  --    ALT 36  --    AST 36  --    MG  --  2.3       Recent Lab Results  (Last 5 results in the past 24 hours)        01/31/24  0432   01/30/24  1249   01/30/24  0713   01/30/24  0711   01/30/24  0703        POC CO2     26.1           Ammonia   27             Anion Gap 10               Baso # 0.02               Basophil % 0.2               BUN 29               BUN/CREAT RATIO 22               Calcium 8.9               Chloride 99               CO2 25               ID NOW COVID-19, (CYNTHIA)         Negative       Creatinine 1.33               CRP   10.40             Differential Method Auto               eGFR 63               Eos # 0.01               Eosinophil % 0.1               Folate   >20.0             Glucose 138               Hematocrit 37.3               Hemoglobin 12.4               Hep A IgM   Non-Reactive             Hep B C IgM   Non-Reactive             Hepatitis B Surface Ag   Non-Reactive             Hepatitis C Ab   Non-Reactive             HIV 1/2 Ag/Ab   Non-Reactive             Immature Grans (Abs) 0.07               Immature Granulocytes 0.6               Lactate, Vicente       1.5         Lymph #  0.80               Lymph % 6.4               Magnesium  2.3               MCH 29.9               MCHC 33.2               MCV 89.9               Mono # 1.15               Mono % 9.3               MPV 11.0               Neutrophils, Abs 10.36               Neutrophils Relative 83.4               nRBC 0.0               NUCLEATED RBC ABSOLUTE 0.00               Platelet Count 322               POC Base Excess     -0.4           POC HCO3     24.8           POC Hematocrit     43           POC Ionized Calcium     1.03           POC Lactate     1.3           POC PCO2     42           POC PH     7.38           POC PO2     62           Potassium, Blood Gas     4.3           POC SATURATED O2     91           Sodium, Blood Gas     130           POCT Glucose     143           Potassium 4.2               RBC 4.15               RDW 13.0               Sodium 130               Uric Acid   7.6             Vitamin D   8.5  Comment: Vitamin D 25-OH Adult Reference Values:  Deficiency: <20 ng/mL  Insufficiency: 20 - <30 ng/mL  Sufficiency: 30 -100 ng/mL    Vitamin D 25-OH Pediatric Reference Values:  Deficiency: <15 ng/mL  Insufficiency: 15 - <20 ng/mL  Sufficiency: 20 - 100 ng/mL             Vitamin B-12   360             WBC 12.41                                      Significant Imaging:  I have reviewed all pertinent imaging results/findings within the past 24 hours.

## 2024-01-31 NOTE — ED NOTES
Pt ambulated to restroom upon request. Pt safely ambulated without assist to restroom and back to bed

## 2024-01-31 NOTE — PROGRESS NOTES
[facility] - Emergency Department  Critical Care Medicine  Progress Note    Patient Name: Derek Moreno  MRN: [id number]  Admission Date: 1/30/2024  Hospital Length of Stay: 1 days  Code Status: Full Code  Attending Provider: Dixon Small MD  Primary Care Provider: No, Primary Doctor   Principal Problem: <principal problem not specified>    Subjective:     HPI:  58-year-old male who has been in the hospital 3 times over the last month for ongoing shortness of breath he is very distinct medical history including ejection fraction 15% there is some questionable history of ALS that has stabilized patient is a smoker drinks alcohol has history of some psychiatric illness I have been asked    Hospital/ICU Course:  No notes on file    Interval History/Significant Events:  Patient without complaints    Review of Systems  Objective:     Vital Signs (Most Recent):  Temp: 98.7 °F (37.1 °C) (01/30/24 0323)  Pulse: 101 (01/31/24 0401)  Resp: 20 (01/31/24 0401)  BP: 105/72 (01/31/24 0207)  SpO2: 98 % (01/31/24 0401) Vital Signs (24h Range):  Pulse:  [] 101  Resp:  [12-27] 20  SpO2:  [94 %-100 %] 98 %  BP: (105-118)/(72-87) 105/72   Weight: 71.2 kg (157 lb)  Body mass index is 23.87 kg/m².      Intake/Output Summary (Last 24 hours) at 1/31/2024 0547  Last data filed at 1/31/2024 0114  Gross per 24 hour   Intake --   Output 4060 ml   Net -4060 ml          Physical Exam  Vitals reviewed.   Constitutional:       Appearance: Normal appearance.      Interventions: He is not intubated.  HENT:      Head: Normocephalic and atraumatic.      Nose: Nose normal.      Mouth/Throat:      Mouth: Mucous membranes are dry.      Pharynx: Oropharynx is clear.   Eyes:      Extraocular Movements: Extraocular movements intact.      Conjunctiva/sclera: Conjunctivae normal.      Pupils: Pupils are equal, round, and reactive to light.   Cardiovascular:      Rate and Rhythm: Normal rate.      Heart sounds: Normal heart sounds. No  murmur heard.  Pulmonary:      Effort: Pulmonary effort is normal. He is not intubated.      Breath sounds: Normal breath sounds.   Abdominal:      General: Abdomen is flat. Bowel sounds are normal.      Palpations: Abdomen is soft.   Musculoskeletal:         General: Normal range of motion.      Cervical back: Normal range of motion and neck supple.      Right lower leg: No edema.      Left lower leg: No edema.   Skin:     General: Skin is warm and dry.      Capillary Refill: Capillary refill takes less than 2 seconds.   Neurological:      General: No focal deficit present.      Mental Status: He is alert and oriented to person, place, and time.   Psychiatric:         Mood and Affect: Mood normal.         Behavior: Behavior normal.            Vents:  Oxygen Concentration (%): 28 (01/31/24 0053)  Lines/Drains/Airways       Peripheral Intravenous Line  Duration                  Peripheral IV - Single Lumen 01/30/24 0355 20 G Anterior;Left Forearm 1 day                  Significant Labs:    CBC/Anemia Profile:  Recent Labs   Lab 01/30/24  0401 01/30/24  0713 01/30/24  1249 01/31/24  0432   WBC 12.08*  --   --  12.41*   HGB 12.9*  --   --  12.4*   HCT 38.5* 43  --  37.3*     --   --  322   MCV 91.9  --   --  89.9   RDW 13.1  --   --  13.0   FOLATE  --   --  >20.0*  --    [id number]  --   --  360  --         Chemistries:  Recent Labs   Lab 01/30/24  0400 01/31/24  0432   * 130*   K 4.5 4.2   CL 99 99   CO2 25 25   BUN 17 29*   CREATININE 1.27 1.33*   CALCIUM 8.6 8.9   ALBUMIN 3.4*  --    PROT 7.7  --    BILITOT 1.0  --    ALKPHOS 88  --    ALT 36  --    AST 36  --    MG  --  2.3       Recent Lab Results  (Last 5 results in the past 24 hours)        01/31/24  0432   01/30/24  1249   01/30/24  0713   01/30/24  0711   01/30/24  0703        POC CO2     26.1           Ammonia   27             Anion Gap 10               Baso # 0.02               Basophil % 0.2               BUN 29               BUN/CREAT RATIO  22               Calcium 8.9               Chloride 99               CO2 25               ID NOW COVID-19, (CYNTHIA)         Negative       Creatinine 1.33               CRP   10.40             Differential Method Auto               eGFR 63               Eos # 0.01               Eosinophil % 0.1               Folate   >20.0             Glucose 138               Hematocrit 37.3               Hemoglobin 12.4               Hep A IgM   Non-Reactive             Hep B C IgM   Non-Reactive             Hepatitis B Surface Ag   Non-Reactive             Hepatitis C Ab   Non-Reactive             HIV 1/2 Ag/Ab   Non-Reactive             Immature Grans (Abs) 0.07               Immature Granulocytes 0.6               Lactate, Vicente       1.5         Lymph # 0.80               Lymph % 6.4               Magnesium  2.3               MCH 29.9               MCHC 33.2               MCV 89.9               Mono # 1.15               Mono % 9.3               MPV 11.0               Neutrophils, Abs 10.36               Neutrophils Relative 83.4               nRBC 0.0               NUCLEATED RBC ABSOLUTE 0.00               Platelet Count 322               POC Base Excess     -0.4           POC HCO3     24.8           POC Hematocrit     43           POC Ionized Calcium     1.03           POC Lactate     1.3           POC PCO2     42           POC PH     7.38           POC PO2     62           Potassium, Blood Gas     4.3           POC SATURATED O2     91           Sodium, Blood Gas     130           POCT Glucose     143           Potassium 4.2               RBC 4.15               RDW 13.0               Sodium 130               Uric Acid   7.6             Vitamin D   8.5  Comment: Vitamin D 25-OH Adult Reference Values:  Deficiency: <20 ng/mL  Insufficiency: 20 - <30 ng/mL  Sufficiency: 30 -100 ng/mL    Vitamin D 25-OH Pediatric Reference Values:  Deficiency: <15 ng/mL  Insufficiency: 15 - <20 ng/mL  Sufficiency: 20 - 100 ng/mL             Vitamin  B-12   360             WBC 12.41                                      Significant Imaging:  I have reviewed all pertinent imaging results/findings within the past 24 hours.    ABG  Recent Labs   Lab 01/30/24  0713   PH 7.38   PO2 62*   PCO2 42   HCO3 24.8     Assessment/Plan:     Pulmonary  COPD (chronic obstructive pulmonary disease)  Patient is markedly better with 4 L of diuresis..  Yesterday he could not give any real history he was diaphoretic today's awake alert feels markedly better.  My plan would be to continue diuresis and repeat chest x-ray with left lateral decubitus in the morning.  If there still free fluid there then will do a diagnostic therapeutic tap it maybe that would diurese this is effusions get markedly better the other issue is he has significant area of abnormality on the left could just be due to pleural effusion atelectasis but maybe a masses something will have to deal with like getting to a stable situation before we consider biopsy or bronchoscopy.  Review COPD medicines             Saqib English MD  Critical Care Medicine  [facility] - Emergency Department

## 2024-01-31 NOTE — PLAN OF CARE
[facility]  Initial Discharge Assessment       Primary Care Provider: Mis, Primary Doctor    Admission Diagnosis: Acute on chronic congestive heart failure, unspecified heart failure type [I50.9]    Admission Date: 1/30/2024  Expected Discharge Date:     Transition of Care Barriers: None    Payor: [employer] / Plan: [facility] CCN [employer]UM / Product Type: Government /     Extended Emergency Contact Information  Primary Emergency Contact: sudeep lara  Mobile Phone: [phone]  Relation: Relative  Preferred language: English   needed? No    Discharge Plan A: Home  Discharge Plan B: Home      [facility] [place], [place] - [address]  [address] [facility] [address]  Phone: [phone] Fax: [phone]      Initial Assessment (most recent)       Adult Discharge Assessment - 01/31/24 1018          Discharge Assessment    Home Accessibility wheelchair accessible     Home Layout Able to live on 1st floor     Equipment Currently Used at Home cane, straight;power chair;walker, rolling     Patient currently being followed by outpatient case management? No     Do you currently have service(s) that help you manage your care at home? No     Do you take prescription medications? Yes     Do you have prescription coverage? Yes     Coverage [employer] and [employer]     Do you have any problems affording any of your prescribed medications? No     Is the patient taking medications as prescribed? yes     Who is going to help you get home at discharge? nephew     How do you get to doctors appointments? family or friend will provide     Are you on dialysis? No     Do you take coumadin? No     Discharge Plan A Home     Discharge Plan B Home     DME Needed Upon Discharge  none     Discharge Plan discussed with: Patient     Transition of Care Barriers None        Physical Activity    On average, how many days per week do you engage in moderate to strenuous exercise (like a brisk  walk)? 0 days     On average, how many minutes do you engage in exercise at this level? 0 min        Financial Resource Strain    How hard is it for you to pay for the very basics like food, housing, medical care, and heating? Not hard at all        Housing Stability    In the last 12 months, was there a time when you were not able to pay the mortgage or rent on time? No     In the last 12 months, how many places have you lived? 1     In the last 12 months, was there a time when you did not have a steady place to sleep or slept in a shelter (including now)? No        Transportation Needs    In the past 12 months, has lack of transportation kept you from medical appointments or from getting medications? No     In the past 12 months, has lack of transportation kept you from meetings, work, or from getting things needed for daily living? No        Food Insecurity    Within the past 12 months, you worried that your food would run out before you got the money to buy more. Never true     Within the past 12 months, the food you bought just didn't last and you didn't have money to get more. Never true        Stress    Do you feel stress - tense, restless, nervous, or anxious, or unable to sleep at night because your mind is troubled all the time - these days? Rather much        Social Connections    In a typical week, how many times do you talk on the phone with family, friends, or neighbors? More than three times a week     How often do you get together with friends or relatives? More than three times a week     How often do you attend Muslim or Roman Catholic services? Never     Do you belong to any clubs or organizations such as Muslim groups, unions, fraternal or athletic groups, or school groups? No     How often do you attend meetings of the clubs or organizations you belong to? Never     Are you , , , , never , or living with a partner? Never         Alcohol Use    Q1: How  often do you have a drink containing alcohol? 4 or more times a week     Q2: How many drinks containing alcohol do you have on a typical day when you are drinking? 3 or 4                      SS spoke with pt in the ER. SS was consulted on pt for possible placement at [facility] in [place]. Pt is not seen by [facility] doctor's here in [place]. Pt sees a provider in [facility]. Pt has a electric wheelchair, cane and standard wheelchair. SS following for discharge needs.

## 2024-01-31 NOTE — PT/OT/SLP EVAL
Occupational Therapy   Evaluation    Name: Derek Moreno  MRN: [id number]  Admitting Diagnosis: <principal problem not specified>  Recent Surgery: * No surgery found *      Recommendations:     Discharge Recommendations: Moderate Intensity Therapy (verses low intensity)  Discharge Equipment Recommendations:  to be determined by next level of care  Barriers to discharge:       Assessment:     Derek Moreno is a 58 y.o. male with a medical diagnosis of CHF.  He presents with alert with many complaints. Performance deficits affecting function: weakness, impaired endurance, impaired self care skills, impaired functional mobility, gait instability, impaired balance, impaired cardiopulmonary response to activity.      Rehab Prognosis: Good and Fair; patient would benefit from acute skilled OT services to address these deficits and reach maximum level of function.       Plan:     Patient to be seen 5 x/week to address the above listed problems via self-care/home management, therapeutic activities, therapeutic exercises  Plan of Care Expires: 02/15/24  Plan of Care Reviewed with: patient    Subjective     Chief Complaint: SOB, fatigue and edema  Patient/Family Comments/goals: Pt plans to go to a swingbed at D/C    Occupational Profile:  Living Environment: Pt lives at home alone in a single level home with 6 steps to enter   Previous level of function: Pt was mod(I) with ADLs. Pt states he ambulated with a cane.  Roles and Routines: Pt takes care of himself and his home   Equipment Used at Home: cane, quad, power chair, walker, rolling  Assistance upon Discharge: Pt will have assistance from facility staff    Pain/Comfort:  Pain Rating 1: 0/10  Pain Rating Post-Intervention 1: 0/10    Patients cultural, spiritual, Samaritan conflicts given the current situation: no    Objective:     Communicated with: RN prior to session.  Patient found up in chair with peripheral IV, oxygen, telemetry, pulse ox (continuous) upon  OT entry to room.    General Precautions: Standard, fall  Orthopedic Precautions: N/A  Braces: N/A  Respiratory Status: Nasal cannula, flow 4 L/min    Occupational Performance:    Bed Mobility:    NT    Functional Mobility/Transfers:  Patient completed Sit <> Stand Transfer with stand by assistance  with  no assistive device   Patient completed Toilet Transfer Step Transfer technique with stand by assistance and contact guard assistance with  IV pole for balance  Functional Mobility: Pt ambulated down deleon and back and into bathroom and back using IV pole for balance. Pt had a dizzy spell initially, but after standing still for a minute it passed. Pt had to stop and take standing rest breaks 3 times.    Activities of Daily Living:  Feeding:  independence including performing his own setup  Lower Body Dressing: modified independence increased time and effort to don and tie shoes, SOB with the effort  Toileting: modified independence for clothes management and for hygiene    Cognitive/Visual Perceptual:  Cognitive/Psychosocial Skills:     -       Oriented to: Person, Place, and Situation   -       Follows Commands/attention:Follows one-step commands  -       Communication: clear/fluent  -       Safety awareness/insight to disability: impaired   -       Mood/Affect/Coping skills/emotional control: Cooperative and frustrated    Physical Exam:  Balance:    -       sitting is Independent, standing is SBA to CGA with an external device for support  Dominant hand:    -       right  Upper Extremity Range of Motion:     -       Right Upper Extremity: WFL  -       Left Upper Extremity: WFL  Upper Extremity Strength:    -       Right Upper Extremity: WFL  -       Left Upper Extremity: WFL   Strength:    -       Right Upper Extremity: WFL  -       Left Upper Extremity: WFL  Fine Motor Coordination:    -       Intact  Gross motor coordination:   WFL    AMPAC 6 Click ADL:  AMPAC Total Score: 21    Treatment & Education:  Pt  educated on OT role/POC.   Importance of OOB activity with staff assistance.  Importance of sitting up in the chair throughout the day as tolerated, especially for meals   Safety during functional t/f and mobility   Importance of assisting with self-care activities   All questions/concerns answered within OT scope of practice      Patient left up in chair with all lines intact, call button in reach, RN Javier Garcia notified, and nephew present    GOALS:   Multidisciplinary Problems       Occupational Therapy Goals          Problem: Occupational Therapy    Goal Priority Disciplines Outcome Interventions   Occupational Therapy Goal     OT, PT/OT Ongoing, Progressing    Description: STG: (in 1 week)  Pt will perform grooming at sink with SBA  Pt will bathe with setup and energy conservation techniques  Pt will perform UE dressing with independence  Pt will perform LE dressing with independence  Pt will transfer bed/chair/bsc with SBA  Pt will perform standing task x 5 min with SBA  Pt will tolerate 15 minutes of tx without fatigue  Pt will perform UE HEP 2x15 reps of each exercise with cues      LTG:(in 3 weeks)  1.Restore to max I with self care and mobility.                          History:     Past Medical History:   Diagnosis Date    CHF (congestive heart failure)     Hypertension          Past Surgical History:   Procedure Laterality Date    LEFT HEART CATHETERIZATION Left 1/10/2024    Procedure: Left heart cath;  Surgeon: Elijah Lozano MD;  Location: [facility] CATH LAB;  Service: Cardiology;  Laterality: Left;       Time Tracking:     OT Date of Treatment: 01/31/24  OT Start Time: 1157  OT Stop Time: 1220  OT Total Time (min): 23 min    Billable Minutes:Evaluation low complexity    1/31/2024

## 2024-01-31 NOTE — PROGRESS NOTES
[facility] - Emergency Department  Cardiology  Progress Note    Patient Name: Derek Moreno  MRN: [id number]  Admission Date: 1/30/2024  Hospital Length of Stay: 1 days  Code Status: Full Code   Attending Physician: Dixon Small MD   Primary Care Physician: No, Primary Doctor  Expected Discharge Date:   Principal Problem:<principal problem not specified>    Subjective:     Hospital Course:   No notes on file    Interval History: Patient seen today, O2 sat 95% on 3L. Creatinine 1.3 today. Patient with continued dyspnea.       Review of Systems   Constitutional: Positive for chills.   Cardiovascular:  Positive for chest pain, dyspnea on exertion, leg swelling, orthopnea and palpitations.   Respiratory:  Positive for cough and shortness of breath.    Neurological:  Positive for dizziness.     Objective:     Vital Signs (Most Recent):  Temp: 98 °F (36.7 °C) (01/31/24 1632)  Pulse: 99 (01/31/24 1314)  Resp: 20 (01/31/24 1314)  BP: 107/71 (01/31/24 0824)  SpO2: 95 % (01/31/24 0824) Vital Signs (24h Range):  Temp:  [98 °F (36.7 °C)-98.8 °F (37.1 °C)] 98 °F (36.7 °C)  Pulse:  [] 99  Resp:  [12-27] 20  SpO2:  [95 %-100 %] 95 %  BP: (105-115)/(71-76) 107/71     Weight: 71.2 kg (157 lb)  Body mass index is 23.87 kg/m².     SpO2: 95 %         Intake/Output Summary (Last 24 hours) at 1/31/2024 1703  Last data filed at 1/31/2024 1319  Gross per 24 hour   Intake --   Output 2800 ml   Net -2800 ml       Lines/Drains/Airways       Peripheral Intravenous Line  Duration                  Peripheral IV - Single Lumen 01/30/24 0355 20 G Anterior;Left Forearm 1 day                       Physical Exam  Vitals reviewed.   Constitutional:       General: He is not in acute distress.  Cardiovascular:      Rate and Rhythm: Normal rate and regular rhythm.   Pulmonary:      Effort: Pulmonary effort is normal.      Breath sounds: Rales present.   Abdominal:      General: Bowel sounds are normal.      Palpations: Abdomen is  "soft.   Musculoskeletal:      Right lower leg: No edema.      Left lower leg: No edema.   Skin:     General: Skin is warm and dry.   Neurological:      Mental Status: He is alert.            Significant Labs: ABG:   Recent Labs   Lab 01/30/24 0713   PH 7.38   PCO2 42   HCO3 24.8   POCSATURATED 91*   , Blood Culture: No results for input(s): "LABBLOO" in the last 48 hours., BMP:   Recent Labs   Lab 01/30/24  0400 01/31/24 0432   * 138*   * 130*   K 4.5 4.2   CL 99 99   CO2 25 25   BUN 17 29*   CREATININE 1.27 1.33*   CALCIUM 8.6 8.9   MG  --  2.3   , CMP   Recent Labs   Lab 01/30/24 0400 01/31/24 0432   * 130*   K 4.5 4.2   CL 99 99   CO2 25 25   * 138*   BUN 17 29*   CREATININE 1.27 1.33*   CALCIUM 8.6 8.9   PROT 7.7  --    ALBUMIN 3.4*  --    BILITOT 1.0  --    ALKPHOS 88  --    AST 36  --    ALT 36  --    ANIONGAP 9 10   , CBC   Recent Labs   Lab 01/30/24  0401 01/30/24 0713 01/31/24 0432   WBC 12.08*  --  12.41*   HGB 12.9*  --  12.4*   HCT 38.5*   < > 37.3*     --  322    < > = values in this interval not displayed.   , INR   Recent Labs   Lab 01/30/24 0400   INR 1.20   , Lipid Panel No results for input(s): "CHOL", "HDL", "LDLCALC", "TRIG", "CHOLHDL" in the last 48 hours., and Troponin No results for input(s): "TROPONINI" in the last 48 hours.    Significant Imaging: Cardiac Cath: Results for orders placed during the hospital encounter of 01/04/24    Cardiac catheterization    Conclusion     oRCA w/ good collateral L->R supply from anterior septals, filling retrograde from RPL branch; otherwise LCx w/ anomalous origin, MLI only    The Ost RCA to Prox RCA lesion was 100% stenosed.    The pre-procedure left ventricular end diastolic pressure was 22.    The estimated blood loss was <50 mL.    The procedure log was documented by Documenter: Marian Quintanilla and verified by Elijah Lozano MD.    Date: 1/10/2024  Time: 4:44 PM     , Echocardiogram: Transthoracic echo " (TTE) complete (Cupid Only):   Results for orders placed or performed during the hospital encounter of 01/04/24   Echo   Result Value Ref Range    BSA 1.83 m2    LVOT stroke volume 33.09 cm3    LVIDd 6.0 3.5 - 6.0 cm    LV Systolic Volume 129.43 mL    LV Systolic Volume Index 70.0 mL/m2    LVIDs 5.20 (A) 2.1 - 4.0 cm    LV Diastolic Volume 148.24 mL    LV Diastolic Volume Index 80.13 mL/m2    IVS 1.02 0.6 - 1.1 cm    LVOT diameter 1.94 cm    LVOT area 3.0 cm2    FS 13 (A) 28 - 44 %    Left Ventricle Relative Wall Thickness 0.30 cm    Posterior Wall 0.90 0.6 - 1.1 cm    LV mass 234.22 g    LV Mass Index 127 g/m2    TDI LATERAL 0.17 m/s    TDI SEPTAL 0.05 m/s    TR Max Teofilo 3.41 m/s    E wave deceleration time 73.99 msec    LVOT peak teofilo 0.73 m/s    Left Ventricular Outflow Tract Mean Velocity 0.52 cm/s    Left Ventricular Outflow Tract Mean Gradient 1.20 mmHg    RVDD 5.17 cm    TAPSE 1.55 cm    LA size 3.69 cm    Left Atrium Major Axis 3.55 cm    RA Major Axis 4.48 cm    AV mean gradient 2 mmHg    AV peak gradient 3 mmHg    Ao peak teofilo 0.82 m/s    Ao VTI 10.90 cm    LVOT peak VTI 11.20 cm    AV valve area 3.04 cm²    AV Velocity Ratio 0.89     AV index (prosthetic) 1.03     YUVAL by Velocity Ratio 2.63 cm²    Mr max teofilo 4.80 m/s    MV mean gradient 3 mmHg    MV peak gradient 5 mmHg    MV stenosis pressure 1/2 time 21.46 ms    MV valve area p 1/2 method 10.25 cm2    MV valve area by continuity eq 1.76 cm2    MV VTI 18.8 cm    Triscuspid Valve Regurgitation Peak Gradient 47 mmHg    PV PEAK VELOCITY 0.57 m/s    PV peak gradient 1 mmHg    Ao root annulus 2.68 cm    IVC diameter 2.19 cm    Mean e' 0.11 m/s    ZLVIDS 3.91     ZLVIDD 1.55     AORTIC VALVE CUSP SEPERATION 2.19 cm    TV resting pulmonary artery pressure 62 mmHg    RV TB RVSP 18 mmHg    Est. RA pres 15 mmHg    Mason's Biplane MOD Ejection Fraction 15 %    Left Ventricular Cardiac Output -6.3 dm3 min-1    Narrative      Left Ventricle: The left ventricle is  mildly dilated. Normal wall   thickness. There is eccentric hypertrophy. Severe global hypokinesis with   regional variation. There is severely reduced systolic function. Biplane   (2D) method of discs ejection fraction is 15%. Global longitudinal strain   is reduced. There is diastolic dysfunction but grade cannot be determined.    Right Ventricle: Moderate right ventricular enlargement. Systolic   function is mildly reduced.    Right Atrium: Right atrium is mildly dilated.    Aortic Valve: The aortic valve is a trileaflet valve.    Mitral Valve: There is mild bileaflet sclerosis. There is no stenosis.   The mean pressure gradient across the mitral valve is 3 mmHg at a heart   rate of  bpm. There is moderate regurgitation with a posterolateral   eccentriccally directed jet.    Tricuspid Valve: There is moderate regurgitation.    Pulmonary Artery: The estimated pulmonary artery systolic pressure is   62 mmHg.    IVC/SVC: Elevated venous pressure at 15 mmHg.    Pericardium: Large left pleural effusion.     , and X-Ray: CXR: X-Ray Chest 1 View (CXR): X-Ray Chest AP Portable  Order: [id number]  Status: Final result       Visible to patient: Yes (not seen)       Next appt: 02/09/2024 at 09:15 AM in Cardiology (MARIA EUGENIA MERCADO MD)    0 Result Notes  Details    Reading Physician Reading Date Result Priority   Mauro Lew MD  [phone] 1/30/2024 STAT     Narrative & Impression  EXAMINATION:  XR CHEST AP PORTABLE     CLINICAL HISTORY:  chest pain;Chest Pain;     TECHNIQUE:  Single frontal view of the chest was performed.     COMPARISON:  01/21/2024     FINDINGS:  Cardiac silhouette is mildly enlarged.  Prominent interstitial markings.  Small left pleural effusion.  No pneumothorax.  Trace right pleural effusion.     Impression:     Bilateral pleural effusions.  Suspect edema as well.        Electronically signed by: Mauro Lew  Date:                                            01/30/2024  Time:                                            07:34     Assessment and Plan:     Brief HPI: 57 y/o male with PMH of COPD, heavy ETOH abuse, depression, [facility] illness [facility]), LHD, VISHAL, and combined systolic and diastolic HF (LVEF 15% with moderate MR and LVEDP 22mmHg) who presented to emergency room with worsening short of breath.  Patient had just been discharged from this facility 01/24.  States he was better but still short of breath at discharge.  Had moved into a house that had a lot of draft and was cold.  [place] like he progressively worsened there.  Developed cough productive of some greenish sputum, sweats, subjective fever and chills.  Breathing worsened.  Complained of 3 pillow orthopnea.  States he could hardly breathe and came back to the emergency depart.  This is his 3rd admission this month to this facility.  Had previously been offered but declined swing bed.  During previous hospital admissions was noted to have ischemic cardiomyopathy.  Findings on recent echocardiogram include greatly reduced ejection fraction to 15%, moderate MR and pulmonary hypertension with RSVP 62. Noted previously on examinations to have bilateral pleural effusions.  Patient be placed back in hospital for further evaluation and treat.      Cardiology consulted for dyspnea with bilateral pleural effusions, smoker, decreased EF & recurrent hospital admissions. [facility] 1/10/204 with single vessel CAD,  RCA with collaterals. Mixed ischemic/alcoholic cardiomyopathy. BNP 9000, was 42819. Echo with EF 15% and severe MR.    Acute on chronic combined systolic and diastolic heart failure  1/31/2024:  - Patient seen and evaluated by Dr. Peterson  - Mixed ischemic/alcoholic cardiomyopathy  - EF 15% with severe MR, 3 admissions in the past month  - Volume overloaded on assessment  - DC losartan, start Entresto 24-26 BID, continue spironolactone  - Hold Toprol XL for now  - Increase lasix to 60mg BID  - Start dopamine 2mcg/kg/min  - Strict I & Os, daily  weights  - BMP in am        VTE Risk Mitigation (From admission, onward)           Ordered     enoxaparin injection 40 mg  Daily         01/30/24 1156     IP VTE HIGH RISK PATIENT  Once         01/30/24 1156     Place sequential compression device  Until discontinued         01/30/24 1156                    ANA PALUA Hogue  Cardiology  [facility] - Emergency Department

## 2024-01-31 NOTE — PLAN OF CARE
Problem: Physical Therapy  Goal: Physical Therapy Goal  Description: Short Term Goals to be met by: 2024    Patient will increase functional independence with mobility by performin. Supine to sit with independently  2. Sit to stand transfer with independently using lowest level of assistive device  3. Bed to chair transfer with independently using lowest level of assistive device  4. Gait  x 100 feet with independently using lowest level of assistive device  5. Lower extremity exercise program x30 reps per handout, with assistance as needed    Long Term Goals to be met by: 2024    Pt will regain full independent functional mobility with no assistive device to return to home situation and prior activities of daily living.   Outcome: Ongoing, Progressing

## 2024-01-31 NOTE — CONSULTS
Consult received and appreciated. Consult for diet education. Patient is currently in CCU. Education not appropriate at this time. Will provide education on follow-up as appropriate.

## 2024-01-31 NOTE — ASSESSMENT & PLAN NOTE
1/31/2024:  - Patient seen and evaluated by Dr. Peterson  - Mixed ischemic/alcoholic cardiomyopathy  - EF 15% with severe MR, 3 admissions in the past month  - Volume overloaded on assessment  - DC losartan, start Entresto 24-26 BID, continue spironolactone  - Hold Toprol XL for now  - Increase lasix to 60mg BID  - Start dopamine 2mcg/kg/min  - Strict I & Os, daily weights  - BMP in am

## 2024-01-31 NOTE — HPI
59 y/o male with PMH of COPD, heavy ETOH abuse, depression, [facility] illness [facility]), LHD, VISHAL, and combined systolic and diastolic HF (LVEF 15% with moderate MR and LVEDP 22mmHg) who presented to emergency room with worsening short of breath.  Patient had just been discharged from this facility 01/24.  States he was better but still short of breath at discharge.  Had moved into a house that had a lot of draft and was cold.  [place] like he progressively worsened there.  Developed cough productive of some greenish sputum, sweats, subjective fever and chills.  Breathing worsened.  Complained of 3 pillow orthopnea.  States he could hardly breathe and came back to the emergency depart.  This is his 3rd admission this month to this facility.  Had previously been offered but declined swing bed.  During previous hospital admissions was noted to have ischemic cardiomyopathy.  Findings on recent echocardiogram include greatly reduced ejection fraction to 15%, moderate MR and pulmonary hypertension with RSVP 62. Noted previously on examinations to have bilateral pleural effusions.  Patient be placed back in hospital for further evaluation and treat.     Cardiology consulted for dyspnea with bilateral pleural effusions, smoker, decreased EF & recurrent hospital admissions. [facility] 1/10/204 with single vessel CAD,  RCA with collaterals. Mixed ischemic/alcoholic cardiomyopathy. BNP 9000, was 70518. Echo with EF 15% and severe MR.

## 2024-01-31 NOTE — PLAN OF CARE
Problem: Occupational Therapy  Goal: Occupational Therapy Goal  Description: STG: (in 1 week)  Pt will perform grooming at sink with SBA  Pt will bathe with setup and energy conservation techniques  Pt will perform UE dressing with independence  Pt will perform LE dressing with independence  Pt will transfer bed/chair/bsc with SBA  Pt will perform standing task x 5 min with SBA  Pt will tolerate 15 minutes of tx without fatigue  Pt will perform UE HEP 2x15 reps of each exercise with cues      LTG:(in 3 weeks)  1.Restore to max I with self care and mobility.     Outcome: Ongoing, Progressing

## 2024-01-31 NOTE — PT/OT/SLP EVAL
Physical Therapy Evaluation     Patient Name: Derek Moreno   MRN: [id number]  Recent Surgery: * No surgery found *      Recommendations:     Discharge Recommendations: Low Intensity Therapy   Discharge Equipment Recommendations: to be determined by next level of care   Barriers to discharge: Decreased caregiver support    Assessment:     Derek Moreno is a 58 y.o. male admitted with a medical diagnosis of COPD. He presents with the following impairments/functional limitations: impaired endurance, impaired functional mobility, gait instability, impaired cardiopulmonary response to activity. Pt reports difficulty with mobility at home. States he has been using cane and taking frequent rest breaks during ambulation. Pt reports history of ALS for 37 years, degenerative joint disease, PTSD, and COPD which limit mobility. Pt was able to complete mobility with supervision and ambulated to restroom with nursing staff overnight. PT to follow to ensure safety with mobility.    Rehab Prognosis: Fair; patient would benefit from acute PT services to address these deficits and reach maximum level of function.    Plan:     During this hospitalization, patient to be seen 5 x/week to address the above listed problems via gait training, therapeutic activities, therapeutic exercises    Plan of Care Expires: 02/07/24    Subjective     Chief Complaint: COPD  Patient Comments/Goals: Pt reports limited mobility at home due to shortness of breath   Pain/Comfort:  Pain Rating 1: 0/10  Pain Rating Post-Intervention 1: 0/10    Social History:  Living Environment: Patient lives alone in a single story home with number of outside stair(s): 6  Prior Level of Function: Prior to admission, patient was modified independent with ADLs using straight cane for mobility  Equipment Used at Home: cane, straight  DME owned (not currently used): wheelchair  Assistance Upon Discharge: unknown    Objective:     Communicated with RN prior to  session. Patient found  standing beside bed  with peripheral IV, oxygen, pulse ox (continuous), telemetry (nasal canula had been removed) upon PT entry to room.    General Precautions: Standard, fall   Orthopedic Precautions: N/A   Braces: N/A    Respiratory Status: Nasal cannula, flow 4 L/min    Exams:  Cognition: Patient is oriented to Person, Place, Time, Situation  RLE ROM: WFL  RLE Strength: WFL  LLE ROM: WFL  LLE Strength: WFL  Gross Motor Coordination: WFL    Functional Mobility:  Gait belt applied - No  Bed Mobility  Scooting: stand by assistance  Supine to Sit: modified independence for trunk management  Sit to Supine: modified independence for trunk management  Transfers  Sit to Stand: supervision with no AD  Gait  Unable due to IV  Balance  Sitting: supervision  Standing: supervision      Therapeutic Activities and Exercises:   Patient educated on role of acute care PT and PT POC, safety while in hospital including calling nurse for mobility, and call light usage  Patient educated about importance of OOB mobility and remaining up in chair most of the day.      AM-PAC 6 CLICK MOBILITY  Total Score:21    Patient left HOB elevated with all lines intact and call button in reach.    GOALS:   Multidisciplinary Problems       Physical Therapy Goals          Problem: Physical Therapy    Goal Priority Disciplines Outcome Goal Variances Interventions   Physical Therapy Goal     PT, PT/OT Ongoing, Progressing     Description: Short Term Goals to be met by: 2024    Patient will increase functional independence with mobility by performin. Supine to sit with independently  2. Sit to stand transfer with independently using lowest level of assistive device  3. Bed to chair transfer with independently using lowest level of assistive device  4. Gait  x 100 feet with independently using lowest level of assistive device  5. Lower extremity exercise program x30 reps per handout, with assistance as needed    Long  Term Goals to be met by: 2/14/2024    Pt will regain full independent functional mobility with no assistive device to return to home situation and prior activities of daily living.                        History:     Past Medical History:   Diagnosis Date    CHF (congestive heart failure)     Hypertension        Past Surgical History:   Procedure Laterality Date    LEFT HEART CATHETERIZATION Left 1/10/2024    Procedure: Left heart cath;  Surgeon: Elijah Lozano MD;  Location: [facility] CATH LAB;  Service: Cardiology;  Laterality: Left;       Time Tracking:     PT Received On: 01/31/24  PT Start Time: 0918  PT Stop Time: 0936  PT Total Time (min): 18 min     Billable Minutes: Evaluation moderate complexity    1/31/2024

## 2024-01-31 NOTE — CONSULTS
[facility] Cardiology Consult      Consultant Attending:Elijah Lozano    Reason for Consult:     Acute on chronic HFrEF exacerbation     Subjective:      History of Present Illness:  Derek Moreno is a 58 y.o.  male who  has a past medical history of CHF (congestive heart failure) and Hypertension.. The patient presented to [facility] on 1/30/2024 with a primary complaint of Chest Pain and Shortness of Breath (Pt states shortness of breath onset a month but got worse tonight - pt states he was just release from the hospital a week ago)    The patient was very somnolent on exam and too sleepy to provide much more in the way of history, but per his nurse, he is making urine and is progressing towards euvolemia. See assessment and plan.     Past Medical History:  Past Medical History:   Diagnosis Date    CHF (congestive heart failure)     Hypertension        Past Surgical History:  Past Surgical History:   Procedure Laterality Date    LEFT HEART CATHETERIZATION Left 1/10/2024    Procedure: Left heart cath;  Surgeon: Elijah Lozano MD;  Location: [facility] CATH LAB;  Service: Cardiology;  Laterality: Left;       Allergies:  Review of patient's allergies indicates:   Allergen Reactions    Azithromycin Swelling and Hives       Medications:   In-Hospital Scheduled Medications:   albuterol-ipratropium  3 mL Nebulization Q6H    allopurinoL  200 mg Oral Daily    aspirin  81 mg Oral Daily    atorvastatin  40 mg Oral QHS    enoxparin  40 mg Subcutaneous Daily    furosemide (LASIX) injection  40 mg Intravenous Daily    gabapentin  300 mg Oral TID    guaiFENesin  600 mg Oral BID    levoFLOXacin  750 mg Intravenous Q24H    losartan  25 mg Oral Daily    metoprolol succinate  50 mg Oral Daily    multivitamin  1 tablet Oral Daily    OLANZapine  5 mg Oral Daily    spironolactone  25 mg Oral Daily    thiamine  100 mg Oral Daily    vitamin D  1,000 Units Oral Daily      In-Hospital PRN  Medications:  acetaminophen, albuterol, lorazepam, melatonin, naloxone, ondansetron, promethazine, sodium chloride 0.9%, sodium chloride 0.9%   In-Hospital IV Infusion Medications:     Home Medications:  Prior to Admission medications    Medication Sig Start Date End Date Taking? Authorizing Provider   albuterol (PROVENTIL/VENTOLIN HFA) 90 mcg/actuation inhaler Inhale 2 puffs into the lungs every 6 (six) hours as needed for Wheezing. Rescue 1/22/24 1/21/25 Yes Jack Valdivia Jr., MD   furosemide (LASIX) 40 MG tablet Take 1 tablet (40 mg total) by mouth once daily. 1/12/24 1/11/25 Yes Jack Valdivia Jr., MD   gabapentin (NEURONTIN) 300 MG capsule Take 1 capsule (300 mg total) by mouth 3 (three) times daily. 1/11/24 1/10/25 Yes Jack Valdivia Jr., MD   losartan (COZAAR) 25 MG tablet Take 1 tablet (25 mg total) by mouth once daily. 1/12/24 1/11/25 Yes Jack Valdivia Jr., MD   metoprolol succinate (TOPROL-XL) 50 MG 24 hr tablet Take 1 tablet (50 mg total) by mouth once daily. 1/25/24 1/24/25 Yes Lisette Lagos ACNP   spironolactone (ALDACTONE) 25 MG tablet Take 1 tablet (25 mg total) by mouth once daily. 1/12/24 1/11/25 Yes Jack Valdivia Jr., MD   aspirin (ECOTRIN) 81 MG EC tablet Take 1 tablet (81 mg total) by mouth once daily. 1/26/24 1/25/25  Lisette Lagos ACNP   OLANZapine (ZYPREXA) 10 MG tablet Take 1 tablet (10 mg total) by mouth once daily.  Patient taking differently: Take 5 mg by mouth once daily. 1/12/24 1/11/25  Jack Valdivia Jr., MD       Family History:  History reviewed. No pertinent family history.    Social History:  Social History     Tobacco Use    Smoking status: Every Day     Current packs/day: 0.50     Types: Cigarettes   Substance Use Topics    Alcohol use: Yes     Comment: been in rehab before    Drug use: Not Currently       Review of Systems:  All other systems are reviewed and are negative except for those mentioned in HPI and A/P.    Objective:   Last 24 Hour Vital  "Signs:  BP  Min: 105/72  Max: 118/81  Pulse  Av.5  Min: 81  Max: 101  Resp  Av.8  Min: 12  Max: 27  SpO2  Av.4 %  Min: 94 %  Max: 100 %  I/O last 3 completed shifts:  In: -   Out: 4060 [Urine:4060]  Body mass index is 23.87 kg/m².    Physical Examination:  Gen: very somnolent  HEENT: NC/AT  CV: mildly tachycardic, + JVD, nearly to tragus  Lungs: coarse BS  Abd: NTTP  Ext: AT  Neuro: CNGI  Psych: unable to assess  Skin: no rash    Laboratory Results:  Most Recent Data:  CBC:   Lab Results   Component Value Date    WBC 12.41 (H) 2024    HGB 12.4 (L) 2024    HCT 37.3 (L) 2024     2024    MCV 89.9 2024    RDW 13.0 2024    DIFFTYPE Auto 2024     BMP:   Lab Results   Component Value Date     (L) 2024    K 4.2 2024    CL 99 2024    CO2 25 2024    BUN 29 (H) 2024     (H) 2024    CALCIUM 8.9 2024    MG 2.3 2024    PHOS 5.0 (H) 2024     LFTs:   Lab Results   Component Value Date    PROT 7.7 2024    ALBUMIN 3.4 (L) 2024    BILITOT 1.0 2024    AST 36 2024    ALKPHOS 88 2024    ALT 36 2024     Coags:   Lab Results   Component Value Date    INR 1.20 2024     FLP: No results found for: "CHOL", "HDL", "LDLCALC", "TRIG", "CHOLHDL"  DM:   Lab Results   Component Value Date    CREATININE 1.33 (H) 2024     Thyroid:   Lab Results   Component Value Date    TSH 1.410 2024     Anemia:   Lab Results   Component Value Date    HYNNDNNG82 360 2024    FOLATE >20.0 (H) 2024     Cardiac: No results found for: "TROPONINI", "CKTOTAL", "CKMB", "BNP"  Urinalysis:   Lab Results   Component Value Date    COLORU Yellow 2024    SPECGRAV 1.027 2024    NITRITE Negative 2024    KETONESU Negative 2024    UROBILINOGEN 4 (A) 2024    WBCUA 2 2024       Trended Lab Data:  Recent Labs   Lab 24  0400 24  0401 24  0713 " "01/31/24  0432   WBC  --  12.08*  --  12.41*   HGB  --  12.9*  --  12.4*   HCT  --  38.5* 43 37.3*   PLT  --  359  --  322   MCV  --  91.9  --  89.9   RDW  --  13.1  --  13.0   *  --   --  130*   K 4.5  --   --  4.2   CL 99  --   --  99   CO2 25  --   --  25   BUN 17  --   --  29*   *  --   --  138*   CALCIUM 8.6  --   --  8.9   PROT 7.7  --   --   --    ALBUMIN 3.4*  --   --   --    BILITOT 1.0  --   --   --    AST 36  --   --   --    ALKPHOS 88  --   --   --    ALT 36  --   --   --          Trended Cardiac Data:  No results for input(s): "TROPONINI", "CKTOTAL", "CKMB", "BNP" in the last 168 hours.    Radiology:  US Lower Extremity Veins Bilateral    Result Date: 1/30/2024  EXAMINATION: US LOWER EXTREMITY VEINS BILATERAL CLINICAL HISTORY: Short of breath; TECHNIQUE: Duplex and color flow Doppler and dynamic compression was performed of the veins was performed. COMPARISON: None. FINDINGS: No evidence of echogenic, non-compressible thrombus seen in the visualized veins of the extremities.  Color Doppler venous waveform pattern is within normal limits.     No evidence of deep venous thrombosis. Ultrasound images stored and captured. Electronically signed by: Celso Carey Date:    01/30/2024 Time:    14:15    CTA Chest Non-Coronary (PE Studies)    Result Date: 1/30/2024  EXAMINATION: CTA CHEST NON CORONARY (PE STUDIES) CLINICAL HISTORY: Pulmonary embolism (PE) suspected, positive D-dimer; TECHNIQUE: Multiplanar CT of the chest with PE protocol.  MIP projections obtained.  This exam is adequate for interpretation.  100 cc of isovue 370. Dose reduction: The CT exam was performed using one or more dose reduction techniques: Automatic exposure control, automated adjustment of the MA and/or kVP according to patient size, or use of iterative reconstruction technique. COMPARISON: 1/30/24 FINDINGS: Pulmonary artery: Patent Lower neck: Normal Chest/airways: Mild to moderate emphysema.  Focal opacity versus " pulmonary nodule located within the inferolateral left upper lobe measuring 4.1 x 2.4 cm.  Moderate bilateral pleural effusions. Mediastinum: Heart is enlarged, similar.  Small pericardial effusion.  Reflux of contrast in into the IVC, findings in keeping with heart failure. Chest wall: Normal Bones: Multilevel degenerative changes of the thoracic spine. Upper abdomen: Multiple small simple cyst or hemangioma within the liver.     No evidence to suggest pulmonary embolism. Focal opacity versus pulmonary nodule located within the inferolateral left upper lobe measuring 4.1 x 2.4 cm.  CT of the chest recommended in 4-6 weeks after medical treatment. Moderate bilateral pleural effusions. Heart is enlarged, similar. Small pericardial effusion. Reflux of contrast in into the IVC, findings in keeping with heart failure. Electronically signed by: Tarik Talamantes Date:    01/30/2024 Time:    07:34    X-Ray Chest AP Portable    Result Date: 1/30/2024  EXAMINATION: XR CHEST AP PORTABLE CLINICAL HISTORY: chest pain;Chest Pain; TECHNIQUE: Single frontal view of the chest was performed. COMPARISON: 01/21/2024 FINDINGS: Cardiac silhouette is mildly enlarged.  Prominent interstitial markings.  Small left pleural effusion.  No pneumothorax.  Trace right pleural effusion.     Bilateral pleural effusions.  Suspect edema as well. Electronically signed by: Mauro Lew Date:    01/30/2024 Time:    07:34    X-Ray Chest AP Portable    Result Date: 1/21/2024  EXAMINATION: XR CHEST AP PORTABLE CLINICAL HISTORY: Chest pain, unspecified COMPARISON: Chest x-ray January 7, 2024 TECHNIQUE: Frontal view/views of the chest. FINDINGS: Left lower lung atelectasis/infiltrate as well as right basilar atelectasis/infiltrate suspicious for pneumonia.  There is small left greater than right pleural fluid.  There is bilateral interstitial infiltrate/edema.  Heart partially obscured.  Suspect at least mild cardiomegaly and or pericardial effusion.   Visualized osseous and surrounding soft tissue structures appear grossly unchanged.     As above. Point of Service: [facility] Electronically signed by: John Rushing Date:    01/21/2024 Time:    20:11    Cardiac catheterization    Result Date: 1/14/2024     oRCA w/ good collateral L->R supply from anterior septals, filling retrograde from RPL branch; otherwise LCx w/ anomalous origin, MLI only   The Ost RCA to Prox RCA lesion was 100% stenosed.   The pre-procedure left ventricular end diastolic pressure was 22.   The estimated blood loss was <50 mL. The procedure log was documented by Documenter: Marian Quintanilla and verified by Elijah Lozano MD. Date: 1/10/2024  Time: 4:44 PM     Nuclear Stress Test    Result Date: 1/10/2024    The ECG portion of the study is abnormal but not diagnostic due to resting ST-T abnormalities.   The patient reported no chest pain during the stress test.     NM Myocardial Perfusion Spect Multi Pharmacologic    Result Date: 1/9/2024  EXAMINATION: NM MYOCARDIAL PERFUSION SPECT MULTI PHARM CLINICAL HISTORY: Heart failure, known or suspected, initial workup; TECHNIQUE: SPECT images in short, vertical and horizontal long axis were acquired 30 minutes after the injection of 11 mCi of Tc-99m sestamibi at rest and 37 mCi during a cardiac stress. The clinical stress and ECG portion of the study is to be read separately. COMPARISON: None. FINDINGS: The quality of the study is good VS is compromised by patient motion/GI activity adjacent to the inferior wall. Large size, severe intensity inferior defect which is improved with stress as compared to rest, likely artifact.  There is small size, medium intensity apical defect worse with stress as compared to rest, possible ischemia.  Wall motion is globally impaired. The gated post-stress images reveal impaired wall motion and diminished systolic wall thickening with an estimated LVEF of 31 %. The LV cavity (is) dilated with an  end-diastolic volume of (263 ml- normal less than 140) ml and an end-systolic volume of (181 ml- normal less than 70) ml. Normal TID ratio- 1.07     1. Large size, severe intensity inferior defect which is improved with stress as compared to rest, likely artifact.  There is reversible small size, medium intensity apical defect, possible ischemia.  Wall motion is globally impaired. 2. the global left ventricular systolic function is severely reduced with an LV ejection fraction of 31 % and evidence of LV dilatation. Wall motion is impaired. Electronically signed by: Ignacio Chowdhury Date:    01/09/2024 Time:    12:48    X-Ray Chest AP Portable    Result Date: 1/7/2024  EXAMINATION: XR CHEST AP PORTABLE CLINICAL HISTORY: sob; TECHNIQUE: Portable COMPARISON: Prior radiograph 01/04/2024 FINDINGS: The cardiomediastinal silhouette is within normal limits. Upper lungs are clear.  Basilar opacities with blunting of the costophrenic angles is grossly unchanged.  No pneumothorax. There is no acute osseous or soft tissue abnormality.     Similar basilar atelectasis and small bilateral pleural effusions. Place of service: [facility] Electronically signed by: Art Gardner Date:    01/07/2024 Time:    13:54    Echo    Result Date: 1/5/2024    Left Ventricle: The left ventricle is mildly dilated. Normal wall thickness. There is eccentric hypertrophy. Severe global hypokinesis with regional variation. There is severely reduced systolic function. Biplane (2D) method of discs ejection fraction is 15%. Global longitudinal strain is reduced. There is diastolic dysfunction but grade cannot be determined.   Right Ventricle: Moderate right ventricular enlargement. Systolic function is mildly reduced.   Right Atrium: Right atrium is mildly dilated.   Aortic Valve: The aortic valve is a trileaflet valve.   Mitral Valve: There is mild bileaflet sclerosis. There is no stenosis. The mean pressure gradient across the mitral valve is  3 mmHg at a heart rate of  bpm. There is moderate regurgitation with a posterolateral eccentriccally directed jet.   Tricuspid Valve: There is moderate regurgitation.   Pulmonary Artery: The estimated pulmonary artery systolic pressure is 62 mmHg.   IVC/SVC: Elevated venous pressure at 15 mmHg.   Pericardium: Large left pleural effusion.     US Abdomen Limited_Liver    Result Date: 1/4/2024  EXAMINATION: US ABDOMEN LIMITED_LIVER CLINICAL HISTORY: hepatitis; TECHNIQUE: Limited ultrasound of the right upper quadrant of the abdomen  was performed.  Ultrasound images captured and stored. COMPARISON: None. FINDINGS: Liver: Normal in size, measuring 17.6 cm. No focal hepatic lesion.  There is increased hepatic parenchymal echogenicity.  No discrete nodularity of the liver surface is identified.  Hepatic and portal veins are patent with normal direction of flow. Gallbladder: There is no stone.  There appears to be gallbladder wall thickening or a small amount of pericholecystic fluid.  Negative sonographic Roque sign. Biliary system: The common duct is not dilated, measuring 4 mm.  No intrahepatic ductal dilatation. Right kidney: Normal in size and echotexture, measuring 10 cm. Miscellaneous: Small amount of upper abdominal ascites.  Right-sided pleural effusion is seen.     Ascites and right-sided pleural effusion.  Increased echotexture of the liver favoring fatty infiltration.  A CT might be able to assess better for cirrhosis. Gallbladder wall thickening versus pericholecystic fluid.  Negative sonographic Roque sign.  No cholelithiasis. Electronically signed by: Mauro Lew Date:    01/04/2024 Time:    19:41    X-Ray Chest 1 View    Result Date: 1/4/2024  EXAMINATION: XR CHEST 1 VIEW CLINICAL HISTORY: shortness of breath; TECHNIQUE: Single frontal view of the chest was performed. COMPARISON: 12/30/2023 FINDINGS: The cardiac silhouette is obscured secondary to interstitial and basilar opacities.  Small pleural  effusions.  No pneumothorax.     Similar appearance of the chest.  The basilar densities with atelectasis and pleural effusions are similar.  Underlying interstitial edema may also be present. Electronically signed by: Mauro Lew Date:    01/04/2024 Time:    14:52      Reviewed all available cardiac studies pertinent to this case personally.      Assessment:     Derek Moreno is a 58 y.o. male with:  Present on Admission:   COPD (chronic obstructive pulmonary disease)   CAD (coronary artery disease)   ETOH abuse   Essential hypertension   Pleural effusion   Tobacco smoker, less than 10 cigarettes per day   ALS (amyotrophic lateral sclerosis)   Benign hypertensive heart disease with CHF and with combined systolic and diastolic dysfunction, NYHA class 3   Hepatitis   Vitamin D deficiency       Recommendations:     AoC combined sys/faizan HFreF Exacerbation 2/2 ICM with no lesions amenable to revascularization (see last cath report below)  -titrate IV lasix to goal net negative 3-4L q24h   -standing daily weights and strict I/os  -recommend d/c negative inotropes and no initiation of antihypertensives during acute phase of care (as this may prolong diuresis and delay discharge), however ARNI as well as MRA, BB, SGLT2i should be initiated/titrated or resumed prior to discharge or if never taken, incrementally added at close outpatient f/u  -trend electrolytes and Cr  -limit fluid intake to 1.5-2L daily; low sodium diet  -will discuss patient candidacy for cardiomems w/ patient and cardiology team    HTN  -as above, afterload should continue to fall with diuresis, please hold negative inotropes/negative chronotropes and antihypertensives in acute phase of care - once near(er) euvolemia, begin adding back incrementally      Results for orders placed during the hospital encounter of 01/04/24    Cardiac catheterization    Conclusion     oRCA w/ good collateral L->R supply from anterior septals, filling retrograde from  RPL branch; otherwise LCx w/ anomalous origin, MLI only    The Ost RCA to Prox RCA lesion was 100% stenosed.    The pre-procedure left ventricular end diastolic pressure was 22.    The estimated blood loss was <50 mL.    The procedure log was documented by Documenter: Marian Quintanilla and verified by Elijah Lozano MD.    Date: 1/10/2024  Time: 4:44 PM            Thank you for allowing us to participate in the care of this patient. Please contact me via secure chat if you have any questions regarding this consult.    Elijah Lozano  [facility]  Interventional Cardiology

## 2024-01-31 NOTE — SUBJECTIVE & OBJECTIVE
"Interval History: Patient seen today, O2 sat 95% on 3L. Creatinine 1.3 today. Patient with continued dyspnea.       Review of Systems   Constitutional: Positive for chills.   Cardiovascular:  Positive for chest pain, dyspnea on exertion, leg swelling, orthopnea and palpitations.   Respiratory:  Positive for cough and shortness of breath.    Neurological:  Positive for dizziness.     Objective:     Vital Signs (Most Recent):  Temp: 98 °F (36.7 °C) (01/31/24 1632)  Pulse: 99 (01/31/24 1314)  Resp: 20 (01/31/24 1314)  BP: 107/71 (01/31/24 0824)  SpO2: 95 % (01/31/24 0824) Vital Signs (24h Range):  Temp:  [98 °F (36.7 °C)-98.8 °F (37.1 °C)] 98 °F (36.7 °C)  Pulse:  [] 99  Resp:  [12-27] 20  SpO2:  [95 %-100 %] 95 %  BP: (105-115)/(71-76) 107/71     Weight: 71.2 kg (157 lb)  Body mass index is 23.87 kg/m².     SpO2: 95 %         Intake/Output Summary (Last 24 hours) at 1/31/2024 1703  Last data filed at 1/31/2024 1319  Gross per 24 hour   Intake --   Output 2800 ml   Net -2800 ml       Lines/Drains/Airways       Peripheral Intravenous Line  Duration                  Peripheral IV - Single Lumen 01/30/24 0355 20 G Anterior;Left Forearm 1 day                       Physical Exam  Vitals reviewed.   Constitutional:       General: He is not in acute distress.  Cardiovascular:      Rate and Rhythm: Normal rate and regular rhythm.   Pulmonary:      Effort: Pulmonary effort is normal.      Breath sounds: Rales present.   Abdominal:      General: Bowel sounds are normal.      Palpations: Abdomen is soft.   Musculoskeletal:      Right lower leg: No edema.      Left lower leg: No edema.   Skin:     General: Skin is warm and dry.   Neurological:      Mental Status: He is alert.            Significant Labs: ABG:   Recent Labs   Lab 01/30/24  0713   PH 7.38   PCO2 42   HCO3 24.8   POCSATURATED 91*   , Blood Culture: No results for input(s): "LABBLOO" in the last 48 hours., BMP:   Recent Labs   Lab 01/30/24  0400 01/31/24  0812 " "  * 138*   * 130*   K 4.5 4.2   CL 99 99   CO2 25 25   BUN 17 29*   CREATININE 1.27 1.33*   CALCIUM 8.6 8.9   MG  --  2.3   , CMP   Recent Labs   Lab 01/30/24  0400 01/31/24  0432   * 130*   K 4.5 4.2   CL 99 99   CO2 25 25   * 138*   BUN 17 29*   CREATININE 1.27 1.33*   CALCIUM 8.6 8.9   PROT 7.7  --    ALBUMIN 3.4*  --    BILITOT 1.0  --    ALKPHOS 88  --    AST 36  --    ALT 36  --    ANIONGAP 9 10   , CBC   Recent Labs   Lab 01/30/24  0401 01/30/24  0713 01/31/24 0432   WBC 12.08*  --  12.41*   HGB 12.9*  --  12.4*   HCT 38.5*   < > 37.3*     --  322    < > = values in this interval not displayed.   , INR   Recent Labs   Lab 01/30/24 0400   INR 1.20   , Lipid Panel No results for input(s): "CHOL", "HDL", "LDLCALC", "TRIG", "CHOLHDL" in the last 48 hours., and Troponin No results for input(s): "TROPONINI" in the last 48 hours.    Significant Imaging: Cardiac Cath: Results for orders placed during the hospital encounter of 01/04/24    Cardiac catheterization    Conclusion     oRCA w/ good collateral L->R supply from anterior septals, filling retrograde from RPL branch; otherwise LCx w/ anomalous origin, MLI only    The Ost RCA to Prox RCA lesion was 100% stenosed.    The pre-procedure left ventricular end diastolic pressure was 22.    The estimated blood loss was <50 mL.    The procedure log was documented by Documenter: Marian Quintanilla and verified by Elijah Lozano MD.    Date: 1/10/2024  Time: 4:44 PM     , Echocardiogram: Transthoracic echo (TTE) complete (Cupid Only):   Results for orders placed or performed during the hospital encounter of 01/04/24   Echo   Result Value Ref Range    BSA 1.83 m2    LVOT stroke volume 33.09 cm3    LVIDd 6.0 3.5 - 6.0 cm    LV Systolic Volume 129.43 mL    LV Systolic Volume Index 70.0 mL/m2    LVIDs 5.20 (A) 2.1 - 4.0 cm    LV Diastolic Volume 148.24 mL    LV Diastolic Volume Index 80.13 mL/m2    IVS 1.02 0.6 - 1.1 cm    LVOT diameter " 1.94 cm    LVOT area 3.0 cm2    FS 13 (A) 28 - 44 %    Left Ventricle Relative Wall Thickness 0.30 cm    Posterior Wall 0.90 0.6 - 1.1 cm    LV mass 234.22 g    LV Mass Index 127 g/m2    TDI LATERAL 0.17 m/s    TDI SEPTAL 0.05 m/s    TR Max Teofilo 3.41 m/s    E wave deceleration time 73.99 msec    LVOT peak teofilo 0.73 m/s    Left Ventricular Outflow Tract Mean Velocity 0.52 cm/s    Left Ventricular Outflow Tract Mean Gradient 1.20 mmHg    RVDD 5.17 cm    TAPSE 1.55 cm    LA size 3.69 cm    Left Atrium Major Axis 3.55 cm    RA Major Axis 4.48 cm    AV mean gradient 2 mmHg    AV peak gradient 3 mmHg    Ao peak teofilo 0.82 m/s    Ao VTI 10.90 cm    LVOT peak VTI 11.20 cm    AV valve area 3.04 cm²    AV Velocity Ratio 0.89     AV index (prosthetic) 1.03     YUVAL by Velocity Ratio 2.63 cm²    Mr max teofilo 4.80 m/s    MV mean gradient 3 mmHg    MV peak gradient 5 mmHg    MV stenosis pressure 1/2 time 21.46 ms    MV valve area p 1/2 method 10.25 cm2    MV valve area by continuity eq 1.76 cm2    MV VTI 18.8 cm    Triscuspid Valve Regurgitation Peak Gradient 47 mmHg    PV PEAK VELOCITY 0.57 m/s    PV peak gradient 1 mmHg    Ao root annulus 2.68 cm    IVC diameter 2.19 cm    Mean e' 0.11 m/s    ZLVIDS 3.91     ZLVIDD 1.55     AORTIC VALVE CUSP SEPERATION 2.19 cm    TV resting pulmonary artery pressure 62 mmHg    RV TB RVSP 18 mmHg    Est. RA pres 15 mmHg    Mason's Biplane MOD Ejection Fraction 15 %    Left Ventricular Cardiac Output -6.3 dm3 min-1    Narrative      Left Ventricle: The left ventricle is mildly dilated. Normal wall   thickness. There is eccentric hypertrophy. Severe global hypokinesis with   regional variation. There is severely reduced systolic function. Biplane   (2D) method of discs ejection fraction is 15%. Global longitudinal strain   is reduced. There is diastolic dysfunction but grade cannot be determined.    Right Ventricle: Moderate right ventricular enlargement. Systolic   function is mildly reduced.    Right  Atrium: Right atrium is mildly dilated.    Aortic Valve: The aortic valve is a trileaflet valve.    Mitral Valve: There is mild bileaflet sclerosis. There is no stenosis.   The mean pressure gradient across the mitral valve is 3 mmHg at a heart   rate of  bpm. There is moderate regurgitation with a posterolateral   eccentriccally directed jet.    Tricuspid Valve: There is moderate regurgitation.    Pulmonary Artery: The estimated pulmonary artery systolic pressure is   62 mmHg.    IVC/SVC: Elevated venous pressure at 15 mmHg.    Pericardium: Large left pleural effusion.     , and X-Ray: CXR: X-Ray Chest 1 View (CXR): X-Ray Chest AP Portable  Order: [id number]  Status: Final result       Visible to patient: Yes (not seen)       Next appt: 02/09/2024 at 09:15 AM in Cardiology (MARIA EUGENIA MERCADO MD)    0 Result Notes  Details    Reading Physician Reading Date Result Priority   Mauro Lew MD  [phone] 1/30/2024 STAT     Narrative & Impression  EXAMINATION:  XR CHEST AP PORTABLE     CLINICAL HISTORY:  chest pain;Chest Pain;     TECHNIQUE:  Single frontal view of the chest was performed.     COMPARISON:  01/21/2024     FINDINGS:  Cardiac silhouette is mildly enlarged.  Prominent interstitial markings.  Small left pleural effusion.  No pneumothorax.  Trace right pleural effusion.     Impression:     Bilateral pleural effusions.  Suspect edema as well.        Electronically signed by: Mauro Lew  Date:                                            01/30/2024  Time:                                           07:34

## 2024-01-31 NOTE — ED NOTES
Pt com plained of not being able to have a BM in a few days. He states he is regularly able to have one 2-3x daily.

## 2024-01-31 NOTE — ED NOTES
Pt linens changed. Pt requested two cups of vanilla ice cream and an orange popsicle to make himself sherbert. Pt still refused blood pressure cuff.

## 2024-01-31 NOTE — ASSESSMENT & PLAN NOTE
Patient is markedly better with 4 L of diuresis..  Yesterday he could not give any real history he was diaphoretic today's awake alert feels markedly better.  My plan would be to continue diuresis and repeat chest x-ray with left lateral decubitus in the morning.  If there still free fluid there then will do a diagnostic therapeutic tap it maybe that would diurese this is effusions get markedly better the other issue is he has significant area of abnormality on the left could just be due to pleural effusion atelectasis but maybe a masses something will have to deal with like getting to a stable situation before we consider biopsy or bronchoscopy.  Review COPD medicines

## 2024-02-01 LAB
ANION GAP SERPL CALCULATED.3IONS-SCNC: 10 MMOL/L (ref 7–16)
BASOPHILS # BLD AUTO: 0.06 K/UL (ref 0–0.2)
BASOPHILS NFR BLD AUTO: 0.6 % (ref 0–1)
BUN SERPL-MCNC: 27 MG/DL (ref 7–18)
BUN/CREAT SERPL: 21 (ref 6–20)
CALCIUM SERPL-MCNC: 9 MG/DL (ref 8.5–10.1)
CHLORIDE SERPL-SCNC: 101 MMOL/L (ref 98–107)
CO2 SERPL-SCNC: 27 MMOL/L (ref 21–32)
CREAT SERPL-MCNC: 1.29 MG/DL (ref 0.7–1.3)
DIFFERENTIAL METHOD BLD: ABNORMAL
EGFR (NO RACE VARIABLE) (RUSH/TITUS): 65 ML/MIN/1.73M2
EOSINOPHIL # BLD AUTO: 0.1 K/UL (ref 0–0.5)
EOSINOPHIL NFR BLD AUTO: 1 % (ref 1–4)
ERYTHROCYTE [DISTWIDTH] IN BLOOD BY AUTOMATED COUNT: 13.1 % (ref 11.5–14.5)
GLUCOSE SERPL-MCNC: 85 MG/DL (ref 74–106)
HCT VFR BLD AUTO: 41.3 % (ref 40–54)
HGB BLD-MCNC: 13.5 G/DL (ref 13.5–18)
IMM GRANULOCYTES # BLD AUTO: 0.06 K/UL (ref 0–0.04)
IMM GRANULOCYTES NFR BLD: 0.6 % (ref 0–0.4)
LYMPHOCYTES # BLD AUTO: 1.72 K/UL (ref 1–4.8)
LYMPHOCYTES NFR BLD AUTO: 16.4 % (ref 27–41)
MCH RBC QN AUTO: 30.1 PG (ref 27–31)
MCHC RBC AUTO-ENTMCNC: 32.7 G/DL (ref 32–36)
MCV RBC AUTO: 92 FL (ref 80–96)
MONOCYTES # BLD AUTO: 1.09 K/UL (ref 0–0.8)
MONOCYTES NFR BLD AUTO: 10.4 % (ref 2–6)
MPC BLD CALC-MCNC: 11.1 FL (ref 9.4–12.4)
NEUTROPHILS # BLD AUTO: 7.44 K/UL (ref 1.8–7.7)
NEUTROPHILS NFR BLD AUTO: 71 % (ref 53–65)
NRBC # BLD AUTO: 0 X10E3/UL
NRBC, AUTO (.00): 0 %
PLATELET # BLD AUTO: 340 K/UL (ref 150–400)
POTASSIUM SERPL-SCNC: 3.9 MMOL/L (ref 3.5–5.1)
RBC # BLD AUTO: 4.49 M/UL (ref 4.6–6.2)
SODIUM SERPL-SCNC: 134 MMOL/L (ref 136–145)
TROPONIN I SERPL DL<=0.01 NG/ML-MCNC: 15.8 PG/ML
TROPONIN I SERPL DL<=0.01 NG/ML-MCNC: 15.8 PG/ML
TROPONIN I SERPL DL<=0.01 NG/ML-MCNC: 18.9 PG/ML
WBC # BLD AUTO: 10.47 K/UL (ref 4.5–11)

## 2024-02-01 PROCEDURE — 99900035 HC TECH TIME PER 15 MIN (STAT)

## 2024-02-01 PROCEDURE — 94640 AIRWAY INHALATION TREATMENT: CPT

## 2024-02-01 PROCEDURE — 84484 ASSAY OF TROPONIN QUANT: CPT | Performed by: HOSPITALIST

## 2024-02-01 PROCEDURE — 93005 ELECTROCARDIOGRAM TRACING: CPT

## 2024-02-01 PROCEDURE — 85025 COMPLETE CBC W/AUTO DIFF WBC: CPT | Performed by: HOSPITALIST

## 2024-02-01 PROCEDURE — 63600175 PHARM REV CODE 636 W HCPCS: Performed by: EMERGENCY MEDICINE

## 2024-02-01 PROCEDURE — 25000242 PHARM REV CODE 250 ALT 637 W/ HCPCS: Performed by: INTERNAL MEDICINE

## 2024-02-01 PROCEDURE — 94761 N-INVAS EAR/PLS OXIMETRY MLT: CPT

## 2024-02-01 PROCEDURE — 25000003 PHARM REV CODE 250: Performed by: STUDENT IN AN ORGANIZED HEALTH CARE EDUCATION/TRAINING PROGRAM

## 2024-02-01 PROCEDURE — 80048 BASIC METABOLIC PNL TOTAL CA: CPT | Performed by: HOSPITALIST

## 2024-02-01 PROCEDURE — 93010 ELECTROCARDIOGRAM REPORT: CPT | Mod: ,,, | Performed by: INTERNAL MEDICINE

## 2024-02-01 PROCEDURE — 99232 SBSQ HOSP IP/OBS MODERATE 35: CPT | Mod: ,,, | Performed by: INTERNAL MEDICINE

## 2024-02-01 PROCEDURE — 97110 THERAPEUTIC EXERCISES: CPT

## 2024-02-01 PROCEDURE — 25000003 PHARM REV CODE 250: Performed by: HOSPITALIST

## 2024-02-01 PROCEDURE — 63600175 PHARM REV CODE 636 W HCPCS: Performed by: HOSPITALIST

## 2024-02-01 PROCEDURE — S4991 NICOTINE PATCH NONLEGEND: HCPCS | Performed by: HOSPITALIST

## 2024-02-01 PROCEDURE — 99232 SBSQ HOSP IP/OBS MODERATE 35: CPT | Mod: ,,, | Performed by: HOSPITALIST

## 2024-02-01 PROCEDURE — 99232 SBSQ HOSP IP/OBS MODERATE 35: CPT | Mod: ,,, | Performed by: NURSE PRACTITIONER

## 2024-02-01 PROCEDURE — 27000221 HC OXYGEN, UP TO 24 HOURS

## 2024-02-01 PROCEDURE — 97116 GAIT TRAINING THERAPY: CPT

## 2024-02-01 PROCEDURE — 11000001 HC ACUTE MED/SURG PRIVATE ROOM

## 2024-02-01 RX ORDER — FUROSEMIDE 40 MG/1
40 TABLET ORAL 2 TIMES DAILY
Status: DISCONTINUED | OUTPATIENT
Start: 2024-02-01 | End: 2024-02-02 | Stop reason: HOSPADM

## 2024-02-01 RX ORDER — POLYETHYLENE GLYCOL 3350 17 G/17G
17 POWDER, FOR SOLUTION ORAL DAILY
Status: DISCONTINUED | OUTPATIENT
Start: 2024-02-02 | End: 2024-02-02 | Stop reason: HOSPADM

## 2024-02-01 RX ADMIN — IPRATROPIUM BROMIDE AND ALBUTEROL SULFATE 3 ML: 2.5; .5 SOLUTION RESPIRATORY (INHALATION) at 07:02

## 2024-02-01 RX ADMIN — Medication 100 MG: at 08:02

## 2024-02-01 RX ADMIN — GUAIFENESIN 600 MG: 600 TABLET, EXTENDED RELEASE ORAL at 08:02

## 2024-02-01 RX ADMIN — ASPIRIN 81 MG: 81 TABLET, COATED ORAL at 08:02

## 2024-02-01 RX ADMIN — NICOTINE 1 PATCH: 21 PATCH, EXTENDED RELEASE TRANSDERMAL at 08:02

## 2024-02-01 RX ADMIN — GABAPENTIN 300 MG: 300 CAPSULE ORAL at 04:02

## 2024-02-01 RX ADMIN — ENOXAPARIN SODIUM 40 MG: 100 INJECTION SUBCUTANEOUS at 04:02

## 2024-02-01 RX ADMIN — IPRATROPIUM BROMIDE AND ALBUTEROL SULFATE 3 ML: 2.5; .5 SOLUTION RESPIRATORY (INHALATION) at 12:02

## 2024-02-01 RX ADMIN — SPIRONOLACTONE 25 MG: 25 TABLET ORAL at 08:02

## 2024-02-01 RX ADMIN — GABAPENTIN 300 MG: 300 CAPSULE ORAL at 09:02

## 2024-02-01 RX ADMIN — SACUBITRIL AND VALSARTAN 1 TABLET: 24; 26 TABLET, FILM COATED ORAL at 09:02

## 2024-02-01 RX ADMIN — FUROSEMIDE 40 MG: 40 TABLET ORAL at 06:02

## 2024-02-01 RX ADMIN — ATORVASTATIN CALCIUM 40 MG: 40 TABLET, FILM COATED ORAL at 09:02

## 2024-02-01 RX ADMIN — THERA TABS 1 TABLET: TAB at 08:02

## 2024-02-01 RX ADMIN — LEVOFLOXACIN 750 MG: 5 INJECTION, SOLUTION INTRAVENOUS at 08:02

## 2024-02-01 RX ADMIN — GUAIFENESIN 600 MG: 600 TABLET, EXTENDED RELEASE ORAL at 09:02

## 2024-02-01 RX ADMIN — Medication 1000 UNITS: at 08:02

## 2024-02-01 RX ADMIN — ALLOPURINOL 200 MG: 100 TABLET ORAL at 08:02

## 2024-02-01 RX ADMIN — ONDANSETRON 4 MG: 2 INJECTION INTRAMUSCULAR; INTRAVENOUS at 09:02

## 2024-02-01 RX ADMIN — SACUBITRIL AND VALSARTAN 1 TABLET: 24; 26 TABLET, FILM COATED ORAL at 08:02

## 2024-02-01 RX ADMIN — GABAPENTIN 300 MG: 300 CAPSULE ORAL at 08:02

## 2024-02-01 NOTE — PROGRESS NOTES
[facility] - Emergency Department  Hospital Medicine  Progress Note    Patient Name: Derek Moreno  MRN: [id number]  Patient Class: IP- Inpatient   Admission Date: 1/30/2024  Length of Stay: 2 days  Attending Physician: Dixon Small MD  Primary Care Provider: No, Primary Doctor        Subjective:     Principal Problem:<principal problem not specified>        HPI:  Chief complaint:  Short of breath.    History of present illness:    Mr. Moreno is a 58-year-old presented to emergency room with worsening short of breath.  Patient had just been discharged from this facility 01/24.  States he was better but still short of breath at discharge.  Had moved into a house that had a lot of draft and was cold.  Greer like he progressively worsened there.  Developed cough productive of some greenish sputum, sweats, subjective fever and chills.  Breathing worsened.  Complained of 3 pillow orthopnea.  States he could hardly breathe and came back to the emergency depart.  This is his 3rd admission this month to this facility.  Had previously been offered but declined swing bed.  During previous hospital admissions was noted to have ischemic cardiomyopathy.  Findings on recent echocardiogram include greatly reduced ejection fraction to 15%, moderate MR and pulmonary hypertension with RSVP 62.  Noted previously on examinations to have bilateral pleural effusions.  Patient be placed back in hospital for further evaluation and treat.     Review of system:  See above.  Some slight sore throat earlier now resolved.  Patient with some occasional sharp pleuritic chest pain recently.  Some cough as above mentioned productive of some greenish sputum which he had previously been clear.  Complained of dyspnea worse with exertion.  Sleeps on 3 pillows because of orthopnea that is he feels slight worse.  No nausea vomiting.  Recently states when he is eaten he has stool shortly after that seems like jelly.  Last bowel  movement 2 days earlier.   ambulation is affected from history of ALS.   was in a wheelchair for 30 years but able to ambulate some now.  Review of systems otherwise negative.    Past medical history:  -ischemic cardiomyopathy with reduced systolic function with ejection fraction of 15%  -coronary artery disease with recent past left heart catheterization with 100% occlusion of right coronary artery with collaterals from the left with plan to treat medically  -pulmonary hypertension by recent echocardiogram  -hypertension times last 40 years  -COPD  -alcohol dependence  -history of ALS  -history of colon polyps  - Zyprexa was given to him at this hospital and denies any known diagnosis of psychiatric illness    Past surgical history:  No surgery    Allergy azithromycin  States allergic to the mycins    Social history:   lives by himself  Smokes 1/2 pack of cigarettes per week; encouraged patient to stop  Drinks alcohol he states he is greatly cut down on and now drinking 1 qt per week of beer; last alcohol prior day    Family history:   everyone in family has a bad heart  Bypass procedure with 1st surgery in his 40s  Grandmother had myocardial infarction and  in her 50s   mother side of the family has ALS,  mother and uncle and others all had ALS    Health maintenance issues:  [facility] but  is seen in [place] and has not been seen in a while  [facility] how about his ALS  Did not get flu shot this winter  Has had previous Pneumovax  He is convinced he had COVID in 2017 but has not had COVID these wear of since   No COVID vaccination  Several colonoscopies last of which 3 years ago and had some small polyps removed, he has not sure when he should go back for repeat colonoscopy      Overview/Hospital Course:   No worse. Basically comfortable. Diuresis and watching. Thanks for everyone help.     better with  no shortness a breath.  Blood pressure slight up.  Chest x-ray looks much better with much less pleural effusion.  Reviewed Dr. English's note and agree.  Will discuss with Cardiology.  Consider changing to oral medications, increase activity and likely be able to discharge home soon with follow up outpatient.    Past Medical History:   Diagnosis Date    CHF (congestive heart failure)     Hypertension        Past Surgical History:   Procedure Laterality Date    LEFT HEART CATHETERIZATION Left 1/10/2024    Procedure: Left heart cath;  Surgeon: Elijah Lozano MD;  Location: [facility] CATH LAB;  Service: Cardiology;  Laterality: Left;       Review of patient's allergies indicates:   Allergen Reactions    Azithromycin Swelling and Hives    Levofloxacin Itching and Rash       No current facility-administered medications on file prior to encounter.     Current Outpatient Medications on File Prior to Encounter   Medication Sig    albuterol (PROVENTIL/VENTOLIN HFA) 90 mcg/actuation inhaler Inhale 2 puffs into the lungs every 6 (six) hours as needed for Wheezing. Rescue    furosemide (LASIX) 40 MG tablet Take 1 tablet (40 mg total) by mouth once daily.    gabapentin (NEURONTIN) 300 MG capsule Take 1 capsule (300 mg total) by mouth 3 (three) times daily.    losartan (COZAAR) 25 MG tablet Take 1 tablet (25 mg total) by mouth once daily.    metoprolol succinate (TOPROL-XL) 50 MG 24 hr tablet Take 1 tablet (50 mg total) by mouth once daily.    spironolactone (ALDACTONE) 25 MG tablet Take 1 tablet (25 mg total) by mouth once daily.    aspirin (ECOTRIN) 81 MG EC tablet Take 1 tablet (81 mg total) by mouth once daily.    OLANZapine (ZYPREXA) 10 MG tablet Take 1 tablet (10 mg total) by mouth once daily. (Patient taking differently: Take 5 mg by mouth once daily.)     Family History    None       Tobacco Use    Smoking status: Every Day     Current packs/day: 0.50     Types: Cigarettes    Smokeless tobacco: Not on file   Substance  and Sexual Activity    Alcohol use: Yes     Comment: been in rehab before    Drug use: Not Currently    Sexual activity: Not Currently     Review of Systems   Constitutional:  Positive for fatigue. Negative for appetite change.   HENT:  Negative for congestion, hearing loss and trouble swallowing.    Respiratory:  Positive for cough and shortness of breath. Negative for chest tightness and wheezing.    Cardiovascular:  Negative for chest pain and palpitations.   Gastrointestinal:  Negative for abdominal pain, constipation and nausea.   Genitourinary:  Negative for difficulty urinating and dysuria.   Musculoskeletal:  Positive for gait problem. Negative for back pain and neck stiffness.   Skin:  Negative for pallor and rash.   Neurological:  Negative for dizziness, speech difficulty and headaches.   Psychiatric/Behavioral:  Negative for confusion and suicidal ideas.      Objective:     Vital Signs (Most Recent):  Temp: 98.4 °F (36.9 °C) (02/01/24 1144)  Pulse: 98 (02/01/24 1222)  Resp: 15 (02/01/24 1222)  BP: 103/66 (02/01/24 1110)  SpO2: 96 % (02/01/24 1222) Vital Signs (24h Range):  Temp:  [97.6 °F (36.4 °C)-98.4 °F (36.9 °C)] 98.4 °F (36.9 °C)  Pulse:  [] 98  Resp:  [15-29] 15  SpO2:  [91 %-99 %] 96 %  BP: ()/(58-77) 103/66     Weight: 71.2 kg (157 lb)  Body mass index is 23.87 kg/m².     Physical Exam  Vitals reviewed.   Constitutional:       General: He is awake. He is not in acute distress.     Appearance: He is well-developed. He is not toxic-appearing.   HENT:      Head: Normocephalic.      Nose: Nose normal.      Mouth/Throat:      Pharynx: Oropharynx is clear.   Eyes:      Extraocular Movements: Extraocular movements intact.      Pupils: Pupils are equal, round, and reactive to light.   Neck:      Thyroid: No thyroid mass.      Vascular: No carotid bruit.   Cardiovascular:      Rate and Rhythm: Normal rate and regular rhythm.      Pulses: Normal pulses.      Heart sounds: Normal heart sounds. No  murmur heard.  Pulmonary:      Effort: Pulmonary effort is normal.      Breath sounds: Normal air entry. No wheezing.   Abdominal:      General: Bowel sounds are normal. There is no distension.      Palpations: Abdomen is soft.      Tenderness: There is no abdominal tenderness.   Musculoskeletal:         General: Normal range of motion.      Cervical back: Neck supple. No rigidity.   Skin:     General: Skin is warm.      Coloration: Skin is not jaundiced.      Findings: No lesion.   Neurological:      General: No focal deficit present.      Mental Status: He is alert and oriented to person, place, and time.      Cranial Nerves: No cranial nerve deficit.      Comments: Patient is able to use extremities seemingly normally and able to raise his arms above his head  Noted no fasciculations   Psychiatric:         Attention and Perception: Attention normal.         Mood and Affect: Mood normal.         Behavior: Behavior normal. Behavior is cooperative.         Thought Content: Thought content normal.         Cognition and Memory: Cognition normal.              CRANIAL NERVES     CN III, IV, VI   Pupils are equal, round, and reactive to light.       Significant Labs: All pertinent labs within the past 24 hours have been reviewed.  BMP:   Recent Labs   Lab 01/31/24 0432 02/01/24 0352   * 85   * 134*   K 4.2 3.9   CL 99 101   CO2 25 27   BUN 29* 27*   CREATININE 1.33* 1.29   CALCIUM 8.9 9.0   MG 2.3  --        CBC:   Recent Labs   Lab 01/31/24 0432 02/01/24 0352   WBC 12.41* 10.47   HGB 12.4* 13.5   HCT 37.3* 41.3    340       CMP:   Recent Labs   Lab 01/31/24 0432 02/01/24 0352   * 134*   K 4.2 3.9   CL 99 101   CO2 25 27   * 85   BUN 29* 27*   CREATININE 1.33* 1.29   CALCIUM 8.9 9.0   ANIONGAP 10 10         Significant Imaging: I have reviewed all pertinent imaging results/findings within the past 24 hours.    Imaging Results              X-Ray Chest 1 View (Final result)  Result  time 02/01/24 07:40:38      Final result by Misael Chang MD (02/01/24 07:40:38)                   Impression:      Improved aeration in the lung bases.  There is some persistent bibasilar edema and atelectasis and mild bilateral pleural effusion    No interval worsening      Electronically signed by: Misael Chang  Date:    02/01/2024  Time:    07:40               Narrative:    EXAMINATION:  XR CHEST 1 VIEW    CLINICAL HISTORY:  Pleural effusion    COMPARISON:  January 30, 2024 chest x-ray    TECHNIQUE:  AP chest    FINDINGS:  Cardiac silhouette is upper normal.  Mediastinal contours are unremarkable.  Pulmonary vasculature is less prominent.    There is mild bilateral pleural effusion which may be slightly improved on the left.  There is some hazy bibasilar parenchymal density which is improved.  This could represent a combination of improving pulmonary edema and passive atelectasis.    Osseous structures are unremarkable                                       X-Ray Chest Lateral Decubitus Left (Final result)  Result time 02/01/24 07:42:02      Final result by Misael Chang MD (02/01/24 07:42:02)                   Impression:      Mild to moderate layering left pleural effusion      Electronically signed by: Misael Chang  Date:    02/01/2024  Time:    07:42               Narrative:    EXAMINATION:  XR CHEST LATERAL DECUBITUS LEFT    CLINICAL HISTORY:  Pleural effusion    COMPARISON:  January 30, 2024    TECHNIQUE:  Left lateral decubitus view of the chest    FINDINGS:  There is mild to moderate layering pleural effusion in the dependent portion of the left hemithorax.                                       US Lower Extremity Veins Bilateral (Final result)  Result time 01/30/24 14:15:25      Final result by Celso Carey II, MD (01/30/24 14:15:25)                   Impression:      No evidence of deep venous thrombosis.    Ultrasound images stored and captured.      Electronically signed  by: Celso Carey  Date:    01/30/2024  Time:    14:15               Narrative:    EXAMINATION:  US LOWER EXTREMITY VEINS BILATERAL    CLINICAL HISTORY:  Short of breath;    TECHNIQUE:  Duplex and color flow Doppler and dynamic compression was performed of the veins was performed.    COMPARISON:  None.    FINDINGS:  No evidence of echogenic, non-compressible thrombus seen in the visualized veins of the extremities.  Color Doppler venous waveform pattern is within normal limits.                                       CTA Chest Non-Coronary (PE Studies) (Final result)  Result time 01/30/24 07:34:55      Final result by Tarik Talamantes DO (01/30/24 07:34:55)                   Impression:      No evidence to suggest pulmonary embolism.    Focal opacity versus pulmonary nodule located within the inferolateral left upper lobe measuring 4.1 x 2.4 cm.  CT of the chest recommended in 4-6 weeks after medical treatment.    Moderate bilateral pleural effusions.    Heart is enlarged, similar. Small pericardial effusion.    Reflux of contrast in into the IVC, findings in keeping with heart failure.      Electronically signed by: Tarik Talamantes  Date:    01/30/2024  Time:    07:34               Narrative:    EXAMINATION:  CTA CHEST NON CORONARY (PE STUDIES)    CLINICAL HISTORY:  Pulmonary embolism (PE) suspected, positive D-dimer;    TECHNIQUE:  Multiplanar CT of the chest with PE protocol.  MIP projections obtained.  This exam is adequate for interpretation.  100 cc of isovue 370.    Dose reduction: The CT exam was performed using one or more dose reduction techniques: Automatic exposure control, automated adjustment of the MA and/or kVP according to patient size, or use of iterative reconstruction technique.    COMPARISON:  1/30/24    FINDINGS:  Pulmonary artery: Patent    Lower neck: Normal    Chest/airways: Mild to moderate emphysema.  Focal opacity versus pulmonary nodule located within the inferolateral left upper  lobe measuring 4.1 x 2.4 cm.  Moderate bilateral pleural effusions.    Mediastinum: Heart is enlarged, similar.  Small pericardial effusion.  Reflux of contrast in into the IVC, findings in keeping with heart failure.    Chest wall: Normal    Bones: Multilevel degenerative changes of the thoracic spine.    Upper abdomen: Multiple small simple cyst or hemangioma within the liver.                                       X-Ray Chest AP Portable (Final result)  Result time 01/30/24 07:34:32   Procedure changed from X-Ray Chest PA And Lateral     Final result by Mauro Lew MD (01/30/24 07:34:32)                   Impression:      Bilateral pleural effusions.  Suspect edema as well.      Electronically signed by: Mauro Lew  Date:    01/30/2024  Time:    07:34               Narrative:    EXAMINATION:  XR CHEST AP PORTABLE    CLINICAL HISTORY:  chest pain;Chest Pain;    TECHNIQUE:  Single frontal view of the chest was performed.    COMPARISON:  01/21/2024    FINDINGS:  Cardiac silhouette is mildly enlarged.  Prominent interstitial markings.  Small left pleural effusion.  No pneumothorax.  Trace right pleural effusion.                                    Intake/Output - Last 3 Shifts         01/30 0700 01/31 0659 01/31 0700 02/01 0659 02/01 0700 02/02 0659    Urine (mL/kg/hr) 4060 (2.4) 4000 (2.3) 1075 (2)    Total Output 4060 4000 1075    Net -4060 -4000 -1075                 Microbiology Results (last 7 days)       Procedure Component Value Units Date/Time    Blood culture #1 [[id number]] Collected: 01/30/24 0705    Order Status: Completed Specimen: Blood Updated: 02/01/24 0711     Culture, Blood No Growth At 24 Hours    Blood culture #2 [[id number]] Collected: 01/30/24 0711    Order Status: Completed Specimen: Blood Updated: 02/01/24 0711     Culture, Blood No Growth At 24 Hours              Assessment/Plan:      Pulmonary hypertension    O2 and monitor    Acute on chronic combined systolic and diastolic heart  failure    Aggressive treatment with guided target therapy and diuresis    Vitamin D deficiency  replace      Benign hypertensive heart disease with CHF and with combined systolic and diastolic dysfunction, NYHA class 3  Patient is identified as having Combined Systolic and Diastolic heart failure that is Acute on chronic. CHF is currently uncontrolled due to Pulmonary edema/pleural effusion on CXR. Latest ECHO performed and demonstrates- Results for orders placed during the hospital encounter of 01/04/24    Echo  01/05/24    Interpretation Summary    Left Ventricle: The left ventricle is mildly dilated. Normal wall thickness. There is eccentric hypertrophy. Severe global hypokinesis with regional variation. There is severely reduced systolic function. Biplane (2D) method of discs ejection fraction is 15%. Global longitudinal strain is reduced. There is diastolic dysfunction but grade cannot be determined.    Right Ventricle: Moderate right ventricular enlargement. Systolic function is mildly reduced.    Right Atrium: Right atrium is mildly dilated.    Aortic Valve: The aortic valve is a trileaflet valve.    Mitral Valve: There is mild bileaflet sclerosis. There is no stenosis. The mean pressure gradient across the mitral valve is 3 mmHg at a heart rate of  bpm. There is moderate regurgitation with a posterolateral eccentriccally directed jet.    Tricuspid Valve: There is moderate regurgitation.    Pulmonary Artery: The estimated pulmonary artery systolic pressure is 62 mmHg.    IVC/SVC: Elevated venous pressure at 15 mmHg.    Pericardium: Large left pleural effusion.  . Continue Beta Blocker, ACE/ARB, Furosemide, and Aldactone and monitor clinical status closely. Monitor on telemetry. Patient is on CHF pathway.  Monitor strict Is&Os and daily weights.  Place on fluid restriction of 1.5 L. Cardiology has been consulted. Continue to stress to patient importance of self efficacy and  on diet for CHF. Last BNP  "reviewed- and noted below No results for input(s): "BNP", "BNPTRIAGEBLO" in the last 168 hours.          ALS (amyotrophic lateral sclerosis)  Mr. 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  States he was in a wheelchair for 30 years.  Tells that he was able to unlock his joints and been able to walk improved.  Tells that his mother side of family has several people with ALS.  Patient states his mother  of ALS after 10 years.  Had an uncle also with ALS. Of note ALS can be familial but is generally not inherited.  He states he would like to see someone locally and will refer him to Neurology outpatient.       Tobacco smoker, less than 10 cigarettes per day    Patient counseled 4 minutes on importance to stopping use of tobacco. Patient was told that stopping smoking was one of the most important actions that could be taken to improve personal health. Discussed tobacco increases risk for poor outcomes in cardiovascular disease, COPD and cancer. Informed stopping smoking reduces risk of premature death by as much as 10 years. Numerous other benefits associated with quitting including helping others by protection from risks associated with secondhand smoke. Told written information with treatment options and help lines will be given at discharge. Patient given opportunity to ask questions.     States he is trying to quit smoking cigarettes altogether and greatly encouraged him to do this         CAD (coronary artery disease)  Patient with known CAD s/p  treated medically , which is controlled Will continue ASA and Statin and monitor for S/Sx of angina/ACS. Continue to monitor on telemetry.     [facility] Summary  01/10/24          oRCA w/ good collateral L->R supply from anterior septals, filling retrograde from RPL branch; otherwise LCx w/ anomalous origin, MLI only    The Ost RCA to Prox RCA lesion was 100% stenosed.    The pre-procedure left ventricular end diastolic pressure was " 22.    The estimated blood loss was <50 mL.     The procedure log was documented by Documenter: Marian Quintanilla and verified by Elijah Lozano MD.     Date: 1/10/2024  Time: 4:44 PM    Pleural effusion  Patient found to have moderate pleural effusion on imaging. I have personally reviewed and interpreted the following imaging: Xray and CT. A thoracentesis was deferred. Most likely etiology includes Congestive Heart Failure. Management to include Diuresis and pulmonary consult to consider thoracentesis.  Cardiology to review.  Of note on recent CT scan there was question of lung mass that will need to be address going forward    02/01 much better with diuresis      Hepatitis  Acute Hepatitis panel normal    ETOH abuse    With patient only drinking a qt of beer a week, feel he is at low risk for delirium tremens.  Encouraged patient to continue to stop alcohol consumption    Essential hypertension  Chronic, controlled. Latest blood pressure and vitals reviewed-     Temp:  [98.7 °F (37.1 °C)]   Pulse:  []   Resp:  [17-50]   BP: (118-143)/()   SpO2:  [87 %-98 %] .   Home meds for hypertension were reviewed and noted below.   Hypertension Medications               furosemide (LASIX) 40 MG tablet Take 1 tablet (40 mg total) by mouth once daily.    losartan (COZAAR) 25 MG tablet Take 1 tablet (25 mg total) by mouth once daily.    metoprolol succinate (TOPROL-XL) 50 MG 24 hr tablet Take 1 tablet (50 mg total) by mouth once daily.    spironolactone (ALDACTONE) 25 MG tablet Take 1 tablet (25 mg total) by mouth once daily.            While in the hospital, will manage blood pressure as follows; Continue home antihypertensive regimen    Will utilize p.r.n. blood pressure medication only if patient's blood pressure greater than 180/110 and he develops symptoms such as worsening chest pain or shortness of breath.    COPD (chronic obstructive pulmonary disease)  Patient's COPD is controlled currently.  Patient is  currently off COPD Pathway. Continue scheduled inhalers  and monitor respiratory status closely.       VTE Risk Mitigation (From admission, onward)           Ordered     enoxaparin injection 40 mg  Daily         01/30/24 1156     IP VTE HIGH RISK PATIENT  Once         01/30/24 1156     Place sequential compression device  Until discontinued         01/30/24 1156                    Discharge Planning   EFREM:      Code Status: Full Code   Is the patient medically ready for discharge?:     Reason for patient still in hospital (select all that apply): Laboratory test, Treatment, Imaging, Consult recommendations, and PT / OT recommendations  Discharge Plan A: Home                  Dixon Small MD  Department of Hospital Medicine   [facility] - Emergency Department

## 2024-02-01 NOTE — SUBJECTIVE & OBJECTIVE
Past Medical History:   Diagnosis Date    CHF (congestive heart failure)     Hypertension        Past Surgical History:   Procedure Laterality Date    LEFT HEART CATHETERIZATION Left 1/10/2024    Procedure: Left heart cath;  Surgeon: Elijah Lozano MD;  Location: [facility] CATH LAB;  Service: Cardiology;  Laterality: Left;       Review of patient's allergies indicates:   Allergen Reactions    Azithromycin Swelling and Hives    Levofloxacin Itching and Rash       No current facility-administered medications on file prior to encounter.     Current Outpatient Medications on File Prior to Encounter   Medication Sig    albuterol (PROVENTIL/VENTOLIN HFA) 90 mcg/actuation inhaler Inhale 2 puffs into the lungs every 6 (six) hours as needed for Wheezing. Rescue    furosemide (LASIX) 40 MG tablet Take 1 tablet (40 mg total) by mouth once daily.    gabapentin (NEURONTIN) 300 MG capsule Take 1 capsule (300 mg total) by mouth 3 (three) times daily.    losartan (COZAAR) 25 MG tablet Take 1 tablet (25 mg total) by mouth once daily.    metoprolol succinate (TOPROL-XL) 50 MG 24 hr tablet Take 1 tablet (50 mg total) by mouth once daily.    spironolactone (ALDACTONE) 25 MG tablet Take 1 tablet (25 mg total) by mouth once daily.    aspirin (ECOTRIN) 81 MG EC tablet Take 1 tablet (81 mg total) by mouth once daily.    OLANZapine (ZYPREXA) 10 MG tablet Take 1 tablet (10 mg total) by mouth once daily. (Patient taking differently: Take 5 mg by mouth once daily.)     Family History    None       Tobacco Use    Smoking status: Every Day     Current packs/day: 0.50     Types: Cigarettes    Smokeless tobacco: Not on file   Substance and Sexual Activity    Alcohol use: Yes     Comment: been in rehab before    Drug use: Not Currently    Sexual activity: Not Currently     Review of Systems   Constitutional:  Positive for fatigue. Negative for appetite change.   HENT:  Negative for congestion, hearing loss and trouble swallowing.     Respiratory:  Positive for cough and shortness of breath. Negative for chest tightness and wheezing.    Cardiovascular:  Negative for chest pain and palpitations.   Gastrointestinal:  Negative for abdominal pain, constipation and nausea.   Genitourinary:  Negative for difficulty urinating and dysuria.   Musculoskeletal:  Positive for gait problem. Negative for back pain and neck stiffness.   Skin:  Negative for pallor and rash.   Neurological:  Negative for dizziness, speech difficulty and headaches.   Psychiatric/Behavioral:  Negative for confusion and suicidal ideas.      Objective:     Vital Signs (Most Recent):  Temp: 98.4 °F (36.9 °C) (02/01/24 1144)  Pulse: 98 (02/01/24 1222)  Resp: 15 (02/01/24 1222)  BP: 103/66 (02/01/24 1110)  SpO2: 96 % (02/01/24 1222) Vital Signs (24h Range):  Temp:  [97.6 °F (36.4 °C)-98.4 °F (36.9 °C)] 98.4 °F (36.9 °C)  Pulse:  [] 98  Resp:  [15-29] 15  SpO2:  [91 %-99 %] 96 %  BP: ()/(58-77) 103/66     Weight: 71.2 kg (157 lb)  Body mass index is 23.87 kg/m².     Physical Exam  Vitals reviewed.   Constitutional:       General: He is awake. He is not in acute distress.     Appearance: He is well-developed. He is not toxic-appearing.   HENT:      Head: Normocephalic.      Nose: Nose normal.      Mouth/Throat:      Pharynx: Oropharynx is clear.   Eyes:      Extraocular Movements: Extraocular movements intact.      Pupils: Pupils are equal, round, and reactive to light.   Neck:      Thyroid: No thyroid mass.      Vascular: No carotid bruit.   Cardiovascular:      Rate and Rhythm: Normal rate and regular rhythm.      Pulses: Normal pulses.      Heart sounds: Normal heart sounds. No murmur heard.  Pulmonary:      Effort: Pulmonary effort is normal.      Breath sounds: Normal air entry. No wheezing.   Abdominal:      General: Bowel sounds are normal. There is no distension.      Palpations: Abdomen is soft.      Tenderness: There is no abdominal tenderness.   Musculoskeletal:          General: Normal range of motion.      Cervical back: Neck supple. No rigidity.   Skin:     General: Skin is warm.      Coloration: Skin is not jaundiced.      Findings: No lesion.   Neurological:      General: No focal deficit present.      Mental Status: He is alert and oriented to person, place, and time.      Cranial Nerves: No cranial nerve deficit.      Comments: Patient is able to use extremities seemingly normally and able to raise his arms above his head  Noted no fasciculations   Psychiatric:         Attention and Perception: Attention normal.         Mood and Affect: Mood normal.         Behavior: Behavior normal. Behavior is cooperative.         Thought Content: Thought content normal.         Cognition and Memory: Cognition normal.              CRANIAL NERVES     CN III, IV, VI   Pupils are equal, round, and reactive to light.       Significant Labs: All pertinent labs within the past 24 hours have been reviewed.  BMP:   Recent Labs   Lab 01/31/24 0432 02/01/24 0352   * 85   * 134*   K 4.2 3.9   CL 99 101   CO2 25 27   BUN 29* 27*   CREATININE 1.33* 1.29   CALCIUM 8.9 9.0   MG 2.3  --        CBC:   Recent Labs   Lab 01/31/24 0432 02/01/24 0352   WBC 12.41* 10.47   HGB 12.4* 13.5   HCT 37.3* 41.3    340       CMP:   Recent Labs   Lab 01/31/24 0432 02/01/24 0352   * 134*   K 4.2 3.9   CL 99 101   CO2 25 27   * 85   BUN 29* 27*   CREATININE 1.33* 1.29   CALCIUM 8.9 9.0   ANIONGAP 10 10         Significant Imaging: I have reviewed all pertinent imaging results/findings within the past 24 hours.    Imaging Results              X-Ray Chest 1 View (Final result)  Result time 02/01/24 07:40:38      Final result by Misael Chang MD (02/01/24 07:40:38)                   Impression:      Improved aeration in the lung bases.  There is some persistent bibasilar edema and atelectasis and mild bilateral pleural effusion    No interval worsening      Electronically  signed by: Misael Chang  Date:    02/01/2024  Time:    07:40               Narrative:    EXAMINATION:  XR CHEST 1 VIEW    CLINICAL HISTORY:  Pleural effusion    COMPARISON:  January 30, 2024 chest x-ray    TECHNIQUE:  AP chest    FINDINGS:  Cardiac silhouette is upper normal.  Mediastinal contours are unremarkable.  Pulmonary vasculature is less prominent.    There is mild bilateral pleural effusion which may be slightly improved on the left.  There is some hazy bibasilar parenchymal density which is improved.  This could represent a combination of improving pulmonary edema and passive atelectasis.    Osseous structures are unremarkable                                       X-Ray Chest Lateral Decubitus Left (Final result)  Result time 02/01/24 07:42:02      Final result by Misael Chang MD (02/01/24 07:42:02)                   Impression:      Mild to moderate layering left pleural effusion      Electronically signed by: Misael Chang  Date:    02/01/2024  Time:    07:42               Narrative:    EXAMINATION:  XR CHEST LATERAL DECUBITUS LEFT    CLINICAL HISTORY:  Pleural effusion    COMPARISON:  January 30, 2024    TECHNIQUE:  Left lateral decubitus view of the chest    FINDINGS:  There is mild to moderate layering pleural effusion in the dependent portion of the left hemithorax.                                       US Lower Extremity Veins Bilateral (Final result)  Result time 01/30/24 14:15:25      Final result by Celso Carey II, MD (01/30/24 14:15:25)                   Impression:      No evidence of deep venous thrombosis.    Ultrasound images stored and captured.      Electronically signed by: Celso Carey  Date:    01/30/2024  Time:    14:15               Narrative:    EXAMINATION:  US LOWER EXTREMITY VEINS BILATERAL    CLINICAL HISTORY:  Short of breath;    TECHNIQUE:  Duplex and color flow Doppler and dynamic compression was performed of the veins was  performed.    COMPARISON:  None.    FINDINGS:  No evidence of echogenic, non-compressible thrombus seen in the visualized veins of the extremities.  Color Doppler venous waveform pattern is within normal limits.                                       CTA Chest Non-Coronary (PE Studies) (Final result)  Result time 01/30/24 07:34:55      Final result by Tarik Talamantes DO (01/30/24 07:34:55)                   Impression:      No evidence to suggest pulmonary embolism.    Focal opacity versus pulmonary nodule located within the inferolateral left upper lobe measuring 4.1 x 2.4 cm.  CT of the chest recommended in 4-6 weeks after medical treatment.    Moderate bilateral pleural effusions.    Heart is enlarged, similar. Small pericardial effusion.    Reflux of contrast in into the IVC, findings in keeping with heart failure.      Electronically signed by: Tarik Talamantes  Date:    01/30/2024  Time:    07:34               Narrative:    EXAMINATION:  CTA CHEST NON CORONARY (PE STUDIES)    CLINICAL HISTORY:  Pulmonary embolism (PE) suspected, positive D-dimer;    TECHNIQUE:  Multiplanar CT of the chest with PE protocol.  MIP projections obtained.  This exam is adequate for interpretation.  100 cc of isovue 370.    Dose reduction: The CT exam was performed using one or more dose reduction techniques: Automatic exposure control, automated adjustment of the MA and/or kVP according to patient size, or use of iterative reconstruction technique.    COMPARISON:  1/30/24    FINDINGS:  Pulmonary artery: Patent    Lower neck: Normal    Chest/airways: Mild to moderate emphysema.  Focal opacity versus pulmonary nodule located within the inferolateral left upper lobe measuring 4.1 x 2.4 cm.  Moderate bilateral pleural effusions.    Mediastinum: Heart is enlarged, similar.  Small pericardial effusion.  Reflux of contrast in into the IVC, findings in keeping with heart failure.    Chest wall: Normal    Bones: Multilevel degenerative  changes of the thoracic spine.    Upper abdomen: Multiple small simple cyst or hemangioma within the liver.                                       X-Ray Chest AP Portable (Final result)  Result time 01/30/24 07:34:32   Procedure changed from X-Ray Chest PA And Lateral     Final result by Mauro Lew MD (01/30/24 07:34:32)                   Impression:      Bilateral pleural effusions.  Suspect edema as well.      Electronically signed by: Mauro Lew  Date:    01/30/2024  Time:    07:34               Narrative:    EXAMINATION:  XR CHEST AP PORTABLE    CLINICAL HISTORY:  chest pain;Chest Pain;    TECHNIQUE:  Single frontal view of the chest was performed.    COMPARISON:  01/21/2024    FINDINGS:  Cardiac silhouette is mildly enlarged.  Prominent interstitial markings.  Small left pleural effusion.  No pneumothorax.  Trace right pleural effusion.                                    Intake/Output - Last 3 Shifts         01/30 0700 01/31 0659 01/31 0700 02/01 0659 02/01 0700  02/02 0659    Urine (mL/kg/hr) 4060 (2.4) 4000 (2.3) 1075 (2)    Total Output 4060 4000 1075    Net -4060 -4000 -1075                 Microbiology Results (last 7 days)       Procedure Component Value Units Date/Time    Blood culture #1 [[id number]] Collected: 01/30/24 0705    Order Status: Completed Specimen: Blood Updated: 02/01/24 0711     Culture, Blood No Growth At 24 Hours    Blood culture #2 [[id number]] Collected: 01/30/24 0711    Order Status: Completed Specimen: Blood Updated: 02/01/24 0711     Culture, Blood No Growth At 24 Hours

## 2024-02-01 NOTE — SUBJECTIVE & OBJECTIVE
"Interval History: Patient seen today, working with PT, states breathing has significantly improved. BP soft with addition of Entresto and spironolactone, morning lasix held by RN. Recommend 40mg IV lasix now.    Review of Systems   Constitutional: Positive for chills.   Cardiovascular:  Positive for chest pain, dyspnea on exertion (improving), leg swelling, orthopnea and palpitations.   Respiratory:  Positive for cough and shortness of breath (improving).    Neurological:  Positive for dizziness.     Objective:     Vital Signs (Most Recent):  Temp: 98.4 °F (36.9 °C) (02/01/24 1144)  Pulse: 98 (02/01/24 1222)  Resp: 15 (02/01/24 1222)  BP: 103/66 (02/01/24 1110)  SpO2: 96 % (02/01/24 1222) Vital Signs (24h Range):  Temp:  [97.6 °F (36.4 °C)-98.4 °F (36.9 °C)] 98.4 °F (36.9 °C)  Pulse:  [] 98  Resp:  [15-29] 15  SpO2:  [91 %-99 %] 96 %  BP: ()/(58-77) 103/66     Weight: 71.2 kg (157 lb)  Body mass index is 23.87 kg/m².     SpO2: 96 %         Intake/Output Summary (Last 24 hours) at 2/1/2024 1558  Last data filed at 2/1/2024 1139  Gross per 24 hour   Intake --   Output 3075 ml   Net -3075 ml       Lines/Drains/Airways       Peripheral Intravenous Line  Duration                  Peripheral IV - Single Lumen 01/30/24 0355 20 G Anterior;Left Forearm 2 days                       Physical Exam  Vitals reviewed.   Constitutional:       General: He is not in acute distress.  Cardiovascular:      Rate and Rhythm: Normal rate and regular rhythm.   Pulmonary:      Effort: Pulmonary effort is normal.      Breath sounds: No rales.   Abdominal:      General: Bowel sounds are normal.      Palpations: Abdomen is soft.   Musculoskeletal:      Right lower leg: No edema.      Left lower leg: No edema.   Skin:     General: Skin is warm and dry.   Neurological:      Mental Status: He is alert.            Significant Labs: ABG: No results for input(s): "PH", "PCO2", "HCO3", "POCSATURATED", "BE" in the last 48 hours., Blood " "Culture: No results for input(s): "LABBLOO" in the last 48 hours., BMP:   Recent Labs   Lab 01/31/24  0432 02/01/24  0352   * 85   * 134*   K 4.2 3.9   CL 99 101   CO2 25 27   BUN 29* 27*   CREATININE 1.33* 1.29   CALCIUM 8.9 9.0   MG 2.3  --    , CMP   Recent Labs   Lab 01/31/24  0432 02/01/24  0352   * 134*   K 4.2 3.9   CL 99 101   CO2 25 27   * 85   BUN 29* 27*   CREATININE 1.33* 1.29   CALCIUM 8.9 9.0   ANIONGAP 10 10   , CBC   Recent Labs   Lab 01/31/24 0432 02/01/24  0352   WBC 12.41* 10.47   HGB 12.4* 13.5   HCT 37.3* 41.3    340   , INR No results for input(s): "INR", "PROTIME" in the last 48 hours., Lipid Panel   Recent Labs   Lab 01/31/24  1813   CHOL 114   HDL 63*   LDLCALC 29   TRIG 110   CHOLHDL 1.8   , and Troponin No results for input(s): "TROPONINI" in the last 48 hours.    Significant Imaging: Cardiac Cath: Results for orders placed during the hospital encounter of 01/04/24    Cardiac catheterization    Conclusion     oRCA w/ good collateral L->R supply from anterior septals, filling retrograde from RPL branch; otherwise LCx w/ anomalous origin, MLI only    The Ost RCA to Prox RCA lesion was 100% stenosed.    The pre-procedure left ventricular end diastolic pressure was 22.    The estimated blood loss was <50 mL.    The procedure log was documented by Documenter: Marian Quintanilla and verified by Elijah Lozano MD.    Date: 1/10/2024  Time: 4:44 PM     , Echocardiogram: Transthoracic echo (TTE) complete (Cupid Only):   Results for orders placed or performed during the hospital encounter of 01/04/24   Echo   Result Value Ref Range    BSA 1.83 m2    LVOT stroke volume 33.09 cm3    LVIDd 6.0 3.5 - 6.0 cm    LV Systolic Volume 129.43 mL    LV Systolic Volume Index 70.0 mL/m2    LVIDs 5.20 (A) 2.1 - 4.0 cm    LV Diastolic Volume 148.24 mL    LV Diastolic Volume Index 80.13 mL/m2    IVS 1.02 0.6 - 1.1 cm    LVOT diameter 1.94 cm    LVOT area 3.0 cm2    FS 13 (A) " 28 - 44 %    Left Ventricle Relative Wall Thickness 0.30 cm    Posterior Wall 0.90 0.6 - 1.1 cm    LV mass 234.22 g    LV Mass Index 127 g/m2    TDI LATERAL 0.17 m/s    TDI SEPTAL 0.05 m/s    TR Max Teofilo 3.41 m/s    E wave deceleration time 73.99 msec    LVOT peak teofilo 0.73 m/s    Left Ventricular Outflow Tract Mean Velocity 0.52 cm/s    Left Ventricular Outflow Tract Mean Gradient 1.20 mmHg    RVDD 5.17 cm    TAPSE 1.55 cm    LA size 3.69 cm    Left Atrium Major Axis 3.55 cm    RA Major Axis 4.48 cm    AV mean gradient 2 mmHg    AV peak gradient 3 mmHg    Ao peak teofilo 0.82 m/s    Ao VTI 10.90 cm    LVOT peak VTI 11.20 cm    AV valve area 3.04 cm²    AV Velocity Ratio 0.89     AV index (prosthetic) 1.03     YUVAL by Velocity Ratio 2.63 cm²    Mr max teofilo 4.80 m/s    MV mean gradient 3 mmHg    MV peak gradient 5 mmHg    MV stenosis pressure 1/2 time 21.46 ms    MV valve area p 1/2 method 10.25 cm2    MV valve area by continuity eq 1.76 cm2    MV VTI 18.8 cm    Triscuspid Valve Regurgitation Peak Gradient 47 mmHg    PV PEAK VELOCITY 0.57 m/s    PV peak gradient 1 mmHg    Ao root annulus 2.68 cm    IVC diameter 2.19 cm    Mean e' 0.11 m/s    ZLVIDS 3.91     ZLVIDD 1.55     AORTIC VALVE CUSP SEPERATION 2.19 cm    TV resting pulmonary artery pressure 62 mmHg    RV TB RVSP 18 mmHg    Est. RA pres 15 mmHg    Mason's Biplane MOD Ejection Fraction 15 %    Left Ventricular Cardiac Output -6.3 dm3 min-1    Narrative      Left Ventricle: The left ventricle is mildly dilated. Normal wall   thickness. There is eccentric hypertrophy. Severe global hypokinesis with   regional variation. There is severely reduced systolic function. Biplane   (2D) method of discs ejection fraction is 15%. Global longitudinal strain   is reduced. There is diastolic dysfunction but grade cannot be determined.    Right Ventricle: Moderate right ventricular enlargement. Systolic   function is mildly reduced.    Right Atrium: Right atrium is mildly dilated.     Aortic Valve: The aortic valve is a trileaflet valve.    Mitral Valve: There is mild bileaflet sclerosis. There is no stenosis.   The mean pressure gradient across the mitral valve is 3 mmHg at a heart   rate of  bpm. There is moderate regurgitation with a posterolateral   eccentriccally directed jet.    Tricuspid Valve: There is moderate regurgitation.    Pulmonary Artery: The estimated pulmonary artery systolic pressure is   62 mmHg.    IVC/SVC: Elevated venous pressure at 15 mmHg.    Pericardium: Large left pleural effusion.     , and X-Ray: CXR: X-Ray Chest 1 View (CXR):   Results for orders placed or performed during the hospital encounter of 01/30/24   X-Ray Chest 1 View    Narrative    EXAMINATION:  XR CHEST 1 VIEW    CLINICAL HISTORY:  Pleural effusion    COMPARISON:  January 30, 2024 chest x-ray    TECHNIQUE:  AP chest    FINDINGS:  Cardiac silhouette is upper normal.  Mediastinal contours are unremarkable.  Pulmonary vasculature is less prominent.    There is mild bilateral pleural effusion which may be slightly improved on the left.  There is some hazy bibasilar parenchymal density which is improved.  This could represent a combination of improving pulmonary edema and passive atelectasis.    Osseous structures are unremarkable      Impression    Improved aeration in the lung bases.  There is some persistent bibasilar edema and atelectasis and mild bilateral pleural effusion    No interval worsening      Electronically signed by: Misael Chang  Date:    02/01/2024  Time:    07:40

## 2024-02-01 NOTE — ASSESSMENT & PLAN NOTE
Patient has a minimal if any pleural effusions now with diuresis no need for thoracentesis.  I would like to repeat his CT in 6 weeks and see him in the clinic to review this abnormality in his lower left upper lobe.  May need bronchoscopy that is still there.  Next issue is his COPD he will need PFTs when he is discharged see me back in clinic.  Would send him home on long-acting anticholinergic long-acting bronchodilators inhaled steroid and rescue inhaler.  One combination would be Trelegy plus Ventolin.  I will sign off call if needed

## 2024-02-01 NOTE — SUBJECTIVE & OBJECTIVE
Past Medical History:   Diagnosis Date    CHF (congestive heart failure)     Hypertension        Past Surgical History:   Procedure Laterality Date    LEFT HEART CATHETERIZATION Left 1/10/2024    Procedure: Left heart cath;  Surgeon: Elijah Lozano MD;  Location: [facility] CATH LAB;  Service: Cardiology;  Laterality: Left;       Review of patient's allergies indicates:   Allergen Reactions    Azithromycin Swelling and Hives       No current facility-administered medications on file prior to encounter.     Current Outpatient Medications on File Prior to Encounter   Medication Sig    albuterol (PROVENTIL/VENTOLIN HFA) 90 mcg/actuation inhaler Inhale 2 puffs into the lungs every 6 (six) hours as needed for Wheezing. Rescue    furosemide (LASIX) 40 MG tablet Take 1 tablet (40 mg total) by mouth once daily.    gabapentin (NEURONTIN) 300 MG capsule Take 1 capsule (300 mg total) by mouth 3 (three) times daily.    losartan (COZAAR) 25 MG tablet Take 1 tablet (25 mg total) by mouth once daily.    metoprolol succinate (TOPROL-XL) 50 MG 24 hr tablet Take 1 tablet (50 mg total) by mouth once daily.    spironolactone (ALDACTONE) 25 MG tablet Take 1 tablet (25 mg total) by mouth once daily.    aspirin (ECOTRIN) 81 MG EC tablet Take 1 tablet (81 mg total) by mouth once daily.    OLANZapine (ZYPREXA) 10 MG tablet Take 1 tablet (10 mg total) by mouth once daily. (Patient taking differently: Take 5 mg by mouth once daily.)     Family History    None       Tobacco Use    Smoking status: Every Day     Current packs/day: 0.50     Types: Cigarettes    Smokeless tobacco: Not on file   Substance and Sexual Activity    Alcohol use: Yes     Comment: been in rehab before    Drug use: Not Currently    Sexual activity: Not Currently     Review of Systems   Constitutional:  Positive for fatigue and fever. Negative for appetite change.   HENT:  Negative for congestion, hearing loss and trouble swallowing.    Respiratory:  Positive for cough  and shortness of breath. Negative for chest tightness and wheezing.    Cardiovascular:  Negative for chest pain and palpitations.   Gastrointestinal:  Positive for diarrhea. Negative for abdominal pain, constipation and nausea.   Genitourinary:  Negative for difficulty urinating and dysuria.   Musculoskeletal:  Positive for gait problem. Negative for back pain and neck stiffness.   Skin:  Negative for pallor and rash.   Neurological:  Negative for dizziness, speech difficulty and headaches.   Psychiatric/Behavioral:  Negative for confusion and suicidal ideas.      Objective:     Vital Signs (Most Recent):  Temp: 98 °F (36.7 °C) (01/31/24 1632)  Pulse: 103 (01/31/24 2202)  Resp: (!) 29 (01/31/24 2202)  BP: 101/69 (01/31/24 2202)  SpO2: 95 % (01/31/24 2202) Vital Signs (24h Range):  Temp:  [98 °F (36.7 °C)-98.8 °F (37.1 °C)] 98 °F (36.7 °C)  Pulse:  [] 103  Resp:  [12-29] 29  SpO2:  [95 %-99 %] 95 %  BP: (101-114)/(67-77) 101/69     Weight: 71.2 kg (157 lb)  Body mass index is 23.87 kg/m².     Physical Exam  Vitals reviewed.   Constitutional:       General: He is awake. He is not in acute distress.     Appearance: He is well-developed. He is not toxic-appearing.   HENT:      Head: Normocephalic.      Nose: Nose normal.      Mouth/Throat:      Pharynx: Oropharynx is clear.   Eyes:      Extraocular Movements: Extraocular movements intact.      Pupils: Pupils are equal, round, and reactive to light.   Neck:      Thyroid: No thyroid mass.      Vascular: No carotid bruit.   Cardiovascular:      Rate and Rhythm: Normal rate and regular rhythm.      Pulses: Normal pulses.      Heart sounds: Normal heart sounds. No murmur heard.  Pulmonary:      Effort: Pulmonary effort is normal.      Breath sounds: Normal air entry. Rales present. No wheezing.   Abdominal:      General: Bowel sounds are normal. There is no distension.      Palpations: Abdomen is soft.      Tenderness: There is no abdominal tenderness.    Musculoskeletal:         General: Normal range of motion.      Cervical back: Neck supple. No rigidity.   Skin:     General: Skin is warm.      Coloration: Skin is not jaundiced.      Findings: No lesion.   Neurological:      General: No focal deficit present.      Mental Status: He is alert and oriented to person, place, and time.      Cranial Nerves: No cranial nerve deficit.      Comments: Patient is able to use extremities seemingly normally and able to raise his arms above his head  Noted no fasciculations   Psychiatric:         Attention and Perception: Attention normal.         Mood and Affect: Mood normal.         Behavior: Behavior normal. Behavior is cooperative.         Thought Content: Thought content normal.         Cognition and Memory: Cognition normal.              CRANIAL NERVES     CN III, IV, VI   Pupils are equal, round, and reactive to light.       Significant Labs: All pertinent labs within the past 24 hours have been reviewed.  BMP:   Recent Labs   Lab 01/31/24  0432   *   *   K 4.2   CL 99   CO2 25   BUN 29*   CREATININE 1.33*   CALCIUM 8.9   MG 2.3       CBC:   Recent Labs   Lab 01/30/24  0401 01/30/24  0713 01/31/24  0432   WBC 12.08*  --  12.41*   HGB 12.9*  --  12.4*   HCT 38.5* 43 37.3*     --  322       CMP:   Recent Labs   Lab 01/30/24  0400 01/31/24  0432   * 130*   K 4.5 4.2   CL 99 99   CO2 25 25   * 138*   BUN 17 29*   CREATININE 1.27 1.33*   CALCIUM 8.6 8.9   PROT 7.7  --    ALBUMIN 3.4*  --    BILITOT 1.0  --    ALKPHOS 88  --    AST 36  --    ALT 36  --    ANIONGAP 9 10         Significant Imaging: I have reviewed all pertinent imaging results/findings within the past 24 hours.    Imaging Results              US Lower Extremity Veins Bilateral (Final result)  Result time 01/30/24 14:15:25      Final result by Celso Carey II, MD (01/30/24 14:15:25)                   Impression:      No evidence of deep venous thrombosis.    Ultrasound  images stored and captured.      Electronically signed by: Celso Carey  Date:    01/30/2024  Time:    14:15               Narrative:    EXAMINATION:  US LOWER EXTREMITY VEINS BILATERAL    CLINICAL HISTORY:  Short of breath;    TECHNIQUE:  Duplex and color flow Doppler and dynamic compression was performed of the veins was performed.    COMPARISON:  None.    FINDINGS:  No evidence of echogenic, non-compressible thrombus seen in the visualized veins of the extremities.  Color Doppler venous waveform pattern is within normal limits.                                       CTA Chest Non-Coronary (PE Studies) (Final result)  Result time 01/30/24 07:34:55      Final result by Tarik Talamantes DO (01/30/24 07:34:55)                   Impression:      No evidence to suggest pulmonary embolism.    Focal opacity versus pulmonary nodule located within the inferolateral left upper lobe measuring 4.1 x 2.4 cm.  CT of the chest recommended in 4-6 weeks after medical treatment.    Moderate bilateral pleural effusions.    Heart is enlarged, similar. Small pericardial effusion.    Reflux of contrast in into the IVC, findings in keeping with heart failure.      Electronically signed by: Tarik Talamantes  Date:    01/30/2024  Time:    07:34               Narrative:    EXAMINATION:  CTA CHEST NON CORONARY (PE STUDIES)    CLINICAL HISTORY:  Pulmonary embolism (PE) suspected, positive D-dimer;    TECHNIQUE:  Multiplanar CT of the chest with PE protocol.  MIP projections obtained.  This exam is adequate for interpretation.  100 cc of isovue 370.    Dose reduction: The CT exam was performed using one or more dose reduction techniques: Automatic exposure control, automated adjustment of the MA and/or kVP according to patient size, or use of iterative reconstruction technique.    COMPARISON:  1/30/24    FINDINGS:  Pulmonary artery: Patent    Lower neck: Normal    Chest/airways: Mild to moderate emphysema.  Focal opacity versus  pulmonary nodule located within the inferolateral left upper lobe measuring 4.1 x 2.4 cm.  Moderate bilateral pleural effusions.    Mediastinum: Heart is enlarged, similar.  Small pericardial effusion.  Reflux of contrast in into the IVC, findings in keeping with heart failure.    Chest wall: Normal    Bones: Multilevel degenerative changes of the thoracic spine.    Upper abdomen: Multiple small simple cyst or hemangioma within the liver.                                       X-Ray Chest AP Portable (Final result)  Result time 01/30/24 07:34:32   Procedure changed from X-Ray Chest PA And Lateral     Final result by Mauro Lew MD (01/30/24 07:34:32)                   Impression:      Bilateral pleural effusions.  Suspect edema as well.      Electronically signed by: Mauro Lew  Date:    01/30/2024  Time:    07:34               Narrative:    EXAMINATION:  XR CHEST AP PORTABLE    CLINICAL HISTORY:  chest pain;Chest Pain;    TECHNIQUE:  Single frontal view of the chest was performed.    COMPARISON:  01/21/2024    FINDINGS:  Cardiac silhouette is mildly enlarged.  Prominent interstitial markings.  Small left pleural effusion.  No pneumothorax.  Trace right pleural effusion.                                    Intake/Output - Last 3 Shifts         01/30 0700 01/31 0659 01/31 0700 02/01 0659    Urine (mL/kg/hr) 4060 (2.4) 2000 (1.8)    Total Output 4060 2000    Net -4060 -2000                Microbiology Results (last 7 days)       Procedure Component Value Units Date/Time    Blood culture #2 [[id number]] Collected: 01/30/24 0711    Order Status: Resulted Specimen: Blood Updated: 01/30/24 0718    Blood culture #1 [[id number]] Collected: 01/30/24 0705    Order Status: Resulted Specimen: Blood Updated: 01/30/24 0718

## 2024-02-01 NOTE — ASSESSMENT & PLAN NOTE
Patient found to have moderate pleural effusion on imaging. I have personally reviewed and interpreted the following imaging: Xray and CT. A thoracentesis was deferred. Most likely etiology includes Congestive Heart Failure. Management to include Diuresis and pulmonary consult to consider thoracentesis.  Cardiology to review.  Of note on recent CT scan there was question of lung mass that will need to be address going forward    02/01 much better with diuresis

## 2024-02-01 NOTE — PT/OT/SLP PROGRESS
Occupational Therapy   Treatment    Name: Derek Moreno  MRN: [id number]  Admitting Diagnosis:  <principal problem not specified>       Recommendations:     Discharge Recommendations: Moderate Intensity Therapy (verses low intensity)  Discharge Equipment Recommendations:   (to be determined)  Barriers to discharge:  None    Assessment:     Derek Moreno is a 58 y.o. male with a medical diagnosis of <principal problem not specified>.  He presents with no complaints. Pt agreed to OT treatment. Performance deficits affecting function are weakness, impaired endurance.     Rehab Prognosis:  Good; patient would benefit from acute skilled OT services to address these deficits and reach maximum level of function.       Plan:     Patient to be seen 5 x/week to address the above listed problems via self-care/home management, therapeutic activities, therapeutic exercises  Plan of Care Expires: 02/15/24  Plan of Care Reviewed with: patient    Subjective     Chief Complaint: feeling weak  Patient/Family Comments/goals: To return home  Pain/Comfort:  Pain Rating 1: 0/10  Pain Rating Post-Intervention 1: 0/10    Objective:     Communicated with: LUIS Hutchinson prior to session.  Patient found HOB elevated with peripheral IV upon OT entry to room.    General Precautions: Standard, fall    Orthopedic Precautions:N/A  Braces: N/A  Respiratory Status: Room air     Occupational Performance:     Bed Mobility:    Patient completed Supine to Sit with modified independence  Patient completed Sit to Supine with modified independence     Functional Mobility/Transfers:    Functional Mobility:     Activities of Daily Living:        [facility] 6 Click ADL:      Treatment & Education:  Pt agreed to UE exercises perform sitting EOB (I) .  Red theraband HEP x 2 sets of 15 reps (B) shoulder adduction/abduction, (R) shoulder flexion/extension and (B) elbow flexion and extension.  2 lb hand wt x 2 sets of 15 reps (B) elbow  flexion/extension  Hand exerciser x 3 band resistance x 50 reps (B)    Pt participated well with exercises. Pt with complaint of chronic shoulder pain on (L)  Plan is to continue tx addressing goals.    Patient left HOB elevated with all lines intact and call button in reach    GOALS:   Multidisciplinary Problems       Occupational Therapy Goals          Problem: Occupational Therapy    Goal Priority Disciplines Outcome Interventions   Occupational Therapy Goal     OT, PT/OT Ongoing, Progressing    Description: STG: (in 1 week)  Pt will perform grooming at sink with SBA  Pt will bathe with setup and energy conservation techniques  Pt will perform UE dressing with independence  Pt will perform LE dressing with independence  Pt will transfer bed/chair/bsc with SBA  Pt will perform standing task x 5 min with SBA  Pt will tolerate 15 minutes of tx without fatigue  Pt will perform UE HEP 2x15 reps of each exercise with cues      LTG:(in 3 weeks)  1.Restore to max I with self care and mobility.                          Time Tracking:     OT Date of Treatment: 02/01/24  OT Start Time: 1647  OT Stop Time: 1705  OT Total Time (min): 18 min    Billable Minutes:Therapeutic Exercise 15    OT/ANAY: OT          2/1/2024

## 2024-02-01 NOTE — HOSPITAL COURSE
01/31 No worse. Basically comfortable. Diuresis and watching. Thanks for everyone help.    02/01 better with no shortness a breath.  Blood pressure slight up.  Chest x-ray looks much better with much less pleural effusion.  Reviewed Dr. English's note and agree.  Will discuss with Cardiology.  Consider changing to oral medications, increase activity and likely be able to discharge home soon with follow up outpatient.    02/ 02 - patient doing better.  Released by Cardiology and Pulmonary.  Patient feels stronger to go home.  States he has a better place to go this time this less drafty.  Discussed with him the importance of keeping follow-up.  He is to weigh himself daily.  Encouraged to continue not to smoke cigarettes or drink alcohol.  Discharged    Patient counseled on the importance of keeping all hospital follow up appointments. Stressed compliance with medications, therapies, or devices as prescribed in order to provide for the best health outcomes and decrease the risk of reoccurrence or further progression of issues.    Additionally, patient given written literature regarding their current disease processes and home care recommendations.   Patient given opportunity to ask questions and verbalized understanding of all information discussed.  Reinforced patient's primary care provider can be a big assets in monitoring and organizing issues after discharge.    Checking oxygen saturation On room air before discharge

## 2024-02-01 NOTE — PROGRESS NOTES
[facility] - Emergency Department  Hospital Medicine  Progress Note    Patient Name: Derek Moreno  MRN: [id number]  Patient Class: IP- Inpatient   Admission Date: 1/30/2024  Length of Stay: 1 days  Attending Physician: Dixon Small MD  Primary Care Provider: No, Primary Doctor        Subjective:     Principal Problem:<principal problem not specified>        HPI:  Chief complaint:  Short of breath.    History of present illness:    Mr. Moreno is a 58-year-old presented to emergency room with worsening short of breath.  Patient had just been discharged from this facility 01/24.  States he was better but still short of breath at discharge.  Had moved into a house that had a lot of draft and was cold.  McConnell like he progressively worsened there.  Developed cough productive of some greenish sputum, sweats, subjective fever and chills.  Breathing worsened.  Complained of 3 pillow orthopnea.  States he could hardly breathe and came back to the emergency depart.  This is his 3rd admission this month to this facility.  Had previously been offered but declined swing bed.  During previous hospital admissions was noted to have ischemic cardiomyopathy.  Findings on recent echocardiogram include greatly reduced ejection fraction to 15%, moderate MR and pulmonary hypertension with RSVP 62.  Noted previously on examinations to have bilateral pleural effusions.  Patient be placed back in hospital for further evaluation and treat.     Review of system:  See above.  Some slight sore throat earlier now resolved.  Patient with some occasional sharp pleuritic chest pain recently.  Some cough as above mentioned productive of some greenish sputum which he had previously been clear.  Complained of dyspnea worse with exertion.  Sleeps on 3 pillows because of orthopnea that is he feels slight worse.  No nausea vomiting.  Recently states when he is eaten he has stool shortly after that seems like jelly.  Last bowel  movement 2 days earlier.   ambulation is affected from history of ALS.   was in a wheelchair for 30 years but able to ambulate some now.  Review of systems otherwise negative.    Past medical history:  -ischemic cardiomyopathy with reduced systolic function with ejection fraction of 15%  -coronary artery disease with recent past left heart catheterization with 100% occlusion of right coronary artery with collaterals from the left with plan to treat medically  -pulmonary hypertension by recent echocardiogram  -hypertension times last 40 years  -COPD  -alcohol dependence  -history of ALS  -history of colon polyps  - Zyprexa was given to him at this hospital and denies any known diagnosis of psychiatric illness    Past surgical history:  No surgery    Allergy azithromycin  States allergic to the mycins    Social history:   lives by himself  Smokes 1/2 pack of cigarettes per week; encouraged patient to stop  Drinks alcohol he states he is greatly cut down on and now drinking 1 qt per week of beer; last alcohol prior day    Family history:   everyone in family has a bad heart  Bypass procedure with 1st surgery in his 40s  Grandmother had myocardial infarction and  in her 50s   mother side of the family has ALS,  mother and uncle and others all had ALS    Health maintenance issues:  [facility] but  is seen in [place] and has not been seen in a while  [facility] how about his ALS  Did not get flu shot this winter  Has had previous Pneumovax  He is convinced he had COVID in 2017 but has not had COVID these wear of since   No COVID vaccination  Several colonoscopies last of which 3 years ago and had some small polyps removed, he has not sure when he should go back for repeat colonoscopy      Overview/Hospital Course:   No worse. Basically comfortable. Diuresis and watching. Thanks for everyone help.    Past Medical  History:   Diagnosis Date    CHF (congestive heart failure)     Hypertension        Past Surgical History:   Procedure Laterality Date    LEFT HEART CATHETERIZATION Left 1/10/2024    Procedure: Left heart cath;  Surgeon: Elijah Lozano MD;  Location: [facility] CATH LAB;  Service: Cardiology;  Laterality: Left;       Review of patient's allergies indicates:   Allergen Reactions    Azithromycin Swelling and Hives       No current facility-administered medications on file prior to encounter.     Current Outpatient Medications on File Prior to Encounter   Medication Sig    albuterol (PROVENTIL/VENTOLIN HFA) 90 mcg/actuation inhaler Inhale 2 puffs into the lungs every 6 (six) hours as needed for Wheezing. Rescue    furosemide (LASIX) 40 MG tablet Take 1 tablet (40 mg total) by mouth once daily.    gabapentin (NEURONTIN) 300 MG capsule Take 1 capsule (300 mg total) by mouth 3 (three) times daily.    losartan (COZAAR) 25 MG tablet Take 1 tablet (25 mg total) by mouth once daily.    metoprolol succinate (TOPROL-XL) 50 MG 24 hr tablet Take 1 tablet (50 mg total) by mouth once daily.    spironolactone (ALDACTONE) 25 MG tablet Take 1 tablet (25 mg total) by mouth once daily.    aspirin (ECOTRIN) 81 MG EC tablet Take 1 tablet (81 mg total) by mouth once daily.    OLANZapine (ZYPREXA) 10 MG tablet Take 1 tablet (10 mg total) by mouth once daily. (Patient taking differently: Take 5 mg by mouth once daily.)     Family History    None       Tobacco Use    Smoking status: Every Day     Current packs/day: 0.50     Types: Cigarettes    Smokeless tobacco: Not on file   Substance and Sexual Activity    Alcohol use: Yes     Comment: been in rehab before    Drug use: Not Currently    Sexual activity: Not Currently     Review of Systems   Constitutional:  Positive for fatigue and fever. Negative for appetite change.   HENT:  Negative for congestion, hearing loss and trouble swallowing.    Respiratory:  Positive for cough and shortness  of breath. Negative for chest tightness and wheezing.    Cardiovascular:  Negative for chest pain and palpitations.   Gastrointestinal:  Positive for diarrhea. Negative for abdominal pain, constipation and nausea.   Genitourinary:  Negative for difficulty urinating and dysuria.   Musculoskeletal:  Positive for gait problem. Negative for back pain and neck stiffness.   Skin:  Negative for pallor and rash.   Neurological:  Negative for dizziness, speech difficulty and headaches.   Psychiatric/Behavioral:  Negative for confusion and suicidal ideas.      Objective:     Vital Signs (Most Recent):  Temp: 98 °F (36.7 °C) (01/31/24 1632)  Pulse: 103 (01/31/24 2202)  Resp: (!) 29 (01/31/24 2202)  BP: 101/69 (01/31/24 2202)  SpO2: 95 % (01/31/24 2202) Vital Signs (24h Range):  Temp:  [98 °F (36.7 °C)-98.8 °F (37.1 °C)] 98 °F (36.7 °C)  Pulse:  [] 103  Resp:  [12-29] 29  SpO2:  [95 %-99 %] 95 %  BP: (101-114)/(67-77) 101/69     Weight: 71.2 kg (157 lb)  Body mass index is 23.87 kg/m².     Physical Exam  Vitals reviewed.   Constitutional:       General: He is awake. He is not in acute distress.     Appearance: He is well-developed. He is not toxic-appearing.   HENT:      Head: Normocephalic.      Nose: Nose normal.      Mouth/Throat:      Pharynx: Oropharynx is clear.   Eyes:      Extraocular Movements: Extraocular movements intact.      Pupils: Pupils are equal, round, and reactive to light.   Neck:      Thyroid: No thyroid mass.      Vascular: No carotid bruit.   Cardiovascular:      Rate and Rhythm: Normal rate and regular rhythm.      Pulses: Normal pulses.      Heart sounds: Normal heart sounds. No murmur heard.  Pulmonary:      Effort: Pulmonary effort is normal.      Breath sounds: Normal air entry. Rales present. No wheezing.   Abdominal:      General: Bowel sounds are normal. There is no distension.      Palpations: Abdomen is soft.      Tenderness: There is no abdominal tenderness.   Musculoskeletal:          General: Normal range of motion.      Cervical back: Neck supple. No rigidity.   Skin:     General: Skin is warm.      Coloration: Skin is not jaundiced.      Findings: No lesion.   Neurological:      General: No focal deficit present.      Mental Status: He is alert and oriented to person, place, and time.      Cranial Nerves: No cranial nerve deficit.      Comments: Patient is able to use extremities seemingly normally and able to raise his arms above his head  Noted no fasciculations   Psychiatric:         Attention and Perception: Attention normal.         Mood and Affect: Mood normal.         Behavior: Behavior normal. Behavior is cooperative.         Thought Content: Thought content normal.         Cognition and Memory: Cognition normal.              CRANIAL NERVES     CN III, IV, VI   Pupils are equal, round, and reactive to light.       Significant Labs: All pertinent labs within the past 24 hours have been reviewed.  BMP:   Recent Labs   Lab 01/31/24  0432   *   *   K 4.2   CL 99   CO2 25   BUN 29*   CREATININE 1.33*   CALCIUM 8.9   MG 2.3       CBC:   Recent Labs   Lab 01/30/24  0401 01/30/24  0713 01/31/24  0432   WBC 12.08*  --  12.41*   HGB 12.9*  --  12.4*   HCT 38.5* 43 37.3*     --  322       CMP:   Recent Labs   Lab 01/30/24  0400 01/31/24  0432   * 130*   K 4.5 4.2   CL 99 99   CO2 25 25   * 138*   BUN 17 29*   CREATININE 1.27 1.33*   CALCIUM 8.6 8.9   PROT 7.7  --    ALBUMIN 3.4*  --    BILITOT 1.0  --    ALKPHOS 88  --    AST 36  --    ALT 36  --    ANIONGAP 9 10         Significant Imaging: I have reviewed all pertinent imaging results/findings within the past 24 hours.    Imaging Results              US Lower Extremity Veins Bilateral (Final result)  Result time 01/30/24 14:15:25      Final result by Celso Carey II, MD (01/30/24 14:15:25)                   Impression:      No evidence of deep venous thrombosis.    Ultrasound images stored and  captured.      Electronically signed by: Celso Carey  Date:    01/30/2024  Time:    14:15               Narrative:    EXAMINATION:  US LOWER EXTREMITY VEINS BILATERAL    CLINICAL HISTORY:  Short of breath;    TECHNIQUE:  Duplex and color flow Doppler and dynamic compression was performed of the veins was performed.    COMPARISON:  None.    FINDINGS:  No evidence of echogenic, non-compressible thrombus seen in the visualized veins of the extremities.  Color Doppler venous waveform pattern is within normal limits.                                       CTA Chest Non-Coronary (PE Studies) (Final result)  Result time 01/30/24 07:34:55      Final result by Tarik Talamantes DO (01/30/24 07:34:55)                   Impression:      No evidence to suggest pulmonary embolism.    Focal opacity versus pulmonary nodule located within the inferolateral left upper lobe measuring 4.1 x 2.4 cm.  CT of the chest recommended in 4-6 weeks after medical treatment.    Moderate bilateral pleural effusions.    Heart is enlarged, similar. Small pericardial effusion.    Reflux of contrast in into the IVC, findings in keeping with heart failure.      Electronically signed by: Tarik Talamantes  Date:    01/30/2024  Time:    07:34               Narrative:    EXAMINATION:  CTA CHEST NON CORONARY (PE STUDIES)    CLINICAL HISTORY:  Pulmonary embolism (PE) suspected, positive D-dimer;    TECHNIQUE:  Multiplanar CT of the chest with PE protocol.  MIP projections obtained.  This exam is adequate for interpretation.  100 cc of isovue 370.    Dose reduction: The CT exam was performed using one or more dose reduction techniques: Automatic exposure control, automated adjustment of the MA and/or kVP according to patient size, or use of iterative reconstruction technique.    COMPARISON:  1/30/24    FINDINGS:  Pulmonary artery: Patent    Lower neck: Normal    Chest/airways: Mild to moderate emphysema.  Focal opacity versus pulmonary nodule located  within the inferolateral left upper lobe measuring 4.1 x 2.4 cm.  Moderate bilateral pleural effusions.    Mediastinum: Heart is enlarged, similar.  Small pericardial effusion.  Reflux of contrast in into the IVC, findings in keeping with heart failure.    Chest wall: Normal    Bones: Multilevel degenerative changes of the thoracic spine.    Upper abdomen: Multiple small simple cyst or hemangioma within the liver.                                       X-Ray Chest AP Portable (Final result)  Result time 01/30/24 07:34:32   Procedure changed from X-Ray Chest PA And Lateral     Final result by Mauro Lew MD (01/30/24 07:34:32)                   Impression:      Bilateral pleural effusions.  Suspect edema as well.      Electronically signed by: Mauro Lew  Date:    01/30/2024  Time:    07:34               Narrative:    EXAMINATION:  XR CHEST AP PORTABLE    CLINICAL HISTORY:  chest pain;Chest Pain;    TECHNIQUE:  Single frontal view of the chest was performed.    COMPARISON:  01/21/2024    FINDINGS:  Cardiac silhouette is mildly enlarged.  Prominent interstitial markings.  Small left pleural effusion.  No pneumothorax.  Trace right pleural effusion.                                    Intake/Output - Last 3 Shifts         01/30 0700 01/31 0659 01/31 0700 02/01 0659    Urine (mL/kg/hr) 4060 (2.4) 2000 (1.8)    Total Output 4060 2000    Net -4060 -2000                Microbiology Results (last 7 days)       Procedure Component Value Units Date/Time    Blood culture #2 [[id number]] Collected: 01/30/24 0711    Order Status: Resulted Specimen: Blood Updated: 01/30/24 0718    Blood culture #1 [[id number]] Collected: 01/30/24 0705    Order Status: Resulted Specimen: Blood Updated: 01/30/24 0718              Assessment/Plan:      Pulmonary hypertension    O2 and monitor    Acute on chronic combined systolic and diastolic heart failure    Aggressive treatment with guided target therapy and diuresis    Vitamin D  "deficiency  replace      Benign hypertensive heart disease with CHF and with combined systolic and diastolic dysfunction, NYHA class 3  Patient is identified as having Combined Systolic and Diastolic heart failure that is Acute on chronic. CHF is currently uncontrolled due to Pulmonary edema/pleural effusion on CXR. Latest ECHO performed and demonstrates- Results for orders placed during the hospital encounter of 01/04/24    Echo  01/05/24    Interpretation Summary    Left Ventricle: The left ventricle is mildly dilated. Normal wall thickness. There is eccentric hypertrophy. Severe global hypokinesis with regional variation. There is severely reduced systolic function. Biplane (2D) method of discs ejection fraction is 15%. Global longitudinal strain is reduced. There is diastolic dysfunction but grade cannot be determined.    Right Ventricle: Moderate right ventricular enlargement. Systolic function is mildly reduced.    Right Atrium: Right atrium is mildly dilated.    Aortic Valve: The aortic valve is a trileaflet valve.    Mitral Valve: There is mild bileaflet sclerosis. There is no stenosis. The mean pressure gradient across the mitral valve is 3 mmHg at a heart rate of  bpm. There is moderate regurgitation with a posterolateral eccentriccally directed jet.    Tricuspid Valve: There is moderate regurgitation.    Pulmonary Artery: The estimated pulmonary artery systolic pressure is 62 mmHg.    IVC/SVC: Elevated venous pressure at 15 mmHg.    Pericardium: Large left pleural effusion.  . Continue Beta Blocker, ACE/ARB, Furosemide, and Aldactone and monitor clinical status closely. Monitor on telemetry. Patient is on CHF pathway.  Monitor strict Is&Os and daily weights.  Place on fluid restriction of 1.5 L. Cardiology has been consulted. Continue to stress to patient importance of self efficacy and  on diet for CHF. Last BNP reviewed- and noted below No results for input(s): "BNP", "BNPTRIAGEBLO" in the last " 168 hours.          ALS (amyotrophic lateral sclerosis)  Mr. 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  States he was in a wheelchair for 30 years.  Tells that he was able to unlock his joints and been able to walk improved.  Tells that his mother side of family has several people with ALS.  Patient states his mother  of ALS after 10 years.  Had an uncle also with ALS. Of note ALS can be familial but is generally not inherited.  He states he would like to see someone locally and will refer him to Neurology outpatient.       Tobacco smoker, less than 10 cigarettes per day    Patient counseled 4 minutes on importance to stopping use of tobacco. Patient was told that stopping smoking was one of the most important actions that could be taken to improve personal health. Discussed tobacco increases risk for poor outcomes in cardiovascular disease, COPD and cancer. Informed stopping smoking reduces risk of premature death by as much as 10 years. Numerous other benefits associated with quitting including helping others by protection from risks associated with secondhand smoke. Told written information with treatment options and help lines will be given at discharge. Patient given opportunity to ask questions.     States he is trying to quit smoking cigarettes altogether and greatly encouraged him to do this         CAD (coronary artery disease)  Patient with known CAD s/p  treated medically , which is controlled Will continue ASA and Statin and monitor for S/Sx of angina/ACS. Continue to monitor on telemetry.     [facility] Summary  01/10/24          oRCA w/ good collateral L->R supply from anterior septals, filling retrograde from RPL branch; otherwise LCx w/ anomalous origin, MLI only    The Ost RCA to Prox RCA lesion was 100% stenosed.    The pre-procedure left ventricular end diastolic pressure was 22.    The estimated blood loss was <50 mL.     The procedure log was documented by  Documenter: Marian Quintanilla and verified by Elijah Lozano MD.     Date: 1/10/2024  Time: 4:44 PM    Pleural effusion  Patient found to have moderate pleural effusion on imaging. I have personally reviewed and interpreted the following imaging: Xray and CT. A thoracentesis was deferred. Most likely etiology includes Congestive Heart Failure. Management to include Diuresis and pulmonary consult to consider thoracentesis.  Cardiology to review.  Of note on recent CT scan there was question of lung mass that will need to be address going forward      Hepatitis  Check hepatitis studies      ETOH abuse    With patient only drinking a qt of beer a week, feel he is at low risk for delirium tremens.  Encouraged patient to continue to stop alcohol consumption    Essential hypertension  Chronic, controlled. Latest blood pressure and vitals reviewed-     Temp:  [98.7 °F (37.1 °C)]   Pulse:  []   Resp:  [17-50]   BP: (118-143)/()   SpO2:  [87 %-98 %] .   Home meds for hypertension were reviewed and noted below.   Hypertension Medications               furosemide (LASIX) 40 MG tablet Take 1 tablet (40 mg total) by mouth once daily.    losartan (COZAAR) 25 MG tablet Take 1 tablet (25 mg total) by mouth once daily.    metoprolol succinate (TOPROL-XL) 50 MG 24 hr tablet Take 1 tablet (50 mg total) by mouth once daily.    spironolactone (ALDACTONE) 25 MG tablet Take 1 tablet (25 mg total) by mouth once daily.            While in the hospital, will manage blood pressure as follows; Continue home antihypertensive regimen    Will utilize p.r.n. blood pressure medication only if patient's blood pressure greater than 180/110 and he develops symptoms such as worsening chest pain or shortness of breath.    COPD (chronic obstructive pulmonary disease)  Patient's COPD is controlled currently.  Patient is currently off COPD Pathway. Continue scheduled inhalers  and monitor respiratory status closely.       VTE Risk  Mitigation (From admission, onward)           Ordered     enoxaparin injection 40 mg  Daily         01/30/24 1156     IP VTE HIGH RISK PATIENT  Once         01/30/24 1156     Place sequential compression device  Until discontinued         01/30/24 1156                    Discharge Planning   EFREM:      Code Status: Full Code   Is the patient medically ready for discharge?:     Reason for patient still in hospital (select all that apply): Laboratory test, Treatment, and Imaging  Discharge Plan A: Home                  Dixon Small MD  Department of Hospital Medicine   [facility] - Emergency Department

## 2024-02-01 NOTE — PROGRESS NOTES
[facility] - Emergency Department  Critical Care Medicine  Progress Note    Patient Name: Derek Moreno  MRN: [id number]  Admission Date: 1/30/2024  Hospital Length of Stay: 2 days  Code Status: Full Code  Attending Provider: Dixon Small MD  Primary Care Provider: No, Primary Doctor   Principal Problem: <principal problem not specified>    Subjective:     HPI:  58-year-old male who has been in the hospital 3 times over the last month for ongoing shortness of breath he is very distinct medical history including ejection fraction 15% there is some questionable history of ALS that has stabilized patient is a smoker drinks alcohol has history of some psychiatric illness I have been asked    Hospital/ICU Course:  No notes on file    Interval History/Significant Events:  Patient without complaints this morning he is having some chills but otherwise doing well short of breath    Review of Systems  Objective:     Vital Signs (Most Recent):  Temp: 97.8 °F (36.6 °C) (02/01/24 0335)  Pulse: 96 (02/01/24 0332)  Resp: (!) 23 (02/01/24 0332)  BP: (!) 109/58 (02/01/24 0332)  SpO2: 97 % (02/01/24 0332) Vital Signs (24h Range):  Temp:  [97.8 °F (36.6 °C)-98.8 °F (37.1 °C)] 97.8 °F (36.6 °C)  Pulse:  [] 96  Resp:  [18-29] 23  SpO2:  [95 %-98 %] 97 %  BP: (101-114)/(58-77) 109/58   Weight: 71.2 kg (157 lb)  Body mass index is 23.87 kg/m².      Intake/Output Summary (Last 24 hours) at 2/1/2024 0553  Last data filed at 2/1/2024 0431  Gross per 24 hour   Intake --   Output 4000 ml   Net -4000 ml          Physical Exam  Vitals reviewed.   Constitutional:       Appearance: Normal appearance.      Interventions: He is not intubated.  HENT:      Head: Normocephalic and atraumatic.      Nose: Nose normal.      Mouth/Throat:      Mouth: Mucous membranes are dry.      Pharynx: Oropharynx is clear.   Eyes:      Extraocular Movements: Extraocular movements intact.      Conjunctiva/sclera: Conjunctivae normal.      Pupils:  Pupils are equal, round, and reactive to light.   Cardiovascular:      Rate and Rhythm: Normal rate.      Heart sounds: Normal heart sounds. No murmur heard.  Pulmonary:      Effort: Pulmonary effort is normal. He is not intubated.      Breath sounds: Normal breath sounds.   Abdominal:      General: Abdomen is flat. Bowel sounds are normal.      Palpations: Abdomen is soft.   Musculoskeletal:         General: Normal range of motion.      Cervical back: Normal range of motion and neck supple.      Right lower leg: No edema.      Left lower leg: No edema.   Skin:     General: Skin is warm and dry.      Capillary Refill: Capillary refill takes less than 2 seconds.   Neurological:      General: No focal deficit present.      Mental Status: He is alert and oriented to person, place, and time.   Psychiatric:         Mood and Affect: Mood normal.         Behavior: Behavior normal.            Vents:  Oxygen Concentration (%): 32 (01/31/24 1945)  Lines/Drains/Airways       Peripheral Intravenous Line  Duration                  Peripheral IV - Single Lumen 01/30/24 0355 20 G Anterior;Left Forearm 2 days                  Significant Labs:    CBC/Anemia Profile:  Recent Labs   Lab 01/30/24  0713 01/30/24  1249 01/31/24  0432   WBC  --   --  12.41*   HGB  --   --  12.4*   HCT 43  --  37.3*   PLT  --   --  322   MCV  --   --  89.9   RDW  --   --  13.0   FOLATE  --  >20.0*  --    [id number]  --  360  --         Chemistries:  Recent Labs   Lab 01/31/24  0432 02/01/24  0352   * 134*   K 4.2 3.9   CL 99 101   CO2 25 27   BUN 29* 27*   CREATININE 1.33* 1.29   CALCIUM 8.9 9.0   MG 2.3  --        Recent Lab Results         02/01/24  0352   01/31/24  1813        Anion Gap 10         BUN 27         BUN/CREAT RATIO 21         Calcium 9.0         Chloride 101         CHOL/HDLC Ratio   1.8       Cholesterol Total   114  Comment:   <200 mg/dL:  Desirable  200-240 mg/dL: Borderline High  >240 mg/dL:  High       CO2 27         Creatinine  1.29         eGFR 65         Glucose 85         HDL   63  Comment:   <40 mg/dL: Low HDL  40-60 mg/dL: Normal  >60 mg/dL: Desirable       LDL Calculated   29       Non-HDL Cholesterol   51       Potassium 3.9         Sodium 134         Triglycerides   110  Comment:   Normal:  <150 mg/dL  Borderline High: 150-199 mg/dL  High:   200-499 mg/dL  Very High:  >=500       Troponin I High Sensitivity 18.9         VLDL Cholesterol Hunter   22               Significant Imaging:  I have reviewed all pertinent imaging results/findings within the past 24 hours.    ABG  Recent Labs   Lab 01/30/24  0713   PH 7.38   PO2 62*   PCO2 42   HCO3 24.8     Assessment/Plan:     Pulmonary  COPD (chronic obstructive pulmonary disease)  Patient has a minimal if any pleural effusions now with diuresis no need for thoracentesis.  I would like to repeat his CT in 6 weeks and see him in the clinic to review this abnormality in his lower left upper lobe.  May need bronchoscopy that is still there.  Next issue is his COPD he will need PFTs when he is discharged see me back in clinic.  Would send him home on long-acting anticholinergic long-acting bronchodilators inhaled steroid and rescue inhaler.  One combination would be Trelegy plus Ventolin.  I will sign off call if needed             Saqib English MD  Critical Care Medicine  [facility] - Emergency Department

## 2024-02-01 NOTE — NURSING
Rec'd to room # 548 via bed AAOx4. Resp even and unlabored on room air. Nicotine patch present on patient.Dopamine infusion at 2 mcg/kg/min. Oriented to room, CB, bed, TV. VS stable. Continue to monitor.

## 2024-02-01 NOTE — NURSING
0856- Levofloxacin started per order.   0859- pt c/o itching to his arm at the site of the PIV. Red, blotchy rash noted proximal to the PIV. Denies SOB. VSS. Levofloxacin stopped.   0910- Provider notified of reaction. KRISTIN.

## 2024-02-01 NOTE — PROGRESS NOTES
[facility] - [facility] Telemetry  Cardiology  Progress Note    Patient Name: Derek Moreno  MRN: [id number]  Admission Date: 1/30/2024  Hospital Length of Stay: 2 days  Code Status: Full Code   Attending Physician: Dixon Small MD   Primary Care Physician: No, Primary Doctor  Expected Discharge Date:   Principal Problem:<principal problem not specified>    Subjective:     Hospital Course:   No notes on file    Interval History: Patient seen today, working with PT, states breathing has significantly improved. BP soft with addition of Entresto and spironolactone, morning lasix held by RN. Recommend 40mg IV lasix now.    Review of Systems   Constitutional: Positive for chills.   Cardiovascular:  Positive for chest pain, dyspnea on exertion (improving), leg swelling, orthopnea and palpitations.   Respiratory:  Positive for cough and shortness of breath (improving).    Neurological:  Positive for dizziness.     Objective:     Vital Signs (Most Recent):  Temp: 98.4 °F (36.9 °C) (02/01/24 1144)  Pulse: 98 (02/01/24 1222)  Resp: 15 (02/01/24 1222)  BP: 103/66 (02/01/24 1110)  SpO2: 96 % (02/01/24 1222) Vital Signs (24h Range):  Temp:  [97.6 °F (36.4 °C)-98.4 °F (36.9 °C)] 98.4 °F (36.9 °C)  Pulse:  [] 98  Resp:  [15-29] 15  SpO2:  [91 %-99 %] 96 %  BP: ()/(58-77) 103/66     Weight: 71.2 kg (157 lb)  Body mass index is 23.87 kg/m².     SpO2: 96 %         Intake/Output Summary (Last 24 hours) at 2/1/2024 1558  Last data filed at 2/1/2024 1139  Gross per 24 hour   Intake --   Output 3075 ml   Net -3075 ml       Lines/Drains/Airways       Peripheral Intravenous Line  Duration                  Peripheral IV - Single Lumen 01/30/24 0355 20 G Anterior;Left Forearm 2 days                       Physical Exam  Vitals reviewed.   Constitutional:       General: He is not in acute distress.  Cardiovascular:      Rate and Rhythm: Normal rate and regular rhythm.   Pulmonary:      Effort: Pulmonary effort  "is normal.      Breath sounds: No rales.   Abdominal:      General: Bowel sounds are normal.      Palpations: Abdomen is soft.   Musculoskeletal:      Right lower leg: No edema.      Left lower leg: No edema.   Skin:     General: Skin is warm and dry.   Neurological:      Mental Status: He is alert.            Significant Labs: ABG: No results for input(s): "PH", "PCO2", "HCO3", "POCSATURATED", "BE" in the last 48 hours., Blood Culture: No results for input(s): "LABBLOO" in the last 48 hours., BMP:   Recent Labs   Lab 01/31/24  0432 02/01/24  0352   * 85   * 134*   K 4.2 3.9   CL 99 101   CO2 25 27   BUN 29* 27*   CREATININE 1.33* 1.29   CALCIUM 8.9 9.0   MG 2.3  --    , CMP   Recent Labs   Lab 01/31/24  0432 02/01/24  0352   * 134*   K 4.2 3.9   CL 99 101   CO2 25 27   * 85   BUN 29* 27*   CREATININE 1.33* 1.29   CALCIUM 8.9 9.0   ANIONGAP 10 10   , CBC   Recent Labs   Lab 01/31/24 0432 02/01/24  0352   WBC 12.41* 10.47   HGB 12.4* 13.5   HCT 37.3* 41.3    340   , INR No results for input(s): "INR", "PROTIME" in the last 48 hours., Lipid Panel   Recent Labs   Lab 01/31/24  1813   CHOL 114   HDL 63*   LDLCALC 29   TRIG 110   CHOLHDL 1.8   , and Troponin No results for input(s): "TROPONINI" in the last 48 hours.    Significant Imaging: Cardiac Cath: Results for orders placed during the hospital encounter of 01/04/24    Cardiac catheterization    Conclusion     oRCA w/ good collateral L->R supply from anterior septals, filling retrograde from RPL branch; otherwise LCx w/ anomalous origin, MLI only    The Ost RCA to Prox RCA lesion was 100% stenosed.    The pre-procedure left ventricular end diastolic pressure was 22.    The estimated blood loss was <50 mL.    The procedure log was documented by Documenter: Marian Quintanilla and verified by Elijah Lozano MD.    Date: 1/10/2024  Time: 4:44 PM     , Echocardiogram: Transthoracic echo (TTE) complete (Cupid Only):   Results for " orders placed or performed during the hospital encounter of 01/04/24   Echo   Result Value Ref Range    BSA 1.83 m2    LVOT stroke volume 33.09 cm3    LVIDd 6.0 3.5 - 6.0 cm    LV Systolic Volume 129.43 mL    LV Systolic Volume Index 70.0 mL/m2    LVIDs 5.20 (A) 2.1 - 4.0 cm    LV Diastolic Volume 148.24 mL    LV Diastolic Volume Index 80.13 mL/m2    IVS 1.02 0.6 - 1.1 cm    LVOT diameter 1.94 cm    LVOT area 3.0 cm2    FS 13 (A) 28 - 44 %    Left Ventricle Relative Wall Thickness 0.30 cm    Posterior Wall 0.90 0.6 - 1.1 cm    LV mass 234.22 g    LV Mass Index 127 g/m2    TDI LATERAL 0.17 m/s    TDI SEPTAL 0.05 m/s    TR Max Teofilo 3.41 m/s    E wave deceleration time 73.99 msec    LVOT peak teofilo 0.73 m/s    Left Ventricular Outflow Tract Mean Velocity 0.52 cm/s    Left Ventricular Outflow Tract Mean Gradient 1.20 mmHg    RVDD 5.17 cm    TAPSE 1.55 cm    LA size 3.69 cm    Left Atrium Major Axis 3.55 cm    RA Major Axis 4.48 cm    AV mean gradient 2 mmHg    AV peak gradient 3 mmHg    Ao peak teofilo 0.82 m/s    Ao VTI 10.90 cm    LVOT peak VTI 11.20 cm    AV valve area 3.04 cm²    AV Velocity Ratio 0.89     AV index (prosthetic) 1.03     YUVAL by Velocity Ratio 2.63 cm²    Mr max teofilo 4.80 m/s    MV mean gradient 3 mmHg    MV peak gradient 5 mmHg    MV stenosis pressure 1/2 time 21.46 ms    MV valve area p 1/2 method 10.25 cm2    MV valve area by continuity eq 1.76 cm2    MV VTI 18.8 cm    Triscuspid Valve Regurgitation Peak Gradient 47 mmHg    PV PEAK VELOCITY 0.57 m/s    PV peak gradient 1 mmHg    Ao root annulus 2.68 cm    IVC diameter 2.19 cm    Mean e' 0.11 m/s    ZLVIDS 3.91     ZLVIDD 1.55     AORTIC VALVE CUSP SEPERATION 2.19 cm    TV resting pulmonary artery pressure 62 mmHg    RV TB RVSP 18 mmHg    Est. RA pres 15 mmHg    Mason's Biplane MOD Ejection Fraction 15 %    Left Ventricular Cardiac Output -6.3 dm3 min-1    Narrative      Left Ventricle: The left ventricle is mildly dilated. Normal wall   thickness. There  is eccentric hypertrophy. Severe global hypokinesis with   regional variation. There is severely reduced systolic function. Biplane   (2D) method of discs ejection fraction is 15%. Global longitudinal strain   is reduced. There is diastolic dysfunction but grade cannot be determined.    Right Ventricle: Moderate right ventricular enlargement. Systolic   function is mildly reduced.    Right Atrium: Right atrium is mildly dilated.    Aortic Valve: The aortic valve is a trileaflet valve.    Mitral Valve: There is mild bileaflet sclerosis. There is no stenosis.   The mean pressure gradient across the mitral valve is 3 mmHg at a heart   rate of  bpm. There is moderate regurgitation with a posterolateral   eccentriccally directed jet.    Tricuspid Valve: There is moderate regurgitation.    Pulmonary Artery: The estimated pulmonary artery systolic pressure is   62 mmHg.    IVC/SVC: Elevated venous pressure at 15 mmHg.    Pericardium: Large left pleural effusion.     , and X-Ray: CXR: X-Ray Chest 1 View (CXR):   Results for orders placed or performed during the hospital encounter of 01/30/24   X-Ray Chest 1 View    Narrative    EXAMINATION:  XR CHEST 1 VIEW    CLINICAL HISTORY:  Pleural effusion    COMPARISON:  January 30, 2024 chest x-ray    TECHNIQUE:  AP chest    FINDINGS:  Cardiac silhouette is upper normal.  Mediastinal contours are unremarkable.  Pulmonary vasculature is less prominent.    There is mild bilateral pleural effusion which may be slightly improved on the left.  There is some hazy bibasilar parenchymal density which is improved.  This could represent a combination of improving pulmonary edema and passive atelectasis.    Osseous structures are unremarkable      Impression    Improved aeration in the lung bases.  There is some persistent bibasilar edema and atelectasis and mild bilateral pleural effusion    No interval worsening      Electronically signed by: Misael  Charlene  Date:    02/01/2024  Time:    07:40     Assessment and Plan:     Brief HPI: 57 y/o male with PMH of COPD, heavy ETOH abuse, depression, [facility] illness [facility]), LHD, VISHAL, and combined systolic and diastolic HF (LVEF 15% with moderate MR and LVEDP 22mmHg) who presented to emergency room with worsening short of breath.  Patient had just been discharged from this facility 01/24.  States he was better but still short of breath at discharge.  Had moved into a house that had a lot of draft and was cold.  [place] like he progressively worsened there.  Developed cough productive of some greenish sputum, sweats, subjective fever and chills.  Breathing worsened.  Complained of 3 pillow orthopnea.  States he could hardly breathe and came back to the emergency depart.  This is his 3rd admission this month to this facility.  Had previously been offered but declined swing bed.  During previous hospital admissions was noted to have ischemic cardiomyopathy.  Findings on recent echocardiogram include greatly reduced ejection fraction to 15%, moderate MR and pulmonary hypertension with RSVP 62. Noted previously on examinations to have bilateral pleural effusions.  Patient be placed back in hospital for further evaluation and treat.      Cardiology consulted for dyspnea with bilateral pleural effusions, smoker, decreased EF & recurrent hospital admissions. [facility] 1/10/204 with single vessel CAD,  RCA with collaterals. Mixed ischemic/alcoholic cardiomyopathy. BNP 9000, was 75655. Echo with EF 15% and severe MR.    Acute on chronic combined systolic and diastolic heart failure  1/31/2024:  - Patient seen and evaluated by Dr. Peterson  - Mixed ischemic/alcoholic cardiomyopathy  - EF 15% with severe MR, 3 admissions in the past month  - Volume overloaded on assessment  - DC losartan, start Entresto 24-26 BID, continue spironolactone  - Hold Toprol XL for now  - Increase lasix to 60mg BID  - Start dopamine  2mcg/kg/min  - Strict I & Os, daily weights  - BMP in am    2/1/2024:  - Creatinine improving, 1.29 today  - Dyspnea improving, BP soft with addition of Entresto, morning lasix held by RN due to hypotension  - Recommend IV lasix 40 now, continue dopamine 2mcg/kg/min  - BB still on hold  - Continue monitoring        VTE Risk Mitigation (From admission, onward)           Ordered     enoxaparin injection 40 mg  Daily         01/30/24 1156     IP VTE HIGH RISK PATIENT  Once         01/30/24 1156     Place sequential compression device  Until discontinued         01/30/24 1156                    ANA PAULA Hogue  Cardiology  [facility] - [facility]

## 2024-02-01 NOTE — PT/OT/SLP PROGRESS
Physical Therapy Treatment    Patient Name:  Derek Moreno   MRN:  [id number]    Recommendations:     Discharge Recommendations: Low Intensity Therapy  Discharge Equipment Recommendations: to be determined by next level of care  Barriers to discharge: None    Assessment:     Derek Moreno is a 58 y.o. male admitted with a medical diagnosis of COPD.  He presents with the following impairments/functional limitations: impaired endurance, impaired functional mobility, gait instability, impaired cardiopulmonary response to activity Pt demonstrates good mobility today. Respiratory status has improved as he maintained saturation in 90s throughout treatment. Pt ambulated with assist of IV pole. Appears to be near functional baseline. Will continue to follow for 1 week trial    Rehab Prognosis: Good; patient would benefit from acute skilled PT services to address these deficits and reach maximum level of function.    Recent Surgery: * No surgery found *      Plan:     During this hospitalization, patient to be seen 5 x/week to address the identified rehab impairments via gait training, therapeutic activities, therapeutic exercises and progress toward the following goals:    Plan of Care Expires:  02/07/24    Subjective     Chief Complaint: shortness of breath   Patient/Family Comments/goals: Pt is agreeable to PT. States he had allergic reaction to antibiotic this morning  Pain/Comfort:  Pain Rating 1: 0/10  Pain Rating Post-Intervention 1: 0/10      Objective:     Communicated with RN prior to session.  Patient found HOB elevated with peripheral IV, telemetry, blood pressure cuff, pulse ox (continuous) upon PT entry to room.     General Precautions: Standard, fall  Orthopedic Precautions: N/A  Braces: N/A  Respiratory Status: Room air     Functional Mobility:  Bed Mobility:     Scooting: supervision  Supine to Sit: supervision  Transfers:     Sit to Stand:  supervision with no AD  Gait: 200 ft stand-by assistance  with IV pole, slow stacy  Balance: good      AM-PAC 6 CLICK MOBILITY  Turning over in bed (including adjusting bedclothes, sheets and blankets)?: 4  Sitting down on and standing up from a chair with arms (e.g., wheelchair, bedside commode, etc.): 4  Moving from lying on back to sitting on the side of the bed?: 4  Moving to and from a bed to a chair (including a wheelchair)?: 4  Need to walk in hospital room?: 3  Climbing 3-5 steps with a railing?: 3  Basic Mobility Total Score: 22       Treatment & Education:  Pt performed bilateral LE: seated exercises: ankle pumps, long arc quads, marches, and hip abduction x 30 each      Patient left sitting edge of bed with all lines intact and call button in reach..    GOALS:   Multidisciplinary Problems       Physical Therapy Goals          Problem: Physical Therapy    Goal Priority Disciplines Outcome Goal Variances Interventions   Physical Therapy Goal     PT, PT/OT Ongoing, Progressing     Description: Short Term Goals to be met by: 2024    Patient will increase functional independence with mobility by performin. Supine to sit with independently  2. Sit to stand transfer with independently using lowest level of assistive device  3. Bed to chair transfer with independently using lowest level of assistive device  4. Gait  x 100 feet with independently using lowest level of assistive device  5. Lower extremity exercise program x30 reps per handout, with assistance as needed    Long Term Goals to be met by: 2024    Pt will regain full independent functional mobility with no assistive device to return to home situation and prior activities of daily living.                        Time Tracking:     PT Received On: 24  PT Start Time: 1104     PT Stop Time: 1130  PT Total Time (min): 26 min     Billable Minutes: Gait Training 10 and Therapeutic Exercise 15    Treatment Type: Treatment  PT/PTA: PT     Number of PTA visits since last PT visit: 0      02/01/2024

## 2024-02-01 NOTE — ASSESSMENT & PLAN NOTE
1/31/2024:  - Patient seen and evaluated by Dr. Peterson  - Mixed ischemic/alcoholic cardiomyopathy  - EF 15% with severe MR, 3 admissions in the past month  - Volume overloaded on assessment  - DC losartan, start Entresto 24-26 BID, continue spironolactone  - Hold Toprol XL for now  - Increase lasix to 60mg BID  - Start dopamine 2mcg/kg/min  - Strict I & Os, daily weights  - BMP in am    2/1/2024:  - Creatinine improving, 1.29 today  - Dyspnea improving, BP soft with addition of Entresto, morning lasix held by RN due to hypotension  - Recommend IV lasix 40 now, continue dopamine 2mcg/kg/min  - BB still on hold  - Continue monitoring

## 2024-02-01 NOTE — SUBJECTIVE & OBJECTIVE
Interval History/Significant Events:  Patient without complaints this morning he is having some chills but otherwise doing well short of breath    Review of Systems  Objective:     Vital Signs (Most Recent):  Temp: 97.8 °F (36.6 °C) (02/01/24 0335)  Pulse: 96 (02/01/24 0332)  Resp: (!) 23 (02/01/24 0332)  BP: (!) 109/58 (02/01/24 0332)  SpO2: 97 % (02/01/24 0332) Vital Signs (24h Range):  Temp:  [97.8 °F (36.6 °C)-98.8 °F (37.1 °C)] 97.8 °F (36.6 °C)  Pulse:  [] 96  Resp:  [18-29] 23  SpO2:  [95 %-98 %] 97 %  BP: (101-114)/(58-77) 109/58   Weight: 71.2 kg (157 lb)  Body mass index is 23.87 kg/m².      Intake/Output Summary (Last 24 hours) at 2/1/2024 0548  Last data filed at 2/1/2024 0431  Gross per 24 hour   Intake --   Output 4000 ml   Net -4000 ml          Physical Exam  Vitals reviewed.   Constitutional:       Appearance: Normal appearance.      Interventions: He is not intubated.  HENT:      Head: Normocephalic and atraumatic.      Nose: Nose normal.      Mouth/Throat:      Mouth: Mucous membranes are dry.      Pharynx: Oropharynx is clear.   Eyes:      Extraocular Movements: Extraocular movements intact.      Conjunctiva/sclera: Conjunctivae normal.      Pupils: Pupils are equal, round, and reactive to light.   Cardiovascular:      Rate and Rhythm: Normal rate.      Heart sounds: Normal heart sounds. No murmur heard.  Pulmonary:      Effort: Pulmonary effort is normal. He is not intubated.      Breath sounds: Normal breath sounds.   Abdominal:      General: Abdomen is flat. Bowel sounds are normal.      Palpations: Abdomen is soft.   Musculoskeletal:         General: Normal range of motion.      Cervical back: Normal range of motion and neck supple.      Right lower leg: No edema.      Left lower leg: No edema.   Skin:     General: Skin is warm and dry.      Capillary Refill: Capillary refill takes less than 2 seconds.   Neurological:      General: No focal deficit present.      Mental Status: He is  alert and oriented to person, place, and time.   Psychiatric:         Mood and Affect: Mood normal.         Behavior: Behavior normal.            Vents:  Oxygen Concentration (%): 32 (01/31/24 1945)  Lines/Drains/Airways       Peripheral Intravenous Line  Duration                  Peripheral IV - Single Lumen 01/30/24 0355 20 G Anterior;Left Forearm 2 days                  Significant Labs:    CBC/Anemia Profile:  Recent Labs   Lab 01/30/24  0713 01/30/24  1249 01/31/24  0432   WBC  --   --  12.41*   HGB  --   --  12.4*   HCT 43  --  37.3*   PLT  --   --  322   MCV  --   --  89.9   RDW  --   --  13.0   FOLATE  --  >20.0*  --    [id number]  --  360  --         Chemistries:  Recent Labs   Lab 01/31/24  0432 02/01/24  0352   * 134*   K 4.2 3.9   CL 99 101   CO2 25 27   BUN 29* 27*   CREATININE 1.33* 1.29   CALCIUM 8.9 9.0   MG 2.3  --        Recent Lab Results         02/01/24  0352   01/31/24  1813        Anion Gap 10         BUN 27         BUN/CREAT RATIO 21         Calcium 9.0         Chloride 101         CHOL/HDLC Ratio   1.8       Cholesterol Total   114  Comment:   <200 mg/dL:  Desirable  200-240 mg/dL: Borderline High  >240 mg/dL:  High       CO2 27         Creatinine 1.29         eGFR 65         Glucose 85         HDL   63  Comment:   <40 mg/dL: Low HDL  40-60 mg/dL: Normal  >60 mg/dL: Desirable       LDL Calculated   29       Non-HDL Cholesterol   51       Potassium 3.9         Sodium 134         Triglycerides   110  Comment:   Normal:  <150 mg/dL  Borderline High: 150-199 mg/dL  High:   200-499 mg/dL  Very High:  >=500       Troponin I High Sensitivity 18.9         VLDL Cholesterol Hunter   22               Significant Imaging:  I have reviewed all pertinent imaging results/findings within the past 24 hours.

## 2024-02-02 VITALS
HEART RATE: 104 BPM | HEIGHT: 68 IN | OXYGEN SATURATION: 95 % | TEMPERATURE: 98 F | BODY MASS INDEX: 23.79 KG/M2 | DIASTOLIC BLOOD PRESSURE: 66 MMHG | WEIGHT: 157 LBS | SYSTOLIC BLOOD PRESSURE: 97 MMHG | RESPIRATION RATE: 16 BRPM

## 2024-02-02 PROBLEM — A41.9 SEVERE SEPSIS: Status: RESOLVED | Noted: 2024-01-04 | Resolved: 2024-02-02

## 2024-02-02 PROBLEM — J69.0 ASPIRATION PNEUMONIA: Status: RESOLVED | Noted: 2024-01-04 | Resolved: 2024-02-02

## 2024-02-02 PROBLEM — R65.20 SEVERE SEPSIS: Status: RESOLVED | Noted: 2024-01-04 | Resolved: 2024-02-02

## 2024-02-02 PROBLEM — J18.9 PNEUMONIA DUE TO INFECTIOUS ORGANISM: Status: RESOLVED | Noted: 2024-01-21 | Resolved: 2024-02-02

## 2024-02-02 LAB
ANION GAP SERPL CALCULATED.3IONS-SCNC: 11 MMOL/L (ref 7–16)
BUN SERPL-MCNC: 25 MG/DL (ref 7–18)
BUN/CREAT SERPL: 20 (ref 6–20)
CALCIUM SERPL-MCNC: 9 MG/DL (ref 8.5–10.1)
CHLORIDE SERPL-SCNC: 99 MMOL/L (ref 98–107)
CO2 SERPL-SCNC: 27 MMOL/L (ref 21–32)
CREAT SERPL-MCNC: 1.28 MG/DL (ref 0.7–1.3)
CRP SERPL-MCNC: 3.39 MG/DL (ref 0–0.8)
EGFR (NO RACE VARIABLE) (RUSH/TITUS): 66 ML/MIN/1.73M2
GLUCOSE SERPL-MCNC: 115 MG/DL (ref 74–106)
NT-PROBNP SERPL-MCNC: 2369 PG/ML (ref 1–125)
POTASSIUM SERPL-SCNC: 4 MMOL/L (ref 3.5–5.1)
SODIUM SERPL-SCNC: 133 MMOL/L (ref 136–145)

## 2024-02-02 PROCEDURE — 94761 N-INVAS EAR/PLS OXIMETRY MLT: CPT

## 2024-02-02 PROCEDURE — 25000003 PHARM REV CODE 250: Performed by: HOSPITALIST

## 2024-02-02 PROCEDURE — 80048 BASIC METABOLIC PNL TOTAL CA: CPT | Performed by: HOSPITALIST

## 2024-02-02 PROCEDURE — 99239 HOSP IP/OBS DSCHRG MGMT >30: CPT | Mod: ,,, | Performed by: HOSPITALIST

## 2024-02-02 PROCEDURE — 25000242 PHARM REV CODE 250 ALT 637 W/ HCPCS: Performed by: INTERNAL MEDICINE

## 2024-02-02 PROCEDURE — 25000003 PHARM REV CODE 250

## 2024-02-02 PROCEDURE — 25000003 PHARM REV CODE 250: Performed by: INTERNAL MEDICINE

## 2024-02-02 PROCEDURE — S4991 NICOTINE PATCH NONLEGEND: HCPCS | Performed by: HOSPITALIST

## 2024-02-02 PROCEDURE — 99900035 HC TECH TIME PER 15 MIN (STAT)

## 2024-02-02 PROCEDURE — 86140 C-REACTIVE PROTEIN: CPT | Performed by: HOSPITALIST

## 2024-02-02 PROCEDURE — 94640 AIRWAY INHALATION TREATMENT: CPT

## 2024-02-02 PROCEDURE — 25000003 PHARM REV CODE 250: Performed by: STUDENT IN AN ORGANIZED HEALTH CARE EDUCATION/TRAINING PROGRAM

## 2024-02-02 PROCEDURE — 83880 ASSAY OF NATRIURETIC PEPTIDE: CPT | Performed by: HOSPITALIST

## 2024-02-02 PROCEDURE — 63600175 PHARM REV CODE 636 W HCPCS: Performed by: STUDENT IN AN ORGANIZED HEALTH CARE EDUCATION/TRAINING PROGRAM

## 2024-02-02 PROCEDURE — 99232 SBSQ HOSP IP/OBS MODERATE 35: CPT | Mod: ,,, | Performed by: NURSE PRACTITIONER

## 2024-02-02 RX ORDER — ALLOPURINOL 100 MG/1
200 TABLET ORAL DAILY
Qty: 60 TABLET | Refills: 5 | Status: SHIPPED | OUTPATIENT
Start: 2024-02-02

## 2024-02-02 RX ORDER — CALCIUM CARBONATE 200(500)MG
500 TABLET,CHEWABLE ORAL 3 TIMES DAILY PRN
Status: DISCONTINUED | OUTPATIENT
Start: 2024-02-02 | End: 2024-02-02 | Stop reason: HOSPADM

## 2024-02-02 RX ORDER — CHOLECALCIFEROL (VITAMIN D3) 25 MCG
1000 TABLET ORAL DAILY
Qty: 30 TABLET | Refills: 5 | Status: SHIPPED | OUTPATIENT
Start: 2024-02-03

## 2024-02-02 RX ORDER — ALUMINUM HYDROXIDE, MAGNESIUM HYDROXIDE, AND SIMETHICONE 2400; 240; 2400 MG/30ML; MG/30ML; MG/30ML
30 SUSPENSION ORAL EVERY 6 HOURS PRN
Status: DISCONTINUED | OUTPATIENT
Start: 2024-02-02 | End: 2024-02-02 | Stop reason: HOSPADM

## 2024-02-02 RX ORDER — FUROSEMIDE 40 MG/1
40 TABLET ORAL 2 TIMES DAILY
Qty: 60 TABLET | Refills: 5 | Status: SHIPPED | OUTPATIENT
Start: 2024-02-02 | End: 2025-02-01

## 2024-02-02 RX ORDER — METOPROLOL SUCCINATE 25 MG/1
25 TABLET, EXTENDED RELEASE ORAL DAILY
Status: DISCONTINUED | OUTPATIENT
Start: 2024-02-02 | End: 2024-02-02 | Stop reason: HOSPADM

## 2024-02-02 RX ORDER — OLANZAPINE 5 MG/1
5 TABLET ORAL DAILY
Qty: 30 TABLET | Refills: 5 | Status: SHIPPED | OUTPATIENT
Start: 2024-02-03 | End: 2025-02-02

## 2024-02-02 RX ORDER — METOPROLOL SUCCINATE 25 MG/1
25 TABLET, EXTENDED RELEASE ORAL DAILY
Qty: 30 TABLET | Refills: 5 | Status: SHIPPED | OUTPATIENT
Start: 2024-02-02 | End: 2025-02-01

## 2024-02-02 RX ORDER — IBUPROFEN 200 MG
1 TABLET ORAL DAILY
Qty: 30 PATCH | Refills: 1 | Status: SHIPPED | OUTPATIENT
Start: 2024-02-02

## 2024-02-02 RX ADMIN — METOPROLOL SUCCINATE 25 MG: 25 TABLET, FILM COATED, EXTENDED RELEASE ORAL at 12:02

## 2024-02-02 RX ADMIN — THERA TABS 1 TABLET: TAB at 09:02

## 2024-02-02 RX ADMIN — POLYETHYLENE GLYCOL 3350 17 G: 17 POWDER, FOR SOLUTION ORAL at 09:02

## 2024-02-02 RX ADMIN — IPRATROPIUM BROMIDE AND ALBUTEROL SULFATE 3 ML: 2.5; .5 SOLUTION RESPIRATORY (INHALATION) at 07:02

## 2024-02-02 RX ADMIN — DOPAMINE HYDROCHLORIDE 2 MCG/KG/MIN: 160 INJECTION, SOLUTION INTRAVENOUS at 06:02

## 2024-02-02 RX ADMIN — SACUBITRIL AND VALSARTAN 1 TABLET: 24; 26 TABLET, FILM COATED ORAL at 09:02

## 2024-02-02 RX ADMIN — Medication 100 MG: at 09:02

## 2024-02-02 RX ADMIN — NICOTINE 1 PATCH: 21 PATCH, EXTENDED RELEASE TRANSDERMAL at 09:02

## 2024-02-02 RX ADMIN — ALUMINUM HYDROXIDE, MAGNESIUM HYDROXIDE, AND DIMETHICONE 30 ML: 400; 400; 40 SUSPENSION ORAL at 11:02

## 2024-02-02 RX ADMIN — ALLOPURINOL 200 MG: 100 TABLET ORAL at 09:02

## 2024-02-02 RX ADMIN — ASPIRIN 81 MG: 81 TABLET, COATED ORAL at 09:02

## 2024-02-02 RX ADMIN — Medication 1000 UNITS: at 09:02

## 2024-02-02 RX ADMIN — IPRATROPIUM BROMIDE AND ALBUTEROL SULFATE 3 ML: 2.5; .5 SOLUTION RESPIRATORY (INHALATION) at 01:02

## 2024-02-02 RX ADMIN — CALCIUM CARBONATE (ANTACID) CHEW TAB 500 MG 500 MG: 500 CHEW TAB at 03:02

## 2024-02-02 RX ADMIN — SPIRONOLACTONE 25 MG: 25 TABLET ORAL at 09:02

## 2024-02-02 RX ADMIN — GABAPENTIN 300 MG: 300 CAPSULE ORAL at 09:02

## 2024-02-02 RX ADMIN — OLANZAPINE 5 MG: 5 TABLET, FILM COATED ORAL at 09:02

## 2024-02-02 RX ADMIN — FUROSEMIDE 40 MG: 40 TABLET ORAL at 09:02

## 2024-02-02 RX ADMIN — CALCIUM CARBONATE (ANTACID) CHEW TAB 500 MG 500 MG: 500 CHEW TAB at 11:02

## 2024-02-02 RX ADMIN — GUAIFENESIN 600 MG: 600 TABLET, EXTENDED RELEASE ORAL at 09:02

## 2024-02-02 RX ADMIN — ACETAMINOPHEN 650 MG: 325 TABLET ORAL at 12:02

## 2024-02-02 RX ADMIN — IPRATROPIUM BROMIDE AND ALBUTEROL SULFATE 3 ML: 2.5; .5 SOLUTION RESPIRATORY (INHALATION) at 12:02

## 2024-02-02 NOTE — PLAN OF CARE
Problem: Violence Risk or Actual  Goal: Anger and Impulse Control  Outcome: Ongoing, Progressing     Problem: Adult Inpatient Plan of Care  Goal: Plan of Care Review  Outcome: Ongoing, Progressing  Goal: Patient-Specific Goal (Individualized)  Outcome: Ongoing, Progressing  Goal: Absence of Hospital-Acquired Illness or Injury  Outcome: Ongoing, Progressing  Goal: Optimal Comfort and Wellbeing  Outcome: Ongoing, Progressing  Goal: Readiness for Transition of Care  Outcome: Ongoing, Progressing     Problem: Skin Injury Risk Increased  Goal: Skin Health and Integrity  Outcome: Ongoing, Progressing     Problem: Gas Exchange Impaired  Goal: Optimal Gas Exchange  Outcome: Ongoing, Progressing     Problem: Adjustment to Illness (Sepsis/Septic Shock)  Goal: Optimal Coping  Outcome: Ongoing, Progressing     Problem: Bleeding (Sepsis/Septic Shock)  Goal: Absence of Bleeding  Outcome: Ongoing, Progressing     Problem: Glycemic Control Impaired (Sepsis/Septic Shock)  Goal: Blood Glucose Level Within Desired Range  Outcome: Ongoing, Progressing     Problem: Infection Progression (Sepsis/Septic Shock)  Goal: Absence of Infection Signs and Symptoms  Outcome: Ongoing, Progressing     Problem: Nutrition Impaired (Sepsis/Septic Shock)  Goal: Optimal Nutrition Intake  Outcome: Ongoing, Progressing     Problem: Fluid Imbalance (Pneumonia)  Goal: Fluid Balance  Outcome: Ongoing, Progressing     Problem: Infection (Pneumonia)  Goal: Resolution of Infection Signs and Symptoms  Outcome: Ongoing, Progressing     Problem: Respiratory Compromise (Pneumonia)  Goal: Effective Oxygenation and Ventilation  Outcome: Ongoing, Progressing

## 2024-02-02 NOTE — SUBJECTIVE & OBJECTIVE
"Interval History: Patient seen today, euvolemic on assessment. Reports dyspnea has significantly improved. Per Dr. Small, plan to discharge home today.    Review of Systems   Constitutional: Positive for chills.   Cardiovascular:  Positive for chest pain, dyspnea on exertion (improving), leg swelling, orthopnea and palpitations.   Respiratory:  Positive for cough and shortness of breath (improving).    Neurological:  Positive for dizziness.     Objective:     Vital Signs (Most Recent):  Temp: 97.6 °F (36.4 °C) (02/02/24 1012)  Pulse: 104 (02/02/24 1314)  Resp: 16 (02/02/24 1314)  BP: 97/66 (02/02/24 1012)  SpO2: 95 % (02/02/24 1314) Vital Signs (24h Range):  Temp:  [97.6 °F (36.4 °C)-99 °F (37.2 °C)] 97.6 °F (36.4 °C)  Pulse:  [] 104  Resp:  [16-28] 16  SpO2:  [95 %-99 %] 95 %  BP: ()/(62-77) 97/66     Weight: 71.2 kg (157 lb)  Body mass index is 23.87 kg/m².     SpO2: 95 %         Intake/Output Summary (Last 24 hours) at 2/2/2024 1324  Last data filed at 2/2/2024 1012  Gross per 24 hour   Intake --   Output 2425 ml   Net -2425 ml       Lines/Drains/Airways       None                      Physical Exam  Vitals reviewed.   Constitutional:       General: He is not in acute distress.  Cardiovascular:      Rate and Rhythm: Normal rate and regular rhythm.   Pulmonary:      Effort: Pulmonary effort is normal.      Breath sounds: No rales.   Abdominal:      General: Bowel sounds are normal.      Palpations: Abdomen is soft.   Musculoskeletal:      Right lower leg: No edema.      Left lower leg: No edema.   Skin:     General: Skin is warm and dry.   Neurological:      Mental Status: He is alert.            Significant Labs: ABG: No results for input(s): "PH", "PCO2", "HCO3", "POCSATURATED", "BE" in the last 48 hours., Blood Culture: No results for input(s): "LABBLOO" in the last 48 hours., BMP:   Recent Labs   Lab 02/01/24  0352 02/02/24  0259   GLU 85 115*   * 133*   K 3.9 4.0    99   CO2 27 27 " "  BUN 27* 25*   CREATININE 1.29 1.28   CALCIUM 9.0 9.0   , CMP   Recent Labs   Lab 02/01/24  0352 02/02/24  0259   * 133*   K 3.9 4.0    99   CO2 27 27   GLU 85 115*   BUN 27* 25*   CREATININE 1.29 1.28   CALCIUM 9.0 9.0   ANIONGAP 10 11   , CBC   Recent Labs   Lab 02/01/24  0352   WBC 10.47   HGB 13.5   HCT 41.3      , INR No results for input(s): "INR", "PROTIME" in the last 48 hours., Lipid Panel   Recent Labs   Lab 01/31/24  1813   CHOL 114   HDL 63*   LDLCALC 29   TRIG 110   CHOLHDL 1.8   , and Troponin No results for input(s): "TROPONINI" in the last 48 hours.    Significant Imaging: Cardiac Cath: Results for orders placed during the hospital encounter of 01/04/24    Cardiac catheterization    Conclusion     oRCA w/ good collateral L->R supply from anterior septals, filling retrograde from RPL branch; otherwise LCx w/ anomalous origin, MLI only    The Ost RCA to Prox RCA lesion was 100% stenosed.    The pre-procedure left ventricular end diastolic pressure was 22.    The estimated blood loss was <50 mL.    The procedure log was documented by Documenter: Marian Quintanilla and verified by Elijah Lozano MD.    Date: 1/10/2024  Time: 4:44 PM     , Echocardiogram: Transthoracic echo (TTE) complete (Cupid Only):   Results for orders placed or performed during the hospital encounter of 01/04/24   Echo   Result Value Ref Range    BSA 1.83 m2    LVOT stroke volume 33.09 cm3    LVIDd 6.0 3.5 - 6.0 cm    LV Systolic Volume 129.43 mL    LV Systolic Volume Index 70.0 mL/m2    LVIDs 5.20 (A) 2.1 - 4.0 cm    LV Diastolic Volume 148.24 mL    LV Diastolic Volume Index 80.13 mL/m2    IVS 1.02 0.6 - 1.1 cm    LVOT diameter 1.94 cm    LVOT area 3.0 cm2    FS 13 (A) 28 - 44 %    Left Ventricle Relative Wall Thickness 0.30 cm    Posterior Wall 0.90 0.6 - 1.1 cm    LV mass 234.22 g    LV Mass Index 127 g/m2    TDI LATERAL 0.17 m/s    TDI SEPTAL 0.05 m/s    TR Max Teofilo 3.41 m/s    E wave deceleration time " 73.99 msec    LVOT peak clarita 0.73 m/s    Left Ventricular Outflow Tract Mean Velocity 0.52 cm/s    Left Ventricular Outflow Tract Mean Gradient 1.20 mmHg    RVDD 5.17 cm    TAPSE 1.55 cm    LA size 3.69 cm    Left Atrium Major Axis 3.55 cm    RA Major Axis 4.48 cm    AV mean gradient 2 mmHg    AV peak gradient 3 mmHg    Ao peak clarita 0.82 m/s    Ao VTI 10.90 cm    LVOT peak VTI 11.20 cm    AV valve area 3.04 cm²    AV Velocity Ratio 0.89     AV index (prosthetic) 1.03     YUVAL by Velocity Ratio 2.63 cm²    Mr max clarita 4.80 m/s    MV mean gradient 3 mmHg    MV peak gradient 5 mmHg    MV stenosis pressure 1/2 time 21.46 ms    MV valve area p 1/2 method 10.25 cm2    MV valve area by continuity eq 1.76 cm2    MV VTI 18.8 cm    Triscuspid Valve Regurgitation Peak Gradient 47 mmHg    PV PEAK VELOCITY 0.57 m/s    PV peak gradient 1 mmHg    Ao root annulus 2.68 cm    IVC diameter 2.19 cm    Mean e' 0.11 m/s    ZLVIDS 3.91     ZLVIDD 1.55     AORTIC VALVE CUSP SEPERATION 2.19 cm    TV resting pulmonary artery pressure 62 mmHg    RV TB RVSP 18 mmHg    Est. RA pres 15 mmHg    Mason's Biplane MOD Ejection Fraction 15 %    Left Ventricular Cardiac Output -6.3 dm3 min-1    Narrative      Left Ventricle: The left ventricle is mildly dilated. Normal wall   thickness. There is eccentric hypertrophy. Severe global hypokinesis with   regional variation. There is severely reduced systolic function. Biplane   (2D) method of discs ejection fraction is 15%. Global longitudinal strain   is reduced. There is diastolic dysfunction but grade cannot be determined.    Right Ventricle: Moderate right ventricular enlargement. Systolic   function is mildly reduced.    Right Atrium: Right atrium is mildly dilated.    Aortic Valve: The aortic valve is a trileaflet valve.    Mitral Valve: There is mild bileaflet sclerosis. There is no stenosis.   The mean pressure gradient across the mitral valve is 3 mmHg at a heart   rate of  bpm. There is moderate  regurgitation with a posterolateral   eccentriccally directed jet.    Tricuspid Valve: There is moderate regurgitation.    Pulmonary Artery: The estimated pulmonary artery systolic pressure is   62 mmHg.    IVC/SVC: Elevated venous pressure at 15 mmHg.    Pericardium: Large left pleural effusion.     , and X-Ray: CXR: X-Ray Chest 1 View (CXR):   Results for orders placed or performed during the hospital encounter of 01/30/24   X-Ray Chest 1 View    Narrative    EXAMINATION:  XR CHEST 1 VIEW    CLINICAL HISTORY:  Pleural effusion    COMPARISON:  January 30, 2024 chest x-ray    TECHNIQUE:  AP chest    FINDINGS:  Cardiac silhouette is upper normal.  Mediastinal contours are unremarkable.  Pulmonary vasculature is less prominent.    There is mild bilateral pleural effusion which may be slightly improved on the left.  There is some hazy bibasilar parenchymal density which is improved.  This could represent a combination of improving pulmonary edema and passive atelectasis.    Osseous structures are unremarkable      Impression    Improved aeration in the lung bases.  There is some persistent bibasilar edema and atelectasis and mild bilateral pleural effusion    No interval worsening      Electronically signed by: Misael Chang  Date:    02/01/2024  Time:    07:40

## 2024-02-02 NOTE — ASSESSMENT & PLAN NOTE
1/31/2024:  - Patient seen and evaluated by Dr. Peterson  - Mixed ischemic/alcoholic cardiomyopathy  - EF 15% with severe MR, 3 admissions in the past month  - Volume overloaded on assessment  - DC losartan, start Entresto 24-26 BID, continue spironolactone  - Hold Toprol XL for now  - Increase lasix to 60mg BID  - Start dopamine 2mcg/kg/min  - Strict I & Os, daily weights  - BMP in am    2/1/2024:  - Creatinine improving, 1.29 today  - Dyspnea improving, BP soft with addition of Entresto, morning lasix held by RN due to hypotension  - Recommend IV lasix 40 now, continue dopamine 2mcg/kg/min  - BB still on hold  - Continue monitoring    2/2/2024:  - Start Toprol XL 25 daily per Dr. Peterson  - Dopamine dc'd  - Euvolemic on assessment today  - Discharge home on Entresto 24-26 BID, Toprol XL 25 daily, spironolactone 25mg daily, lasix 40mg BID  - Keep scheduled follow up with Dr. Chowdhury next week, 2/9/2024

## 2024-02-02 NOTE — ASSESSMENT & PLAN NOTE
Chronic, controlled. Latest blood pressure and vitals reviewed-     Temp:  [97.6 °F (36.4 °C)-99 °F (37.2 °C)]   Pulse:  []   Resp:  [15-28]   BP: ()/(62-77)   SpO2:  [95 %-99 %] .   Home meds for hypertension were reviewed and noted below.   Hypertension Medications               furosemide (LASIX) 40 MG tablet Take 1 tablet (40 mg total) by mouth once daily.    losartan (COZAAR) 25 MG tablet Take 1 tablet (25 mg total) by mouth once daily.    metoprolol succinate (TOPROL-XL) 50 MG 24 hr tablet Take 1 tablet (50 mg total) by mouth once daily.    spironolactone (ALDACTONE) 25 MG tablet Take 1 tablet (25 mg total) by mouth once daily.            While in the hospital, will manage blood pressure as follows; Continue home antihypertensive regimen    Will utilize p.r.n. blood pressure medication only if patient's blood pressure greater than 180/110 and he develops symptoms such as worsening chest pain or shortness of breath.

## 2024-02-02 NOTE — PLAN OF CARE
Problem: Violence Risk or Actual  Goal: Anger and Impulse Control  Outcome: Ongoing, Progressing     Problem: Adult Inpatient Plan of Care  Goal: Plan of Care Review  Outcome: Ongoing, Progressing  Goal: Patient-Specific Goal (Individualized)  Outcome: Ongoing, Progressing  Goal: Absence of Hospital-Acquired Illness or Injury  Outcome: Ongoing, Progressing  Goal: Optimal Comfort and Wellbeing  Outcome: Ongoing, Progressing  Goal: Readiness for Transition of Care  Outcome: Ongoing, Progressing     Problem: Skin Injury Risk Increased  Goal: Skin Health and Integrity  Outcome: Ongoing, Progressing     Problem: Gas Exchange Impaired  Goal: Optimal Gas Exchange  Outcome: Ongoing, Progressing     Problem: Adjustment to Illness (Sepsis/Septic Shock)  Goal: Optimal Coping  Outcome: Ongoing, Progressing     Problem: Bleeding (Sepsis/Septic Shock)  Goal: Absence of Bleeding  Outcome: Ongoing, Progressing     Problem: Glycemic Control Impaired (Sepsis/Septic Shock)  Goal: Blood Glucose Level Within Desired Range  Outcome: Ongoing, Progressing     Problem: Infection Progression (Sepsis/Septic Shock)  Goal: Absence of Infection Signs and Symptoms  Outcome: Ongoing, Progressing     Problem: Nutrition Impaired (Sepsis/Septic Shock)  Goal: Optimal Nutrition Intake  Outcome: Ongoing, Progressing     Problem: Fluid Imbalance (Pneumonia)  Goal: Fluid Balance  Outcome: Ongoing, Progressing     Problem: Infection (Pneumonia)  Goal: Resolution of Infection Signs and Symptoms  Outcome: Ongoing, Progressing     Problem: Respiratory Compromise (Pneumonia)  Goal: Effective Oxygenation and Ventilation  Outcome: Ongoing, Progressing      Patient/Caregiver provided printed discharge information.

## 2024-02-02 NOTE — DISCHARGE INSTRUCTIONS
Do daily weights, follow discharge instructions , call cardiology if more than 2 lb weight gain in 24 hours.

## 2024-02-02 NOTE — ASSESSMENT & PLAN NOTE
"Patient is identified as having Combined Systolic and Diastolic heart failure that is Acute on chronic. CHF is currently uncontrolled due to Pulmonary edema/pleural effusion on CXR. Latest ECHO performed and demonstrates- Results for orders placed during the hospital encounter of 01/04/24    Echo  01/05/24    Interpretation Summary    Left Ventricle: The left ventricle is mildly dilated. Normal wall thickness. There is eccentric hypertrophy. Severe global hypokinesis with regional variation. There is severely reduced systolic function. Biplane (2D) method of discs ejection fraction is 15%. Global longitudinal strain is reduced. There is diastolic dysfunction but grade cannot be determined.    Right Ventricle: Moderate right ventricular enlargement. Systolic function is mildly reduced.    Right Atrium: Right atrium is mildly dilated.    Aortic Valve: The aortic valve is a trileaflet valve.    Mitral Valve: There is mild bileaflet sclerosis. There is no stenosis. The mean pressure gradient across the mitral valve is 3 mmHg at a heart rate of  bpm. There is moderate regurgitation with a posterolateral eccentriccally directed jet.    Tricuspid Valve: There is moderate regurgitation.    Pulmonary Artery: The estimated pulmonary artery systolic pressure is 62 mmHg.    IVC/SVC: Elevated venous pressure at 15 mmHg.    Pericardium: Large left pleural effusion.  . Continue Beta Blocker, ACE/ARB, Furosemide, and Aldactone and monitor clinical status closely. Monitor on telemetry. Patient is on CHF pathway.  Monitor strict Is&Os and daily weights.  Place on fluid restriction of 1.5 L. Cardiology has been consulted. Continue to stress to patient importance of self efficacy and  on diet for CHF. Last BNP reviewed- and noted below No results for input(s): "BNP", "BNPTRIAGEBLO" in the last 168 hours.        "

## 2024-02-02 NOTE — ASSESSMENT & PLAN NOTE
Mr. 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  States he was in a wheelchair for 30 years.  Tells that he was able to unlock his joints and been able to walk improved.  Tells that his mother side of family has several people with ALS.  Patient states his mother  of ALS after 10 years.  Had an uncle also with ALS. Of note ALS can be familial but is generally not inherited.  He states he would like to see someone locally and will refer him to Neurology outpatient.      to follow up with Neurology outpatient

## 2024-02-02 NOTE — DISCHARGE SUMMARY
[facility] Medicine  Discharge Summary      Patient Name: Derek Moreno  MRN: [id number]  MED: [id number]  Patient Class: IP- Inpatient  Admission Date: 1/30/2024  Hospital Length of Stay: 3 days  Discharge Date and Time:  02/02/2024 12:14 PM  Attending Physician: Dixon Small MD   Discharging Provider: Dixon Small MD  Primary Care Provider: Mis, Primary Doctor    Primary Care Team: Networked reference to record PCT     HPI:   Chief complaint:  Short of breath.    History of present illness:    Mr. Moreno is a 58-year-old presented to emergency room with worsening short of breath.  Patient had just been discharged from this facility 01/24.  States he was better but still short of breath at discharge.  Had moved into a house that had a lot of draft and was cold.  [place] like he progressively worsened there.  Developed cough productive of some greenish sputum, sweats, subjective fever and chills.  Breathing worsened.  Complained of 3 pillow orthopnea.  States he could hardly breathe and came back to the emergency depart.  This is his 3rd admission this month to this facility.  Had previously been offered but declined swing bed.  During previous hospital admissions was noted to have ischemic cardiomyopathy.  Findings on recent echocardiogram include greatly reduced ejection fraction to 15%, moderate MR and pulmonary hypertension with RSVP 62.  Noted previously on examinations to have bilateral pleural effusions.  Patient be placed back in hospital for further evaluation and treat.     Review of system:  See above.  Some slight sore throat earlier now resolved.  Patient with some occasional sharp pleuritic chest pain recently.  Some cough as above mentioned productive of some greenish sputum which he had previously been clear.  Complained of dyspnea worse with exertion.  Sleeps on 3 pillows because of orthopnea that is he feels slight worse.  No nausea  vomiting.  Recently states when he is eaten he has stool shortly after that seems like jelly.  Last bowel movement 2 days earlier.  States ambulation is affected from history of ALS.   was in a wheelchair for 30 years but able to ambulate some now.  Review of systems otherwise negative.    Past medical history:  -ischemic cardiomyopathy with reduced systolic function with ejection fraction of 15%  -coronary artery disease with recent past left heart catheterization with 100% occlusion of right coronary artery with collaterals from the left with plan to treat medically  -pulmonary hypertension by recent echocardiogram  -hypertension times last 40 years  -COPD  -alcohol dependence  -history of ALS  -history of colon polyps  -states Zyprexa was given to him at this hospital and denies any known diagnosis of psychiatric illness    Past surgical history:  No surgery    Allergy azithromycin  States allergic to the mycins    Social history:   lives by himself  Smokes 1/2 pack of cigarettes per week; encouraged patient to stop  Drinks alcohol he states he is greatly cut down on and now drinking 1 qt per week of beer; last alcohol prior day    Family history:   everyone in family has a bad heart  Bypass procedure with 1st surgery in his 40s  Grandmother had myocardial infarction and  in her 50s  States mother side of the family has ALS,  mother and uncle and others all had ALS    Health maintenance issues:  [facility]  is seen [facility] [place] and has not been seen in a while  [facility] how about his ALS  Did not get flu shot this winter  Has had previous Pneumovax  He is convinced he had COVID in 2017 but has not had COVID these wear of since   No COVID vaccination  Several colonoscopies last of which 3 years ago and had some small polyps removed, he has not sure when he should go back for repeat colonoscopy      * No surgery found *       Hospital Course:   01/31 No worse. Basically comfortable. Diuresis and watching. Thanks for everyone help.    02/01 better with no shortness a breath.  Blood pressure slight up.  Chest x-ray looks much better with much less pleural effusion.  Reviewed Dr. English's note and agree.  Will discuss with Cardiology.  Consider changing to oral medications, increase activity and likely be able to discharge home soon with follow up outpatient.    02/ 02 - patient doing better.  Released by Cardiology and Pulmonary.  Patient feels stronger to go home.  States he has a better place to go this time this less drafty.  Discussed with him the importance of keeping follow-up.  He is to weigh himself daily.  Encouraged to continue not to smoke cigarettes or drink alcohol.  Discharged    Patient counseled on the importance of keeping all hospital follow up appointments. Stressed compliance with medications, therapies, or devices as prescribed in order to provide for the best health outcomes and decrease the risk of reoccurrence or further progression of issues.    Additionally, patient given written literature regarding their current disease processes and home care recommendations.   Patient given opportunity to ask questions and verbalized understanding of all information discussed.  Reinforced patient's primary care provider can be a big assets in monitoring and organizing issues after discharge.    Checking oxygen saturation On room air before discharge     Goals of Care Treatment Preferences:  Code Status: Full Code      Consults:   Consults (From admission, onward)          Status Ordering Provider     Inpatient consult to Social Work  Once        Provider:  (Not yet assigned)    Completed BEE MENDEZ     Inpatient consult to Registered Dietitian/Nutritionist  Once        Provider:  (Not yet assigned)    Completed BEE MENDEZ     Inpatient consult to Cardiology  Once        Provider:  Elijah Lozano MD    Completed  BEE MENDEZ     Inpatient consult to Pulmonology  Once        Provider:  Saqib English MD    Acknowledged BEE MENDEZ     Case Management/  Once        Provider:  (Not yet assigned)    Completed BEE MENDEZ     Inpatient consult to Social Work/Case Management  Once        Provider:  (Not yet assigned)    Completed CROW HUTCHINSON            Neuro  ALS (amyotrophic lateral sclerosis)  Mr. 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  States he was in a wheelchair for 30 years.  Tells that he was able to unlock his joints and been able to walk improved.  Tells that his mother side of family has several people with ALS.  Patient states his mother  of ALS after 10 years.  Had an uncle also with ALS. Of note ALS can be familial but is generally not inherited.  He states he would like to see someone locally and will refer him to Neurology outpatient.      to follow up with Neurology outpatient      Psychiatric  ETOH abuse    With patient only drinking a qt of beer a week, feel he is at low risk for delirium tremens.  Encouraged patient to continue to stop alcohol consumption    Pulmonary  Pleural effusion  Patient found to have moderate pleural effusion on imaging. I have personally reviewed and interpreted the following imaging: Xray and CT. A thoracentesis was deferred. Most likely etiology includes Congestive Heart Failure. Management to include Diuresis and pulmonary consult to consider thoracentesis.  Cardiology to review.  Of note on recent CT scan there was question of lung mass that will need to be address going forward     much better with diuresis      COPD (chronic obstructive pulmonary disease)  Patient's COPD is controlled currently.  Patient is currently off COPD Pathway. Continue scheduled inhalers  and monitor respiratory status closely.      at discharge per Pulmonary recommendations, follow up with Pulmonary  outpatient    Cardiac/Vascular  * Acute on chronic combined systolic and diastolic heart failure    Aggressive treatment with guided target therapy and diuresis    Pulmonary hypertension    O2 and monitor  Follow up with Pulmonary outpatient    Benign hypertensive heart disease with CHF and with combined systolic and diastolic dysfunction, NYHA class 3  Patient is identified as having Combined Systolic and Diastolic heart failure that is Acute on chronic. CHF is currently uncontrolled due to Pulmonary edema/pleural effusion on CXR. Latest ECHO performed and demonstrates- Results for orders placed during the hospital encounter of 01/04/24    Echo  01/05/24    Interpretation Summary    Left Ventricle: The left ventricle is mildly dilated. Normal wall thickness. There is eccentric hypertrophy. Severe global hypokinesis with regional variation. There is severely reduced systolic function. Biplane (2D) method of discs ejection fraction is 15%. Global longitudinal strain is reduced. There is diastolic dysfunction but grade cannot be determined.    Right Ventricle: Moderate right ventricular enlargement. Systolic function is mildly reduced.    Right Atrium: Right atrium is mildly dilated.    Aortic Valve: The aortic valve is a trileaflet valve.    Mitral Valve: There is mild bileaflet sclerosis. There is no stenosis. The mean pressure gradient across the mitral valve is 3 mmHg at a heart rate of  bpm. There is moderate regurgitation with a posterolateral eccentriccally directed jet.    Tricuspid Valve: There is moderate regurgitation.    Pulmonary Artery: The estimated pulmonary artery systolic pressure is 62 mmHg.    IVC/SVC: Elevated venous pressure at 15 mmHg.    Pericardium: Large left pleural effusion.  . Continue Beta Blocker, ACE/ARB, Furosemide, and Aldactone and monitor clinical status closely. Monitor on telemetry. Patient is on CHF pathway.  Monitor strict Is&Os and daily weights.  Place on fluid restriction of  "1.5 L. Cardiology has been consulted. Continue to stress to patient importance of self efficacy and  on diet for CHF. Last BNP reviewed- and noted below No results for input(s): "BNP", "BNPTRIAGEBLO" in the last 168 hours.          CAD (coronary artery disease)  Patient with known CAD s/p  treated medically , which is controlled Will continue ASA and Statin and monitor for S/Sx of angina/ACS. Continue to monitor on telemetry.     [facility] Summary  01/10/24          oRCA w/ good collateral L->R supply from anterior septals, filling retrograde from RPL branch; otherwise LCx w/ anomalous origin, MLI only    The Ost RCA to Prox RCA lesion was 100% stenosed.    The pre-procedure left ventricular end diastolic pressure was 22.    The estimated blood loss was <50 mL.     The procedure log was documented by Documenter: Marian Quintanilla and verified by Elijah Lozano MD.     Date: 1/10/2024  Time: 4:44 PM    Essential hypertension  Chronic, controlled. Latest blood pressure and vitals reviewed-     Temp:  [97.6 °F (36.4 °C)-99 °F (37.2 °C)]   Pulse:  []   Resp:  [15-28]   BP: ()/(62-77)   SpO2:  [95 %-99 %] .   Home meds for hypertension were reviewed and noted below.   Hypertension Medications               furosemide (LASIX) 40 MG tablet Take 1 tablet (40 mg total) by mouth once daily.    losartan (COZAAR) 25 MG tablet Take 1 tablet (25 mg total) by mouth once daily.    metoprolol succinate (TOPROL-XL) 50 MG 24 hr tablet Take 1 tablet (50 mg total) by mouth once daily.    spironolactone (ALDACTONE) 25 MG tablet Take 1 tablet (25 mg total) by mouth once daily.            While in the hospital, will manage blood pressure as follows; Continue home antihypertensive regimen    Will utilize p.r.n. blood pressure medication only if patient's blood pressure greater than 180/110 and he develops symptoms such as worsening chest pain or shortness of breath.    Endocrine  Vitamin D " deficiency  replace      GI  Hepatitis  Acute Hepatitis panel normal    Other  Tobacco smoker, less than 10 cigarettes per day    Patient counseled 4 minutes on importance to stopping use of tobacco. Patient was told that stopping smoking was one of the most important actions that could be taken to improve personal health. Discussed tobacco increases risk for poor outcomes in cardiovascular disease, COPD and cancer. Informed stopping smoking reduces risk of premature death by as much as 10 years. Numerous other benefits associated with quitting including helping others by protection from risks associated with secondhand smoke. Told written information with treatment options and help lines will be given at discharge. Patient given opportunity to ask questions.     States he is trying to quit smoking cigarettes altogether and greatly encouraged him to do this           Final Active Diagnoses:    Diagnosis Date Noted POA    PRINCIPAL PROBLEM:  Acute on chronic combined systolic and diastolic heart failure [I50.43] 01/31/2024 Yes    Pulmonary hypertension [I27.20] 01/31/2024 Yes    Tobacco smoker, less than 10 cigarettes per day [F17.210] 01/30/2024 Yes    ALS (amyotrophic lateral sclerosis) [G12.21] 01/30/2024 Yes    Benign hypertensive heart disease with CHF and with combined systolic and diastolic dysfunction, NYHA class 3 [I11.0, I50.40] 01/30/2024 Yes    Vitamin D deficiency [E55.9] 01/30/2024 Yes    CAD (coronary artery disease) [I25.10] 01/22/2024 Yes    Pleural effusion [J90] 01/05/2024 Yes    Hepatitis [K75.9] 01/05/2024 Yes    COPD (chronic obstructive pulmonary disease) [J44.9] 01/04/2024 Yes    ETOH abuse [F10.10] 01/04/2024 Yes    Essential hypertension [I10] 01/04/2024 Yes      Problems Resolved During this Admission:       Discharged Condition: fair    Disposition:     Follow Up:   Follow-up Information       Saqib English MD. Schedule an appointment as soon as possible for a visit on 3/18/2024.     Specialty: Pulmonary Disease  Why: 1:30 pm  Contact information:  [address] [facility] [place] [address]  [phone]               Misael Gonzalez MD. Schedule an appointment as soon as possible for a visit on 3/19/2024.    Specialty: Neurology  Why: 8:45 am 1st available but appointement is subject to change  Contact information:  [address] [place] [address]  [phone]               [facility] [place], [place]. Schedule an appointment as soon as possible for a visit in 1 week(s).    Contact information:  [address], [place] Follow up on 2/9/2024.    Specialty: Cardiology  Why: 9:15 am  Contact information:  [address] [facility] [address]  [phone]                           Patient Instructions:      Ambulatory referral/consult to Smoking Cessation Program   Standing Status: Future   Referral Priority: Routine Referral Type: Consultation   Referral Reason: Specialty Services Required   Requested Specialty: CTTS   Number of Visits Requested: 1     Ambulatory referral/consult to Pulmonology   Standing Status: Future   Referral Priority: Routine Referral Type: Consultation   Referral Reason: Specialty Services Required   Referred to Provider: TEVIN VELEZ Requested Specialty: Pulmonary Disease   Number of Visits Requested: 1     Ambulatory referral/consult to Neurology   Standing Status: Future   Referral Priority: Routine Referral Type: Consultation   Referral Reason: Specialty Services Required   Requested Specialty: Neurology   Number of Visits Requested: 1     Diet Cardiac     Activity as tolerated       Significant Diagnostic Studies: Labs: BMP:   Recent Labs   Lab 02/01/24  0352 02/02/24  0259   GLU 85 115*   * 133*   K 3.9 4.0    99   CO2 27 27   BUN 27* 25*   CREATININE 1.29 1.28   CALCIUM 9.0 9.0   , CMP   Recent Labs   Lab 02/01/24  0352 02/02/24  0259   * 133*    K 3.9 4.0    99   CO2 27 27   GLU 85 115*   BUN 27* 25*   CREATININE 1.29 1.28   CALCIUM 9.0 9.0   ANIONGAP 10 11   , and CBC   Recent Labs   Lab 02/01/24  0352   WBC 10.47   HGB 13.5   HCT 41.3        Imaging Results              X-Ray Chest 1 View (Final result)  Result time 02/01/24 07:40:38      Final result by Misael Chang MD (02/01/24 07:40:38)                   Impression:      Improved aeration in the lung bases.  There is some persistent bibasilar edema and atelectasis and mild bilateral pleural effusion    No interval worsening      Electronically signed by: Misael Chang  Date:    02/01/2024  Time:    07:40               Narrative:    EXAMINATION:  XR CHEST 1 VIEW    CLINICAL HISTORY:  Pleural effusion    COMPARISON:  January 30, 2024 chest x-ray    TECHNIQUE:  AP chest    FINDINGS:  Cardiac silhouette is upper normal.  Mediastinal contours are unremarkable.  Pulmonary vasculature is less prominent.    There is mild bilateral pleural effusion which may be slightly improved on the left.  There is some hazy bibasilar parenchymal density which is improved.  This could represent a combination of improving pulmonary edema and passive atelectasis.    Osseous structures are unremarkable                                       X-Ray Chest Lateral Decubitus Left (Final result)  Result time 02/01/24 07:42:02      Final result by Misael Chang MD (02/01/24 07:42:02)                   Impression:      Mild to moderate layering left pleural effusion      Electronically signed by: Misael Chang  Date:    02/01/2024  Time:    07:42               Narrative:    EXAMINATION:  XR CHEST LATERAL DECUBITUS LEFT    CLINICAL HISTORY:  Pleural effusion    COMPARISON:  January 30, 2024    TECHNIQUE:  Left lateral decubitus view of the chest    FINDINGS:  There is mild to moderate layering pleural effusion in the dependent portion of the left hemithorax.                                       US  Lower Extremity Veins Bilateral (Final result)  Result time 01/30/24 14:15:25      Final result by Celso Carey II, MD (01/30/24 14:15:25)                   Impression:      No evidence of deep venous thrombosis.    Ultrasound images stored and captured.      Electronically signed by: Celso Carey  Date:    01/30/2024  Time:    14:15               Narrative:    EXAMINATION:  US LOWER EXTREMITY VEINS BILATERAL    CLINICAL HISTORY:  Short of breath;    TECHNIQUE:  Duplex and color flow Doppler and dynamic compression was performed of the veins was performed.    COMPARISON:  None.    FINDINGS:  No evidence of echogenic, non-compressible thrombus seen in the visualized veins of the extremities.  Color Doppler venous waveform pattern is within normal limits.                                       CTA Chest Non-Coronary (PE Studies) (Final result)  Result time 01/30/24 07:34:55      Final result by Tarik Talamantes DO (01/30/24 07:34:55)                   Impression:      No evidence to suggest pulmonary embolism.    Focal opacity versus pulmonary nodule located within the inferolateral left upper lobe measuring 4.1 x 2.4 cm.  CT of the chest recommended in 4-6 weeks after medical treatment.    Moderate bilateral pleural effusions.    Heart is enlarged, similar. Small pericardial effusion.    Reflux of contrast in into the IVC, findings in keeping with heart failure.      Electronically signed by: Tarik Talamantes  Date:    01/30/2024  Time:    07:34               Narrative:    EXAMINATION:  CTA CHEST NON CORONARY (PE STUDIES)    CLINICAL HISTORY:  Pulmonary embolism (PE) suspected, positive D-dimer;    TECHNIQUE:  Multiplanar CT of the chest with PE protocol.  MIP projections obtained.  This exam is adequate for interpretation.  100 cc of isovue 370.    Dose reduction: The CT exam was performed using one or more dose reduction techniques: Automatic exposure control, automated adjustment of the MA and/or kVP  according to patient size, or use of iterative reconstruction technique.    COMPARISON:  1/30/24    FINDINGS:  Pulmonary artery: Patent    Lower neck: Normal    Chest/airways: Mild to moderate emphysema.  Focal opacity versus pulmonary nodule located within the inferolateral left upper lobe measuring 4.1 x 2.4 cm.  Moderate bilateral pleural effusions.    Mediastinum: Heart is enlarged, similar.  Small pericardial effusion.  Reflux of contrast in into the IVC, findings in keeping with heart failure.    Chest wall: Normal    Bones: Multilevel degenerative changes of the thoracic spine.    Upper abdomen: Multiple small simple cyst or hemangioma within the liver.                                       X-Ray Chest AP Portable (Final result)  Result time 01/30/24 07:34:32   Procedure changed from X-Ray Chest PA And Lateral     Final result by Mauro Lew MD (01/30/24 07:34:32)                   Impression:      Bilateral pleural effusions.  Suspect edema as well.      Electronically signed by: Mauro Lew  Date:    01/30/2024  Time:    07:34               Narrative:    EXAMINATION:  XR CHEST AP PORTABLE    CLINICAL HISTORY:  chest pain;Chest Pain;    TECHNIQUE:  Single frontal view of the chest was performed.    COMPARISON:  01/21/2024    FINDINGS:  Cardiac silhouette is mildly enlarged.  Prominent interstitial markings.  Small left pleural effusion.  No pneumothorax.  Trace right pleural effusion.                                    Intake/Output - Last 3 Shifts         01/31 0700  02/01 0659 02/01 0700  02/02 0659 02/02 0700  02/03 0659    Urine (mL/kg/hr) 4000 (2.3) 2725 (1.6) 775 (2.1)    Total Output 4000 2725 775    Net -4000 -2725 -775                 Microbiology Results (last 7 days)       Procedure Component Value Units Date/Time    Blood culture #2 [[id number]] Collected: 01/30/24 0711    Order Status: Completed Specimen: Blood Updated: 02/02/24 0645     Culture, Blood No Growth At 48 Hours    Blood culture #1  [[id number]] Collected: 01/30/24 0705    Order Status: Completed Specimen: Blood Updated: 02/02/24 0645     Culture, Blood No Growth At 48 Hours                  Pending Diagnostic Studies:       Procedure Component Value Units Date/Time    EKG 12-LEAD [[id number]]     Order Status: Sent Lab Status: No result            Medications:  Reconciled Home Medications:      Medication List        START taking these medications      allopurinoL 100 MG tablet  Commonly known as: ZYLOPRIM  Take 2 tablets (200 mg total) by mouth once daily.     fluticasone-umeclidin-vilanter 200-62.5-25 mcg inhaler  Commonly known as: TRELEGY ELLIPTA  Inhale 1 puff into the lungs once daily.     multivitamin Tab  Take 1 tablet by mouth once daily.  Start taking on: February 3, 2024     nicotine 21 mg/24 hr  Commonly known as: NICODERM CQ  Place 1 patch onto the skin once daily.     sacubitriL-valsartan 24-26 mg per tablet  Commonly known as: ENTRESTO  Take 1 tablet by mouth 2 (two) times daily.     vitamin D 1000 units Tab  Commonly known as: VITAMIN D3  Take 1 tablet (1,000 Units total) by mouth once daily.  Start taking on: February 3, 2024            CHANGE how you take these medications      furosemide 40 MG tablet  Commonly known as: LASIX  Take 1 tablet (40 mg total) by mouth 2 (two) times daily.  What changed: when to take this     metoprolol succinate 25 MG 24 hr tablet  Commonly known as: TOPROL-XL  Take 1 tablet (25 mg total) by mouth once daily.  What changed:   medication strength  how much to take     OLANZapine 5 MG tablet  Commonly known as: ZyPREXA  Take 1 tablet (5 mg total) by mouth once daily.  Start taking on: February 3, 2024  What changed:   medication strength  how much to take            CONTINUE taking these medications      albuterol 90 mcg/actuation inhaler  Commonly known as: PROVENTIL/VENTOLIN HFA  Inhale 2 puffs into the lungs every 6 (six) hours as needed for Wheezing. Rescue     aspirin 81 MG EC tablet  Commonly  known as: ECOTRIN  Take 1 tablet (81 mg total) by mouth once daily.     gabapentin 300 MG capsule  Commonly known as: NEURONTIN  Take 1 capsule (300 mg total) by mouth 3 (three) times daily.     spironolactone 25 MG tablet  Commonly known as: ALDACTONE  Take 1 tablet (25 mg total) by mouth once daily.            STOP taking these medications      losartan 25 MG tablet  Commonly known as: COZAAR              Indwelling Lines/Drains at time of discharge:   Lines/Drains/Airways       None                   Time spent on the discharge of patient: 40 minutes         Dixon Small MD  Department of Hospital Medicine  [facility] - [facility] Tele[facility]

## 2024-02-02 NOTE — PROGRESS NOTES
[facility]  Cardiology  Progress Note    Patient Name: Derek Moreno  MRN: [id number]  Admission Date: 1/30/2024  Hospital Length of Stay: 3 days  Code Status: Full Code   Attending Physician: Dixon Small MD   Primary Care Physician: Mis, Primary Doctor  Expected Discharge Date: 2/2/2024  Principal Problem:Acute on chronic combined systolic and diastolic heart failure    Subjective:     Hospital Course:   No notes on file    Interval History: Patient seen today, euvolemic on assessment. Reports dyspnea has significantly improved. Per Dr. Small, plan to discharge home today.    Review of Systems   Constitutional: Positive for chills.   Cardiovascular:  Positive for chest pain, dyspnea on exertion (improving), leg swelling, orthopnea and palpitations.   Respiratory:  Positive for cough and shortness of breath (improving).    Neurological:  Positive for dizziness.     Objective:     Vital Signs (Most Recent):  Temp: 97.6 °F (36.4 °C) (02/02/24 1012)  Pulse: 104 (02/02/24 1314)  Resp: 16 (02/02/24 1314)  BP: 97/66 (02/02/24 1012)  SpO2: 95 % (02/02/24 1314) Vital Signs (24h Range):  Temp:  [97.6 °F (36.4 °C)-99 °F (37.2 °C)] 97.6 °F (36.4 °C)  Pulse:  [] 104  Resp:  [16-28] 16  SpO2:  [95 %-99 %] 95 %  BP: ()/(62-77) 97/66     Weight: 71.2 kg (157 lb)  Body mass index is 23.87 kg/m².     SpO2: 95 %         Intake/Output Summary (Last 24 hours) at 2/2/2024 1324  Last data filed at 2/2/2024 1012  Gross per 24 hour   Intake --   Output 2425 ml   Net -2425 ml       Lines/Drains/Airways       None                      Physical Exam  Vitals reviewed.   Constitutional:       General: He is not in acute distress.  Cardiovascular:      Rate and Rhythm: Normal rate and regular rhythm.   Pulmonary:      Effort: Pulmonary effort is normal.      Breath sounds: No rales.   Abdominal:      General: Bowel sounds are normal.      Palpations: Abdomen is soft.   Musculoskeletal:  "     Right lower leg: No edema.      Left lower leg: No edema.   Skin:     General: Skin is warm and dry.   Neurological:      Mental Status: He is alert.            Significant Labs: ABG: No results for input(s): "PH", "PCO2", "HCO3", "POCSATURATED", "BE" in the last 48 hours., Blood Culture: No results for input(s): "LABBLOO" in the last 48 hours., BMP:   Recent Labs   Lab 02/01/24  0352 02/02/24  0259   GLU 85 115*   * 133*   K 3.9 4.0    99   CO2 27 27   BUN 27* 25*   CREATININE 1.29 1.28   CALCIUM 9.0 9.0   , CMP   Recent Labs   Lab 02/01/24  0352 02/02/24  0259   * 133*   K 3.9 4.0    99   CO2 27 27   GLU 85 115*   BUN 27* 25*   CREATININE 1.29 1.28   CALCIUM 9.0 9.0   ANIONGAP 10 11   , CBC   Recent Labs   Lab 02/01/24  0352   WBC 10.47   HGB 13.5   HCT 41.3      , INR No results for input(s): "INR", "PROTIME" in the last 48 hours., Lipid Panel   Recent Labs   Lab 01/31/24  1813   CHOL 114   HDL 63*   LDLCALC 29   TRIG 110   CHOLHDL 1.8   , and Troponin No results for input(s): "TROPONINI" in the last 48 hours.    Significant Imaging: Cardiac Cath: Results for orders placed during the hospital encounter of 01/04/24    Cardiac catheterization    Conclusion     oRCA w/ good collateral L->R supply from anterior septals, filling retrograde from RPL branch; otherwise LCx w/ anomalous origin, MLI only    The Ost RCA to Prox RCA lesion was 100% stenosed.    The pre-procedure left ventricular end diastolic pressure was 22.    The estimated blood loss was <50 mL.    The procedure log was documented by Documenter: Marian Quintanilla and verified by Elijah Lozano MD.    Date: 1/10/2024  Time: 4:44 PM     , Echocardiogram: Transthoracic echo (TTE) complete (Cupid Only):   Results for orders placed or performed during the hospital encounter of 01/04/24   Echo   Result Value Ref Range    BSA 1.83 m2    LVOT stroke volume 33.09 cm3    LVIDd 6.0 3.5 - 6.0 cm    LV Systolic Volume 129.43 " mL    LV Systolic Volume Index 70.0 mL/m2    LVIDs 5.20 (A) 2.1 - 4.0 cm    LV Diastolic Volume 148.24 mL    LV Diastolic Volume Index 80.13 mL/m2    IVS 1.02 0.6 - 1.1 cm    LVOT diameter 1.94 cm    LVOT area 3.0 cm2    FS 13 (A) 28 - 44 %    Left Ventricle Relative Wall Thickness 0.30 cm    Posterior Wall 0.90 0.6 - 1.1 cm    LV mass 234.22 g    LV Mass Index 127 g/m2    TDI LATERAL 0.17 m/s    TDI SEPTAL 0.05 m/s    TR Max Teofilo 3.41 m/s    E wave deceleration time 73.99 msec    LVOT peak teofilo 0.73 m/s    Left Ventricular Outflow Tract Mean Velocity 0.52 cm/s    Left Ventricular Outflow Tract Mean Gradient 1.20 mmHg    RVDD 5.17 cm    TAPSE 1.55 cm    LA size 3.69 cm    Left Atrium Major Axis 3.55 cm    RA Major Axis 4.48 cm    AV mean gradient 2 mmHg    AV peak gradient 3 mmHg    Ao peak teofilo 0.82 m/s    Ao VTI 10.90 cm    LVOT peak VTI 11.20 cm    AV valve area 3.04 cm²    AV Velocity Ratio 0.89     AV index (prosthetic) 1.03     YUVAL by Velocity Ratio 2.63 cm²    Mr max teofilo 4.80 m/s    MV mean gradient 3 mmHg    MV peak gradient 5 mmHg    MV stenosis pressure 1/2 time 21.46 ms    MV valve area p 1/2 method 10.25 cm2    MV valve area by continuity eq 1.76 cm2    MV VTI 18.8 cm    Triscuspid Valve Regurgitation Peak Gradient 47 mmHg    PV PEAK VELOCITY 0.57 m/s    PV peak gradient 1 mmHg    Ao root annulus 2.68 cm    IVC diameter 2.19 cm    Mean e' 0.11 m/s    ZLVIDS 3.91     ZLVIDD 1.55     AORTIC VALVE CUSP SEPERATION 2.19 cm    TV resting pulmonary artery pressure 62 mmHg    RV TB RVSP 18 mmHg    Est. RA pres 15 mmHg    Mason's Biplane MOD Ejection Fraction 15 %    Left Ventricular Cardiac Output -6.3 dm3 min-1    Narrative      Left Ventricle: The left ventricle is mildly dilated. Normal wall   thickness. There is eccentric hypertrophy. Severe global hypokinesis with   regional variation. There is severely reduced systolic function. Biplane   (2D) method of discs ejection fraction is 15%. Global longitudinal  strain   is reduced. There is diastolic dysfunction but grade cannot be determined.    Right Ventricle: Moderate right ventricular enlargement. Systolic   function is mildly reduced.    Right Atrium: Right atrium is mildly dilated.    Aortic Valve: The aortic valve is a trileaflet valve.    Mitral Valve: There is mild bileaflet sclerosis. There is no stenosis.   The mean pressure gradient across the mitral valve is 3 mmHg at a heart   rate of  bpm. There is moderate regurgitation with a posterolateral   eccentriccally directed jet.    Tricuspid Valve: There is moderate regurgitation.    Pulmonary Artery: The estimated pulmonary artery systolic pressure is   62 mmHg.    IVC/SVC: Elevated venous pressure at 15 mmHg.    Pericardium: Large left pleural effusion.     , and X-Ray: CXR: X-Ray Chest 1 View (CXR):   Results for orders placed or performed during the hospital encounter of 01/30/24   X-Ray Chest 1 View    Narrative    EXAMINATION:  XR CHEST 1 VIEW    CLINICAL HISTORY:  Pleural effusion    COMPARISON:  January 30, 2024 chest x-ray    TECHNIQUE:  AP chest    FINDINGS:  Cardiac silhouette is upper normal.  Mediastinal contours are unremarkable.  Pulmonary vasculature is less prominent.    There is mild bilateral pleural effusion which may be slightly improved on the left.  There is some hazy bibasilar parenchymal density which is improved.  This could represent a combination of improving pulmonary edema and passive atelectasis.    Osseous structures are unremarkable      Impression    Improved aeration in the lung bases.  There is some persistent bibasilar edema and atelectasis and mild bilateral pleural effusion    No interval worsening      Electronically signed by: Misael Chang  Date:    02/01/2024  Time:    07:40     Assessment and Plan:     Brief HPI: 57 y/o male with PMH of COPD, heavy ETOH abuse, depression, [facility] LHD, VISHAL, and combined systolic and diastolic HF (LVEF  15% with moderate MR and LVEDP 22mmHg) who presented to emergency room with worsening short of breath.  Patient had just been discharged from this facility 01/24.  States he was better but still short of breath at discharge.  Had moved into a house that had a lot of draft and was cold.  Cardwell like he progressively worsened there.  Developed cough productive of some greenish sputum, sweats, subjective fever and chills.  Breathing worsened.  Complained of 3 pillow orthopnea.  States he could hardly breathe and came back to the emergency depart.  This is his 3rd admission this month to this facility.  Had previously been offered but declined swing bed.  During previous hospital admissions was noted to have ischemic cardiomyopathy.  Findings on recent echocardiogram include greatly reduced ejection fraction to 15%, moderate MR and pulmonary hypertension with RSVP 62. Noted previously on examinations to have bilateral pleural effusions.  Patient be placed back in hospital for further evaluation and treat.      Cardiology consulted for dyspnea with bilateral pleural effusions, smoker, decreased EF & recurrent hospital admissions. [facility] 1/10/204 with single vessel CAD,  RCA with collaterals. Mixed ischemic/alcoholic cardiomyopathy. BNP 9000, was 27053. Echo with EF 15% and severe MR    * Acute on chronic combined systolic and diastolic heart failure  1/31/2024:  - Patient seen and evaluated by Dr. Peterson  - Mixed ischemic/alcoholic cardiomyopathy  - EF 15% with severe MR, 3 admissions in the past month  - Volume overloaded on assessment  - DC losartan, start Entresto 24-26 BID, continue spironolactone  - Hold Toprol XL for now  - Increase lasix to 60mg BID  - Start dopamine 2mcg/kg/min  - Strict I & Os, daily weights  - BMP in am    2/1/2024:  - Creatinine improving, 1.29 today  - Dyspnea improving, BP soft with addition of Entresto, morning lasix held by RN due to hypotension  - Recommend IV lasix 40 now, continue  dopamine 2mcg/kg/min  - BB still on hold  - Continue monitoring    2/2/2024:  - Start Toprol XL 25 daily per Dr. Peterson  - Dopamine dc'd  - Euvolemic on assessment today  - Discharge home on Entresto 24-26 BID, Toprol XL 25 daily, spironolactone 25mg daily, lasix 40mg BID  - Keep scheduled follow up with Dr. Chowdhury next week, 2/9/2024        VTE Risk Mitigation (From admission, onward)           Ordered     enoxaparin injection 40 mg  Daily         01/30/24 1156     IP VTE HIGH RISK PATIENT  Once         01/30/24 1156     Place sequential compression device  Until discontinued         01/30/24 1156                    ANA PAULA Hogue  Cardiology  [facility] - [facility]

## 2024-02-02 NOTE — NURSING
Patient given DC instructions. Sent home with 3 sample bottles of Entresto. Dr Small said he could wait on starting Trilogy since he would need to pay out of pocket. Pharmacy delivered other discharge medications. Patient waiting on family to pick him up.

## 2024-02-02 NOTE — ASSESSMENT & PLAN NOTE
Patient's COPD is controlled currently.  Patient is currently off COPD Pathway. Continue scheduled inhalers  and monitor respiratory status closely.     02/02 at discharge per Pulmonary recommendations, follow up with Pulmonary outpatient

## 2024-02-02 NOTE — ASSESSMENT & PLAN NOTE
Patient with known CAD s/p  treated medically , which is controlled Will continue ASA and Statin and monitor for S/Sx of angina/ACS. Continue to monitor on telemetry.     [facility] Summary  01/10/24          oRCA w/ good collateral L->R supply from anterior septals, filling retrograde from RPL branch; otherwise LCx w/ anomalous origin, MLI only    The Ost RCA to Prox RCA lesion was 100% stenosed.    The pre-procedure left ventricular end diastolic pressure was 22.    The estimated blood loss was <50 mL.     The procedure log was documented by Documenter: Marian Quintanilla and verified by Elijah Lozano MD.     Date: 1/10/2024  Time: 4:44 PM

## 2024-02-05 PROBLEM — I50.21 ACUTE SYSTOLIC HEART FAILURE: Status: ACTIVE | Noted: 2024-02-05

## 2024-02-05 PROBLEM — I34.0 NONRHEUMATIC MITRAL VALVE REGURGITATION: Status: ACTIVE | Noted: 2024-02-05

## 2024-02-05 LAB
BACTERIA BLD CULT: NORMAL
BACTERIA BLD CULT: NORMAL

## 2024-02-05 NOTE — PLAN OF CARE
[facility] Telemetry  Discharge Final Note    Primary Care Provider: No, Primary Doctor    Expected Discharge Date: 2/2/2024    Final Discharge Note (most recent)       Final Note - 02/05/24 0829          Final Note    Assessment Type Final Discharge Note     Anticipated Discharge Disposition Home or Self Care                     Important Message from Medicare             Contact Info       Saqib English MD   Specialty: Pulmonary Disease    [address] [place] [id number]   Phone: [phone]       Next Steps: Schedule an appointment as soon as possible for a visit on 3/18/2024    Instructions: 1:30 pm    Misael Gonzalez MD   Specialty: Neurology    [address] [place] [address]   Phone: [phone]       Next Steps: Schedule an appointment as soon as possible for a visit on 3/19/2024    Instructions: 8:45 am 1st available but appointement is subject to change    [facility] [place] [place]    [address]       Next Steps: Schedule an appointment as soon as possible for a visit in 1 week(s)    Ignacio Chowdhury MD   Specialty: Cardiology    [address] [place] [address]   Phone: [phone]       Next Steps: Follow up on 2/9/2024    Instructions: 9:15 am          Pt discharged home

## 2024-02-06 LAB
OHS QRS DURATION: 146 MS
OHS QTC CALCULATION: 433 MS

## 2024-02-11 LAB
OHS QRS DURATION: 144 MS
OHS QTC CALCULATION: 451 MS

## 2024-03-28 DIAGNOSIS — G12.21 AMYOTROPHIC LATERAL SCLEROSIS: Primary | ICD-10-CM

## 2024-04-12 DIAGNOSIS — R07.9 CHEST PAIN: Primary | ICD-10-CM

## 2024-04-12 DIAGNOSIS — I50.9 CHF (CONGESTIVE HEART FAILURE): ICD-10-CM

## 2025-02-21 RX ORDER — FUROSEMIDE 40 MG/1
40 TABLET ORAL 2 TIMES DAILY
Qty: 60 TABLET | Refills: 5 | Status: CANCELLED | OUTPATIENT
Start: 2025-02-21 | End: 2026-02-21

## 2025-02-24 ENCOUNTER — HOSPITAL ENCOUNTER (INPATIENT)
Facility: HOSPITAL | Age: 60
LOS: 9 days | Discharge: HOME | DRG: 291 | End: 2025-03-05
Attending: EMERGENCY MEDICINE | Admitting: FAMILY MEDICINE
Payer: OTHER GOVERNMENT

## 2025-02-24 DIAGNOSIS — I50.23 ACUTE ON CHRONIC SYSTOLIC CHF (CONGESTIVE HEART FAILURE): ICD-10-CM

## 2025-02-24 DIAGNOSIS — I50.43 ACUTE ON CHRONIC COMBINED SYSTOLIC AND DIASTOLIC HEART FAILURE: ICD-10-CM

## 2025-02-24 DIAGNOSIS — I25.10 CORONARY ARTERY DISEASE, UNSPECIFIED VESSEL OR LESION TYPE, UNSPECIFIED WHETHER ANGINA PRESENT, UNSPECIFIED WHETHER NATIVE OR TRANSPLANTED HEART: ICD-10-CM

## 2025-02-24 DIAGNOSIS — K70.30 ALCOHOLIC CIRRHOSIS, UNSPECIFIED WHETHER ASCITES PRESENT: Primary | ICD-10-CM

## 2025-02-24 DIAGNOSIS — R06.00 DYSPNEA: ICD-10-CM

## 2025-02-24 DIAGNOSIS — Z91.148 NONCOMPLIANCE WITH MEDICATIONS: ICD-10-CM

## 2025-02-24 DIAGNOSIS — J43.9 PULMONARY EMPHYSEMA, UNSPECIFIED EMPHYSEMA TYPE: ICD-10-CM

## 2025-02-24 DIAGNOSIS — I10 ESSENTIAL HYPERTENSION: ICD-10-CM

## 2025-02-24 DIAGNOSIS — K70.30 ALCOHOLIC CIRRHOSIS OF LIVER WITHOUT ASCITES: ICD-10-CM

## 2025-02-24 DIAGNOSIS — G12.21 ALS (AMYOTROPHIC LATERAL SCLEROSIS): ICD-10-CM

## 2025-02-24 DIAGNOSIS — F17.200 TOBACCO DEPENDENCY: ICD-10-CM

## 2025-02-24 DIAGNOSIS — E55.9 VITAMIN D DEFICIENCY: ICD-10-CM

## 2025-02-24 DIAGNOSIS — I50.9 CHF (CONGESTIVE HEART FAILURE): ICD-10-CM

## 2025-02-24 DIAGNOSIS — I25.10 CORONARY ARTERY DISEASE INVOLVING NATIVE CORONARY ARTERY OF NATIVE HEART WITHOUT ANGINA PECTORIS: ICD-10-CM

## 2025-02-24 PROBLEM — E78.2 MIXED HYPERLIPIDEMIA: Status: ACTIVE | Noted: 2024-01-04

## 2025-02-24 PROBLEM — G62.9 NEUROPATHY: Status: ACTIVE | Noted: 2025-02-24

## 2025-02-24 PROBLEM — F10.20 ALCOHOL DEPENDENCE: Status: ACTIVE | Noted: 2025-02-24

## 2025-02-24 PROBLEM — G62.9 NEUROPATHY: Status: RESOLVED | Noted: 2025-02-24 | Resolved: 2025-02-24

## 2025-02-24 LAB
ALBUMIN SERPL BCP-MCNC: 3.7 G/DL (ref 3.5–5)
ALBUMIN/GLOB SERPL: 1 {RATIO}
ALP SERPL-CCNC: 108 U/L (ref 40–150)
ALT SERPL W P-5'-P-CCNC: 1107 U/L
ANION GAP SERPL CALCULATED.3IONS-SCNC: 20 MMOL/L (ref 7–16)
APTT PPP: 31.9 SECONDS (ref 25.2–37.3)
AST SERPL W P-5'-P-CCNC: 950 U/L (ref 5–34)
BASOPHILS # BLD AUTO: 0.04 K/UL (ref 0–0.2)
BASOPHILS NFR BLD AUTO: 0.4 % (ref 0–1)
BILIRUB SERPL-MCNC: 3.4 MG/DL
BUN SERPL-MCNC: 15 MG/DL (ref 8–26)
BUN/CREAT SERPL: 11 (ref 6–20)
CALCIUM SERPL-MCNC: 8.7 MG/DL (ref 8.4–10.2)
CHLORIDE SERPL-SCNC: 96 MMOL/L (ref 98–107)
CO2 SERPL-SCNC: 21 MMOL/L (ref 22–29)
CREAT SERPL-MCNC: 1.34 MG/DL (ref 0.72–1.25)
DIFFERENTIAL METHOD BLD: ABNORMAL
EGFR (NO RACE VARIABLE) (RUSH/TITUS): 62 ML/MIN/1.73M2
EOSINOPHIL # BLD AUTO: 0.01 K/UL (ref 0–0.5)
EOSINOPHIL NFR BLD AUTO: 0.1 % (ref 1–4)
ERYTHROCYTE [DISTWIDTH] IN BLOOD BY AUTOMATED COUNT: 14.1 % (ref 11.5–14.5)
GLOBULIN SER-MCNC: 3.7 G/DL (ref 2–4)
GLUCOSE SERPL-MCNC: 109 MG/DL (ref 74–100)
HAV IGM SER QL: NORMAL
HBV CORE IGM SER QL: NORMAL
HBV SURFACE AG SERPL QL IA: NORMAL
HCT VFR BLD AUTO: 42.1 % (ref 40–54)
HCV AB SER QL: NORMAL
HGB BLD-MCNC: 13.1 G/DL (ref 13.5–18)
IMM GRANULOCYTES # BLD AUTO: 0.05 K/UL (ref 0–0.04)
IMM GRANULOCYTES NFR BLD: 0.5 % (ref 0–0.4)
INR BLD: 1.5
LYMPHOCYTES # BLD AUTO: 1.65 K/UL (ref 1–4.8)
LYMPHOCYTES NFR BLD AUTO: 16.8 % (ref 27–41)
MAGNESIUM SERPL-MCNC: 2 MG/DL (ref 1.6–2.6)
MCH RBC QN AUTO: 30.9 PG (ref 27–31)
MCHC RBC AUTO-ENTMCNC: 31.1 G/DL (ref 32–36)
MCV RBC AUTO: 99.3 FL (ref 80–96)
MONOCYTES # BLD AUTO: 0.99 K/UL (ref 0–0.8)
MONOCYTES NFR BLD AUTO: 10.1 % (ref 2–6)
MPC BLD CALC-MCNC: 11.3 FL (ref 9.4–12.4)
NEUTROPHILS # BLD AUTO: 7.09 K/UL (ref 1.8–7.7)
NEUTROPHILS NFR BLD AUTO: 72.1 % (ref 53–65)
NRBC # BLD AUTO: 0 X10E3/UL
NRBC, AUTO (.00): 0 %
NT-PROBNP SERPL-MCNC: ABNORMAL PG/ML (ref 1–125)
PLATELET # BLD AUTO: 280 K/UL (ref 150–400)
POTASSIUM SERPL-SCNC: 5.3 MMOL/L (ref 3.5–5.1)
PROT SERPL-MCNC: 7.4 G/DL (ref 6.4–8.3)
PROTHROMBIN TIME: 17.9 SECONDS (ref 11.7–14.7)
RBC # BLD AUTO: 4.24 M/UL (ref 4.6–6.2)
SODIUM SERPL-SCNC: 132 MMOL/L (ref 136–145)
TROPONIN I SERPL HS-MCNC: 14.5 NG/L
TROPONIN I SERPL HS-MCNC: 17.5 NG/L
WBC # BLD AUTO: 9.83 K/UL (ref 4.5–11)

## 2025-02-24 PROCEDURE — 85025 COMPLETE CBC W/AUTO DIFF WBC: CPT | Performed by: EMERGENCY MEDICINE

## 2025-02-24 PROCEDURE — 83690 ASSAY OF LIPASE: CPT | Performed by: EMERGENCY MEDICINE

## 2025-02-24 PROCEDURE — 80053 COMPREHEN METABOLIC PANEL: CPT | Performed by: EMERGENCY MEDICINE

## 2025-02-24 PROCEDURE — 36415 COLL VENOUS BLD VENIPUNCTURE: CPT | Performed by: EMERGENCY MEDICINE

## 2025-02-24 PROCEDURE — 99285 EMERGENCY DEPT VISIT HI MDM: CPT | Mod: 25

## 2025-02-24 PROCEDURE — 83735 ASSAY OF MAGNESIUM: CPT | Performed by: EMERGENCY MEDICINE

## 2025-02-24 PROCEDURE — 85730 THROMBOPLASTIN TIME PARTIAL: CPT | Performed by: EMERGENCY MEDICINE

## 2025-02-24 PROCEDURE — 63600175 PHARM REV CODE 636 W HCPCS: Performed by: EMERGENCY MEDICINE

## 2025-02-24 PROCEDURE — 83880 ASSAY OF NATRIURETIC PEPTIDE: CPT | Performed by: EMERGENCY MEDICINE

## 2025-02-24 PROCEDURE — 11000001 HC ACUTE MED/SURG PRIVATE ROOM

## 2025-02-24 PROCEDURE — 96375 TX/PRO/DX INJ NEW DRUG ADDON: CPT

## 2025-02-24 PROCEDURE — 93005 ELECTROCARDIOGRAM TRACING: CPT

## 2025-02-24 PROCEDURE — 63600175 PHARM REV CODE 636 W HCPCS: Performed by: HOSPITALIST

## 2025-02-24 PROCEDURE — 80074 ACUTE HEPATITIS PANEL: CPT | Performed by: EMERGENCY MEDICINE

## 2025-02-24 PROCEDURE — 84484 ASSAY OF TROPONIN QUANT: CPT | Performed by: EMERGENCY MEDICINE

## 2025-02-24 PROCEDURE — 96374 THER/PROPH/DIAG INJ IV PUSH: CPT

## 2025-02-24 PROCEDURE — 85610 PROTHROMBIN TIME: CPT | Performed by: EMERGENCY MEDICINE

## 2025-02-24 RX ORDER — ONDANSETRON HYDROCHLORIDE 2 MG/ML
4 INJECTION, SOLUTION INTRAVENOUS
Status: COMPLETED | OUTPATIENT
Start: 2025-02-24 | End: 2025-02-24

## 2025-02-24 RX ORDER — ALUMINUM HYDROXIDE, MAGNESIUM HYDROXIDE, AND SIMETHICONE 2400; 240; 2400 MG/30ML; MG/30ML; MG/30ML
30 SUSPENSION ORAL EVERY 6 HOURS PRN
Status: DISCONTINUED | OUTPATIENT
Start: 2025-02-24 | End: 2025-03-05 | Stop reason: HOSPADM

## 2025-02-24 RX ORDER — ASPIRIN 81 MG/1
81 TABLET ORAL DAILY
Status: DISCONTINUED | OUTPATIENT
Start: 2025-02-25 | End: 2025-03-05 | Stop reason: HOSPADM

## 2025-02-24 RX ORDER — METOPROLOL SUCCINATE 25 MG/1
25 TABLET, EXTENDED RELEASE ORAL DAILY
Status: DISCONTINUED | OUTPATIENT
Start: 2025-02-25 | End: 2025-03-05 | Stop reason: HOSPADM

## 2025-02-24 RX ORDER — DOCUSATE SODIUM 100 MG/1
100 CAPSULE, LIQUID FILLED ORAL 2 TIMES DAILY PRN
Status: DISCONTINUED | OUTPATIENT
Start: 2025-02-24 | End: 2025-03-05 | Stop reason: HOSPADM

## 2025-02-24 RX ORDER — ACETAMINOPHEN 650 MG/1
650 SUPPOSITORY RECTAL EVERY 6 HOURS PRN
Status: DISCONTINUED | OUTPATIENT
Start: 2025-02-24 | End: 2025-03-05 | Stop reason: HOSPADM

## 2025-02-24 RX ORDER — ATORVASTATIN CALCIUM 40 MG/1
40 TABLET, FILM COATED ORAL NIGHTLY
Status: DISCONTINUED | OUTPATIENT
Start: 2025-02-24 | End: 2025-02-24

## 2025-02-24 RX ORDER — TRAMADOL HYDROCHLORIDE 50 MG/1
50 TABLET ORAL EVERY 6 HOURS PRN
Status: DISCONTINUED | OUTPATIENT
Start: 2025-02-24 | End: 2025-03-05 | Stop reason: HOSPADM

## 2025-02-24 RX ORDER — IPRATROPIUM BROMIDE AND ALBUTEROL SULFATE 2.5; .5 MG/3ML; MG/3ML
3 SOLUTION RESPIRATORY (INHALATION) EVERY 8 HOURS
Status: DISCONTINUED | OUTPATIENT
Start: 2025-02-25 | End: 2025-03-05 | Stop reason: HOSPADM

## 2025-02-24 RX ORDER — SODIUM CHLORIDE 0.9 % (FLUSH) 0.9 %
10 SYRINGE (ML) INJECTION
Status: DISCONTINUED | OUTPATIENT
Start: 2025-02-24 | End: 2025-03-05 | Stop reason: HOSPADM

## 2025-02-24 RX ORDER — GUAIFENESIN AND DEXTROMETHORPHAN HYDROBROMIDE 10; 100 MG/5ML; MG/5ML
10 SYRUP ORAL EVERY 6 HOURS PRN
Status: DISCONTINUED | OUTPATIENT
Start: 2025-02-24 | End: 2025-03-05 | Stop reason: HOSPADM

## 2025-02-24 RX ORDER — BISACODYL 5 MG
10 TABLET, DELAYED RELEASE (ENTERIC COATED) ORAL DAILY PRN
Status: DISCONTINUED | OUTPATIENT
Start: 2025-02-24 | End: 2025-03-05 | Stop reason: HOSPADM

## 2025-02-24 RX ORDER — ENOXAPARIN SODIUM 100 MG/ML
40 INJECTION SUBCUTANEOUS EVERY 24 HOURS
Status: DISCONTINUED | OUTPATIENT
Start: 2025-02-24 | End: 2025-03-05 | Stop reason: HOSPADM

## 2025-02-24 RX ORDER — ACETAMINOPHEN 500 MG
1000 TABLET ORAL EVERY 6 HOURS PRN
Status: DISCONTINUED | OUTPATIENT
Start: 2025-02-24 | End: 2025-02-24

## 2025-02-24 RX ORDER — IBUPROFEN 200 MG
1 TABLET ORAL DAILY
Status: DISCONTINUED | OUTPATIENT
Start: 2025-02-25 | End: 2025-03-05 | Stop reason: HOSPADM

## 2025-02-24 RX ORDER — FUROSEMIDE 10 MG/ML
40 INJECTION INTRAMUSCULAR; INTRAVENOUS EVERY 12 HOURS
Status: DISCONTINUED | OUTPATIENT
Start: 2025-02-25 | End: 2025-02-26

## 2025-02-24 RX ORDER — GABAPENTIN 300 MG/1
300 CAPSULE ORAL 3 TIMES DAILY
Status: DISCONTINUED | OUTPATIENT
Start: 2025-02-25 | End: 2025-02-24

## 2025-02-24 RX ORDER — TRAZODONE HYDROCHLORIDE 50 MG/1
50 TABLET ORAL NIGHTLY PRN
Status: DISCONTINUED | OUTPATIENT
Start: 2025-02-24 | End: 2025-03-05 | Stop reason: HOSPADM

## 2025-02-24 RX ORDER — TALC
6 POWDER (GRAM) TOPICAL NIGHTLY PRN
Status: DISCONTINUED | OUTPATIENT
Start: 2025-02-24 | End: 2025-03-05 | Stop reason: HOSPADM

## 2025-02-24 RX ORDER — SACUBITRIL AND VALSARTAN 24; 26 MG/1; MG/1
1 TABLET, FILM COATED ORAL 2 TIMES DAILY
Status: DISCONTINUED | OUTPATIENT
Start: 2025-02-24 | End: 2025-02-24

## 2025-02-24 RX ORDER — DIPHENHYDRAMINE HCL 25 MG
50 CAPSULE ORAL EVERY 6 HOURS PRN
Status: DISCONTINUED | OUTPATIENT
Start: 2025-02-24 | End: 2025-03-05 | Stop reason: HOSPADM

## 2025-02-24 RX ORDER — ACETAMINOPHEN 325 MG/1
650 TABLET ORAL EVERY 6 HOURS PRN
Status: DISCONTINUED | OUTPATIENT
Start: 2025-02-24 | End: 2025-03-05 | Stop reason: HOSPADM

## 2025-02-24 RX ORDER — CHOLECALCIFEROL (VITAMIN D3) 25 MCG
1000 TABLET ORAL DAILY
Status: DISCONTINUED | OUTPATIENT
Start: 2025-02-25 | End: 2025-03-05 | Stop reason: HOSPADM

## 2025-02-24 RX ORDER — ALLOPURINOL 100 MG/1
200 TABLET ORAL DAILY
Status: DISCONTINUED | OUTPATIENT
Start: 2025-02-25 | End: 2025-03-05 | Stop reason: HOSPADM

## 2025-02-24 RX ORDER — FUROSEMIDE 10 MG/ML
60 INJECTION INTRAMUSCULAR; INTRAVENOUS
Status: COMPLETED | OUTPATIENT
Start: 2025-02-24 | End: 2025-02-24

## 2025-02-24 RX ORDER — THIAMINE HYDROCHLORIDE 100 MG/ML
200 INJECTION, SOLUTION INTRAMUSCULAR; INTRAVENOUS DAILY
Status: DISCONTINUED | OUTPATIENT
Start: 2025-02-25 | End: 2025-02-24

## 2025-02-24 RX ORDER — THIAMINE HYDROCHLORIDE 100 MG/ML
200 INJECTION, SOLUTION INTRAMUSCULAR; INTRAVENOUS DAILY
Status: DISCONTINUED | OUTPATIENT
Start: 2025-02-24 | End: 2025-03-05 | Stop reason: HOSPADM

## 2025-02-24 RX ORDER — ONDANSETRON HYDROCHLORIDE 2 MG/ML
8 INJECTION, SOLUTION INTRAVENOUS EVERY 6 HOURS PRN
Status: DISCONTINUED | OUTPATIENT
Start: 2025-02-24 | End: 2025-03-05 | Stop reason: HOSPADM

## 2025-02-24 RX ADMIN — THIAMINE HYDROCHLORIDE 200 MG: 100 INJECTION, SOLUTION INTRAMUSCULAR; INTRAVENOUS at 10:02

## 2025-02-24 RX ADMIN — ENOXAPARIN SODIUM 40 MG: 40 INJECTION SUBCUTANEOUS at 10:02

## 2025-02-24 RX ADMIN — FUROSEMIDE 60 MG: 10 INJECTION, SOLUTION INTRAMUSCULAR; INTRAVENOUS at 06:02

## 2025-02-24 RX ADMIN — ONDANSETRON 4 MG: 2 INJECTION INTRAMUSCULAR; INTRAVENOUS at 04:02

## 2025-02-24 NOTE — ED PROVIDER NOTES
Encounter Date: 2/24/2025    SCRIBE #1 NOTE: I, Whitley Obrien, am scribing for, and in the presence of,  Donaldo Aguiar MD. I have scribed the entire note.       History     Chief Complaint   Patient presents with    Shortness of Breath     Pt presents to ed with c/o having Sob and chest pain      This is a 58 y/o male,who presents to the ED with complaints of shortness of breath. There is no hx of CP or cough. There is no hx of the pt being nauseated or vomiting and he has not been diaphoretic. There are no other complaints/pain in the ED at this time. He has a known hx of CAD, HTN, HLD and CHF. He has had a cardiac cath in the past and he is a current every day smoker.     The history is provided by the patient. No  was used.     Review of patient's allergies indicates:   Allergen Reactions    Azithromycin Swelling and Hives    Levofloxacin Itching and Rash     Past Medical History:   Diagnosis Date    CAD (coronary artery disease) 01/22/2024    [facility] 1/10/2024- Dr. Lozano  Single vessel CAD with  of the Ostto prox RCA with collaterals from the left.  LVEDP 22 mmHg     No chest pain  Patient discharged from hospital without ASA. Start ASA 81 mg po daily  Patient is not on a statin. I reviewed his labs and and feel that this is most likely due to elevated liver function tests.  No lipid panel results available from recent hospital admissi    CHF (congestive heart failure)     Essential hypertension 01/04/2024    HLD (hyperlipidemia) 01/04/2024    Hypertension     Nonrheumatic mitral valve regurgitation 02/05/2024     Past Surgical History:   Procedure Laterality Date    LEFT HEART CATHETERIZATION Left 1/10/2024    Procedure: Left heart cath;  Surgeon: Elijah Lozano MD;  Location[facility] CATH LAB;  Service: Cardiology;  Laterality: Left;     No family history on file.  Social History[1]  Review of Systems   Respiratory:  Positive for shortness of breath.    Cardiovascular:   "Negative for chest pain.   Gastrointestinal:  Negative for nausea and vomiting.   Neurological:  Positive for weakness.   All other systems reviewed and are negative.      Physical Exam     Initial Vitals   BP Pulse Resp Temp SpO2   02/24/25 1555 02/24/25 1557 02/24/25 1557 -- --   120/84 95 16        MAP       --                Physical Exam    Nursing note and vitals reviewed.  Constitutional: He appears well-developed and well-nourished.   HENT:   Head: Normocephalic and atraumatic.   Eyes: Conjunctivae and EOM are normal. Pupils are equal, round, and reactive to light.   Neck: Neck supple.   Normal range of motion.  Cardiovascular:  Normal rate, regular rhythm, normal heart sounds and intact distal pulses.           Pulmonary/Chest: Breath sounds normal.   Abdominal: Abdomen is soft. Bowel sounds are normal.   Musculoskeletal:         General: Edema (Trace of edema bilaterally noed.) present. Normal range of motion.      Cervical back: Normal range of motion and neck supple.     Neurological: He is alert and oriented to person, place, and time. He has normal strength.   Skin: Skin is warm and dry. Capillary refill takes less than 2 seconds.   Psychiatric: He has a normal mood and affect. Thought content normal.         ED Course   Procedures  Labs Reviewed - No data to display       Imaging Results    None          Medications - No data to display  Medical Decision Making            Attending Attestation:           Physician Attestation for Scribe:  Physician Attestation Statement for Scribe #1: I, Donaldo Aguiar MD, reviewed documentation, as scribed by Whitley Obrien in my presence, and it is both accurate and complete.                                    Clinical Impression:   ***Please document a Clinical Impression and click the "Refresh" button to refresh your note and automatically pull in before signing.***                [1]   Social History  Tobacco Use    Smoking status: Every Day     Current " packs/day: 0.50     Types: Cigarettes   Substance Use Topics    Alcohol use: Yes     Comment: been in rehab before    Drug use: Not Currently

## 2025-02-24 NOTE — Clinical Note
Diagnosis: CHF (congestive heart failure) [170579]   Future Attending Provider: VALORIE PEREIRA [[id number]]   Reason for IP Medical Treatment  (Clinical interventions that can only be accomplished in the IP setting? ) :: life threatening illness

## 2025-02-25 PROBLEM — I50.23 ACUTE ON CHRONIC SYSTOLIC CHF (CONGESTIVE HEART FAILURE): Status: ACTIVE | Noted: 2024-02-05

## 2025-02-25 LAB
ALBUMIN SERPL BCP-MCNC: 3.1 G/DL (ref 3.5–5)
ALBUMIN/GLOB SERPL: 1 {RATIO}
ALP SERPL-CCNC: 100 U/L (ref 40–150)
ALT SERPL W P-5'-P-CCNC: 1762 U/L
ANION GAP SERPL CALCULATED.3IONS-SCNC: 16 MMOL/L (ref 7–16)
AORTIC ROOT ANNULUS: 2.7 CM
AORTIC VALVE CUSP SEPERATION: 2.19 CM
APICAL FOUR CHAMBER EJECTION FRACTION: 2 %
AST SERPL W P-5'-P-CCNC: 2513 U/L (ref 5–34)
AV INDEX (PROSTH): 0.96
AV MEAN GRADIENT: 2 MMHG
AV PEAK GRADIENT: 3 MMHG
AV VALVE AREA BY VELOCITY RATIO: 2.5 CM²
AV VALVE AREA: 3.7 CM²
AV VELOCITY RATIO: 0.67
BILIRUB SERPL-MCNC: 2.5 MG/DL
BSA FOR ECHO PROCEDURE: 1.85 M2
BUN SERPL-MCNC: 19 MG/DL (ref 8–26)
BUN/CREAT SERPL: 14 (ref 6–20)
CALCIUM SERPL-MCNC: 8.2 MG/DL (ref 8.4–10.2)
CHLORIDE SERPL-SCNC: 96 MMOL/L (ref 98–107)
CO2 SERPL-SCNC: 24 MMOL/L (ref 22–29)
CREAT SERPL-MCNC: 1.36 MG/DL (ref 0.72–1.25)
CV ECHO LV RWT: 0.33 CM
DOP CALC AO PEAK VEL: 0.9 M/S
DOP CALC AO VTI: 7.7 CM
DOP CALC LVOT AREA: 3.8 CM2
DOP CALC LVOT DIAMETER: 2.2 CM
DOP CALC LVOT PEAK VEL: 0.6 M/S
DOP CALC LVOT STROKE VOLUME: 28.1 CM3
DOP CALC MV VTI: 28.6 CM
DOP CALCLVOT PEAK VEL VTI: 7.4 CM
E WAVE DECELERATION TIME: 108 MSEC
E/A RATIO: 1.51
E/E' RATIO: 20 M/S
ECHO LV POSTERIOR WALL: 1 CM (ref 0.6–1.1)
EGFR (NO RACE VARIABLE) (RUSH/TITUS): 61 ML/MIN/1.73M2
EJECTION FRACTION: 15 %
FRACTIONAL SHORTENING: 3.3 % (ref 28–44)
GLOBULIN SER-MCNC: 3.2 G/DL (ref 2–4)
GLUCOSE SERPL-MCNC: 105 MG/DL (ref 74–100)
INTERVENTRICULAR SEPTUM: 0.7 CM (ref 0.6–1.1)
IVC DIAMETER: 2.53 CM
LEFT ATRIUM AREA SYSTOLIC (APICAL 4 CHAMBER): 17.54 CM2
LEFT ATRIUM SIZE: 4.4 CM
LEFT INTERNAL DIMENSION IN SYSTOLE: 5.8 CM (ref 2.1–4)
LEFT VENTRICLE DIASTOLIC VOLUME INDEX: 97.83 ML/M2
LEFT VENTRICLE DIASTOLIC VOLUME: 180 ML
LEFT VENTRICLE END DIASTOLIC VOLUME APICAL 4 CHAMBER: 144.12 ML
LEFT VENTRICLE END SYSTOLIC VOLUME APICAL 4 CHAMBER: 40.89 ML
LEFT VENTRICLE MASS INDEX: 109.1 G/M2
LEFT VENTRICLE SYSTOLIC VOLUME INDEX: 91.8 ML/M2
LEFT VENTRICLE SYSTOLIC VOLUME: 169 ML
LEFT VENTRICULAR INTERNAL DIMENSION IN DIASTOLE: 6 CM (ref 3.5–6)
LEFT VENTRICULAR MASS: 200.7 G
LIPASE SERPL-CCNC: 18 U/L
LV LATERAL E/E' RATIO: 14 M/S
LV SEPTAL E/E' RATIO: 37.3 M/S
LVED V (TEICH): 179.99 ML
LVES V (TEICH): 169.33 ML
LVOT MG: 0.96 MMHG
LVOT MV: 0.46 CM/S
MV MEAN GRADIENT: 4 MMHG
MV PEAK A VEL: 0.74 M/S
MV PEAK E VEL: 1.12 M/S
MV PEAK GRADIENT: 8 MMHG
MV STENOSIS PRESSURE HALF TIME: 31.75 MS
MV VALVE AREA BY CONTINUITY EQUATION: 0.98 CM2
MV VALVE AREA P 1/2 METHOD: 6.93 CM2
OHS CV RV/LV RATIO: 0.9 CM
OHS QRS DURATION: 166 MS
OHS QTC CALCULATION: 463 MS
PISA MRMAX VEL: 4.75 M/S
PISA TR MAX VEL: 3.2 M/S
POTASSIUM SERPL-SCNC: 3.8 MMOL/L (ref 3.5–5.1)
PROT SERPL-MCNC: 6.3 G/DL (ref 6.4–8.3)
PV PEAK GRADIENT: 1 MMHG
PV PEAK VELOCITY: 0.45 M/S
RA MAJOR: 5.29 CM
RA PRESSURE ESTIMATED: 15 MMHG
RIGHT VENTRICLE DIASTOLIC BASEL DIMENSION: 5.4 CM
RIGHT VENTRICLE DIASTOLIC LENGTH: 5.7 CM
RIGHT VENTRICLE DIASTOLIC MID DIMENSION: 4.5 CM
RIGHT VENTRICULAR LENGTH IN DIASTOLE (APICAL 4-CHAMBER VIEW): 5.74 CM
RV MID DIAMA: 4.51 CM
RV TB RVSP: 18 MMHG
SODIUM SERPL-SCNC: 132 MMOL/L (ref 136–145)
TDI LATERAL: 0.08 M/S
TDI SEPTAL: 0.03 M/S
TDI: 0.06 M/S
TRICUSPID ANNULAR PLANE SYSTOLIC EXCURSION: 1.93 CM
TV REST PULMONARY ARTERY PRESSURE: 56 MMHG
Z-SCORE OF LEFT VENTRICULAR DIMENSION IN END DIASTOLE: 1.62
Z-SCORE OF LEFT VENTRICULAR DIMENSION IN END SYSTOLE: 4.82

## 2025-02-25 PROCEDURE — 25000003 PHARM REV CODE 250

## 2025-02-25 PROCEDURE — 97161 PT EVAL LOW COMPLEX 20 MIN: CPT

## 2025-02-25 PROCEDURE — 25000003 PHARM REV CODE 250: Performed by: HOSPITALIST

## 2025-02-25 PROCEDURE — 63600175 PHARM REV CODE 636 W HCPCS: Performed by: HOSPITALIST

## 2025-02-25 PROCEDURE — 80053 COMPREHEN METABOLIC PANEL: CPT | Performed by: HOSPITALIST

## 2025-02-25 PROCEDURE — 97165 OT EVAL LOW COMPLEX 30 MIN: CPT

## 2025-02-25 PROCEDURE — 25000242 PHARM REV CODE 250 ALT 637 W/ HCPCS

## 2025-02-25 PROCEDURE — 94640 AIRWAY INHALATION TREATMENT: CPT

## 2025-02-25 PROCEDURE — 11000001 HC ACUTE MED/SURG PRIVATE ROOM

## 2025-02-25 PROCEDURE — 36415 COLL VENOUS BLD VENIPUNCTURE: CPT | Performed by: HOSPITALIST

## 2025-02-25 PROCEDURE — 94799 UNLISTED PULMONARY SVC/PX: CPT

## 2025-02-25 PROCEDURE — 25000003 PHARM REV CODE 250: Performed by: FAMILY MEDICINE

## 2025-02-25 PROCEDURE — 99900035 HC TECH TIME PER 15 MIN (STAT)

## 2025-02-25 PROCEDURE — S4991 NICOTINE PATCH NONLEGEND: HCPCS | Performed by: HOSPITALIST

## 2025-02-25 RX ORDER — OXYCODONE HYDROCHLORIDE 5 MG/1
5 TABLET ORAL EVERY 6 HOURS PRN
Refills: 0 | Status: DISCONTINUED | OUTPATIENT
Start: 2025-02-25 | End: 2025-03-02

## 2025-02-25 RX ADMIN — METOPROLOL SUCCINATE 25 MG: 25 TABLET, EXTENDED RELEASE ORAL at 09:02

## 2025-02-25 RX ADMIN — Medication 6 MG: at 09:02

## 2025-02-25 RX ADMIN — IPRATROPIUM BROMIDE AND ALBUTEROL SULFATE 3 ML: .5; 3 SOLUTION RESPIRATORY (INHALATION) at 01:02

## 2025-02-25 RX ADMIN — TRAZODONE HYDROCHLORIDE 50 MG: 50 TABLET ORAL at 01:02

## 2025-02-25 RX ADMIN — FUROSEMIDE 40 MG: 10 INJECTION, SOLUTION INTRAVENOUS at 09:02

## 2025-02-25 RX ADMIN — ASPIRIN 81 MG: 81 TABLET, COATED ORAL at 09:02

## 2025-02-25 RX ADMIN — OXYCODONE 5 MG: 5 TABLET ORAL at 09:02

## 2025-02-25 RX ADMIN — TRAZODONE HYDROCHLORIDE 50 MG: 50 TABLET ORAL at 07:02

## 2025-02-25 RX ADMIN — ALUMINUM HYDROXIDE, MAGNESIUM HYDROXIDE, AND DIMETHICONE 30 ML: 400; 400; 40 SUSPENSION ORAL at 01:02

## 2025-02-25 RX ADMIN — NICOTINE 1 PATCH: 14 PATCH, EXTENDED RELEASE TRANSDERMAL at 09:02

## 2025-02-25 RX ADMIN — FUROSEMIDE 40 MG: 10 INJECTION, SOLUTION INTRAVENOUS at 03:02

## 2025-02-25 RX ADMIN — OXYCODONE 5 MG: 5 TABLET ORAL at 02:02

## 2025-02-25 RX ADMIN — IPRATROPIUM BROMIDE AND ALBUTEROL SULFATE 3 ML: .5; 3 SOLUTION RESPIRATORY (INHALATION) at 07:02

## 2025-02-25 RX ADMIN — THIAMINE HYDROCHLORIDE 200 MG: 100 INJECTION, SOLUTION INTRAMUSCULAR; INTRAVENOUS at 09:02

## 2025-02-25 RX ADMIN — ALLOPURINOL 200 MG: 100 TABLET ORAL at 09:02

## 2025-02-25 RX ADMIN — IPRATROPIUM BROMIDE AND ALBUTEROL SULFATE 3 ML: .5; 3 SOLUTION RESPIRATORY (INHALATION) at 02:02

## 2025-02-25 RX ADMIN — Medication 1000 UNITS: at 09:02

## 2025-02-25 RX ADMIN — Medication 6 MG: at 01:02

## 2025-02-25 RX ADMIN — ENOXAPARIN SODIUM 40 MG: 40 INJECTION SUBCUTANEOUS at 06:02

## 2025-02-25 RX ADMIN — THERA TABS 1 TABLET: TAB at 09:02

## 2025-02-25 NOTE — CONSULTS
Consult for fluid and sodium restricted diet received and appreciated. Patient is currently in CCU. Diet education will be completed on follow up as appropriate.

## 2025-02-25 NOTE — ASSESSMENT & PLAN NOTE
Patient with known Cirrhosis with Child's class B. Co-morbidities are absent.  MELD-Na score calculated; MELD 3.0: 21 at 2/24/2025  4:20 PM  MELD-Na: 22 at 2/24/2025  4:20 PM  Calculated from:  Serum Creatinine: 1.34 mg/dL at 2/24/2025  4:20 PM  Serum Sodium: 132 mmol/L at 2/24/2025  4:20 PM  Total Bilirubin: 3.4 mg/dL at 2/24/2025  4:20 PM  Serum Albumin: 3.7 g/dL (Using max of 3.5 g/dL) at 2/24/2025  4:20 PM  INR(ratio): 1.5 at 2/24/2025  4:20 PM  Age at listing (hypothetical): 59 years  Sex: Male at 2/24/2025  4:20 PM      Continue chronic meds. Etiology likely ETOH. Will avoid any hepatotoxic meds, and monitor CBC/CMP/INR for synthetic function.

## 2025-02-25 NOTE — ASSESSMENT & PLAN NOTE
Patient has PMH of Chronic combined systolic and diastolic heart failure.  Results for orders placed during the hospital encounter of 01/04/24    Echo    Interpretation Summary    Left Ventricle: The left ventricle is mildly dilated. Normal wall thickness. There is eccentric hypertrophy. Severe global hypokinesis with regional variation. There is severely reduced systolic function. Biplane (2D) method of discs ejection fraction is 15%. Global longitudinal strain is reduced. There is diastolic dysfunction but grade cannot be determined.    Right Ventricle: Moderate right ventricular enlargement. Systolic function is mildly reduced.    Right Atrium: Right atrium is mildly dilated.    Aortic Valve: The aortic valve is a trileaflet valve.    Mitral Valve: There is mild bileaflet sclerosis. There is no stenosis. The mean pressure gradient across the mitral valve is 3 mmHg at a heart rate of  bpm. There is moderate regurgitation with a posterolateral eccentriccally directed jet.    Tricuspid Valve: There is moderate regurgitation.    Pulmonary Artery: The estimated pulmonary artery systolic pressure is 62 mmHg.    IVC/SVC: Elevated venous pressure at 15 mmHg.    Pericardium: Large left pleural effusion.  - patient is on CHF exacerbation pathway  - patient received 6o mg IV of furosemide in the ED  - pro BNP was 49501  - troponin levels were flat  - EKG showed sinus rhythm with no acute findings  - CXR    FINDINGS:  Cardiac silhouette is mildly enlarged.  Probable small bibasilar pleural effusions, improved from the prior study.  Mild right basilar airspace disease could represent mild infiltrate and pneumonia, mildly increased from the prior study.  No mass is detected.  No evidence of pneumothorax  - continue IV diuresis  - LOW SODIUM DIET WITH FLUID RESTRICTION  - daily weights and strict I/O's  - repeat ECHO pending

## 2025-02-25 NOTE — PLAN OF CARE
Problem: Physical Therapy  Goal: Physical Therapy Goal  Description: Short term goals:  1. Supine to sit with Modified Felt  2. Sit to stand transfer with Modified Felt  3. Bed to chair transfer with Modified Felt using Single-point Cane   4. Gait  x 100 feet with Modified Felt using Single-point Cane .     Long term goals:  Pt will return home to PLOF with all mobility    Outcome: Progressing

## 2025-02-25 NOTE — HPI
The patient is a 59-year-old male who presented to the emergency department with worsening shortness of breath and increased fatigue. He reported experiencing shortness of breath upon mild to moderate exertion over the past few months. He complained of accompanying non productive cough. He denied any hemoptysis, chills or fever.  He did not complain of any accompanying chest pain, leg swelling, palpitations, or dizziness, but did mention experiencing a few episodes of blackouts. He denied any falls resulting from black outs.   Patient has PMH of COPD, CHF, tobacco and alcohol dependence.  The patient has not been using oxygen at home despite being prescribed it, due to concerns that he will get dependent on supplemented oxygen.   Additionally, he has not been taking his prescribed medications for the past several months due to affordability issues.   The patient lives alone and is ambulatory with the help of a cane.   Patient has PMH of alcohol dependence, he has been drinking at least 20 oz of beer daily and drank 8 oz today.

## 2025-02-25 NOTE — ASSESSMENT & PLAN NOTE
Patient with known CAD s/p  no stent placed , which is controlled Will continue ASA and monitor for S/Sx of angina/ACS. Continue to monitor on telemetry.   Holding statin 2/2 liver enzymes elevated  - LHC was done on 1/10/2024  - troponin levels were flat  - EKG showed sinus rhythm with no acute findings

## 2025-02-25 NOTE — ASSESSMENT & PLAN NOTE
Patient's COPD is with exacerbation noted by continued dyspnea currently.  Patient is currently off COPD Pathway. Continue scheduled inhalers  patient has not been on his home medication because of affordability issues  and monitor respiratory status closely.   - patient SpO2 is normal in room air

## 2025-02-25 NOTE — PLAN OF CARE
Problem: Occupational Therapy  Goal: Occupational Therapy Goal  Description: STG:  Pt will perform grooming with setup  Pt will bathe with SBA with setup at EOB  Pt will perform UE dressing with SBA  Pt will perform LE dressing with SBA  Pt will sit EOB x 10 min with SBA   Pt will transfer bed/chair/bsc with CGA with cane  Pt will perform standing task x 2 min with CGA with RW   Pt will tolerate 15 minutes of tx without fatigue      LT.Restore to max I with self care and mobility.      Outcome: Progressing

## 2025-02-25 NOTE — PHARMACY MED REC
"Admission Medication History     The home medication history was taken by Marta Ruboi.    You may go to "Admission" then "Reconcile Home Medications" tabs to review and/or act upon these items.     The home medication list has been updated by the Pharmacy department.   Please read ALL comments highlighted in yellow.   Please address this information as you see fit.    Feel free to contact us if you have any questions or require assistance.      Patient reports no longer taking the following medication(s). The medication(s) listed below were removed from the home medication list. Please reorder if appropriate:  Albuterol 90 mcg inhaler  Allopurinol 100 mg  Aspirin 81 mg  Furosemide 40 mg  Gabapentin 300 mg  Metoprolol Succinate 25 mg  Multivitamin  Nicotine 21 mg/24 hr  Olanzapine 5 mg  Sacubitril-Valsartan 24-26 mg  Spironolactone 25 mg  Multivitamin w/iron  Vitamin D 1,000 iu    Medications listed below were obtained from: Patient/family and Analytic software- [employer]  (Not in a hospital admission)        Current Outpatient Medications on File Prior to Encounter   Medication Sig Dispense Refill Last Dose/Taking    [DISCONTINUED] albuterol (PROVENTIL/VENTOLIN HFA) 90 mcg/actuation inhaler Inhale 2 puffs into the lungs every 6 (six) hours as needed for Wheezing. Rescue 6.7 g 11     [DISCONTINUED] allopurinoL (ZYLOPRIM) 100 MG tablet Take 2 tablets (200 mg total) by mouth once daily. 60 tablet 5     [DISCONTINUED] aspirin (ECOTRIN) 81 MG EC tablet Take 1 tablet (81 mg total) by mouth once daily. 90 tablet 3     [DISCONTINUED] furosemide (LASIX) 40 MG tablet Take 1 tablet (40 mg total) by mouth 2 (two) times daily. 60 tablet 5     [DISCONTINUED] gabapentin (NEURONTIN) 300 MG capsule Take 1 capsule (300 mg total) by mouth 3 (three) times daily. 90 capsule 11     [DISCONTINUED] metoprolol succinate (TOPROL-XL) 25 MG 24 hr tablet Take 1 tablet (25 mg total) by mouth once daily. 30 tablet 5     [DISCONTINUED] multivitamin " Tab Take 1 tablet by mouth once daily. 30 tablet 5     [DISCONTINUED] nicotine (NICODERM CQ) 21 mg/24 hr Place 1 patch onto the skin once daily. 30 patch 1     [DISCONTINUED] OLANZapine (ZYPREXA) 5 MG tablet Take 1 tablet (5 mg total) by mouth once daily. 30 tablet 5     [DISCONTINUED] sacubitriL-valsartan (ENTRESTO) 24-26 mg per tablet Take 1 tablet by mouth 2 (two) times daily. 60 tablet 5     [DISCONTINUED] spironolactone (ALDACTONE) 25 MG tablet Take 1 tablet (25 mg total) by mouth once daily. 30 tablet 11     [DISCONTINUED] TAB-A-CHRISTA MULTIVITAMIN W-IRON Tab TAKE ONE (1) TABLET BY MOUTH ONCE DAILY.       [DISCONTINUED] vitamin D (VITAMIN D3) 1000 units Tab Take 1 tablet (1,000 Units total) by mouth once daily. 30 tablet 5          Potential issues to be addressed PRIOR TO DISCHARGE  Please discuss with the patient barriers to adherence with medication treatment plans  Drug cost interfering with therapy: (All medications.)    Patient also mentioned having  prescriptions and thus cannot fill them.    Marta Rubio  Medication Access Specialist  EXT. [id number]    .

## 2025-02-25 NOTE — PLAN OF CARE
Problem: Adult Inpatient Plan of Care  Goal: Plan of Care Review  Outcome: Progressing     Problem: Airway Clearance Ineffective  Goal: Effective Airway Clearance  Outcome: Progressing

## 2025-02-25 NOTE — ASSESSMENT & PLAN NOTE
Patient's blood pressure range in the last 24 hours was: BP  Min: 105/81  Max: 128/89.The patient's inpatient anti-hypertensive regimen is listed below:  Current Antihypertensives  furosemide injection 40 mg, Every 12 hours, Intravenous  metoprolol succinate (TOPROL-XL) 24 hr tablet 25 mg, Daily, Oral    Plan  Holding home BP medications due to soft blood pressure and ongoing diuresis

## 2025-02-25 NOTE — PLAN OF CARE
[facility] - Emergency Department  Initial Discharge Assessment       Primary Care Provider: No, Primary Doctor    Admission Diagnosis: CHF (congestive heart failure) [I50.9]    Admission Date: 2/24/2025  Expected Discharge Date:     Transition of Care Barriers: None    Payor: [employer] / Plan: [facility] [employer] / Product Type: Government /     Extended Emergency Contact Information  Primary Emergency Contact: nasir lara  Mobile Phone: [phone]  Relation: Relative  Preferred language: English   needed? No    Discharge Plan A: Home with family  Discharge Plan B: Home with family, Home Health, Long-term acute care facility (LTAC), Rehab, Skilled Nursing Facility      [facility]  [address] [place] [id number]  Phone: [phone] Fax: [phone]      Initial Assessment (most recent)       Adult Discharge Assessment - 02/25/25 1420          Discharge Assessment    Assessment Type Discharge Planning Assessment     Confirmed/corrected address, phone number and insurance Yes     Confirmed Demographics Correct on Facesheet     Source of Information patient     People in Home significant other     Do you expect to return to your current living situation? Other (see comments)   pt states he may go stay with his nephew at [place]    Do you have help at home or someone to help you manage your care at home? Yes     Who are your caregiver(s) and their phone number(s)? Margo Guerrero, significant other, [phone]; Nasir Lara, nephew, [phone]     Prior to hospitilization cognitive status: Unable to Assess     Current cognitive status: Alert/Oriented     Walking or Climbing Stairs Difficulty yes     Walking or Climbing Stairs ambulation difficulty, requires equipment;stair climbing difficulty, requires equipment     Mobility Management cane, power chair     Dressing/Bathing Difficulty no     Home Accessibility not wheelchair accessible;stairs to enter home      Number of Stairs, Main Entrance five     Stair Railings, Main Entrance railings safe and in good condition;railings on both sides of stairs     Home Layout Able to live on 1st floor     Equipment Currently Used at Home cane, straight;power chair;BIPAP     Readmission within 30 days? No     Patient currently being followed by outpatient case management? No     Do you currently have service(s) that help you manage your care at home? No     Do you take prescription medications? Yes     Do you have prescription coverage? Yes     Coverage [facility]/ [facility] choice plus     Do you have any problems affording any of your prescribed medications? TBD   pt states [facility] has stopped paying for his meds    Is the patient taking medications as prescribed? no   pt states [facility] has stopped paying for his meds    Who is going to help you get home at discharge? Margo Guerrero, significant other, [phone]; Nasir Boykin, nephew, [phone]     How do you get to doctors appointments? car, drives self;family or friend will provide     Are you on dialysis? No     Do you take coumadin? No     Discharge Plan A Home with family     Discharge Plan B Home with family;Home Health;Long-term acute care facility (LTAC);Rehab;Skilled Nursing Facility     DME Needed Upon Discharge  none     Discharge Plan discussed with: Patient     Transition of Care Barriers None        Physical Activity    On average, how many days per week do you engage in moderate to strenuous exercise (like a brisk walk)? 0 days     On average, how many minutes do you engage in exercise at this level? 0 min        Financial Resource Strain    How hard is it for you to pay for the very basics like food, housing, medical care, and heating? Not very hard        Housing Stability    In the last 12 months, was there a time when you were not able to pay the mortgage or rent on time? No     At any time in the past 12 months, were you homeless or living in a shelter (including now)? No         Transportation Needs    Has the lack of transportation kept you from medical appointments, meetings, work or from getting things needed for daily living? No        Food Insecurity    Within the past 12 months, you worried that your food would run out before you got the money to buy more. Never true     Within the past 12 months, the food you bought just didn't last and you didn't have money to get more. Never true        Stress    Do you feel stress - tense, restless, nervous, or anxious, or unable to sleep at night because your mind is troubled all the time - these days? Rather much        Social Isolation    How often do you feel lonely or isolated from those around you?  Never        Alcohol Use    Q1: How often do you have a drink containing alcohol? 4 or more times a week     Q2: How many drinks containing alcohol do you have on a typical day when you are drinking? 7 to 9     Q3: How often do you have six or more drinks on one occasion? Daily or almost daily        Utilities    In the past 12 months has the electric, gas, oil, or water company threatened to shut off services in your home? No        Health Literacy    How often do you need to have someone help you when you read instructions, pamphlets, or other written material from your doctor or pharmacy? Rarely        OTHER    Name(s) of People in Home Marog Guerrero, significant other, [phone]; Nasir Lucretia, nephew, [phone]                   SS spoke with pt at bedside. Pt lives at home with his significant other but states he may go stay with his nephew when medically ready for dc. Pt has required dme. Pt states the [facility] has stopped covering his prescription medications. MD aware and is reviewing his meds to possibly send to [facility] prescription assistance program. SS to follow up re prescriptions. SDOH complete. SS following

## 2025-02-25 NOTE — ASSESSMENT & PLAN NOTE
Patient with known Cirrhosis with Child's class B. Co-morbidities are absent.  MELD-Na score calculated; MELD 3.0: 21 at 2/24/2025  4:20 PM  MELD-Na: 22 at 2/24/2025  4:20 PM  Calculated from:  Serum Creatinine: 1.34 mg/dL at 2/24/2025  4:20 PM  Serum Sodium: 132 mmol/L at 2/24/2025  4:20 PM  Total Bilirubin: 3.4 mg/dL at 2/24/2025  4:20 PM  Serum Albumin: 3.7 g/dL (Using max of 3.5 g/dL) at 2/24/2025  4:20 PM  INR(ratio): 1.5 at 2/24/2025  4:20 PM  Age at listing (hypothetical): 59 years  Sex: Male at 2/24/2025  4:20 PM      Continue chronic meds. Etiology likely ETOH. Will avoid any hepatotoxic meds, and monitor CBC/CMP/INR for synthetic function.     - negative hepatitis panel  US Abdomen Limited  Impression:  1. Nonspecific gallbladder wall thickening with minimal pericholecystic fluid.  No stones are identified.  Mild acute cholecystitis is a consideration.  Recommend follow-up.  2. Probable small 1.3 cm minimally complex hepatic cyst in the right lobe.  3. Bibasilar pleural effusions.

## 2025-02-25 NOTE — PT/OT/SLP EVAL
Physical Therapy Evaluation     Patient Name: Derek Moreno   MRN: [id number]  Recent Surgery: * No surgery found *      Recommendations:     Discharge Recommendations: Low Intensity Therapy   Discharge Equipment Recommendations: none   Barriers to discharge: None    Assessment:     Derek Moreno is a 59 y.o. male admitted with a medical diagnosis of Acute on chronic combined systolic and diastolic heart failure. He presents with the following impairments/functional limitations: weakness, impaired endurance, impaired functional mobility, impaired cardiopulmonary response to activity. Pt is near his functional baseline. However, gait distance is limited by fatigue. Has mild weakness but appears to be able to return to prior living situation    Rehab Prognosis: Fair; patient would benefit from acute PT services to address these deficits and reach maximum level of function.    Plan:     During this hospitalization, patient to be seen 5 x/week to address the above listed problems via gait training, therapeutic activities, therapeutic exercises    Plan of Care Expires: 03/25/25    Subjective     Chief Complaint: arm and leg pain  Patient Comments/Goals: Pt is agreeable to PT   Pain/Comfort:  Pain Rating 1: 7/10  Location - Side 1: Bilateral  Location 1: arm  Pain Addressed 1: Pre-medicate for activity  Pain Rating Post-Intervention 1: 7/10  Location - Side 2: Bilateral  Location 2: leg  Pain Addressed 2: Pre-medicate for activity  Pain Rating Post-Intervention 2: 7/10    Social History:  Living Environment: Patient lives with their significant other in a single story home with number of outside stair(s): 5  Prior Level of Function: Prior to admission, patient was modified independent with ADLs using straight cane for mobility  Equipment Used at Home: cane, straight  DME owned (not currently used): none  Assistance Upon Discharge: significant other    Objective:     Communicated with RN prior to session. Patient  found HOB elevated with blood pressure cuff, peripheral IV, pulse ox (continuous), telemetry upon PT entry to room.    General Precautions: Standard, fall   Orthopedic Precautions: N/A   Braces: N/A    Respiratory Status: Room air    Exams:  Cognition: Patient is oriented to Person, Place, Time, Situation  RLE ROM: WFL  RLE Strength:  4/5  LLE ROM:  WFL  LLE Strength:  4/5  Gross Motor Coordination: WFL    Functional Mobility:  Gait belt applied - No  Bed Mobility  Scooting: supervision  Supine to Sit: supervision for trunk management  Sit to Supine: supervision for LE management  Transfers  Sit to Stand: supervision with straight cane  Gait  Patient ambulated 80 ft x 2 trials with straight cane and contact guard assistance. Patient demonstrates decreased step length and decreased stacy. Seated rest break between trials. All lines remained intact throughout ambulation trail.  Balance  Sitting: independence  Standing: supervision      Therapeutic Activities and Exercises:   Patient educated on role of acute care PT and PT POC, safety while in hospital including calling nurse for mobility, and call light usage      AM-PAC 6 CLICK MOBILITY  Total Score:22    Patient left HOB elevated with all lines intact and call button in reach.    GOALS:   Multidisciplinary Problems       Physical Therapy Goals          Problem: Physical Therapy    Goal Priority Disciplines Outcome Interventions   Physical Therapy Goal     PT, PT/OT Progressing    Description: Short term goals:  1. Supine to sit with Modified Bell  2. Sit to stand transfer with Modified Bell  3. Bed to chair transfer with Modified Bell using Single-point Cane   4. Gait  x 100 feet with Modified Bell using Single-point Cane .     Long term goals:  Pt will return home to [facility] with all mobility                         DME Justifications:  No DME recommended requiring DME justifications    History:     Past Medical History:   Diagnosis  Date    Alcohol dependence     Alcoholic cirrhosis of liver     ALS (amyotrophic lateral sclerosis)     patient says [facility] told him and offered no treatement    CAD (coronary artery disease) 01/22/2024    [facility] 1/10/2024- Dr. oLzano  Single vessel CAD with  of the Ostto prox RCA with collaterals from the left.  LVEDP 22 mmHg     No chest pain  Patient discharged from hospital without ASA. Start ASA 81 mg po daily  Patient is not on a statin. I reviewed his labs and and feel that this is most likely due to elevated liver function tests.  No lipid panel results available from recent hospital admissi    Chronic systolic (congestive) heart failure     EF  15%    Essential hypertension 01/04/2024    HLD (hyperlipidemia) 01/04/2024    Nonrheumatic mitral valve regurgitation 02/05/2024    Pulmonary hypertension due to left heart disease     PASP  62 mmHg       Past Surgical History:   Procedure Laterality Date    LEFT HEART CATHETERIZATION Left 1/10/2024    Procedure: Left heart cath;  Surgeon: Elijah Lozano MD;  Location: [facility] CATH LAB;  Service: Cardiology;  Laterality: Left;       Time Tracking:     PT Received On: 02/25/25  PT Start Time: 1010  PT Stop Time: 1024  PT Total Time (min): 14 min     Billable Minutes: Evaluation low complexity    2/25/2025

## 2025-02-25 NOTE — PT/OT/SLP EVAL
Occupational Therapy   Evaluation    Name: Derek Moreno  MRN: [id number]  Admitting Diagnosis: Acute on chronic combined systolic and diastolic heart failure  Recent Surgery: * No surgery found *      Recommendations:     Discharge Recommendations: Low Intensity Therapy  Discharge Equipment Recommendations:  none  Barriers to discharge:  None    Assessment:     Derek Moreno is a 59 y.o. male with a medical diagnosis of Acute on chronic combined systolic and diastolic heart failure.  He presents with weakness and decline in ADLs. Performance deficits affecting function: weakness, impaired endurance, impaired self care skills, impaired functional mobility, gait instability, impaired balance.      Rehab Prognosis: Good; patient would benefit from acute skilled OT services to address these deficits and reach maximum level of function.       Plan:     Patient to be seen 5 x/week to address the above listed problems via self-care/home management, therapeutic activities, therapeutic exercises  Plan of Care Expires: 03/25/25  Plan of Care Reviewed with: patient    Subjective     Chief Complaint: acute on chronic combined systolic and diastolic heart failure  Patient/Family Comments/goals: pt agreeable to OT eval    Occupational Profile:  Living Environment: pt lives with family in one story home with 5 steps to enter  Previous level of function: independent with all ADL tasks and utilized cane for functional mobility   Roles and Routines: perform self care  Equipment Used at Home: cane, straight  Assistance upon Discharge: home with home health    Pain/Comfort:  Pain Rating 1: 7/10  Location - Side 1: Bilateral  Location 1: arm  Pain Addressed 1: Pre-medicate for activity    Patients cultural, spiritual, Voodoo conflicts given the current situation: no    Objective:     Communicated with: LUIS Toussaint prior to session.  Patient found supine with blood pressure cuff, peripheral IV, pulse ox (continuous),  telemetry upon OT entry to room.    General Precautions: Standard, fall  Orthopedic Precautions: N/A  Braces: N/A  Respiratory Status: Room air    Occupational Performance:    Bed Mobility:    Patient completed Supine to Sit with stand by assistance  Patient completed Sit to Supine with stand by assistance    Functional Mobility/Transfers:  Patient completed Sit <> Stand Transfer with contact guard assistance  with  straight cane   Functional Mobility: pt performed functional mobility into hallway and back to bed with CGA with cane    Activities of Daily Living:  Lower Body Dressing: supervision to hero socks and shoes    Cognitive/Visual Perceptual:  Cognitive/Psychosocial Skills:     -       Oriented to: Person, Place, Time, and Situation   -       Follows Commands/attention:Follows multistep  commands  -       Mood/Affect/Coping skills/emotional control: Cooperative    Physical Exam:  Balance:    -       sitting balance SBA; standing balance CGA  Upper Extremity Range of Motion:     -       Right Upper Extremity: WFL  -       Left Upper Extremity: WFL  Upper Extremity Strength:    -       Right Upper Extremity: WFL  -       Left Upper Extremity: WFL  Gross motor coordination:   WFL    AMPAC 6 Click ADL:  AMPAC Total Score: 24    Treatment & Education:  Pt educated on OT role/POC.   Importance of OOB activity with staff assistance.  Importance of sitting up in the chair throughout the day as tolerated, especially for meals   Safety during functional t/f and mobility with use of cane  Importance of assisting with self-care activities   All questions/concerns answered within OT scope of practice      Patient left HOB elevated with all lines intact and call button in reach    GOALS:   Multidisciplinary Problems       Occupational Therapy Goals          Problem: Occupational Therapy    Goal Priority Disciplines Outcome Interventions   Occupational Therapy Goal     OT, PT/OT Progressing    Description: STG:  Pt will  perform grooming with setup  Pt will bathe with SBA with setup at EOB  Pt will perform UE dressing with SBA  Pt will perform LE dressing with SBA  Pt will sit EOB x 10 min with SBA   Pt will transfer bed/chair/bsc with CGA with cane  Pt will perform standing task x 2 min with CGA with RW   Pt will tolerate 15 minutes of tx without fatigue      LT.Restore to max I with self care and mobility.                           DME Justifications:  No DME recommended requiring DME justifications    History:     Past Medical History:   Diagnosis Date    Alcohol dependence     Alcoholic cirrhosis of liver     ALS (amyotrophic lateral sclerosis)     patient says [facility] told him and offered no treatement    CAD (coronary artery disease) 2024    [facility] 1/10/2024- Dr. Lozano  Single vessel CAD with  of the Ostto prox RCA with collaterals from the left.  LVEDP 22 mmHg     No chest pain  Patient discharged from hospital without ASA. Start ASA 81 mg po daily  Patient is not on a statin. I reviewed his labs and and feel that this is most likely due to elevated liver function tests.  No lipid panel results available from recent hospital admissi    Chronic systolic (congestive) heart failure     EF  15%    Essential hypertension 2024    HLD (hyperlipidemia) 2024    Nonrheumatic mitral valve regurgitation 2024    Pulmonary hypertension due to left heart disease     PASP  62 mmHg         Past Surgical History:   Procedure Laterality Date    LEFT HEART CATHETERIZATION Left 1/10/2024    Procedure: Left heart cath;  Surgeon: Elijah Lozano MD;  Location: [facility] CATH LAB;  Service: Cardiology;  Laterality: Left;       Time Tracking:     OT Date of Treatment: 25  OT Start Time: 1010  OT Stop Time: 1023  OT Total Time (min): 13 min    Billable Minutes:Evaluation OT min complexity     2025

## 2025-02-25 NOTE — H&P
[facility] - Emergency Department  [facility] Medicine  History & Physical    Patient Name: Derek Moreno  MRN: [id number]  Patient Class: IP- Inpatient  Admission Date: 2/24/2025  Attending Physician: Jack Valdivia Jr., MD   Primary Care Provider: Mis, Primary Doctor         Patient information was obtained from patient, spouse/SO, past medical records, and ER records.     Subjective:     Principal Problem:<principal problem not specified>    Chief Complaint:   Chief Complaint   Patient presents with    Shortness of Breath     Pt presents to ed with c/o having Sob and chest pain         HPI: The patient is a 59-year-old male who presented to the emergency department with worsening shortness of breath and increased fatigue. He reported experiencing shortness of breath upon mild to moderate exertion over the past few months. He complained of accompanying non productive cough. He denied any hemoptysis, chills or fever.  He did not complain of any accompanying chest pain, leg swelling, palpitations, or dizziness, but did mention experiencing a few episodes of blackouts. He denied any falls resulting from black outs.   Patient has PMH of COPD, CHF, tobacco and alcohol dependence.  The patient has not been using oxygen at home despite being prescribed it, due to concerns that he will get dependent on supplemented oxygen.   Additionally, he has not been taking his prescribed medications for the past several months due to affordability issues.   The patient lives alone and is ambulatory with the help of a cane.   Patient has PMH of alcohol dependence, he has been drinking at least 20 oz of beer daily and drank 8 oz today.      Past Medical History:   Diagnosis Date    Alcohol dependence     Alcoholic cirrhosis of liver     CAD (coronary artery disease) 01/22/2024    [facility] 1/10/2024- Dr. Lozano  Single vessel CAD with  of the Ostto prox RCA with collaterals from the left.  LVEDP 22 mmHg     No chest pain   Patient discharged from hospital without ASA. Start ASA 81 mg po daily  Patient is not on a statin. I reviewed his labs and and feel that this is most likely due to elevated liver function tests.  No lipid panel results available from recent hospital admissi    Chronic systolic (congestive) heart failure     EF  15%    Essential hypertension 01/04/2024    HLD (hyperlipidemia) 01/04/2024    Nonrheumatic mitral valve regurgitation 02/05/2024    Pulmonary hypertension due to left heart disease     PASP  62 mmHg       Past Surgical History:   Procedure Laterality Date    LEFT HEART CATHETERIZATION Left 1/10/2024    Procedure: Left heart cath;  Surgeon: Elijah Lozano MD;  Location: [facility] CATH LAB;  Service: Cardiology;  Laterality: Left;       Review of patient's allergies indicates:   Allergen Reactions    Azithromycin Swelling and Hives    Levofloxacin Itching and Rash       No current facility-administered medications on file prior to encounter.     Current Outpatient Medications on File Prior to Encounter   Medication Sig    albuterol (PROVENTIL/VENTOLIN HFA) 90 mcg/actuation inhaler Inhale 2 puffs into the lungs every 6 (six) hours as needed for Wheezing. Rescue    allopurinoL (ZYLOPRIM) 100 MG tablet Take 2 tablets (200 mg total) by mouth once daily.    aspirin (ECOTRIN) 81 MG EC tablet Take 1 tablet (81 mg total) by mouth once daily.    furosemide (LASIX) 40 MG tablet Take 1 tablet (40 mg total) by mouth 2 (two) times daily.    gabapentin (NEURONTIN) 300 MG capsule Take 1 capsule (300 mg total) by mouth 3 (three) times daily.    metoprolol succinate (TOPROL-XL) 25 MG 24 hr tablet Take 1 tablet (25 mg total) by mouth once daily.    multivitamin Tab Take 1 tablet by mouth once daily.    nicotine (NICODERM CQ) 21 mg/24 hr Place 1 patch onto the skin once daily.    OLANZapine (ZYPREXA) 5 MG tablet Take 1 tablet (5 mg total) by mouth once daily.    sacubitriL-valsartan (ENTRESTO) 24-26 mg per tablet Take 1  tablet by mouth 2 (two) times daily.    spironolactone (ALDACTONE) 25 MG tablet Take 1 tablet (25 mg total) by mouth once daily.    TAB-A-CHRISTA MULTIVITAMIN W-IRON Tab TAKE ONE (1) TABLET BY MOUTH ONCE DAILY.    vitamin D (VITAMIN D3) 1000 units Tab Take 1 tablet (1,000 Units total) by mouth once daily.     Family History    None       Tobacco Use    Smoking status: Every Day     Current packs/day: 0.50     Types: Cigarettes    Smokeless tobacco: Not on file   Substance and Sexual Activity    Alcohol use: Yes     Comment: been in rehab before    Drug use: Not Currently    Sexual activity: Not Currently     Review of Systems   Constitutional:  Positive for fatigue. Negative for activity change, appetite change, chills, diaphoresis and fever.   HENT:  Negative for congestion.    Eyes: Negative.    Respiratory:  Positive for cough and shortness of breath. Negative for choking, wheezing and stridor.    Cardiovascular:  Negative for chest pain, palpitations and leg swelling.   Gastrointestinal:  Positive for nausea. Negative for abdominal distention, abdominal pain, anal bleeding, blood in stool, constipation and vomiting.   Genitourinary:  Negative for dysuria, flank pain, frequency, hematuria and urgency.   Musculoskeletal:  Positive for back pain. Negative for gait problem.   Skin: Negative.    Neurological:  Positive for weakness. Negative for syncope, numbness and headaches.   Hematological:  Negative for adenopathy.   Psychiatric/Behavioral: Negative.       Objective:     Vital Signs (Most Recent):  Temp: 97.6 °F (36.4 °C) (02/24/25 1602)  Pulse: 98 (02/24/25 2209)  Resp: (!) 25 (02/24/25 2010)  BP: 106/77 (02/24/25 2209)  SpO2: 98 % (02/24/25 2211) Vital Signs (24h Range):  Temp:  [97.6 °F (36.4 °C)] 97.6 °F (36.4 °C)  Pulse:  [] 98  Resp:  [14-33] 25  SpO2:  [95 %-100 %] 98 %  BP: (106-128)/(72-94) 106/77     Weight: 71.2 kg (157 lb)  Body mass index is 23.87 kg/m².     Physical Exam  Vitals and nursing note  reviewed.   Constitutional:       General: He is not in acute distress.     Appearance: Normal appearance. He is not ill-appearing.   HENT:      Head: Normocephalic and atraumatic.      Nose: No congestion or rhinorrhea.   Eyes:      Extraocular Movements: Extraocular movements intact and EOM normal.      Conjunctiva/sclera: Conjunctivae normal.      Pupils: Pupils are equal, round, and reactive to light.   Cardiovascular:      Rate and Rhythm: Normal rate and regular rhythm.      Pulses: Normal pulses.      Heart sounds: Normal heart sounds. No murmur heard.  Pulmonary:      Effort: Pulmonary effort is normal. No respiratory distress.      Breath sounds: Examination of the right-lower field reveals decreased breath sounds. Examination of the left-lower field reveals decreased breath sounds. Decreased breath sounds present. No wheezing.   Abdominal:      General: Bowel sounds are normal. There is no distension.      Palpations: Abdomen is soft.      Tenderness: There is no abdominal tenderness.   Musculoskeletal:      Cervical back: Normal range of motion and neck supple. No rigidity.      Right lower leg: No edema.      Left lower leg: No edema.   Skin:     General: Skin is warm.      Capillary Refill: Capillary refill takes less than 2 seconds.   Neurological:      General: No focal deficit present.      Mental Status: He is alert and oriented to person, place, and time.      GCS: GCS eye subscore is 4. GCS verbal subscore is 5. GCS motor subscore is 6.              CRANIAL NERVES     CN I  cranial nerve I not tested    CN III, IV, VI   Pupils are equal, round, and reactive to light.  Extraocular motions are normal.     CN V   Facial sensation intact.   Right corneal reflex: normal  Left corneal reflex: normal    CN VII   Right facial weakness: none  Left facial weakness: none    CN VIII   Hearing: intact       Significant Labs: All pertinent labs within the past 24 hours have been reviewed.    Significant  Imaging: I have reviewed all pertinent imaging results/findings within the past 24 hours.  Assessment/Plan:     * Acute on chronic combined systolic and diastolic heart failure  Patient has PMH of Chronic combined systolic and diastolic heart failure.  Results for orders placed during the hospital encounter of 01/04/24    Echo    Interpretation Summary    Left Ventricle: The left ventricle is mildly dilated. Normal wall thickness. There is eccentric hypertrophy. Severe global hypokinesis with regional variation. There is severely reduced systolic function. Biplane (2D) method of discs ejection fraction is 15%. Global longitudinal strain is reduced. There is diastolic dysfunction but grade cannot be determined.    Right Ventricle: Moderate right ventricular enlargement. Systolic function is mildly reduced.    Right Atrium: Right atrium is mildly dilated.    Aortic Valve: The aortic valve is a trileaflet valve.    Mitral Valve: There is mild bileaflet sclerosis. There is no stenosis. The mean pressure gradient across the mitral valve is 3 mmHg at a heart rate of  bpm. There is moderate regurgitation with a posterolateral eccentriccally directed jet.    Tricuspid Valve: There is moderate regurgitation.    Pulmonary Artery: The estimated pulmonary artery systolic pressure is 62 mmHg.    IVC/SVC: Elevated venous pressure at 15 mmHg.    Pericardium: Large left pleural effusion.  - patient is on CHF exacerbation pathway  - patient received 6o mg IV of furosemide in the ED  - pro BNP was 66053  - troponin levels were flat  - EKG showed sinus rhythm with no acute findings  - CXR    FINDINGS:  Cardiac silhouette is mildly enlarged.  Probable small bibasilar pleural effusions, improved from the prior study.  Mild right basilar airspace disease could represent mild infiltrate and pneumonia, mildly increased from the prior study.  No mass is detected.  No evidence of pneumothorax  - continue IV diuresis  - LOW SODIUM DIET WITH  FLUID RESTRICTION  - daily weights and strict I/O's  - repeat ECHO pending    COPD (chronic obstructive pulmonary disease)  Patient's COPD is with exacerbation noted by continued dyspnea currently.  Patient is currently off COPD Pathway. Continue scheduled inhalers  patient has not been on his home medication because of affordability issues  and monitor respiratory status closely.   - patient SpO2 is normal in room air    CAD (coronary artery disease)  Patient with known CAD s/p  no stent placed , which is controlled Will continue ASA and monitor for S/Sx of angina/ACS. Continue to monitor on telemetry.   Holding statin 2/2 liver enzymes elevated  - LHC was done on 1/10/2024  - troponin levels were flat  - EKG showed sinus rhythm with no acute findings    Alcoholic cirrhosis of liver without ascites  Patient with known Cirrhosis with Child's class B. Co-morbidities are absent.  MELD-Na score calculated; MELD 3.0: 21 at 2/24/2025  4:20 PM  MELD-Na: 22 at 2/24/2025  4:20 PM  Calculated from:  Serum Creatinine: 1.34 mg/dL at 2/24/2025  4:20 PM  Serum Sodium: 132 mmol/L at 2/24/2025  4:20 PM  Total Bilirubin: 3.4 mg/dL at 2/24/2025  4:20 PM  Serum Albumin: 3.7 g/dL (Using max of 3.5 g/dL) at 2/24/2025  4:20 PM  INR(ratio): 1.5 at 2/24/2025  4:20 PM  Age at listing (hypothetical): 59 years  Sex: Male at 2/24/2025  4:20 PM      Continue chronic meds. Etiology likely ETOH. Will avoid any hepatotoxic meds, and monitor CBC/CMP/INR for synthetic function.     Essential hypertension  Patient's blood pressure range in the last 24 hours was: BP  Min: 105/81  Max: 128/89.The patient's inpatient anti-hypertensive regimen is listed below:  Current Antihypertensives  furosemide injection 40 mg, Every 12 hours, Intravenous  metoprolol succinate (TOPROL-XL) 24 hr tablet 25 mg, Daily, Oral    Plan  Holding home BP medications due to soft blood pressure and ongoing diuresis     Tobacco dependency  Dangers of cigarette smoking were  reviewed with patient in detail. Patient was Counseled for 3-10 minutes. Nicotine replacement options were discussed. Nicotine replacement was discussed- prescribed    Alcohol dependence  Follow [facility] protocol  - patient reported drinking 12oz of beer today  - he limits alcohol to 20oz a day (beer)  Supplementing with thiamine and multivitamin      Vitamin D deficiency  Continue home vitamin D         VTE Risk Mitigation (From admission, onward)           Ordered     enoxaparin injection 40 mg  Daily         02/24/25 2132     IP VTE HIGH RISK PATIENT  Once         02/24/25 2132                                    Chari Berry MD  Department of Hospital Medicine  [facility] - Emergency Department

## 2025-02-25 NOTE — PLAN OF CARE
Ss consulted to assist with cardiac rehab when ordered. Ss provided cardiac rehab packet to pt and explained. Ss following

## 2025-02-25 NOTE — ASSESSMENT & PLAN NOTE
Follow [facility] protocol  - patient reported drinking 12oz of beer today  - he limits alcohol to 20oz a day (beer)  Supplementing with thiamine and multivitamin

## 2025-02-25 NOTE — SUBJECTIVE & OBJECTIVE
Past Medical History:   Diagnosis Date    Alcohol dependence     Alcoholic cirrhosis of liver     CAD (coronary artery disease) 01/22/2024    [facility] 1/10/2024- Dr. Lozano  Single vessel CAD with  of the Ostto prox RCA with collaterals from the left.  LVEDP 22 mmHg     No chest pain  Patient discharged from hospital without ASA. Start ASA 81 mg po daily  Patient is not on a statin. I reviewed his labs and and feel that this is most likely due to elevated liver function tests.  No lipid panel results available from recent hospital admissi    Chronic systolic (congestive) heart failure     EF  15%    Essential hypertension 01/04/2024    HLD (hyperlipidemia) 01/04/2024    Nonrheumatic mitral valve regurgitation 02/05/2024    Pulmonary hypertension due to left heart disease     PASP  62 mmHg       Past Surgical History:   Procedure Laterality Date    LEFT HEART CATHETERIZATION Left 1/10/2024    Procedure: Left heart cath;  Surgeon: Elijah Lozano MD;  Location: [facility] CATH LAB;  Service: Cardiology;  Laterality: Left;       Review of patient's allergies indicates:   Allergen Reactions    Azithromycin Swelling and Hives    Levofloxacin Itching and Rash       No current facility-administered medications on file prior to encounter.     Current Outpatient Medications on File Prior to Encounter   Medication Sig    albuterol (PROVENTIL/VENTOLIN HFA) 90 mcg/actuation inhaler Inhale 2 puffs into the lungs every 6 (six) hours as needed for Wheezing. Rescue    allopurinoL (ZYLOPRIM) 100 MG tablet Take 2 tablets (200 mg total) by mouth once daily.    aspirin (ECOTRIN) 81 MG EC tablet Take 1 tablet (81 mg total) by mouth once daily.    furosemide (LASIX) 40 MG tablet Take 1 tablet (40 mg total) by mouth 2 (two) times daily.    gabapentin (NEURONTIN) 300 MG capsule Take 1 capsule (300 mg total) by mouth 3 (three) times daily.    metoprolol succinate (TOPROL-XL) 25 MG 24 hr tablet Take 1 tablet (25 mg total) by mouth once  daily.    multivitamin Tab Take 1 tablet by mouth once daily.    nicotine (NICODERM CQ) 21 mg/24 hr Place 1 patch onto the skin once daily.    OLANZapine (ZYPREXA) 5 MG tablet Take 1 tablet (5 mg total) by mouth once daily.    sacubitriL-valsartan (ENTRESTO) 24-26 mg per tablet Take 1 tablet by mouth 2 (two) times daily.    spironolactone (ALDACTONE) 25 MG tablet Take 1 tablet (25 mg total) by mouth once daily.    TAB-A-CHRISTA MULTIVITAMIN W-IRON Tab TAKE ONE (1) TABLET BY MOUTH ONCE DAILY.    vitamin D (VITAMIN D3) 1000 units Tab Take 1 tablet (1,000 Units total) by mouth once daily.     Family History    None       Tobacco Use    Smoking status: Every Day     Current packs/day: 0.50     Types: Cigarettes    Smokeless tobacco: Not on file   Substance and Sexual Activity    Alcohol use: Yes     Comment: been in rehab before    Drug use: Not Currently    Sexual activity: Not Currently     Review of Systems   Constitutional:  Positive for fatigue. Negative for activity change, appetite change, chills, diaphoresis and fever.   HENT:  Negative for congestion.    Eyes: Negative.    Respiratory:  Positive for cough and shortness of breath. Negative for choking, wheezing and stridor.    Cardiovascular:  Negative for chest pain, palpitations and leg swelling.   Gastrointestinal:  Positive for nausea. Negative for abdominal distention, abdominal pain, anal bleeding, blood in stool, constipation and vomiting.   Genitourinary:  Negative for dysuria, flank pain, frequency, hematuria and urgency.   Musculoskeletal:  Positive for back pain. Negative for gait problem.   Skin: Negative.    Neurological:  Positive for weakness. Negative for syncope, numbness and headaches.   Hematological:  Negative for adenopathy.   Psychiatric/Behavioral: Negative.       Objective:     Vital Signs (Most Recent):  Temp: 97.6 °F (36.4 °C) (02/24/25 1602)  Pulse: 98 (02/24/25 2209)  Resp: (!) 25 (02/24/25 2010)  BP: 106/77 (02/24/25 2209)  SpO2: 98 %  (02/24/25 2211) Vital Signs (24h Range):  Temp:  [97.6 °F (36.4 °C)] 97.6 °F (36.4 °C)  Pulse:  [] 98  Resp:  [14-33] 25  SpO2:  [95 %-100 %] 98 %  BP: (106-128)/(72-94) 106/77     Weight: 71.2 kg (157 lb)  Body mass index is 23.87 kg/m².     Physical Exam  Vitals and nursing note reviewed.   Constitutional:       General: He is not in acute distress.     Appearance: Normal appearance. He is not ill-appearing.   HENT:      Head: Normocephalic and atraumatic.      Nose: No congestion or rhinorrhea.   Eyes:      Extraocular Movements: Extraocular movements intact and EOM normal.      Conjunctiva/sclera: Conjunctivae normal.      Pupils: Pupils are equal, round, and reactive to light.   Cardiovascular:      Rate and Rhythm: Normal rate and regular rhythm.      Pulses: Normal pulses.      Heart sounds: Normal heart sounds. No murmur heard.  Pulmonary:      Effort: Pulmonary effort is normal. No respiratory distress.      Breath sounds: Examination of the right-lower field reveals decreased breath sounds. Examination of the left-lower field reveals decreased breath sounds. Decreased breath sounds present. No wheezing.   Abdominal:      General: Bowel sounds are normal. There is no distension.      Palpations: Abdomen is soft.      Tenderness: There is no abdominal tenderness.   Musculoskeletal:      Cervical back: Normal range of motion and neck supple. No rigidity.      Right lower leg: No edema.      Left lower leg: No edema.   Skin:     General: Skin is warm.      Capillary Refill: Capillary refill takes less than 2 seconds.   Neurological:      General: No focal deficit present.      Mental Status: He is alert and oriented to person, place, and time.      GCS: GCS eye subscore is 4. GCS verbal subscore is 5. GCS motor subscore is 6.              CRANIAL NERVES     CN I  cranial nerve I not tested    CN III, IV, VI   Pupils are equal, round, and reactive to light.  Extraocular motions are normal.     CN V    Facial sensation intact.   Right corneal reflex: normal  Left corneal reflex: normal    CN VII   Right facial weakness: none  Left facial weakness: none    CN VIII   Hearing: intact       Significant Labs: All pertinent labs within the past 24 hours have been reviewed.    Significant Imaging: I have reviewed all pertinent imaging results/findings within the past 24 hours.

## 2025-02-26 PROCEDURE — 25000242 PHARM REV CODE 250 ALT 637 W/ HCPCS

## 2025-02-26 PROCEDURE — 94761 N-INVAS EAR/PLS OXIMETRY MLT: CPT

## 2025-02-26 PROCEDURE — 25000003 PHARM REV CODE 250: Performed by: HOSPITALIST

## 2025-02-26 PROCEDURE — 99232 SBSQ HOSP IP/OBS MODERATE 35: CPT | Mod: ,,, | Performed by: FAMILY MEDICINE

## 2025-02-26 PROCEDURE — 94640 AIRWAY INHALATION TREATMENT: CPT

## 2025-02-26 PROCEDURE — 11000001 HC ACUTE MED/SURG PRIVATE ROOM

## 2025-02-26 PROCEDURE — 25000003 PHARM REV CODE 250

## 2025-02-26 PROCEDURE — 63600175 PHARM REV CODE 636 W HCPCS: Performed by: HOSPITALIST

## 2025-02-26 PROCEDURE — 27000221 HC OXYGEN, UP TO 24 HOURS

## 2025-02-26 PROCEDURE — 97116 GAIT TRAINING THERAPY: CPT

## 2025-02-26 PROCEDURE — S4991 NICOTINE PATCH NONLEGEND: HCPCS | Performed by: HOSPITALIST

## 2025-02-26 PROCEDURE — 97110 THERAPEUTIC EXERCISES: CPT

## 2025-02-26 PROCEDURE — 25000003 PHARM REV CODE 250: Performed by: FAMILY MEDICINE

## 2025-02-26 PROCEDURE — 99900035 HC TECH TIME PER 15 MIN (STAT)

## 2025-02-26 RX ORDER — FUROSEMIDE 20 MG/1
20 TABLET ORAL 2 TIMES DAILY
Status: DISCONTINUED | OUTPATIENT
Start: 2025-02-27 | End: 2025-02-28

## 2025-02-26 RX ADMIN — IPRATROPIUM BROMIDE AND ALBUTEROL SULFATE 3 ML: .5; 3 SOLUTION RESPIRATORY (INHALATION) at 07:02

## 2025-02-26 RX ADMIN — ASPIRIN 81 MG: 81 TABLET, COATED ORAL at 09:02

## 2025-02-26 RX ADMIN — Medication 1000 UNITS: at 09:02

## 2025-02-26 RX ADMIN — ALLOPURINOL 200 MG: 100 TABLET ORAL at 09:02

## 2025-02-26 RX ADMIN — METOPROLOL SUCCINATE 25 MG: 25 TABLET, EXTENDED RELEASE ORAL at 09:02

## 2025-02-26 RX ADMIN — IPRATROPIUM BROMIDE AND ALBUTEROL SULFATE 3 ML: .5; 3 SOLUTION RESPIRATORY (INHALATION) at 12:02

## 2025-02-26 RX ADMIN — THIAMINE HYDROCHLORIDE 200 MG: 100 INJECTION, SOLUTION INTRAMUSCULAR; INTRAVENOUS at 09:02

## 2025-02-26 RX ADMIN — IPRATROPIUM BROMIDE AND ALBUTEROL SULFATE 3 ML: .5; 3 SOLUTION RESPIRATORY (INHALATION) at 11:02

## 2025-02-26 RX ADMIN — OXYCODONE 5 MG: 5 TABLET ORAL at 08:02

## 2025-02-26 RX ADMIN — FUROSEMIDE 40 MG: 10 INJECTION, SOLUTION INTRAVENOUS at 09:02

## 2025-02-26 RX ADMIN — NICOTINE 1 PATCH: 14 PATCH, EXTENDED RELEASE TRANSDERMAL at 09:02

## 2025-02-26 RX ADMIN — THERA TABS 1 TABLET: TAB at 09:02

## 2025-02-26 RX ADMIN — IPRATROPIUM BROMIDE AND ALBUTEROL SULFATE 3 ML: .5; 3 SOLUTION RESPIRATORY (INHALATION) at 03:02

## 2025-02-26 RX ADMIN — FUROSEMIDE 40 MG: 10 INJECTION, SOLUTION INTRAVENOUS at 08:02

## 2025-02-26 RX ADMIN — ENOXAPARIN SODIUM 40 MG: 40 INJECTION SUBCUTANEOUS at 05:02

## 2025-02-26 RX ADMIN — TRAZODONE HYDROCHLORIDE 50 MG: 50 TABLET ORAL at 08:02

## 2025-02-26 NOTE — SUBJECTIVE & OBJECTIVE
Interval History: Known to me from long prior admission.  Feeling a little better with diuresis.     Review of Systems  Objective:     Vital Signs (Most Recent):  Temp: 97.6 °F (36.4 °C) (02/25/25 1626)  Pulse: 84 (02/25/25 1932)  Resp: 17 (02/25/25 1626)  BP: 105/74 (02/25/25 1922)  SpO2: 95 % (02/25/25 1932) Vital Signs (24h Range):  Temp:  [97.6 °F (36.4 °C)-98.3 °F (36.8 °C)] 97.6 °F (36.4 °C)  Pulse:  [] 84  Resp:  [13-26] 17  SpO2:  [92 %-100 %] 95 %  BP: (100-122)/(69-89) 105/74     Weight: 71.2 kg (157 lb)  Body mass index is 23.87 kg/m².  No intake or output data in the 24 hours ending 02/25/25 2055      Physical Exam  Vitals and nursing note reviewed.   Constitutional:       General: He is not in acute distress.     Appearance: Normal appearance. He is not ill-appearing.   HENT:      Head: Normocephalic and atraumatic.      Nose: No congestion or rhinorrhea.   Eyes:      Extraocular Movements: Extraocular movements intact.      Conjunctiva/sclera: Conjunctivae normal.      Pupils: Pupils are equal, round, and reactive to light.   Cardiovascular:      Rate and Rhythm: Normal rate and regular rhythm.      Pulses: Normal pulses.      Heart sounds: Normal heart sounds. No murmur heard.  Pulmonary:      Effort: Pulmonary effort is normal. No respiratory distress.      Breath sounds: Examination of the right-lower field reveals decreased breath sounds. Examination of the left-lower field reveals decreased breath sounds. Decreased breath sounds present. No wheezing.   Abdominal:      General: Bowel sounds are normal. There is no distension.      Palpations: Abdomen is soft.      Tenderness: There is no abdominal tenderness.   Musculoskeletal:      Cervical back: Normal range of motion and neck supple. No rigidity.      Right lower leg: No edema.      Left lower leg: No edema.   Skin:     General: Skin is warm.      Capillary Refill: Capillary refill takes less than 2 seconds.   Neurological:      General:  No focal deficit present.      Mental Status: He is alert and oriented to person, place, and time.      GCS: GCS eye subscore is 4. GCS verbal subscore is 5. GCS motor subscore is 6.             Significant Labs: All pertinent labs within the past 24 hours have been reviewed.  BMP:   Recent Labs   Lab 02/24/25  1620 02/25/25  0425   * 105*   * 132*   K 5.3* 3.8   CL 96* 96*   CO2 21* 24   BUN 15 19   CREATININE 1.34* 1.36*   CALCIUM 8.7 8.2*   MG 2.0  --      CBC:   Recent Labs   Lab 02/24/25  1620   WBC 9.83   HGB 13.1*   HCT 42.1          Significant Imaging: I have reviewed all pertinent imaging results/findings within the past 24 hours.

## 2025-02-26 NOTE — ASSESSMENT & PLAN NOTE
Follow [facility] protocol  - patient reported drinking 12oz of beer today  - he limits alcohol to 20oz a day (beer)  Supplementing with thiamine and multivitamin

## 2025-02-26 NOTE — PROGRESS NOTES
[facility] - Emergency Department  [facility] Medicine  Progress Note    Patient Name: Derek Moreno  MRN: [id number]  Patient Class: IP- Inpatient   Admission Date: 2/24/2025  Length of Stay: 1 days  Attending Physician: Jack Valdivia Jr., MD  Primary Care Provider: Mis, Primary Doctor        Subjective     Principal Problem:Acute on chronic combined systolic and diastolic heart failure        HPI:  The patient is a 59-year-old male who presented to the emergency department with worsening shortness of breath and increased fatigue. He reported experiencing shortness of breath upon mild to moderate exertion over the past few months. He complained of accompanying non productive cough. He denied any hemoptysis, chills or fever.  He did not complain of any accompanying chest pain, leg swelling, palpitations, or dizziness, but did mention experiencing a few episodes of blackouts. He denied any falls resulting from black outs.   Patient has PMH of COPD, CHF, tobacco and alcohol dependence.  The patient has not been using oxygen at home despite being prescribed it, due to concerns that he will get dependent on supplemented oxygen.   Additionally, he has not been taking his prescribed medications for the past several months due to affordability issues.   The patient lives alone and is ambulatory with the help of a cane.   Patient has PMH of alcohol dependence, he has been drinking at least 20 oz of beer daily and drank 8 oz today.      Overview/Hospital Course:  No notes on file    Interval History: Known to me from long prior admission.  Feeling a little better with diuresis.     Review of Systems  Objective:     Vital Signs (Most Recent):  Temp: 97.6 °F (36.4 °C) (02/25/25 1626)  Pulse: 84 (02/25/25 1932)  Resp: 17 (02/25/25 1626)  BP: 105/74 (02/25/25 1922)  SpO2: 95 % (02/25/25 1932) Vital Signs (24h Range):  Temp:  [97.6 °F (36.4 °C)-98.3 °F (36.8 °C)] 97.6 °F (36.4 °C)  Pulse:  [] 84  Resp:   [13-26] 17  SpO2:  [92 %-100 %] 95 %  BP: (100-122)/(69-89) 105/74     Weight: 71.2 kg (157 lb)  Body mass index is 23.87 kg/m².  No intake or output data in the 24 hours ending 02/25/25 2055      Physical Exam  Vitals and nursing note reviewed.   Constitutional:       General: He is not in acute distress.     Appearance: Normal appearance. He is not ill-appearing.   HENT:      Head: Normocephalic and atraumatic.      Nose: No congestion or rhinorrhea.   Eyes:      Extraocular Movements: Extraocular movements intact.      Conjunctiva/sclera: Conjunctivae normal.      Pupils: Pupils are equal, round, and reactive to light.   Cardiovascular:      Rate and Rhythm: Normal rate and regular rhythm.      Pulses: Normal pulses.      Heart sounds: Normal heart sounds. No murmur heard.  Pulmonary:      Effort: Pulmonary effort is normal. No respiratory distress.      Breath sounds: Examination of the right-lower field reveals decreased breath sounds. Examination of the left-lower field reveals decreased breath sounds. Decreased breath sounds present. No wheezing.   Abdominal:      General: Bowel sounds are normal. There is no distension.      Palpations: Abdomen is soft.      Tenderness: There is no abdominal tenderness.   Musculoskeletal:      Cervical back: Normal range of motion and neck supple. No rigidity.      Right lower leg: No edema.      Left lower leg: No edema.   Skin:     General: Skin is warm.      Capillary Refill: Capillary refill takes less than 2 seconds.   Neurological:      General: No focal deficit present.      Mental Status: He is alert and oriented to person, place, and time.      GCS: GCS eye subscore is 4. GCS verbal subscore is 5. GCS motor subscore is 6.             Significant Labs: All pertinent labs within the past 24 hours have been reviewed.  BMP:   Recent Labs   Lab 02/24/25  1620 02/25/25  0425   * 105*   * 132*   K 5.3* 3.8   CL 96* 96*   CO2 21* 24   BUN 15 19   CREATININE  1.34* 1.36*   CALCIUM 8.7 8.2*   MG 2.0  --      CBC:   Recent Labs   Lab 02/24/25  1620   WBC 9.83   HGB 13.1*   HCT 42.1          Significant Imaging: I have reviewed all pertinent imaging results/findings within the past 24 hours.    Assessment and Plan     * Acute on chronic combined systolic and diastolic heart failure  Patient has PMH of Chronic combined systolic and diastolic heart failure.  Results for orders placed during the hospital encounter of 01/04/24    Echo    Interpretation Summary    Left Ventricle: The left ventricle is mildly dilated. Normal wall thickness. There is eccentric hypertrophy. Severe global hypokinesis with regional variation. There is severely reduced systolic function. Biplane (2D) method of discs ejection fraction is 15%. Global longitudinal strain is reduced. There is diastolic dysfunction but grade cannot be determined.    Right Ventricle: Moderate right ventricular enlargement. Systolic function is mildly reduced.    Right Atrium: Right atrium is mildly dilated.    Aortic Valve: The aortic valve is a trileaflet valve.    Mitral Valve: There is mild bileaflet sclerosis. There is no stenosis. The mean pressure gradient across the mitral valve is 3 mmHg at a heart rate of  bpm. There is moderate regurgitation with a posterolateral eccentriccally directed jet.    Tricuspid Valve: There is moderate regurgitation.    Pulmonary Artery: The estimated pulmonary artery systolic pressure is 62 mmHg.    IVC/SVC: Elevated venous pressure at 15 mmHg.    Pericardium: Large left pleural effusion.  - patient is on CHF exacerbation pathway  - patient received 6o mg IV of furosemide in the ED  - pro BNP was 86620  - troponin levels were flat  - EKG showed sinus rhythm with no acute findings  - CXR    FINDINGS:  Cardiac silhouette is mildly enlarged.  Probable small bibasilar pleural effusions, improved from the prior study.  Mild right basilar airspace disease could represent mild  infiltrate and pneumonia, mildly increased from the prior study.  No mass is detected.  No evidence of pneumothorax  - continue IV diuresis  - LOW SODIUM DIET WITH FLUID RESTRICTION  - daily weights and strict I/O's  - repeat ECHO pending    2/25 - Subtle improvement overnight.  Continue IV lasix.     Alcohol dependence  Follow [facility] protocol  - patient reported drinking 12oz of beer today  - he limits alcohol to 20oz a day (beer)  Supplementing with thiamine and multivitamin      Tobacco dependency  Dangers of cigarette smoking were reviewed with patient in detail. Patient was Counseled for 3-10 minutes. Nicotine replacement options were discussed. Nicotine replacement was discussed- prescribed    Vitamin D deficiency  Continue home vitamin D       CAD (coronary artery disease)  Patient with known CAD s/p  no stent placed , which is controlled Will continue ASA and monitor for S/Sx of angina/ACS. Continue to monitor on telemetry.   Holding statin 2/2 liver enzymes elevated  - LHC was done on 1/10/2024  - troponin levels were flat  - EKG showed sinus rhythm with no acute findings    Alcoholic cirrhosis  Patient with known Cirrhosis with Child's class B. Co-morbidities are absent.  MELD-Na score calculated; MELD 3.0: 21 at 2/24/2025  4:20 PM  MELD-Na: 22 at 2/24/2025  4:20 PM  Calculated from:  Serum Creatinine: 1.34 mg/dL at 2/24/2025  4:20 PM  Serum Sodium: 132 mmol/L at 2/24/2025  4:20 PM  Total Bilirubin: 3.4 mg/dL at 2/24/2025  4:20 PM  Serum Albumin: 3.7 g/dL (Using max of 3.5 g/dL) at 2/24/2025  4:20 PM  INR(ratio): 1.5 at 2/24/2025  4:20 PM  Age at listing (hypothetical): 59 years  Sex: Male at 2/24/2025  4:20 PM      Continue chronic meds. Etiology likely ETOH. Will avoid any hepatotoxic meds, and monitor CBC/CMP/INR for synthetic function.     - negative hepatitis panel  US Abdomen Limited  Impression:  1. Nonspecific gallbladder wall thickening with minimal pericholecystic fluid.  No stones are identified.   Mild acute cholecystitis is a consideration.  Recommend follow-up.  2. Probable small 1.3 cm minimally complex hepatic cyst in the right lobe.  3. Bibasilar pleural effusions.    Essential hypertension  Patient's blood pressure range in the last 24 hours was: BP  Min: 100/75  Max: 122/69.The patient's inpatient anti-hypertensive regimen is listed below:  Current Antihypertensives  furosemide injection 40 mg, Every 12 hours, Intravenous  metoprolol succinate (TOPROL-XL) 24 hr tablet 25 mg, Daily, Oral    Plan  Holding home BP medications due to soft blood pressure and ongoing diuresis     COPD (chronic obstructive pulmonary disease)  Patient's COPD is with exacerbation noted by continued dyspnea currently.  Patient is currently off COPD Pathway. Continue scheduled inhalers  patient has not been on his home medication because of affordability issues  and monitor respiratory status closely.   - patient SpO2 is normal in room air    2/25 - No steroids at present time.  Monitor.       VTE Risk Mitigation (From admission, onward)           Ordered     enoxaparin injection 40 mg  Daily         02/24/25 2132     IP VTE HIGH RISK PATIENT  Once         02/24/25 2132                    Discharge Planning   EFREM:      Code Status: Full Code   Medical Readiness for Discharge Date:   Discharge Plan A: Home with family                Please place Justification for DME        Jack Valdivia Jr, MD  Department of Hospital Medicine   [facility] - Emergency Department

## 2025-02-26 NOTE — PT/OT/SLP PROGRESS
Occupational Therapy   Treatment    Name: Derek Moreno  MRN: [id number]  Admitting Diagnosis:  Acute on chronic combined systolic and diastolic heart failure       Recommendations:     Discharge Recommendations: Low Intensity Therapy  Discharge Equipment Recommendations:  none  Barriers to discharge:  None    Assessment:     Derek Moreno is a 59 y.o. male with a medical diagnosis of Acute on chronic combined systolic and diastolic heart failure.  He presents with weakness and decline in ADLs. Performance deficits affecting function are weakness, impaired endurance, impaired self care skills, impaired functional mobility, gait instability, impaired balance.     Rehab Prognosis:  Good; patient would benefit from acute skilled OT services to address these deficits and reach maximum level of function.       Plan:     Patient to be seen 5 x/week to address the above listed problems via self-care/home management, therapeutic activities, therapeutic exercises  Plan of Care Expires: 03/25/25  Plan of Care Reviewed with: patient    Subjective     Chief Complaint: acute on chronic combined systolic and diastolic hear failure  Patient/Family Comments/goals: pt agreeable to OT tx  Pain/Comfort:  Pain Rating 1: 6/10  Location - Orientation 1: generalized    Objective:     Communicated with: RN prior to session.  Patient found HOB elevated with blood pressure cuff, peripheral IV, pulse ox (continuous), telemetry upon OT entry to room.    General Precautions: Standard, fall    Orthopedic Precautions:N/A  Braces: N/A  Respiratory Status: Room air     Occupational Performance:     Bed Mobility:    Patient completed Supine to Sit with modified independence  Patient completed Sit to Supine with modified independence     Functional Mobility/Transfers:  Not performed    Activities of Daily Living:  Lower Body Dressing: independence to hero socks      AMPA 6 Click ADL:      Treatment & Education:  Pt performed x 15 reps each  bicep curls 2#db and chest press 2#db in order to improve BUE strength and endurance to perform ADL and ADL t/f with less assist. Pt declined further tx d/t SOB    Patient left supine with all lines intact and call button in reach    GOALS:   Multidisciplinary Problems       Occupational Therapy Goals          Problem: Occupational Therapy    Goal Priority Disciplines Outcome Interventions   Occupational Therapy Goal     OT, PT/OT Progressing    Description: STG:  Pt will perform grooming with setup  Pt will bathe with SBA with setup at EOB  Pt will perform UE dressing with SBA  Pt will perform LE dressing with SBA  Pt will sit EOB x 10 min with SBA   Pt will transfer bed/chair/bsc with CGA with cane  Pt will perform standing task x 2 min with CGA with RW   Pt will tolerate 15 minutes of tx without fatigue      LT.Restore to max I with self care and mobility.                           DME Justifications:  No DME recommended requiring DME justifications    Time Tracking:     OT Date of Treatment: 25  OT Start Time: 1511  OT Stop Time: 1526  OT Total Time (min): 15 min    Billable Minutes:Therapeutic Exercise 15 minutes    OT/ANAY: OT          2025

## 2025-02-26 NOTE — ASSESSMENT & PLAN NOTE
Patient's blood pressure range in the last 24 hours was: BP  Min: 100/75  Max: 122/69.The patient's inpatient anti-hypertensive regimen is listed below:  Current Antihypertensives  furosemide injection 40 mg, Every 12 hours, Intravenous  metoprolol succinate (TOPROL-XL) 24 hr tablet 25 mg, Daily, Oral    Plan  Holding home BP medications due to soft blood pressure and ongoing diuresis

## 2025-02-26 NOTE — ASSESSMENT & PLAN NOTE
Patient has PMH of Chronic combined systolic and diastolic heart failure.  Results for orders placed during the hospital encounter of 01/04/24    Echo    Interpretation Summary    Left Ventricle: The left ventricle is mildly dilated. Normal wall thickness. There is eccentric hypertrophy. Severe global hypokinesis with regional variation. There is severely reduced systolic function. Biplane (2D) method of discs ejection fraction is 15%. Global longitudinal strain is reduced. There is diastolic dysfunction but grade cannot be determined.    Right Ventricle: Moderate right ventricular enlargement. Systolic function is mildly reduced.    Right Atrium: Right atrium is mildly dilated.    Aortic Valve: The aortic valve is a trileaflet valve.    Mitral Valve: There is mild bileaflet sclerosis. There is no stenosis. The mean pressure gradient across the mitral valve is 3 mmHg at a heart rate of  bpm. There is moderate regurgitation with a posterolateral eccentriccally directed jet.    Tricuspid Valve: There is moderate regurgitation.    Pulmonary Artery: The estimated pulmonary artery systolic pressure is 62 mmHg.    IVC/SVC: Elevated venous pressure at 15 mmHg.    Pericardium: Large left pleural effusion.  - patient is on CHF exacerbation pathway  - patient received 6o mg IV of furosemide in the ED  - pro BNP was 85676  - troponin levels were flat  - EKG showed sinus rhythm with no acute findings  - CXR    FINDINGS:  Cardiac silhouette is mildly enlarged.  Probable small bibasilar pleural effusions, improved from the prior study.  Mild right basilar airspace disease could represent mild infiltrate and pneumonia, mildly increased from the prior study.  No mass is detected.  No evidence of pneumothorax  - continue IV diuresis  - LOW SODIUM DIET WITH FLUID RESTRICTION  - daily weights and strict I/O's  - repeat ECHO pending    2/25 - Subtle improvement overnight.  Continue IV lasix.

## 2025-02-26 NOTE — PT/OT/SLP PROGRESS
Physical Therapy Treatment    Patient Name:  Derek Moreno   MRN:  [id number]    Recommendations:     Discharge Recommendations: Low Intensity Therapy  Discharge Equipment Recommendations: none  Barriers to discharge: None    Assessment:     Derek Moreno is a 59 y.o. male admitted with a medical diagnosis of Acute on chronic combined systolic and diastolic heart failure.  He presents with the following impairments/functional limitations: weakness, impaired endurance, impaired functional mobility, impaired cardiopulmonary response to activity Pt completes all mobility without assistance but needs rest breaks due to shortness of breath. Pt ambulating with cane with limited gait distance due to fatigue.    Rehab Prognosis: Fair; patient would benefit from acute skilled PT services to address these deficits and reach maximum level of function.    Recent Surgery: * No surgery found *      Plan:     During this hospitalization, patient to be seen 5 x/week to address the identified rehab impairments via gait training, therapeutic activities, therapeutic exercises and progress toward the following goals:    Plan of Care Expires:  03/25/25    Subjective     Chief Complaint: shortness of breath   Patient/Family Comments/goals: Pt is agreeable to PT   Pain/Comfort:  Pain Rating 1: 7/10  Location - Orientation 1: generalized  Pain Addressed 1: Pre-medicate for activity  Pain Rating Post-Intervention 1: 7/10      Objective:     Communicated with RN prior to session.  Patient found sitting edge of bed with blood pressure cuff, pulse ox (continuous), telemetry upon PT entry to room.     General Precautions: Standard, fall  Orthopedic Precautions: N/A  Braces: N/A  Respiratory Status: Room air     Functional Mobility:  Bed Mobility:     Scooting: independence  Transfers:     Sit to Stand:  supervision with straight cane  Gait: 150 ft contact guard assistance with cane, slow stacy, short step length  Balance:  good      AM-PAC 6 CLICK MOBILITY  Turning over in bed (including adjusting bedclothes, sheets and blankets)?: 4  Sitting down on and standing up from a chair with arms (e.g., wheelchair, bedside commode, etc.): 4  Moving from lying on back to sitting on the side of the bed?: 4  Moving to and from a bed to a chair (including a wheelchair)?: 4  Need to walk in hospital room?: 4  Climbing 3-5 steps with a railing?: 3  Basic Mobility Total Score: 23       Treatment & Education:  Pt performed bilateral LE: seated exercises: ankle pumps, long arc quads, and marches x 30 each, increased time allowd to complete exercises      Patient left sitting edge of bed with all lines intact and call button in reach..    GOALS:   Multidisciplinary Problems       Physical Therapy Goals          Problem: Physical Therapy    Goal Priority Disciplines Outcome Interventions   Physical Therapy Goal     PT, PT/OT Progressing    Description: Short term goals:  1. Supine to sit with Modified [place]  2. Sit to stand transfer with Modified [place]  3. Bed to chair transfer with Modified [place] using Single-point Cane   4. Gait  x 100 feet with Modified [place] using Single-point Cane .     Long term goals:  Pt will return home to [facility] with all mobility                         DME Justifications:  No DME recommended requiring DME justifications    Time Tracking:     PT Received On: 02/26/25  PT Start Time: 1058     PT Stop Time: 1122  PT Total Time (min): 24 min     Billable Minutes: Gait Training 10 and Therapeutic Exercise 14    Treatment Type: Treatment  PT/PTA: PT     Number of PTA visits since last PT visit: 0     02/26/2025

## 2025-02-26 NOTE — ASSESSMENT & PLAN NOTE
Patient's COPD is with exacerbation noted by continued dyspnea currently.  Patient is currently off COPD Pathway. Continue scheduled inhalers  patient has not been on his home medication because of affordability issues  and monitor respiratory status closely.   - patient SpO2 is normal in room air    2/25 - No steroids at present time.  Monitor.

## 2025-02-26 NOTE — PLAN OF CARE
Problem: Adult Inpatient Plan of Care  Goal: Plan of Care Review  Outcome: Progressing  Goal: Patient-Specific Goal (Individualized)  Outcome: Progressing  Goal: Absence of Hospital-Acquired Illness or Injury  Outcome: Progressing  Goal: Optimal Comfort and Wellbeing  Outcome: Progressing  Goal: Readiness for Transition of Care  Outcome: Progressing     Problem: Airway Clearance Ineffective  Goal: Effective Airway Clearance  Outcome: Progressing     Problem: Skin Injury Risk Increased  Goal: Skin Health and Integrity  Outcome: Progressing     Problem: Fall Injury Risk  Goal: Absence of Fall and Fall-Related Injury  Outcome: Progressing

## 2025-02-27 LAB
ANION GAP SERPL CALCULATED.3IONS-SCNC: 13 MMOL/L (ref 7–16)
BASOPHILS # BLD AUTO: 0.04 K/UL (ref 0–0.2)
BASOPHILS NFR BLD AUTO: 0.6 % (ref 0–1)
BUN SERPL-MCNC: 26 MG/DL (ref 8–26)
BUN/CREAT SERPL: 18 (ref 6–20)
CALCIUM SERPL-MCNC: 7.7 MG/DL (ref 8.4–10.2)
CHLORIDE SERPL-SCNC: 91 MMOL/L (ref 98–107)
CO2 SERPL-SCNC: 27 MMOL/L (ref 22–29)
CREAT SERPL-MCNC: 1.42 MG/DL (ref 0.72–1.25)
DIFFERENTIAL METHOD BLD: ABNORMAL
EGFR (NO RACE VARIABLE) (RUSH/TITUS): 58 ML/MIN/1.73M2
EOSINOPHIL # BLD AUTO: 0.11 K/UL (ref 0–0.5)
EOSINOPHIL NFR BLD AUTO: 1.6 % (ref 1–4)
ERYTHROCYTE [DISTWIDTH] IN BLOOD BY AUTOMATED COUNT: 14.1 % (ref 11.5–14.5)
GLUCOSE SERPL-MCNC: 134 MG/DL (ref 74–100)
HCT VFR BLD AUTO: 33.6 % (ref 40–54)
HGB BLD-MCNC: 11 G/DL (ref 13.5–18)
IMM GRANULOCYTES # BLD AUTO: 0.03 K/UL (ref 0–0.04)
IMM GRANULOCYTES NFR BLD: 0.4 % (ref 0–0.4)
LYMPHOCYTES # BLD AUTO: 1.2 K/UL (ref 1–4.8)
LYMPHOCYTES NFR BLD AUTO: 17.8 % (ref 27–41)
MCH RBC QN AUTO: 31.2 PG (ref 27–31)
MCHC RBC AUTO-ENTMCNC: 32.7 G/DL (ref 32–36)
MCV RBC AUTO: 95.2 FL (ref 80–96)
MONOCYTES # BLD AUTO: 0.72 K/UL (ref 0–0.8)
MONOCYTES NFR BLD AUTO: 10.7 % (ref 2–6)
MPC BLD CALC-MCNC: 11.5 FL (ref 9.4–12.4)
NEUTROPHILS # BLD AUTO: 4.63 K/UL (ref 1.8–7.7)
NEUTROPHILS NFR BLD AUTO: 68.9 % (ref 53–65)
NRBC # BLD AUTO: 0 X10E3/UL
NRBC, AUTO (.00): 0 %
NT-PROBNP SERPL-MCNC: 6531 PG/ML (ref 1–125)
PLATELET # BLD AUTO: 200 K/UL (ref 150–400)
POTASSIUM SERPL-SCNC: 3.2 MMOL/L (ref 3.5–5.1)
RBC # BLD AUTO: 3.53 M/UL (ref 4.6–6.2)
SODIUM SERPL-SCNC: 128 MMOL/L (ref 136–145)
WBC # BLD AUTO: 6.73 K/UL (ref 4.5–11)

## 2025-02-27 PROCEDURE — 99232 SBSQ HOSP IP/OBS MODERATE 35: CPT | Mod: ,,, | Performed by: FAMILY MEDICINE

## 2025-02-27 PROCEDURE — 36415 COLL VENOUS BLD VENIPUNCTURE: CPT | Performed by: FAMILY MEDICINE

## 2025-02-27 PROCEDURE — 85025 COMPLETE CBC W/AUTO DIFF WBC: CPT | Performed by: FAMILY MEDICINE

## 2025-02-27 PROCEDURE — 25000003 PHARM REV CODE 250

## 2025-02-27 PROCEDURE — 94640 AIRWAY INHALATION TREATMENT: CPT

## 2025-02-27 PROCEDURE — 80048 BASIC METABOLIC PNL TOTAL CA: CPT | Performed by: FAMILY MEDICINE

## 2025-02-27 PROCEDURE — 94761 N-INVAS EAR/PLS OXIMETRY MLT: CPT

## 2025-02-27 PROCEDURE — 63600175 PHARM REV CODE 636 W HCPCS: Performed by: HOSPITALIST

## 2025-02-27 PROCEDURE — 97116 GAIT TRAINING THERAPY: CPT

## 2025-02-27 PROCEDURE — 25000003 PHARM REV CODE 250: Performed by: FAMILY MEDICINE

## 2025-02-27 PROCEDURE — 99900035 HC TECH TIME PER 15 MIN (STAT)

## 2025-02-27 PROCEDURE — 27000221 HC OXYGEN, UP TO 24 HOURS

## 2025-02-27 PROCEDURE — 25000242 PHARM REV CODE 250 ALT 637 W/ HCPCS

## 2025-02-27 PROCEDURE — S4991 NICOTINE PATCH NONLEGEND: HCPCS | Performed by: HOSPITALIST

## 2025-02-27 PROCEDURE — 97110 THERAPEUTIC EXERCISES: CPT

## 2025-02-27 PROCEDURE — 83880 ASSAY OF NATRIURETIC PEPTIDE: CPT | Performed by: FAMILY MEDICINE

## 2025-02-27 PROCEDURE — 11000001 HC ACUTE MED/SURG PRIVATE ROOM

## 2025-02-27 PROCEDURE — 25000003 PHARM REV CODE 250: Performed by: HOSPITALIST

## 2025-02-27 RX ORDER — POTASSIUM CHLORIDE 20 MEQ/1
20 TABLET, EXTENDED RELEASE ORAL 2 TIMES DAILY
Status: DISCONTINUED | OUTPATIENT
Start: 2025-02-27 | End: 2025-03-03

## 2025-02-27 RX ADMIN — THIAMINE HYDROCHLORIDE 200 MG: 100 INJECTION, SOLUTION INTRAMUSCULAR; INTRAVENOUS at 08:02

## 2025-02-27 RX ADMIN — POTASSIUM CHLORIDE 20 MEQ: 1500 TABLET, EXTENDED RELEASE ORAL at 10:02

## 2025-02-27 RX ADMIN — IPRATROPIUM BROMIDE AND ALBUTEROL SULFATE 3 ML: .5; 3 SOLUTION RESPIRATORY (INHALATION) at 03:02

## 2025-02-27 RX ADMIN — Medication 1000 UNITS: at 08:02

## 2025-02-27 RX ADMIN — FUROSEMIDE 20 MG: 20 TABLET ORAL at 06:02

## 2025-02-27 RX ADMIN — OXYCODONE 5 MG: 5 TABLET ORAL at 09:02

## 2025-02-27 RX ADMIN — IPRATROPIUM BROMIDE AND ALBUTEROL SULFATE 3 ML: .5; 3 SOLUTION RESPIRATORY (INHALATION) at 07:02

## 2025-02-27 RX ADMIN — ALLOPURINOL 200 MG: 100 TABLET ORAL at 08:02

## 2025-02-27 RX ADMIN — NICOTINE 1 PATCH: 14 PATCH, EXTENDED RELEASE TRANSDERMAL at 08:02

## 2025-02-27 RX ADMIN — POTASSIUM CHLORIDE 20 MEQ: 1500 TABLET, EXTENDED RELEASE ORAL at 09:02

## 2025-02-27 RX ADMIN — TRAZODONE HYDROCHLORIDE 50 MG: 50 TABLET ORAL at 09:02

## 2025-02-27 RX ADMIN — ASPIRIN 81 MG: 81 TABLET, COATED ORAL at 08:02

## 2025-02-27 RX ADMIN — ENOXAPARIN SODIUM 40 MG: 40 INJECTION SUBCUTANEOUS at 06:02

## 2025-02-27 RX ADMIN — OXYCODONE 5 MG: 5 TABLET ORAL at 10:02

## 2025-02-27 RX ADMIN — FUROSEMIDE 20 MG: 20 TABLET ORAL at 08:02

## 2025-02-27 RX ADMIN — THERA TABS 1 TABLET: TAB at 08:02

## 2025-02-27 RX ADMIN — METOPROLOL SUCCINATE 25 MG: 25 TABLET, EXTENDED RELEASE ORAL at 08:02

## 2025-02-27 NOTE — PT/OT/SLP PROGRESS
Physical Therapy Treatment    Patient Name:  Derek Moreno   MRN:  [id number]    Recommendations:     Discharge Recommendations: Low Intensity Therapy  Discharge Equipment Recommendations: rollator  Barriers to discharge: None    Assessment:     Derek Moreno is a 59 y.o. male admitted with a medical diagnosis of Acute on chronic combined systolic and diastolic heart failure.  He presents with the following impairments/functional limitations: weakness, impaired endurance, impaired functional mobility, impaired cardiopulmonary response to activity Pt demonstrates slow ambulation with straight cane and would likely benefit from use of rollator. He has complaints of fatigue and occasional dizziness with ambulation.    Rehab Prognosis: Good; patient would benefit from acute skilled PT services to address these deficits and reach maximum level of function.    Recent Surgery: * No surgery found *      Plan:     During this hospitalization, patient to be seen 5 x/week to address the identified rehab impairments via gait training, therapeutic activities, therapeutic exercises and progress toward the following goals:    Plan of Care Expires:  03/25/25    Subjective     Chief Complaint: shortness of breath   Patient/Family Comments/goals: Pt is agreeable to PT   Pain/Comfort:  Pain Rating 1: 6/10  Location - Side 1: Bilateral  Location 1: leg  Pain Addressed 1: Nurse notified  Pain Rating Post-Intervention 1: 5/10      Objective:     Communicated with DENISA Aguilera Rn prior to session.  Patient found HOB elevated with peripheral IV, oxygen, telemetry upon PT entry to room.     General Precautions: Standard, fall  Orthopedic Precautions: N/A  Braces: N/A  Respiratory Status:  2 L on wall, not placed on pt      Functional Mobility:  Bed Mobility:     Scooting: supervision  Supine to Sit: independence  Sit to Supine: independence  Transfers:     Sit to Stand:  supervision with no AD  Gait: 175 ft stand-by assistance with  cane, slow stacy, several standing rest breaks  Balance: good      AM-PAC 6 CLICK MOBILITY  Turning over in bed (including adjusting bedclothes, sheets and blankets)?: 4  Sitting down on and standing up from a chair with arms (e.g., wheelchair, bedside commode, etc.): 4  Moving from lying on back to sitting on the side of the bed?: 4  Moving to and from a bed to a chair (including a wheelchair)?: 4  Need to walk in hospital room?: 4  Climbing 3-5 steps with a railing?: 3  Basic Mobility Total Score: 23       Treatment & Education:  Ambulation as noted above    Patient left HOB elevated with all lines intact and call button in reach..    GOALS:   Multidisciplinary Problems       Physical Therapy Goals          Problem: Physical Therapy    Goal Priority Disciplines Outcome Interventions   Physical Therapy Goal     PT, PT/OT Progressing    Description: Short term goals:  1. Supine to sit with Modified [place]  2. Sit to stand transfer with Modified [place]  3. Bed to chair transfer with Modified [place] using Single-point Cane   4. Gait  x 100 feet with Modified [place] using Single-point Cane .     Long term goals:  Pt will return home to [facility] with all mobility                         DME Justifications:   Derek's mobility limitation cannot be sufficiently resolved by the use of a cane. His functional mobility deficit can be sufficiently resolved with the use of a Rollator. Patient's mobility limitation significantly impairs their ability to participate in one of more activities of daily living.  The use of a Rollator will significantly improve the patient's ability to participate in MRADLS and the patient will use it on regular basis in the home.      Time Tracking:     PT Received On: 02/27/25  PT Start Time: 1430     PT Stop Time: 1451  PT Total Time (min): 21 min     Billable Minutes: Gait Training 21    Treatment Type: Treatment  PT/PTA: PT     Number of PTA visits since last PT visit: 0      02/27/2025

## 2025-02-27 NOTE — SUBJECTIVE & OBJECTIVE
Interval History: Not feeling much better, still feels SOB, sats great..     Review of Systems  Objective:     Vital Signs (Most Recent):  Temp: 98.1 °F (36.7 °C) (02/26/25 1931)  Pulse: 87 (02/26/25 1931)  Resp: 18 (02/26/25 2056)  BP: 104/74 (02/26/25 1931)  SpO2: 98 % (02/26/25 1931) Vital Signs (24h Range):  Temp:  [97.6 °F (36.4 °C)-98.1 °F (36.7 °C)] 98.1 °F (36.7 °C)  Pulse:  [79-92] 87  Resp:  [16-18] 18  SpO2:  [93 %-100 %] 98 %  BP: ()/(62-85) 104/74     Weight: 69.8 kg (153 lb 12.8 oz)  Body mass index is 23.39 kg/m².    Intake/Output Summary (Last 24 hours) at 2/26/2025 2206  Last data filed at 2/26/2025 0428  Gross per 24 hour   Intake 480 ml   Output 550 ml   Net -70 ml         Physical Exam  Vitals and nursing note reviewed.   Constitutional:       General: He is not in acute distress.     Appearance: Normal appearance. He is not ill-appearing.   HENT:      Head: Normocephalic and atraumatic.      Nose: No congestion or rhinorrhea.   Eyes:      Extraocular Movements: Extraocular movements intact.      Conjunctiva/sclera: Conjunctivae normal.      Pupils: Pupils are equal, round, and reactive to light.   Cardiovascular:      Rate and Rhythm: Normal rate and regular rhythm.      Pulses: Normal pulses.      Heart sounds: Normal heart sounds. No murmur heard.  Pulmonary:      Effort: Pulmonary effort is normal. No respiratory distress.      Breath sounds: No wheezing.      Comments: Improved breath sounds.   Abdominal:      General: Bowel sounds are normal. There is no distension.      Palpations: Abdomen is soft.      Tenderness: There is no abdominal tenderness.   Musculoskeletal:      Cervical back: Normal range of motion and neck supple. No rigidity.      Right lower leg: No edema.      Left lower leg: No edema.   Skin:     General: Skin is warm and dry.      Capillary Refill: Capillary refill takes less than 2 seconds.   Neurological:      General: No focal deficit present.      Mental Status:  "He is alert and oriented to person, place, and time.      GCS: GCS eye subscore is 4. GCS verbal subscore is 5. GCS motor subscore is 6.             Significant Labs: All pertinent labs within the past 24 hours have been reviewed.  BMP:   Recent Labs   Lab 02/25/25  0425   *   *   K 3.8   CL 96*   CO2 24   BUN 19   CREATININE 1.36*   CALCIUM 8.2*     CBC: No results for input(s): "WBC", "HGB", "HCT", "PLT" in the last 48 hours.    Significant Imaging: I have reviewed all pertinent imaging results/findings within the past 24 hours.  "

## 2025-02-27 NOTE — NURSING
Patient admitted from ED. No  distress noted at this time. Patient alert and oriented. Cardiac monitor placed.

## 2025-02-27 NOTE — PT/OT/SLP PROGRESS
Occupational Therapy   Treatment    Name: Derek Moreno  MRN: [id number]  Admitting Diagnosis:  Acute on chronic combined systolic and diastolic heart failure       Recommendations:     Discharge Recommendations: Low Intensity Therapy  Discharge Equipment Recommendations:  none  Barriers to discharge:  None    Assessment:     Derek Moreno is a 59 y.o. male with a medical diagnosis of Acute on chronic combined systolic and diastolic heart failure.  He presents with weakness and decline in ADLs. Performance deficits affecting function are weakness, impaired endurance, impaired self care skills, impaired functional mobility, gait instability, impaired balance.     Rehab Prognosis:  Good; patient would benefit from acute skilled OT services to address these deficits and reach maximum level of function.       Plan:     Patient to be seen 5 x/week to address the above listed problems via self-care/home management, therapeutic activities, therapeutic exercises  Plan of Care Expires: 03/25/25  Plan of Care Reviewed with: patient    Subjective     Chief Complaint: heart failure  Patient/Family Comments/goals: pt agreeable to OT tx  Pain/Comfort:  Pain Rating 1: 7/10  Location - Orientation 1: generalized    Objective:     Communicated with: LUIS Funes prior to session.  Patient found HOB elevated with peripheral IV, oxygen upon OT entry to room.    General Precautions: Standard, fall    Orthopedic Precautions:N/A  Braces: N/A  Respiratory Status: Nasal cannula, flow 2 L/min     Occupational Performance:     Bed Mobility:    Patient completed Supine to Sit with modified independence  Patient completed Sit to Supine with modified independence     Functional Mobility/Transfers:  Not performed    Activities of Daily Living:  Not performed      [facility] 6 Click ADL:      Treatment & Education:  Pt performed x 20 reps each bicep curls 2#db, chest press 2#db, and shoulder flexion 2#db in order to improve BUE strength and  endurance to perform ADL and ADL t/f with less assist.     Patient left HOB elevated with all lines intact, call button in reach, and friend present    GOALS:   Multidisciplinary Problems       Occupational Therapy Goals          Problem: Occupational Therapy    Goal Priority Disciplines Outcome Interventions   Occupational Therapy Goal     OT, PT/OT Progressing    Description: STG:  Pt will perform grooming with setup  Pt will bathe with SBA with setup at EOB  Pt will perform UE dressing with SBA  Pt will perform LE dressing with SBA  Pt will sit EOB x 10 min with SBA   Pt will transfer bed/chair/bsc with CGA with cane  Pt will perform standing task x 2 min with CGA with RW   Pt will tolerate 15 minutes of tx without fatigue      LT.Restore to max I with self care and mobility.                           DME Justifications:  No DME recommended requiring DME justifications    Time Tracking:     OT Date of Treatment: 25  OT Start Time: 956  OT Stop Time: 100  OT Total Time (min): 12 min    Billable Minutes:Therapeutic Exercise 12 minutes    OT/ANAY: OT          2025

## 2025-02-27 NOTE — PLAN OF CARE
Ss consulted for rollator. Pt agreeable to obtaining from the medical store. Ss faxed referral and notified Alan Vance following

## 2025-02-27 NOTE — ASSESSMENT & PLAN NOTE
Patient's blood pressure range in the last 24 hours was: BP  Min: 94/62  Max: 114/76.The patient's inpatient anti-hypertensive regimen is listed below:  Current Antihypertensives  metoprolol succinate (TOPROL-XL) 24 hr tablet 25 mg, Daily, Oral  furosemide tablet 20 mg, 2 times daily, Oral    Plan  Holding home BP medications due to soft blood pressure and ongoing diuresis

## 2025-02-27 NOTE — ASSESSMENT & PLAN NOTE
Follow [facility] protocol  - patient reported drinking 12oz of beer today  - he limits alcohol to 20oz a day (beer)  Supplementing with thiamine and multivitamin

## 2025-02-27 NOTE — ASSESSMENT & PLAN NOTE
Patient with known Cirrhosis with Child's class B. Co-morbidities are absent.  MELD-Na score calculated; MELD 3.0: 21 at 2/25/2025  4:25 AM  MELD-Na: 22 at 2/25/2025  4:25 AM  Calculated from:  Serum Creatinine: 1.36 mg/dL at 2/25/2025  4:25 AM  Serum Sodium: 132 mmol/L at 2/25/2025  4:25 AM  Total Bilirubin: 2.5 mg/dL at 2/25/2025  4:25 AM  Serum Albumin: 3.1 g/dL at 2/25/2025  4:25 AM  INR(ratio): 1.5 at 2/24/2025  4:20 PM  Age at listing (hypothetical): 59 years  Sex: Male at 2/25/2025  4:25 AM      Continue chronic meds. Etiology likely ETOH. Will avoid any hepatotoxic meds, and monitor CBC/CMP/INR for synthetic function.     - negative hepatitis panel  US Abdomen Limited  Impression:  1. Nonspecific gallbladder wall thickening with minimal pericholecystic fluid.  No stones are identified.  Mild acute cholecystitis is a consideration.  Recommend follow-up.  2. Probable small 1.3 cm minimally complex hepatic cyst in the right lobe.  3. Bibasilar pleural effusions.

## 2025-02-27 NOTE — ASSESSMENT & PLAN NOTE
Patient has PMH of Chronic combined systolic and diastolic heart failure.  Results for orders placed during the hospital encounter of 01/04/24    Echo    Interpretation Summary    Left Ventricle: The left ventricle is mildly dilated. Normal wall thickness. There is eccentric hypertrophy. Severe global hypokinesis with regional variation. There is severely reduced systolic function. Biplane (2D) method of discs ejection fraction is 15%. Global longitudinal strain is reduced. There is diastolic dysfunction but grade cannot be determined.    Right Ventricle: Moderate right ventricular enlargement. Systolic function is mildly reduced.    Right Atrium: Right atrium is mildly dilated.    Aortic Valve: The aortic valve is a trileaflet valve.    Mitral Valve: There is mild bileaflet sclerosis. There is no stenosis. The mean pressure gradient across the mitral valve is 3 mmHg at a heart rate of  bpm. There is moderate regurgitation with a posterolateral eccentriccally directed jet.    Tricuspid Valve: There is moderate regurgitation.    Pulmonary Artery: The estimated pulmonary artery systolic pressure is 62 mmHg.    IVC/SVC: Elevated venous pressure at 15 mmHg.    Pericardium: Large left pleural effusion.  - patient is on CHF exacerbation pathway  - patient received 6o mg IV of furosemide in the ED  - pro BNP was 43613  - troponin levels were flat  - EKG showed sinus rhythm with no acute findings  - CXR    FINDINGS:  Cardiac silhouette is mildly enlarged.  Probable small bibasilar pleural effusions, improved from the prior study.  Mild right basilar airspace disease could represent mild infiltrate and pneumonia, mildly increased from the prior study.  No mass is detected.  No evidence of pneumothorax  - continue IV diuresis  - LOW SODIUM DIET WITH FLUID RESTRICTION  - daily weights and strict I/O's  - repeat ECHO pending    2/25 - Subtle improvement overnight.  Continue IV lasix.   2/26- Appears euvolemic now.  Change to po  lasix. His Dyspnea may be HF related vs COPD.

## 2025-02-27 NOTE — PROGRESS NOTES
[facility] Hospital Medicine  Progress Note    Patient Name: Derek Moreno  MRN: [id number]  Patient Class: IP- Inpatient   Admission Date: 2/24/2025  Length of Stay: 2 days  Attending Physician: Jack Valdivia Jr., MD  Primary Care Provider: Mis, Primary Doctor        Subjective     Principal Problem:Acute on chronic combined systolic and diastolic heart failure        HPI:  The patient is a 59-year-old male who presented to the emergency department with worsening shortness of breath and increased fatigue. He reported experiencing shortness of breath upon mild to moderate exertion over the past few months. He complained of accompanying non productive cough. He denied any hemoptysis, chills or fever.  He did not complain of any accompanying chest pain, leg swelling, palpitations, or dizziness, but did mention experiencing a few episodes of blackouts. He denied any falls resulting from black outs.   Patient has PMH of COPD, CHF, tobacco and alcohol dependence.  The patient has not been using oxygen at home despite being prescribed it, due to concerns that he will get dependent on supplemented oxygen.   Additionally, he has not been taking his prescribed medications for the past several months due to affordability issues.   The patient lives alone and is ambulatory with the help of a cane.   Patient has PMH of alcohol dependence, he has been drinking at least 20 oz of beer daily and drank 8 oz today.      Overview/Hospital Course:  No notes on file    Interval History: Not feeling much better, still feels SOB, sats great..     Review of Systems  Objective:     Vital Signs (Most Recent):  Temp: 98.1 °F (36.7 °C) (02/26/25 1931)  Pulse: 87 (02/26/25 1931)  Resp: 18 (02/26/25 2056)  BP: 104/74 (02/26/25 1931)  SpO2: 98 % (02/26/25 1931) Vital Signs (24h Range):  Temp:  [97.6 °F (36.4 °C)-98.1 °F (36.7 °C)] 98.1 °F (36.7 °C)  Pulse:  [79-92] 87  Resp:  [16-18] 18  SpO2:  [93  "%-100 %] 98 %  BP: ()/(62-85) 104/74     Weight: 69.8 kg (153 lb 12.8 oz)  Body mass index is 23.39 kg/m².    Intake/Output Summary (Last 24 hours) at 2/26/2025 2206  Last data filed at 2/26/2025 0428  Gross per 24 hour   Intake 480 ml   Output 550 ml   Net -70 ml         Physical Exam  Vitals and nursing note reviewed.   Constitutional:       General: He is not in acute distress.     Appearance: Normal appearance. He is not ill-appearing.   HENT:      Head: Normocephalic and atraumatic.      Nose: No congestion or rhinorrhea.   Eyes:      Extraocular Movements: Extraocular movements intact.      Conjunctiva/sclera: Conjunctivae normal.      Pupils: Pupils are equal, round, and reactive to light.   Cardiovascular:      Rate and Rhythm: Normal rate and regular rhythm.      Pulses: Normal pulses.      Heart sounds: Normal heart sounds. No murmur heard.  Pulmonary:      Effort: Pulmonary effort is normal. No respiratory distress.      Breath sounds: No wheezing.      Comments: Improved breath sounds.   Abdominal:      General: Bowel sounds are normal. There is no distension.      Palpations: Abdomen is soft.      Tenderness: There is no abdominal tenderness.   Musculoskeletal:      Cervical back: Normal range of motion and neck supple. No rigidity.      Right lower leg: No edema.      Left lower leg: No edema.   Skin:     General: Skin is warm and dry.      Capillary Refill: Capillary refill takes less than 2 seconds.   Neurological:      General: No focal deficit present.      Mental Status: He is alert and oriented to person, place, and time.      GCS: GCS eye subscore is 4. GCS verbal subscore is 5. GCS motor subscore is 6.             Significant Labs: All pertinent labs within the past 24 hours have been reviewed.  BMP:   Recent Labs   Lab 02/25/25  0425   *   *   K 3.8   CL 96*   CO2 24   BUN 19   CREATININE 1.36*   CALCIUM 8.2*     CBC: No results for input(s): "WBC", "HGB", "HCT", "PLT" in " the last 48 hours.    Significant Imaging: I have reviewed all pertinent imaging results/findings within the past 24 hours.    Assessment and Plan     * Acute on chronic combined systolic and diastolic heart failure  Patient has PMH of Chronic combined systolic and diastolic heart failure.  Results for orders placed during the hospital encounter of 01/04/24    Echo    Interpretation Summary    Left Ventricle: The left ventricle is mildly dilated. Normal wall thickness. There is eccentric hypertrophy. Severe global hypokinesis with regional variation. There is severely reduced systolic function. Biplane (2D) method of discs ejection fraction is 15%. Global longitudinal strain is reduced. There is diastolic dysfunction but grade cannot be determined.    Right Ventricle: Moderate right ventricular enlargement. Systolic function is mildly reduced.    Right Atrium: Right atrium is mildly dilated.    Aortic Valve: The aortic valve is a trileaflet valve.    Mitral Valve: There is mild bileaflet sclerosis. There is no stenosis. The mean pressure gradient across the mitral valve is 3 mmHg at a heart rate of  bpm. There is moderate regurgitation with a posterolateral eccentriccally directed jet.    Tricuspid Valve: There is moderate regurgitation.    Pulmonary Artery: The estimated pulmonary artery systolic pressure is 62 mmHg.    IVC/SVC: Elevated venous pressure at 15 mmHg.    Pericardium: Large left pleural effusion.  - patient is on CHF exacerbation pathway  - patient received 6o mg IV of furosemide in the ED  - pro BNP was 75444  - troponin levels were flat  - EKG showed sinus rhythm with no acute findings  - CXR    FINDINGS:  Cardiac silhouette is mildly enlarged.  Probable small bibasilar pleural effusions, improved from the prior study.  Mild right basilar airspace disease could represent mild infiltrate and pneumonia, mildly increased from the prior study.  No mass is detected.  No evidence of pneumothorax  -  continue IV diuresis  - LOW SODIUM DIET WITH FLUID RESTRICTION  - daily weights and strict I/O's  - repeat ECHO pending    2/25 - Subtle improvement overnight.  Continue IV lasix.   2/26- Appears euvolemic now.  Change to po lasix. His Dyspnea may be HF related vs COPD.     Alcohol dependence  Follow [facility] protocol  - patient reported drinking 12oz of beer today  - he limits alcohol to 20oz a day (beer)  Supplementing with thiamine and multivitamin      Tobacco dependency  Dangers of cigarette smoking were reviewed with patient in detail. Patient was Counseled for 3-10 minutes. Nicotine replacement options were discussed. Nicotine replacement was discussed- prescribed    Vitamin D deficiency  Continue home vitamin D       CAD (coronary artery disease)  Patient with known CAD s/p  no stent placed , which is controlled Will continue ASA and monitor for S/Sx of angina/ACS. Continue to monitor on telemetry.   Holding statin 2/2 liver enzymes elevated  - LHC was done on 1/10/2024  - troponin levels were flat  - EKG showed sinus rhythm with no acute findings    Alcoholic cirrhosis  Patient with known Cirrhosis with Child's class B. Co-morbidities are absent.  MELD-Na score calculated; MELD 3.0: 21 at 2/25/2025  4:25 AM  MELD-Na: 22 at 2/25/2025  4:25 AM  Calculated from:  Serum Creatinine: 1.36 mg/dL at 2/25/2025  4:25 AM  Serum Sodium: 132 mmol/L at 2/25/2025  4:25 AM  Total Bilirubin: 2.5 mg/dL at 2/25/2025  4:25 AM  Serum Albumin: 3.1 g/dL at 2/25/2025  4:25 AM  INR(ratio): 1.5 at 2/24/2025  4:20 PM  Age at listing (hypothetical): 59 years  Sex: Male at 2/25/2025  4:25 AM      Continue chronic meds. Etiology likely ETOH. Will avoid any hepatotoxic meds, and monitor CBC/CMP/INR for synthetic function.     - negative hepatitis panel  US Abdomen Limited  Impression:  1. Nonspecific gallbladder wall thickening with minimal pericholecystic fluid.  No stones are identified.  Mild acute cholecystitis is a consideration.   Recommend follow-up.  2. Probable small 1.3 cm minimally complex hepatic cyst in the right lobe.  3. Bibasilar pleural effusions.    Essential hypertension  Patient's blood pressure range in the last 24 hours was: BP  Min: 94/62  Max: 114/76.The patient's inpatient anti-hypertensive regimen is listed below:  Current Antihypertensives  metoprolol succinate (TOPROL-XL) 24 hr tablet 25 mg, Daily, Oral  furosemide tablet 20 mg, 2 times daily, Oral    Plan  Holding home BP medications due to soft blood pressure and ongoing diuresis     COPD (chronic obstructive pulmonary disease)  Patient's COPD is with exacerbation noted by continued dyspnea currently.  Patient is currently off COPD Pathway. Continue scheduled inhalers  patient has not been on his home medication because of affordability issues  and monitor respiratory status closely.   - patient SpO2 is normal in room air    2/25 - No steroids at present time.  Monitor.       VTE Risk Mitigation (From admission, onward)           Ordered     enoxaparin injection 40 mg  Daily         02/24/25 2132     IP VTE HIGH RISK PATIENT  Once         02/24/25 2132                    Discharge Planning   EFREM:      Code Status: Full Code   Medical Readiness for Discharge Date:   Discharge Plan A: Home with family                Please place Justification for DME        Jack Valdivia Jr, MD  Department of Hospital Medicine   [facility] - [facility]

## 2025-02-28 PROCEDURE — 99900035 HC TECH TIME PER 15 MIN (STAT)

## 2025-02-28 PROCEDURE — 99233 SBSQ HOSP IP/OBS HIGH 50: CPT | Mod: ,,, | Performed by: FAMILY MEDICINE

## 2025-02-28 PROCEDURE — 63600175 PHARM REV CODE 636 W HCPCS: Performed by: FAMILY MEDICINE

## 2025-02-28 PROCEDURE — 25000003 PHARM REV CODE 250: Performed by: HOSPITALIST

## 2025-02-28 PROCEDURE — 25000003 PHARM REV CODE 250

## 2025-02-28 PROCEDURE — 25000003 PHARM REV CODE 250: Performed by: FAMILY MEDICINE

## 2025-02-28 PROCEDURE — S4991 NICOTINE PATCH NONLEGEND: HCPCS | Performed by: HOSPITALIST

## 2025-02-28 PROCEDURE — 63600175 PHARM REV CODE 636 W HCPCS: Performed by: HOSPITALIST

## 2025-02-28 PROCEDURE — 25000242 PHARM REV CODE 250 ALT 637 W/ HCPCS

## 2025-02-28 PROCEDURE — 11000001 HC ACUTE MED/SURG PRIVATE ROOM

## 2025-02-28 PROCEDURE — 94640 AIRWAY INHALATION TREATMENT: CPT

## 2025-02-28 PROCEDURE — 27000221 HC OXYGEN, UP TO 24 HOURS

## 2025-02-28 PROCEDURE — 94761 N-INVAS EAR/PLS OXIMETRY MLT: CPT

## 2025-02-28 RX ORDER — FUROSEMIDE 20 MG/1
20 TABLET ORAL DAILY
Status: DISCONTINUED | OUTPATIENT
Start: 2025-03-01 | End: 2025-03-03

## 2025-02-28 RX ADMIN — IPRATROPIUM BROMIDE AND ALBUTEROL SULFATE 3 ML: .5; 3 SOLUTION RESPIRATORY (INHALATION) at 03:02

## 2025-02-28 RX ADMIN — OXYCODONE 5 MG: 5 TABLET ORAL at 08:02

## 2025-02-28 RX ADMIN — IPRATROPIUM BROMIDE AND ALBUTEROL SULFATE 3 ML: .5; 3 SOLUTION RESPIRATORY (INHALATION) at 12:02

## 2025-02-28 RX ADMIN — TRAZODONE HYDROCHLORIDE 50 MG: 50 TABLET ORAL at 08:02

## 2025-02-28 RX ADMIN — THIAMINE HYDROCHLORIDE 200 MG: 100 INJECTION, SOLUTION INTRAMUSCULAR; INTRAVENOUS at 08:02

## 2025-02-28 RX ADMIN — OXYCODONE 5 MG: 5 TABLET ORAL at 11:02

## 2025-02-28 RX ADMIN — POTASSIUM CHLORIDE 20 MEQ: 1500 TABLET, EXTENDED RELEASE ORAL at 08:02

## 2025-02-28 RX ADMIN — ENOXAPARIN SODIUM 40 MG: 40 INJECTION SUBCUTANEOUS at 05:02

## 2025-02-28 RX ADMIN — IPRATROPIUM BROMIDE AND ALBUTEROL SULFATE 3 ML: .5; 3 SOLUTION RESPIRATORY (INHALATION) at 07:02

## 2025-02-28 RX ADMIN — BISACODYL 10 MG: 5 TABLET, COATED ORAL at 08:02

## 2025-02-28 RX ADMIN — Medication 1000 UNITS: at 08:02

## 2025-02-28 RX ADMIN — FUROSEMIDE 20 MG: 20 TABLET ORAL at 08:02

## 2025-02-28 RX ADMIN — ASPIRIN 81 MG: 81 TABLET, COATED ORAL at 08:02

## 2025-02-28 RX ADMIN — ALLOPURINOL 200 MG: 100 TABLET ORAL at 08:02

## 2025-02-28 RX ADMIN — NICOTINE 1 PATCH: 14 PATCH, EXTENDED RELEASE TRANSDERMAL at 08:02

## 2025-02-28 RX ADMIN — SODIUM CHLORIDE, POTASSIUM CHLORIDE, SODIUM LACTATE AND CALCIUM CHLORIDE 500 ML: 600; 310; 30; 20 INJECTION, SOLUTION INTRAVENOUS at 02:02

## 2025-02-28 RX ADMIN — METOPROLOL SUCCINATE 25 MG: 25 TABLET, EXTENDED RELEASE ORAL at 08:02

## 2025-02-28 RX ADMIN — THERA TABS 1 TABLET: TAB at 08:02

## 2025-02-28 NOTE — ASSESSMENT & PLAN NOTE
Patient has PMH of Chronic combined systolic and diastolic heart failure.  Results for orders placed during the hospital encounter of 01/04/24    Echo    Interpretation Summary    Left Ventricle: The left ventricle is mildly dilated. Normal wall thickness. There is eccentric hypertrophy. Severe global hypokinesis with regional variation. There is severely reduced systolic function. Biplane (2D) method of discs ejection fraction is 15%. Global longitudinal strain is reduced. There is diastolic dysfunction but grade cannot be determined.    Right Ventricle: Moderate right ventricular enlargement. Systolic function is mildly reduced.    Right Atrium: Right atrium is mildly dilated.    Aortic Valve: The aortic valve is a trileaflet valve.    Mitral Valve: There is mild bileaflet sclerosis. There is no stenosis. The mean pressure gradient across the mitral valve is 3 mmHg at a heart rate of  bpm. There is moderate regurgitation with a posterolateral eccentriccally directed jet.    Tricuspid Valve: There is moderate regurgitation.    Pulmonary Artery: The estimated pulmonary artery systolic pressure is 62 mmHg.    IVC/SVC: Elevated venous pressure at 15 mmHg.    Pericardium: Large left pleural effusion.  - patient is on CHF exacerbation pathway  - patient received 6o mg IV of furosemide in the ED  - pro BNP was 81774  - troponin levels were flat  - EKG showed sinus rhythm with no acute findings  - CXR    FINDINGS:  Cardiac silhouette is mildly enlarged.  Probable small bibasilar pleural effusions, improved from the prior study.  Mild right basilar airspace disease could represent mild infiltrate and pneumonia, mildly increased from the prior study.  No mass is detected.  No evidence of pneumothorax  - continue IV diuresis  - LOW SODIUM DIET WITH FLUID RESTRICTION  - daily weights and strict I/O's  - repeat ECHO pending    2/25 - Subtle improvement overnight.  Continue IV lasix.   2/26- Appears euvolemic now.  Change to po  lasix. His Dyspnea may be HF related vs COPD.   2/27 - diuresing well, breathing better. - anticipate discharge tomorrow

## 2025-02-28 NOTE — PT/OT/SLP PROGRESS
Occupational Therapy      Patient Name:  Derek Moreno   MRN:  [id number]    Patient not seen today secondary to Patient unwilling to participate (Patient states he is having trouble breathing and prefer not to do any therapy today.). Will follow-up 2/28/2025.    Suzanne Raya, OSCARR/JANETTE, CSRS  2/28/2025

## 2025-02-28 NOTE — ASSESSMENT & PLAN NOTE
Follow [facility] protocol  - patient reported drinking 12oz of beer today  - he limits alcohol to 20oz a day (beer)  Supplementing with thiamine and multivitamin

## 2025-02-28 NOTE — PT/OT/SLP PROGRESS
"Physical Therapy      Patient Name:  Derek Moreno   MRN:  [id number]    Patient not seen today secondary to Patient unwilling to participate (pt refused PT Tx at ~1000 due to feeling like he couldn't breathe "gasping for air"; then again at ~1145, instructed to hold per RN due to security had to be called in due to his behavior). Will follow-up next available treatment session as pt able and available; tent. D/C today per RN.    "

## 2025-02-28 NOTE — PROGRESS NOTES
[facility] Hospital Medicine  Progress Note    Patient Name: Derek Moreno  MRN: [id number]  Patient Class: IP- Inpatient   Admission Date: 2/24/2025  Length of Stay: 3 days  Attending Physician: Jack Valdivia Jr., MD  Primary Care Provider: Mis, Primary Doctor        Subjective     Principal Problem:Acute on chronic combined systolic and diastolic heart failure        HPI:  The patient is a 59-year-old male who presented to the emergency department with worsening shortness of breath and increased fatigue. He reported experiencing shortness of breath upon mild to moderate exertion over the past few months. He complained of accompanying non productive cough. He denied any hemoptysis, chills or fever.  He did not complain of any accompanying chest pain, leg swelling, palpitations, or dizziness, but did mention experiencing a few episodes of blackouts. He denied any falls resulting from black outs.   Patient has PMH of COPD, CHF, tobacco and alcohol dependence.  The patient has not been using oxygen at home despite being prescribed it, due to concerns that he will get dependent on supplemented oxygen.   Additionally, he has not been taking his prescribed medications for the past several months due to affordability issues.   The patient lives alone and is ambulatory with the help of a cane.   Patient has PMH of alcohol dependence, he has been drinking at least 20 oz of beer daily and drank 8 oz today.      Overview/Hospital Course:  No notes on file    Interval History:  Feeling substantially better today.  Less short of breath at rest    Review of Systems  Objective:     Vital Signs (Most Recent):  Temp: 97.2 °F (36.2 °C) (02/27/25 1722)  Pulse: 83 (02/27/25 1722)  Resp: 18 (02/27/25 1722)  BP: 102/75 (02/27/25 1722)  SpO2: 98 % (02/27/25 1722) Vital Signs (24h Range):  Temp:  [97.2 °F (36.2 °C)-97.6 °F (36.4 °C)] 97.2 °F (36.2 °C)  Pulse:  [76-83] 83  Resp:  [16-20]  18  SpO2:  [92 %-98 %] 98 %  BP: ()/(66-75) 102/75     Weight: 69.8 kg (153 lb 14.1 oz)  Body mass index is 23.4 kg/m².    Intake/Output Summary (Last 24 hours) at 2/27/2025 2046  Last data filed at 2/27/2025 1213  Gross per 24 hour   Intake 320 ml   Output 1500 ml   Net -1180 ml         Physical Exam  Vitals and nursing note reviewed.   Constitutional:       General: He is not in acute distress.     Appearance: Normal appearance. He is not ill-appearing.   HENT:      Head: Normocephalic and atraumatic.      Nose: No congestion or rhinorrhea.   Eyes:      Extraocular Movements: Extraocular movements intact.      Conjunctiva/sclera: Conjunctivae normal.      Pupils: Pupils are equal, round, and reactive to light.   Cardiovascular:      Rate and Rhythm: Normal rate and regular rhythm.      Pulses: Normal pulses.      Heart sounds: Normal heart sounds. No murmur heard.  Pulmonary:      Effort: Pulmonary effort is normal. No respiratory distress.      Breath sounds: No wheezing.      Comments: Improved breath sounds.   Abdominal:      General: Bowel sounds are normal. There is no distension.      Palpations: Abdomen is soft.      Tenderness: There is no abdominal tenderness.   Musculoskeletal:      Cervical back: Normal range of motion and neck supple. No rigidity.      Right lower leg: No edema.      Left lower leg: No edema.   Skin:     General: Skin is warm and dry.      Capillary Refill: Capillary refill takes less than 2 seconds.   Neurological:      General: No focal deficit present.      Mental Status: He is alert and oriented to person, place, and time.      GCS: GCS eye subscore is 4. GCS verbal subscore is 5. GCS motor subscore is 6.             Significant Labs: All pertinent labs within the past 24 hours have been reviewed.  BMP:   Recent Labs   Lab 02/27/25  0543   *   *   K 3.2*   CL 91*   CO2 27   BUN 26   CREATININE 1.42*   CALCIUM 7.7*     CBC:   Recent Labs   Lab 02/27/25  0543   WBC  6.73   HGB 11.0*   HCT 33.6*          Significant Imaging: I have reviewed all pertinent imaging results/findings within the past 24 hours.    Assessment and Plan     * Acute on chronic combined systolic and diastolic heart failure  Patient has PMH of Chronic combined systolic and diastolic heart failure.  Results for orders placed during the hospital encounter of 01/04/24    Echo    Interpretation Summary    Left Ventricle: The left ventricle is mildly dilated. Normal wall thickness. There is eccentric hypertrophy. Severe global hypokinesis with regional variation. There is severely reduced systolic function. Biplane (2D) method of discs ejection fraction is 15%. Global longitudinal strain is reduced. There is diastolic dysfunction but grade cannot be determined.    Right Ventricle: Moderate right ventricular enlargement. Systolic function is mildly reduced.    Right Atrium: Right atrium is mildly dilated.    Aortic Valve: The aortic valve is a trileaflet valve.    Mitral Valve: There is mild bileaflet sclerosis. There is no stenosis. The mean pressure gradient across the mitral valve is 3 mmHg at a heart rate of  bpm. There is moderate regurgitation with a posterolateral eccentriccally directed jet.    Tricuspid Valve: There is moderate regurgitation.    Pulmonary Artery: The estimated pulmonary artery systolic pressure is 62 mmHg.    IVC/SVC: Elevated venous pressure at 15 mmHg.    Pericardium: Large left pleural effusion.  - patient is on CHF exacerbation pathway  - patient received 6o mg IV of furosemide in the ED  - pro BNP was 64521  - troponin levels were flat  - EKG showed sinus rhythm with no acute findings  - CXR    FINDINGS:  Cardiac silhouette is mildly enlarged.  Probable small bibasilar pleural effusions, improved from the prior study.  Mild right basilar airspace disease could represent mild infiltrate and pneumonia, mildly increased from the prior study.  No mass is detected.  No evidence of  pneumothorax  - continue IV diuresis  - LOW SODIUM DIET WITH FLUID RESTRICTION  - daily weights and strict I/O's  - repeat ECHO pending    2/25 - Subtle improvement overnight.  Continue IV lasix.   2/26- Appears euvolemic now.  Change to po lasix. His Dyspnea may be HF related vs COPD.   2/27 - diuresing well, breathing better. - anticipate discharge tomorrow    Alcohol dependence  Follow [facility] protocol  - patient reported drinking 12oz of beer today  - he limits alcohol to 20oz a day (beer)  Supplementing with thiamine and multivitamin      Tobacco dependency  Dangers of cigarette smoking were reviewed with patient in detail. Patient was Counseled for 3-10 minutes. Nicotine replacement options were discussed. Nicotine replacement was discussed- prescribed    Vitamin D deficiency  Continue home vitamin D       CAD (coronary artery disease)  Patient with known CAD s/p  no stent placed , which is controlled Will continue ASA and monitor for S/Sx of angina/ACS. Continue to monitor on telemetry.   Holding statin 2/2 liver enzymes elevated  - LHC was done on 1/10/2024  - troponin levels were flat  - EKG showed sinus rhythm with no acute findings    Alcoholic cirrhosis  Patient with known Cirrhosis with Child's class B. Co-morbidities are absent.  MELD-Na score calculated; MELD 3.0: 21 at 2/25/2025  4:25 AM  MELD-Na: 22 at 2/25/2025  4:25 AM  Calculated from:  Serum Creatinine: 1.36 mg/dL at 2/25/2025  4:25 AM  Serum Sodium: 132 mmol/L at 2/25/2025  4:25 AM  Total Bilirubin: 2.5 mg/dL at 2/25/2025  4:25 AM  Serum Albumin: 3.1 g/dL at 2/25/2025  4:25 AM  INR(ratio): 1.5 at 2/24/2025  4:20 PM  Age at listing (hypothetical): 59 years  Sex: Male at 2/25/2025  4:25 AM      Continue chronic meds. Etiology likely ETOH. Will avoid any hepatotoxic meds, and monitor CBC/CMP/INR for synthetic function.     - negative hepatitis panel  US Abdomen Limited  Impression:  1. Nonspecific gallbladder wall thickening with minimal  pericholecystic fluid.  No stones are identified.  Mild acute cholecystitis is a consideration.  Recommend follow-up.  2. Probable small 1.3 cm minimally complex hepatic cyst in the right lobe.  3. Bibasilar pleural effusions.    Essential hypertension  Patient's blood pressure range in the last 24 hours was: BP  Min: 95/69  Max: 107/75.The patient's inpatient anti-hypertensive regimen is listed below:  Current Antihypertensives  metoprolol succinate (TOPROL-XL) 24 hr tablet 25 mg, Daily, Oral  furosemide tablet 20 mg, 2 times daily, Oral    Plan  Holding home BP medications due to soft blood pressure and ongoing diuresis     COPD (chronic obstructive pulmonary disease)  Patient's COPD is with exacerbation noted by continued dyspnea currently.  Patient is currently off COPD Pathway. Continue scheduled inhalers  patient has not been on his home medication because of affordability issues  and monitor respiratory status closely.   - patient SpO2 is normal in room air    2/25 - No steroids at present time.  Monitor.       VTE Risk Mitigation (From admission, onward)           Ordered     enoxaparin injection 40 mg  Daily         02/24/25 2132     IP VTE HIGH RISK PATIENT  Once         02/24/25 2132                    Discharge Planning   EFREM: 2/28/2025     Code Status: Full Code   Medical Readiness for Discharge Date:   Discharge Plan A: Home with family                Please place Justification for DME        Jack Valdivia Jr, MD  Department of Hospital Medicine   [facility] - [facility]

## 2025-02-28 NOTE — ASSESSMENT & PLAN NOTE
Patient's blood pressure range in the last 24 hours was: BP  Min: 95/69  Max: 107/75.The patient's inpatient anti-hypertensive regimen is listed below:  Current Antihypertensives  metoprolol succinate (TOPROL-XL) 24 hr tablet 25 mg, Daily, Oral  furosemide tablet 20 mg, 2 times daily, Oral    Plan  Holding home BP medications due to soft blood pressure and ongoing diuresis

## 2025-02-28 NOTE — SUBJECTIVE & OBJECTIVE
Interval History:  Feeling substantially better today.  Less short of breath at rest    Review of Systems  Objective:     Vital Signs (Most Recent):  Temp: 97.2 °F (36.2 °C) (02/27/25 1722)  Pulse: 83 (02/27/25 1722)  Resp: 18 (02/27/25 1722)  BP: 102/75 (02/27/25 1722)  SpO2: 98 % (02/27/25 1722) Vital Signs (24h Range):  Temp:  [97.2 °F (36.2 °C)-97.6 °F (36.4 °C)] 97.2 °F (36.2 °C)  Pulse:  [76-83] 83  Resp:  [16-20] 18  SpO2:  [92 %-98 %] 98 %  BP: ()/(66-75) 102/75     Weight: 69.8 kg (153 lb 14.1 oz)  Body mass index is 23.4 kg/m².    Intake/Output Summary (Last 24 hours) at 2/27/2025 2046  Last data filed at 2/27/2025 1213  Gross per 24 hour   Intake 320 ml   Output 1500 ml   Net -1180 ml         Physical Exam  Vitals and nursing note reviewed.   Constitutional:       General: He is not in acute distress.     Appearance: Normal appearance. He is not ill-appearing.   HENT:      Head: Normocephalic and atraumatic.      Nose: No congestion or rhinorrhea.   Eyes:      Extraocular Movements: Extraocular movements intact.      Conjunctiva/sclera: Conjunctivae normal.      Pupils: Pupils are equal, round, and reactive to light.   Cardiovascular:      Rate and Rhythm: Normal rate and regular rhythm.      Pulses: Normal pulses.      Heart sounds: Normal heart sounds. No murmur heard.  Pulmonary:      Effort: Pulmonary effort is normal. No respiratory distress.      Breath sounds: No wheezing.      Comments: Improved breath sounds.   Abdominal:      General: Bowel sounds are normal. There is no distension.      Palpations: Abdomen is soft.      Tenderness: There is no abdominal tenderness.   Musculoskeletal:      Cervical back: Normal range of motion and neck supple. No rigidity.      Right lower leg: No edema.      Left lower leg: No edema.   Skin:     General: Skin is warm and dry.      Capillary Refill: Capillary refill takes less than 2 seconds.   Neurological:      General: No focal deficit present.       Mental Status: He is alert and oriented to person, place, and time.      GCS: GCS eye subscore is 4. GCS verbal subscore is 5. GCS motor subscore is 6.             Significant Labs: All pertinent labs within the past 24 hours have been reviewed.  BMP:   Recent Labs   Lab 02/27/25  0543   *   *   K 3.2*   CL 91*   CO2 27   BUN 26   CREATININE 1.42*   CALCIUM 7.7*     CBC:   Recent Labs   Lab 02/27/25  0543   WBC 6.73   HGB 11.0*   HCT 33.6*          Significant Imaging: I have reviewed all pertinent imaging results/findings within the past 24 hours.

## 2025-03-01 PROBLEM — R11.2 INTRACTABLE NAUSEA AND VOMITING: Status: ACTIVE | Noted: 2025-03-01

## 2025-03-01 PROCEDURE — S4991 NICOTINE PATCH NONLEGEND: HCPCS | Performed by: HOSPITALIST

## 2025-03-01 PROCEDURE — 94640 AIRWAY INHALATION TREATMENT: CPT

## 2025-03-01 PROCEDURE — 25000003 PHARM REV CODE 250: Performed by: FAMILY MEDICINE

## 2025-03-01 PROCEDURE — 27000221 HC OXYGEN, UP TO 24 HOURS

## 2025-03-01 PROCEDURE — 25000003 PHARM REV CODE 250

## 2025-03-01 PROCEDURE — 99233 SBSQ HOSP IP/OBS HIGH 50: CPT | Mod: ,,, | Performed by: HOSPITALIST

## 2025-03-01 PROCEDURE — 99900035 HC TECH TIME PER 15 MIN (STAT)

## 2025-03-01 PROCEDURE — 25000003 PHARM REV CODE 250: Performed by: HOSPITALIST

## 2025-03-01 PROCEDURE — 63600175 PHARM REV CODE 636 W HCPCS: Performed by: HOSPITALIST

## 2025-03-01 PROCEDURE — 11000001 HC ACUTE MED/SURG PRIVATE ROOM

## 2025-03-01 PROCEDURE — 94761 N-INVAS EAR/PLS OXIMETRY MLT: CPT

## 2025-03-01 PROCEDURE — 25000242 PHARM REV CODE 250 ALT 637 W/ HCPCS

## 2025-03-01 RX ORDER — CALCIUM CARBONATE 200(500)MG
500 TABLET,CHEWABLE ORAL 2 TIMES DAILY
Status: DISCONTINUED | OUTPATIENT
Start: 2025-03-01 | End: 2025-03-05 | Stop reason: HOSPADM

## 2025-03-01 RX ADMIN — IPRATROPIUM BROMIDE AND ALBUTEROL SULFATE 3 ML: .5; 3 SOLUTION RESPIRATORY (INHALATION) at 07:03

## 2025-03-01 RX ADMIN — DIPHENHYDRAMINE HYDROCHLORIDE 50 MG: 25 CAPSULE ORAL at 07:03

## 2025-03-01 RX ADMIN — ENOXAPARIN SODIUM 40 MG: 40 INJECTION SUBCUTANEOUS at 05:03

## 2025-03-01 RX ADMIN — IPRATROPIUM BROMIDE AND ALBUTEROL SULFATE 3 ML: .5; 3 SOLUTION RESPIRATORY (INHALATION) at 02:03

## 2025-03-01 RX ADMIN — TRAZODONE HYDROCHLORIDE 50 MG: 50 TABLET ORAL at 08:03

## 2025-03-01 RX ADMIN — THERA TABS 1 TABLET: TAB at 08:03

## 2025-03-01 RX ADMIN — ASPIRIN 81 MG: 81 TABLET, COATED ORAL at 08:03

## 2025-03-01 RX ADMIN — ALUMINUM HYDROXIDE, MAGNESIUM HYDROXIDE, AND DIMETHICONE 30 ML: 400; 400; 40 SUSPENSION ORAL at 03:03

## 2025-03-01 RX ADMIN — CALCIUM CARBONATE (ANTACID) CHEW TAB 500 MG 500 MG: 500 CHEW TAB at 05:03

## 2025-03-01 RX ADMIN — POTASSIUM CHLORIDE 20 MEQ: 1500 TABLET, EXTENDED RELEASE ORAL at 08:03

## 2025-03-01 RX ADMIN — NICOTINE 1 PATCH: 14 PATCH, EXTENDED RELEASE TRANSDERMAL at 08:03

## 2025-03-01 RX ADMIN — ALLOPURINOL 200 MG: 100 TABLET ORAL at 08:03

## 2025-03-01 RX ADMIN — FUROSEMIDE 20 MG: 20 TABLET ORAL at 08:03

## 2025-03-01 RX ADMIN — POTASSIUM CHLORIDE 20 MEQ: 1500 TABLET, EXTENDED RELEASE ORAL at 11:03

## 2025-03-01 RX ADMIN — GUAIFENESIN AND DEXTROMETHORPHAN 10 ML: 100; 10 SYRUP ORAL at 11:03

## 2025-03-01 RX ADMIN — IPRATROPIUM BROMIDE AND ALBUTEROL SULFATE 3 ML: .5; 3 SOLUTION RESPIRATORY (INHALATION) at 01:03

## 2025-03-01 RX ADMIN — Medication 1000 UNITS: at 08:03

## 2025-03-01 RX ADMIN — OXYCODONE 5 MG: 5 TABLET ORAL at 11:03

## 2025-03-01 NOTE — PLAN OF CARE
Problem: Adult Inpatient Plan of Care  Goal: Plan of Care Review  Outcome: Progressing  Goal: Patient-Specific Goal (Individualized)  Outcome: Progressing  Goal: Absence of Hospital-Acquired Illness or Injury  Outcome: Progressing  Goal: Optimal Comfort and Wellbeing  Outcome: Progressing  Goal: Readiness for Transition of Care  Outcome: Progressing     Problem: Airway Clearance Ineffective  Goal: Effective Airway Clearance  Outcome: Progressing     Problem: Skin Injury Risk Increased  Goal: Skin Health and Integrity  Outcome: Progressing     Problem: Fall Injury Risk  Goal: Absence of Fall and Fall-Related Injury  Outcome: Progressing     Problem: Gas Exchange Impaired  Goal: Optimal Gas Exchange  Outcome: Progressing

## 2025-03-01 NOTE — SUBJECTIVE & OBJECTIVE
Interval History: Had hoped for discharge today but got dizzy and hypotensive then anxious. Felt to be overdiuresed. Giving some fluid back.     Review of Systems  Objective:     Vital Signs (Most Recent):  Temp: 97.6 °F (36.4 °C) (02/28/25 1949)  Pulse: 85 (02/28/25 2020)  Resp: 20 (02/28/25 2020)  BP: 108/80 (02/28/25 1949)  SpO2: (!) 93 % (02/28/25 2020) Vital Signs (24h Range):  Temp:  [97.5 °F (36.4 °C)-99.2 °F (37.3 °C)] 97.6 °F (36.4 °C)  Pulse:  [54-92] 85  Resp:  [18-22] 20  SpO2:  [93 %-100 %] 93 %  BP: ()/(46-80) 108/80     Weight: 69.9 kg (154 lb 1.6 oz)  Body mass index is 23.43 kg/m².    Intake/Output Summary (Last 24 hours) at 2/28/2025 2152  Last data filed at 2/28/2025 1841  Gross per 24 hour   Intake 480 ml   Output 800 ml   Net -320 ml         Physical Exam  Vitals and nursing note reviewed.   Constitutional:       General: He is not in acute distress.     Appearance: Normal appearance. He is ill-appearing.   HENT:      Head: Normocephalic and atraumatic.      Nose: No congestion or rhinorrhea.   Eyes:      Extraocular Movements: Extraocular movements intact.      Conjunctiva/sclera: Conjunctivae normal.      Pupils: Pupils are equal, round, and reactive to light.   Cardiovascular:      Rate and Rhythm: Normal rate and regular rhythm.      Pulses: Normal pulses.      Heart sounds: Normal heart sounds. No murmur heard.  Pulmonary:      Effort: Pulmonary effort is normal. No respiratory distress.      Breath sounds: No wheezing.      Comments: Improved breath sounds.   Abdominal:      General: Bowel sounds are normal. There is no distension.      Palpations: Abdomen is soft.      Tenderness: There is no abdominal tenderness.   Musculoskeletal:      Cervical back: Normal range of motion and neck supple. No rigidity.      Right lower leg: No edema.      Left lower leg: No edema.   Skin:     General: Skin is warm and dry.      Capillary Refill: Capillary refill takes less than 2 seconds.       Comments: Poor turgor.    Neurological:      General: No focal deficit present.      Mental Status: He is alert and oriented to person, place, and time.      GCS: GCS eye subscore is 4. GCS verbal subscore is 5. GCS motor subscore is 6.   Psychiatric:      Comments: Anxious.              Significant Labs: All pertinent labs within the past 24 hours have been reviewed.  BMP:   Recent Labs   Lab 02/27/25  0543   *   *   K 3.2*   CL 91*   CO2 27   BUN 26   CREATININE 1.42*   CALCIUM 7.7*     CBC:   Recent Labs   Lab 02/27/25  0543   WBC 6.73   HGB 11.0*   HCT 33.6*          Significant Imaging: I have reviewed all pertinent imaging results/findings within the past 24 hours.

## 2025-03-01 NOTE — PLAN OF CARE
Problem: Airway Clearance Ineffective  Goal: Effective Airway Clearance  Outcome: Progressing     Problem: Skin Injury Risk Increased  Goal: Skin Health and Integrity  Outcome: Progressing     Problem: Gas Exchange Impaired  Goal: Optimal Gas Exchange  Outcome: Progressing

## 2025-03-01 NOTE — PLAN OF CARE
Problem: Adult Inpatient Plan of Care  Goal: Plan of Care Review  Outcome: Progressing  Goal: Patient-Specific Goal (Individualized)  Outcome: Progressing     Problem: Airway Clearance Ineffective  Goal: Effective Airway Clearance  Outcome: Progressing     Problem: Skin Injury Risk Increased  Goal: Skin Health and Integrity  Outcome: Progressing     Problem: Fall Injury Risk  Goal: Absence of Fall and Fall-Related Injury  Outcome: Progressing     Problem: Gas Exchange Impaired  Goal: Optimal Gas Exchange  Outcome: Progressing

## 2025-03-01 NOTE — ASSESSMENT & PLAN NOTE
Patient has PMH of Chronic combined systolic and diastolic heart failure.  Results for orders placed during the hospital encounter of 01/04/24    Echo    Interpretation Summary    Left Ventricle: The left ventricle is mildly dilated. Normal wall thickness. There is eccentric hypertrophy. Severe global hypokinesis with regional variation. There is severely reduced systolic function. Biplane (2D) method of discs ejection fraction is 15%. Global longitudinal strain is reduced. There is diastolic dysfunction but grade cannot be determined.    Right Ventricle: Moderate right ventricular enlargement. Systolic function is mildly reduced.    Right Atrium: Right atrium is mildly dilated.    Aortic Valve: The aortic valve is a trileaflet valve.    Mitral Valve: There is mild bileaflet sclerosis. There is no stenosis. The mean pressure gradient across the mitral valve is 3 mmHg at a heart rate of  bpm. There is moderate regurgitation with a posterolateral eccentriccally directed jet.    Tricuspid Valve: There is moderate regurgitation.    Pulmonary Artery: The estimated pulmonary artery systolic pressure is 62 mmHg.    IVC/SVC: Elevated venous pressure at 15 mmHg.    Pericardium: Large left pleural effusion.  - patient is on CHF exacerbation pathway  - patient received 6o mg IV of furosemide in the ED  - pro BNP was 05923  - troponin levels were flat  - EKG showed sinus rhythm with no acute findings  - CXR    FINDINGS:  Cardiac silhouette is mildly enlarged.  Probable small bibasilar pleural effusions, improved from the prior study.  Mild right basilar airspace disease could represent mild infiltrate and pneumonia, mildly increased from the prior study.  No mass is detected.  No evidence of pneumothorax  - continue IV diuresis  - LOW SODIUM DIET WITH FLUID RESTRICTION  - daily weights and strict I/O's  - repeat ECHO pending    2/25 - Subtle improvement overnight.  Continue IV lasix.   2/26- Appears euvolemic now.  Change to po  lasix. His Dyspnea may be HF related vs COPD.   2/27 - diuresing well, breathing better. - anticipate discharge tomorrow  2/28 - Overdiuresed, giving 500 LR back.  Change lasix to 20 po daily.

## 2025-03-01 NOTE — PROGRESS NOTES
[facility] Hospital Medicine  Progress Note    Patient Name: Derek Moreno  MRN: [id number]  Patient Class: IP- Inpatient   Admission Date: 2/24/2025  Length of Stay: 4 days  Attending Physician: Jack Valdivia Jr., MD  Primary Care Provider: Mis, Primary Doctor        Subjective     Principal Problem:Acute on chronic combined systolic and diastolic heart failure        HPI:  The patient is a 59-year-old male who presented to the emergency department with worsening shortness of breath and increased fatigue. He reported experiencing shortness of breath upon mild to moderate exertion over the past few months. He complained of accompanying non productive cough. He denied any hemoptysis, chills or fever.  He did not complain of any accompanying chest pain, leg swelling, palpitations, or dizziness, but did mention experiencing a few episodes of blackouts. He denied any falls resulting from black outs.   Patient has PMH of COPD, CHF, tobacco and alcohol dependence.  The patient has not been using oxygen at home despite being prescribed it, due to concerns that he will get dependent on supplemented oxygen.   Additionally, he has not been taking his prescribed medications for the past several months due to affordability issues.   The patient lives alone and is ambulatory with the help of a cane.   Patient has PMH of alcohol dependence, he has been drinking at least 20 oz of beer daily and drank 8 oz today.      Overview/Hospital Course:  No notes on file    Interval History: Had hoped for discharge today but got dizzy and hypotensive then anxious. Felt to be overdiuresed. Giving some fluid back.     Review of Systems  Objective:     Vital Signs (Most Recent):  Temp: 97.6 °F (36.4 °C) (02/28/25 1949)  Pulse: 85 (02/28/25 2020)  Resp: 20 (02/28/25 2020)  BP: 108/80 (02/28/25 1949)  SpO2: (!) 93 % (02/28/25 2020) Vital Signs (24h Range):  Temp:  [97.5 °F (36.4 °C)-99.2 °F (37.3 °C)]  97.6 °F (36.4 °C)  Pulse:  [54-92] 85  Resp:  [18-22] 20  SpO2:  [93 %-100 %] 93 %  BP: ()/(46-80) 108/80     Weight: 69.9 kg (154 lb 1.6 oz)  Body mass index is 23.43 kg/m².    Intake/Output Summary (Last 24 hours) at 2/28/2025 2152  Last data filed at 2/28/2025 1841  Gross per 24 hour   Intake 480 ml   Output 800 ml   Net -320 ml         Physical Exam  Vitals and nursing note reviewed.   Constitutional:       General: He is not in acute distress.     Appearance: Normal appearance. He is ill-appearing.   HENT:      Head: Normocephalic and atraumatic.      Nose: No congestion or rhinorrhea.   Eyes:      Extraocular Movements: Extraocular movements intact.      Conjunctiva/sclera: Conjunctivae normal.      Pupils: Pupils are equal, round, and reactive to light.   Cardiovascular:      Rate and Rhythm: Normal rate and regular rhythm.      Pulses: Normal pulses.      Heart sounds: Normal heart sounds. No murmur heard.  Pulmonary:      Effort: Pulmonary effort is normal. No respiratory distress.      Breath sounds: No wheezing.      Comments: Improved breath sounds.   Abdominal:      General: Bowel sounds are normal. There is no distension.      Palpations: Abdomen is soft.      Tenderness: There is no abdominal tenderness.   Musculoskeletal:      Cervical back: Normal range of motion and neck supple. No rigidity.      Right lower leg: No edema.      Left lower leg: No edema.   Skin:     General: Skin is warm and dry.      Capillary Refill: Capillary refill takes less than 2 seconds.      Comments: Poor turgor.    Neurological:      General: No focal deficit present.      Mental Status: He is alert and oriented to person, place, and time.      GCS: GCS eye subscore is 4. GCS verbal subscore is 5. GCS motor subscore is 6.   Psychiatric:      Comments: Anxious.              Significant Labs: All pertinent labs within the past 24 hours have been reviewed.  BMP:   Recent Labs   Lab 02/27/25  0543   *   *    K 3.2*   CL 91*   CO2 27   BUN 26   CREATININE 1.42*   CALCIUM 7.7*     CBC:   Recent Labs   Lab 02/27/25  0543   WBC 6.73   HGB 11.0*   HCT 33.6*          Significant Imaging: I have reviewed all pertinent imaging results/findings within the past 24 hours.    Assessment and Plan     * Acute on chronic combined systolic and diastolic heart failure  Patient has PMH of Chronic combined systolic and diastolic heart failure.  Results for orders placed during the hospital encounter of 01/04/24    Echo    Interpretation Summary    Left Ventricle: The left ventricle is mildly dilated. Normal wall thickness. There is eccentric hypertrophy. Severe global hypokinesis with regional variation. There is severely reduced systolic function. Biplane (2D) method of discs ejection fraction is 15%. Global longitudinal strain is reduced. There is diastolic dysfunction but grade cannot be determined.    Right Ventricle: Moderate right ventricular enlargement. Systolic function is mildly reduced.    Right Atrium: Right atrium is mildly dilated.    Aortic Valve: The aortic valve is a trileaflet valve.    Mitral Valve: There is mild bileaflet sclerosis. There is no stenosis. The mean pressure gradient across the mitral valve is 3 mmHg at a heart rate of  bpm. There is moderate regurgitation with a posterolateral eccentriccally directed jet.    Tricuspid Valve: There is moderate regurgitation.    Pulmonary Artery: The estimated pulmonary artery systolic pressure is 62 mmHg.    IVC/SVC: Elevated venous pressure at 15 mmHg.    Pericardium: Large left pleural effusion.  - patient is on CHF exacerbation pathway  - patient received 6o mg IV of furosemide in the ED  - pro BNP was [id number]  - troponin levels were flat  - EKG showed sinus rhythm with no acute findings  - CXR    FINDINGS:  Cardiac silhouette is mildly enlarged.  Probable small bibasilar pleural effusions, improved from the prior study.  Mild right basilar airspace disease  could represent mild infiltrate and pneumonia, mildly increased from the prior study.  No mass is detected.  No evidence of pneumothorax  - continue IV diuresis  - LOW SODIUM DIET WITH FLUID RESTRICTION  - daily weights and strict I/O's  - repeat ECHO pending    2/25 - Subtle improvement overnight.  Continue IV lasix.   2/26- Appears euvolemic now.  Change to po lasix. His Dyspnea may be HF related vs COPD.   2/27 - diuresing well, breathing better. - anticipate discharge tomorrow  2/28 - Overdiuresed, giving 500 LR back.  Change lasix to 20 po daily.     Alcohol dependence  Follow [facility] protocol  - patient reported drinking 12oz of beer today  - he limits alcohol to 20oz a day (beer)  Supplementing with thiamine and multivitamin      Tobacco dependency  Dangers of cigarette smoking were reviewed with patient in detail. Patient was Counseled for 3-10 minutes. Nicotine replacement options were discussed. Nicotine replacement was discussed- prescribed    Vitamin D deficiency  Continue home vitamin D       CAD (coronary artery disease)  Patient with known CAD s/p  no stent placed , which is controlled Will continue ASA and monitor for S/Sx of angina/ACS. Continue to monitor on telemetry.   Holding statin 2/2 liver enzymes elevated  - LHC was done on 1/10/2024  - troponin levels were flat  - EKG showed sinus rhythm with no acute findings    Alcoholic cirrhosis  Patient with known Cirrhosis with Child's class B. Co-morbidities are absent.  MELD-Na score calculated; MELD 3.0: 21 at 2/25/2025  4:25 AM  MELD-Na: 22 at 2/25/2025  4:25 AM  Calculated from:  Serum Creatinine: 1.36 mg/dL at 2/25/2025  4:25 AM  Serum Sodium: 132 mmol/L at 2/25/2025  4:25 AM  Total Bilirubin: 2.5 mg/dL at 2/25/2025  4:25 AM  Serum Albumin: 3.1 g/dL at 2/25/2025  4:25 AM  INR(ratio): 1.5 at 2/24/2025  4:20 PM  Age at listing (hypothetical): 59 years  Sex: Male at 2/25/2025  4:25 AM      Continue chronic meds. Etiology likely ETOH. Will avoid any  hepatotoxic meds, and monitor CBC/CMP/INR for synthetic function.     - negative hepatitis panel  US Abdomen Limited  Impression:  1. Nonspecific gallbladder wall thickening with minimal pericholecystic fluid.  No stones are identified.  Mild acute cholecystitis is a consideration.  Recommend follow-up.  2. Probable small 1.3 cm minimally complex hepatic cyst in the right lobe.  3. Bibasilar pleural effusions.    Essential hypertension  Patient's blood pressure range in the last 24 hours was: BP  Min: 95/69  Max: 107/75.The patient's inpatient anti-hypertensive regimen is listed below:  Current Antihypertensives  metoprolol succinate (TOPROL-XL) 24 hr tablet 25 mg, Daily, Oral  furosemide tablet 20 mg, 2 times daily, Oral    Plan  Holding home BP medications due to soft blood pressure and ongoing diuresis     COPD (chronic obstructive pulmonary disease)  Patient's COPD is with exacerbation noted by continued dyspnea currently.  Patient is currently off COPD Pathway. Continue scheduled inhalers  patient has not been on his home medication because of affordability issues  and monitor respiratory status closely.   - patient SpO2 is normal in room air    2/25 - No steroids at present time.  Monitor.       VTE Risk Mitigation (From admission, onward)           Ordered     enoxaparin injection 40 mg  Daily         02/24/25 2132     IP VTE HIGH RISK PATIENT  Once         02/24/25 2132                    Discharge Planning   EFREM: 2/28/2025     Code Status: Full Code   Medical Readiness for Discharge Date:   Discharge Plan A: Home with family                Please place Justification for DME        Jack Valdivia Jr, MD  Department of Hospital Medicine   [facility]

## 2025-03-02 PROBLEM — R53.81 DEBILITY: Status: ACTIVE | Noted: 2025-03-02

## 2025-03-02 PROBLEM — R33.9 URINARY RETENTION: Status: ACTIVE | Noted: 2025-03-02

## 2025-03-02 PROCEDURE — 27000221 HC OXYGEN, UP TO 24 HOURS

## 2025-03-02 PROCEDURE — 94761 N-INVAS EAR/PLS OXIMETRY MLT: CPT

## 2025-03-02 PROCEDURE — 94640 AIRWAY INHALATION TREATMENT: CPT

## 2025-03-02 PROCEDURE — 25000003 PHARM REV CODE 250: Performed by: HOSPITALIST

## 2025-03-02 PROCEDURE — 63600175 PHARM REV CODE 636 W HCPCS: Performed by: HOSPITALIST

## 2025-03-02 PROCEDURE — 99900035 HC TECH TIME PER 15 MIN (STAT)

## 2025-03-02 PROCEDURE — 11000001 HC ACUTE MED/SURG PRIVATE ROOM

## 2025-03-02 PROCEDURE — 25000242 PHARM REV CODE 250 ALT 637 W/ HCPCS

## 2025-03-02 PROCEDURE — S4991 NICOTINE PATCH NONLEGEND: HCPCS | Performed by: HOSPITALIST

## 2025-03-02 PROCEDURE — 25000003 PHARM REV CODE 250: Performed by: FAMILY MEDICINE

## 2025-03-02 PROCEDURE — 51798 US URINE CAPACITY MEASURE: CPT

## 2025-03-02 PROCEDURE — 25000003 PHARM REV CODE 250

## 2025-03-02 RX ORDER — OXYCODONE HYDROCHLORIDE 5 MG/1
10 TABLET ORAL EVERY 4 HOURS PRN
Refills: 0 | Status: DISCONTINUED | OUTPATIENT
Start: 2025-03-02 | End: 2025-03-05 | Stop reason: HOSPADM

## 2025-03-02 RX ORDER — METOCLOPRAMIDE HYDROCHLORIDE 5 MG/ML
10 INJECTION INTRAMUSCULAR; INTRAVENOUS 3 TIMES DAILY
Status: DISCONTINUED | OUTPATIENT
Start: 2025-03-02 | End: 2025-03-02

## 2025-03-02 RX ORDER — METOCLOPRAMIDE HYDROCHLORIDE 5 MG/ML
5 INJECTION INTRAMUSCULAR; INTRAVENOUS 3 TIMES DAILY
Status: DISCONTINUED | OUTPATIENT
Start: 2025-03-02 | End: 2025-03-05 | Stop reason: HOSPADM

## 2025-03-02 RX ADMIN — NICOTINE 1 PATCH: 14 PATCH, EXTENDED RELEASE TRANSDERMAL at 09:03

## 2025-03-02 RX ADMIN — POTASSIUM CHLORIDE 20 MEQ: 1500 TABLET, EXTENDED RELEASE ORAL at 09:03

## 2025-03-02 RX ADMIN — ASPIRIN 81 MG: 81 TABLET, COATED ORAL at 09:03

## 2025-03-02 RX ADMIN — METOCLOPRAMIDE 5 MG: 5 INJECTION, SOLUTION INTRAMUSCULAR; INTRAVENOUS at 10:03

## 2025-03-02 RX ADMIN — IPRATROPIUM BROMIDE AND ALBUTEROL SULFATE 3 ML: .5; 3 SOLUTION RESPIRATORY (INHALATION) at 07:03

## 2025-03-02 RX ADMIN — ENOXAPARIN SODIUM 40 MG: 40 INJECTION SUBCUTANEOUS at 05:03

## 2025-03-02 RX ADMIN — IPRATROPIUM BROMIDE AND ALBUTEROL SULFATE 3 ML: .5; 3 SOLUTION RESPIRATORY (INHALATION) at 03:03

## 2025-03-02 RX ADMIN — CALCIUM CARBONATE (ANTACID) CHEW TAB 500 MG 500 MG: 500 CHEW TAB at 09:03

## 2025-03-02 RX ADMIN — FUROSEMIDE 20 MG: 20 TABLET ORAL at 09:03

## 2025-03-02 RX ADMIN — POTASSIUM CHLORIDE 20 MEQ: 1500 TABLET, EXTENDED RELEASE ORAL at 10:03

## 2025-03-02 RX ADMIN — IPRATROPIUM BROMIDE AND ALBUTEROL SULFATE 3 ML: .5; 3 SOLUTION RESPIRATORY (INHALATION) at 12:03

## 2025-03-02 RX ADMIN — OXYCODONE 10 MG: 5 TABLET ORAL at 02:03

## 2025-03-02 RX ADMIN — TRAMADOL HYDROCHLORIDE 50 MG: 50 TABLET, COATED ORAL at 10:03

## 2025-03-02 RX ADMIN — CALCIUM CARBONATE (ANTACID) CHEW TAB 500 MG 500 MG: 500 CHEW TAB at 10:03

## 2025-03-02 RX ADMIN — ALLOPURINOL 200 MG: 100 TABLET ORAL at 09:03

## 2025-03-02 RX ADMIN — TRAZODONE HYDROCHLORIDE 50 MG: 50 TABLET ORAL at 10:03

## 2025-03-02 RX ADMIN — THERA TABS 1 TABLET: TAB at 09:03

## 2025-03-02 RX ADMIN — DIPHENHYDRAMINE HYDROCHLORIDE 50 MG: 25 CAPSULE ORAL at 07:03

## 2025-03-02 RX ADMIN — Medication 1000 UNITS: at 09:03

## 2025-03-02 NOTE — ASSESSMENT & PLAN NOTE
Intractable nausea and vomiting.  Patient has been given Zofran and other medications.    He does have cirrhosis and he could have multiple etiologies of nausea and vomiting.  Sons at bedside and would be able to assist him at home if he is discharged home.  [facility] and may be able to work with or be placed in that system.

## 2025-03-02 NOTE — PLAN OF CARE
"SS consulted for Discharge Planning.  Consult states, "unable to walk or function at home which is below baseline."  Spoke with pt who states he is unable to walk but has a wheelchair he uses for assistance with mobility.  Pt also reports he wants to go home but at this time, he does not feel he is ready. Per SW notes, pt lives at home with his significant other but states he may go stay with his nephew when medically ready.  SS following.  "

## 2025-03-02 NOTE — NURSING
2030pm Pt complained of itching and was asking if I could find out why when he pee some white drainage, I ask pt the next time he urinate  to notify me so I can see it. 2058pm gave pt a benadryl for itching, pt ask for trazodone and pain pill. Educated pt that he will have to work at least 1hr a least because I don't want give to much, pt agreed to wait. @2115 went to give pt medicine, pt was staying he is spitting up acid and he needed Guaifenesin to help him, once I got back with, pt was stated that he couldn't take the medicine he ask for he needed me to sit for a awhile, I told pt I would get him cleaned up and sit him up in bed and then we will take medicine pt stayed he still could take the medicine. Clean pt up repositioned him, told pt that when he is ready to call and let me know and I will bring medicine. It is 2312 pt in in bed asleep, will return medicine back in pysis  @1200am pt finally called out for medicine, had not yet to return med, medicine was given

## 2025-03-02 NOTE — PROGRESS NOTES
[facility] Hospital Medicine  Progress Note    Patient Name: Deerk Moreno  MRN: [id number]  Patient Class: IP- Inpatient   Admission Date: 2/24/2025  Length of Stay: 5 days  Attending Physician: Thor Marshall MD  Primary Care Provider: Mis, Primary Doctor        Subjective     Principal Problem:Acute on chronic combined systolic and diastolic heart failure    HPI:  The patient is a 59-year-old male who presented to the emergency department with worsening shortness of breath and increased fatigue. He reported experiencing shortness of breath upon mild to moderate exertion over the past few months. He complained of accompanying non productive cough. He denied any hemoptysis, chills or fever.  He did not complain of any accompanying chest pain, leg swelling, palpitations, or dizziness, but did mention experiencing a few episodes of blackouts. He denied any falls resulting from black outs.   Patient has PMH of COPD, CHF, tobacco and alcohol dependence.  The patient has not been using oxygen at home despite being prescribed it, due to concerns that he will get dependent on supplemented oxygen.   Additionally, he has not been taking his prescribed medications for the past several months due to affordability issues.   The patient lives alone and is ambulatory with the help of a cane.   Patient has PMH of alcohol dependence, he has been drinking at least 20 oz of beer daily and drank 8 oz today.      Overview/Hospital Course:  No notes on file    Interval History:     Review of Systems  Objective:     Vital Signs (Most Recent):  Temp: 97.4 °F (36.3 °C) (03/01/25 1944)  Pulse: 85 (03/01/25 1944)  Resp: 20 (03/01/25 1944)  BP: 107/81 (03/01/25 1944)  SpO2: 99 % (03/01/25 1944) Vital Signs (24h Range):  Temp:  [97.4 °F (36.3 °C)-97.9 °F (36.6 °C)] 97.4 °F (36.3 °C)  Pulse:  [74-85] 85  Resp:  [18-20] 20  SpO2:  [93 %-100 %] 99 %  BP: (104-113)/(70-81) 107/81     Weight: 76.1  kg (167 lb 12.3 oz)  Body mass index is 25.51 kg/m².    Intake/Output Summary (Last 24 hours) at 3/1/2025 2019  Last data filed at 3/1/2025 0808  Gross per 24 hour   Intake --   Output 200 ml   Net -200 ml         Physical Exam  Vitals and nursing note reviewed.   Constitutional:       General: He is not in acute distress.     Appearance: Normal appearance. He is ill-appearing.   HENT:      Head: Normocephalic and atraumatic.      Nose: No congestion or rhinorrhea.   Eyes:      Extraocular Movements: Extraocular movements intact.      Conjunctiva/sclera: Conjunctivae normal.      Pupils: Pupils are equal, round, and reactive to light.   Cardiovascular:      Rate and Rhythm: Normal rate and regular rhythm.      Pulses: Normal pulses.      Heart sounds: Normal heart sounds. No murmur heard.  Pulmonary:      Effort: Pulmonary effort is normal. No respiratory distress.      Breath sounds: No wheezing.      Comments: Improved breath sounds.   Abdominal:      General: Bowel sounds are normal. There is no distension.      Palpations: Abdomen is soft.      Tenderness: There is no abdominal tenderness.   Musculoskeletal:      Cervical back: Normal range of motion and neck supple. No rigidity.      Right lower leg: No edema.      Left lower leg: No edema.   Skin:     General: Skin is warm and dry.      Capillary Refill: Capillary refill takes less than 2 seconds.      Comments: Poor turgor.    Neurological:      General: No focal deficit present.      Mental Status: He is alert and oriented to person, place, and time.      GCS: GCS eye subscore is 4. GCS verbal subscore is 5. GCS motor subscore is 6.   Psychiatric:      Comments: Anxious.              Significant Labs: All pertinent labs within the past 24 hours have been reviewed.    Significant Imaging: I have reviewed all pertinent imaging results/findings within the past 24 hours.    Assessment and Plan     * Acute on chronic combined systolic and diastolic heart  failure  Patient has PMH of Chronic combined systolic and diastolic heart failure.  Results for orders placed during the hospital encounter of 01/04/24    Echo    Interpretation Summary    Left Ventricle: The left ventricle is mildly dilated. Normal wall thickness. There is eccentric hypertrophy. Severe global hypokinesis with regional variation. There is severely reduced systolic function. Biplane (2D) method of discs ejection fraction is 15%. Global longitudinal strain is reduced. There is diastolic dysfunction but grade cannot be determined.    Right Ventricle: Moderate right ventricular enlargement. Systolic function is mildly reduced.    Right Atrium: Right atrium is mildly dilated.    Aortic Valve: The aortic valve is a trileaflet valve.    Mitral Valve: There is mild bileaflet sclerosis. There is no stenosis. The mean pressure gradient across the mitral valve is 3 mmHg at a heart rate of  bpm. There is moderate regurgitation with a posterolateral eccentriccally directed jet.    Tricuspid Valve: There is moderate regurgitation.    Pulmonary Artery: The estimated pulmonary artery systolic pressure is 62 mmHg.    IVC/SVC: Elevated venous pressure at 15 mmHg.    Pericardium: Large left pleural effusion.  - patient is on CHF exacerbation pathway  - patient received 6o mg IV of furosemide in the ED  - pro BNP was 83820  - troponin levels were flat  - EKG showed sinus rhythm with no acute findings  - CXR    FINDINGS:  Cardiac silhouette is mildly enlarged.  Probable small bibasilar pleural effusions, improved from the prior study.  Mild right basilar airspace disease could represent mild infiltrate and pneumonia, mildly increased from the prior study.  No mass is detected.  No evidence of pneumothorax  - continue IV diuresis  - LOW SODIUM DIET WITH FLUID RESTRICTION  - daily weights and strict I/O's  - repeat ECHO pending    2/25 - Subtle improvement overnight.  Continue IV lasix.   2/26- Appears euvolemic now.   Change to po lasix. His Dyspnea may be HF related vs COPD.   2/27 - diuresing well, breathing better. - anticipate discharge tomorrow  2/28 - Overdiuresed, giving 500 LR back.  Change lasix to 20 po daily.   3/1:  Appears euvolemic.    Intractable nausea and vomiting  Intractable nausea and vomiting.  Patient has been given Zofran and other medications.    He does have cirrhosis and he could have multiple etiologies of nausea and vomiting.  Sons at bedside and would be able to assist him at home if he is discharged home.  [facility] and may be able to work with or be placed in that system.      Alcohol dependence  Follow [facility] protocol  - patient reported drinking 12oz of beer today  - he limits alcohol to 20oz a day (beer)  Supplementing with thiamine and multivitamin      Tobacco dependency  Dangers of cigarette smoking were reviewed with patient in detail. Patient was Counseled for 3-10 minutes. Nicotine replacement options were discussed. Nicotine replacement was discussed- prescribed    Vitamin D deficiency  Continue home vitamin D       CAD (coronary artery disease)  Patient with known CAD s/p  no stent placed , which is controlled Will continue ASA and monitor for S/Sx of angina/ACS. Continue to monitor on telemetry.   Holding statin 2/2 liver enzymes elevated  - LHC was done on 1/10/2024  - troponin levels were flat  - EKG showed sinus rhythm with no acute findings    Alcoholic cirrhosis  Patient with known Cirrhosis with Child's class B. Co-morbidities are absent.  MELD-Na score calculated; MELD 3.0: 21 at 2/25/2025  4:25 AM  MELD-Na: 22 at 2/25/2025  4:25 AM  Calculated from:  Serum Creatinine: 1.36 mg/dL at 2/25/2025  4:25 AM  Serum Sodium: 132 mmol/L at 2/25/2025  4:25 AM  Total Bilirubin: 2.5 mg/dL at 2/25/2025  4:25 AM  Serum Albumin: 3.1 g/dL at 2/25/2025  4:25 AM  INR(ratio): 1.5 at 2/24/2025  4:20 PM  Age at listing (hypothetical): 59 years  Sex: Male at 2/25/2025  4:25 AM      Continue  chronic meds. Etiology likely ETOH. Will avoid any hepatotoxic meds, and monitor CBC/CMP/INR for synthetic function.     - negative hepatitis panel  US Abdomen Limited  Impression:  1. Nonspecific gallbladder wall thickening with minimal pericholecystic fluid.  No stones are identified.  Mild acute cholecystitis is a consideration.  Recommend follow-up.  2. Probable small 1.3 cm minimally complex hepatic cyst in the right lobe.  3. Bibasilar pleural effusions.    Essential hypertension  Patient's blood pressure range in the last 24 hours was: BP  Min: 95/69  Max: 107/75.The patient's inpatient anti-hypertensive regimen is listed below:  Current Antihypertensives  metoprolol succinate (TOPROL-XL) 24 hr tablet 25 mg, Daily, Oral  furosemide tablet 20 mg, 2 times daily, Oral    Plan  Holding home BP medications due to soft blood pressure and ongoing diuresis     COPD (chronic obstructive pulmonary disease)  Patient's COPD is with exacerbation noted by continued dyspnea currently.  Patient is currently off COPD Pathway. Continue scheduled inhalers  patient has not been on his home medication because of affordability issues  and monitor respiratory status closely.   - patient SpO2 is normal in room air    2/25 - No steroids at present time.  Monitor.       VTE Risk Mitigation (From admission, onward)           Ordered     enoxaparin injection 40 mg  Daily         02/24/25 2132     IP VTE HIGH RISK PATIENT  Once         02/24/25 2132                    Discharge Planning   EFREM: 2/28/2025     Code Status: Full Code   Medical Readiness for Discharge Date:   Discharge Plan A: Home with family          Please place Justification for DME        Thor Marshall MD  Department of Hospital Medicine   [facility]

## 2025-03-02 NOTE — PROGRESS NOTES
Pharmacist Renal Dose Adjustment Note    Derek Moreno is a 59 y.o. male being treated with the medication metoclopramide    Patient Data:    Vital Signs (Most Recent):  Temp: 97.4 °F (36.3 °C) (03/02/25 0715)  Pulse: 77 (03/02/25 0745)  Resp: 16 (03/02/25 0745)  BP: 107/76 (03/02/25 0715)  SpO2: 96 % (03/02/25 0745) Vital Signs (72h Range):  Temp:  [96.2 °F (35.7 °C)-99.2 °F (37.3 °C)]   Pulse:  [54-92]   Resp:  [16-22]   BP: ()/(46-81)   SpO2:  [92 %-100 %]      Recent Labs   Lab 02/24/25  1620 02/25/25  0425 02/27/25  0543   CREATININE 1.34* 1.36* 1.42*     Serum creatinine: 1.42 mg/dL (H) 02/27/25 0543  Estimated creatinine clearance: 55.5 mL/min (A)    Medication:metoclopramide dose: 10 mg frequency tid will be changed to medication:metoclopramide dose:5 mg frequency:tid    Pharmacist's Name: Monica Rodriguez  Pharmacist's Extension: [id number]

## 2025-03-02 NOTE — SUBJECTIVE & OBJECTIVE
Interval History:     Review of Systems  Objective:     Vital Signs (Most Recent):  Temp: 97.4 °F (36.3 °C) (03/01/25 1944)  Pulse: 85 (03/01/25 1944)  Resp: 20 (03/01/25 1944)  BP: 107/81 (03/01/25 1944)  SpO2: 99 % (03/01/25 1944) Vital Signs (24h Range):  Temp:  [97.4 °F (36.3 °C)-97.9 °F (36.6 °C)] 97.4 °F (36.3 °C)  Pulse:  [74-85] 85  Resp:  [18-20] 20  SpO2:  [93 %-100 %] 99 %  BP: (104-113)/(70-81) 107/81     Weight: 76.1 kg (167 lb 12.3 oz)  Body mass index is 25.51 kg/m².    Intake/Output Summary (Last 24 hours) at 3/1/2025 2019  Last data filed at 3/1/2025 0808  Gross per 24 hour   Intake --   Output 200 ml   Net -200 ml         Physical Exam  Vitals and nursing note reviewed.   Constitutional:       General: He is not in acute distress.     Appearance: Normal appearance. He is ill-appearing.   HENT:      Head: Normocephalic and atraumatic.      Nose: No congestion or rhinorrhea.   Eyes:      Extraocular Movements: Extraocular movements intact.      Conjunctiva/sclera: Conjunctivae normal.      Pupils: Pupils are equal, round, and reactive to light.   Cardiovascular:      Rate and Rhythm: Normal rate and regular rhythm.      Pulses: Normal pulses.      Heart sounds: Normal heart sounds. No murmur heard.  Pulmonary:      Effort: Pulmonary effort is normal. No respiratory distress.      Breath sounds: No wheezing.      Comments: Improved breath sounds.   Abdominal:      General: Bowel sounds are normal. There is no distension.      Palpations: Abdomen is soft.      Tenderness: There is no abdominal tenderness.   Musculoskeletal:      Cervical back: Normal range of motion and neck supple. No rigidity.      Right lower leg: No edema.      Left lower leg: No edema.   Skin:     General: Skin is warm and dry.      Capillary Refill: Capillary refill takes less than 2 seconds.      Comments: Poor turgor.    Neurological:      General: No focal deficit present.      Mental Status: He is alert and oriented to  person, place, and time.      GCS: GCS eye subscore is 4. GCS verbal subscore is 5. GCS motor subscore is 6.   Psychiatric:      Comments: Anxious.              Significant Labs: All pertinent labs within the past 24 hours have been reviewed.    Significant Imaging: I have reviewed all pertinent imaging results/findings within the past 24 hours.

## 2025-03-02 NOTE — PLAN OF CARE
Problem: Adult Inpatient Plan of Care  Goal: Plan of Care Review  3/2/2025 0126 by Glen Stapleton RN  Outcome: Progressing  3/2/2025 0126 by Glen Stapleton RN  Outcome: Progressing  Goal: Patient-Specific Goal (Individualized)  3/2/2025 0126 by Glen Stapleton RN  Outcome: Progressing  3/2/2025 0126 by Glen Stapleton RN  Outcome: Progressing  Goal: Absence of Hospital-Acquired Illness or Injury  3/2/2025 0126 by Glen Stapleton RN  Outcome: Progressing  3/2/2025 0126 by Glen Stapleton RN  Outcome: Progressing  Goal: Optimal Comfort and Wellbeing  3/2/2025 0126 by Glen Stapleton RN  Outcome: Progressing  3/2/2025 0126 by Glen Stapleton RN  Outcome: Progressing  Goal: Readiness for Transition of Care  3/2/2025 0126 by Glen Stapleton RN  Outcome: Progressing  3/2/2025 0126 by Glen Stapleton RN  Outcome: Progressing     Problem: Airway Clearance Ineffective  Goal: Effective Airway Clearance  3/2/2025 0126 by Glen Stapleton RN  Outcome: Progressing  3/2/2025 0126 by Glen Stapleton RN  Outcome: Progressing     Problem: Skin Injury Risk Increased  Goal: Skin Health and Integrity  3/2/2025 0126 by Glen Stapleton RN  Outcome: Progressing  3/2/2025 0126 by Glen Stapleton RN  Outcome: Progressing     Problem: Fall Injury Risk  Goal: Absence of Fall and Fall-Related Injury  3/2/2025 0126 by Glen Stapleton RN  Outcome: Progressing  3/2/2025 0126 by Glen Stapleton RN  Outcome: Progressing     Problem: Gas Exchange Impaired  Goal: Optimal Gas Exchange  Outcome: Progressing

## 2025-03-02 NOTE — ASSESSMENT & PLAN NOTE
Patient has PMH of Chronic combined systolic and diastolic heart failure.  Results for orders placed during the hospital encounter of 01/04/24    Echo    Interpretation Summary    Left Ventricle: The left ventricle is mildly dilated. Normal wall thickness. There is eccentric hypertrophy. Severe global hypokinesis with regional variation. There is severely reduced systolic function. Biplane (2D) method of discs ejection fraction is 15%. Global longitudinal strain is reduced. There is diastolic dysfunction but grade cannot be determined.    Right Ventricle: Moderate right ventricular enlargement. Systolic function is mildly reduced.    Right Atrium: Right atrium is mildly dilated.    Aortic Valve: The aortic valve is a trileaflet valve.    Mitral Valve: There is mild bileaflet sclerosis. There is no stenosis. The mean pressure gradient across the mitral valve is 3 mmHg at a heart rate of  bpm. There is moderate regurgitation with a posterolateral eccentriccally directed jet.    Tricuspid Valve: There is moderate regurgitation.    Pulmonary Artery: The estimated pulmonary artery systolic pressure is 62 mmHg.    IVC/SVC: Elevated venous pressure at 15 mmHg.    Pericardium: Large left pleural effusion.  - patient is on CHF exacerbation pathway  - patient received 6o mg IV of furosemide in the ED  - pro BNP was 20736  - troponin levels were flat  - EKG showed sinus rhythm with no acute findings  - CXR    FINDINGS:  Cardiac silhouette is mildly enlarged.  Probable small bibasilar pleural effusions, improved from the prior study.  Mild right basilar airspace disease could represent mild infiltrate and pneumonia, mildly increased from the prior study.  No mass is detected.  No evidence of pneumothorax  - continue IV diuresis  - LOW SODIUM DIET WITH FLUID RESTRICTION  - daily weights and strict I/O's  - repeat ECHO pending    2/25 - Subtle improvement overnight.  Continue IV lasix.   2/26- Appears euvolemic now.  Change to po  lasix. His Dyspnea may be HF related vs COPD.   2/27 - diuresing well, breathing better. - anticipate discharge tomorrow  2/28 - Overdiuresed, giving 500 LR back.  Change lasix to 20 po daily.   3/1:  Appears euvolemic.

## 2025-03-03 LAB
ANION GAP SERPL CALCULATED.3IONS-SCNC: 17 MMOL/L (ref 7–16)
BASOPHILS # BLD AUTO: 0.03 K/UL (ref 0–0.2)
BASOPHILS NFR BLD AUTO: 0.3 % (ref 0–1)
BUN SERPL-MCNC: 22 MG/DL (ref 8–26)
BUN/CREAT SERPL: 21 (ref 6–20)
CALCIUM SERPL-MCNC: 8.8 MG/DL (ref 8.4–10.2)
CHLORIDE SERPL-SCNC: 90 MMOL/L (ref 98–107)
CO2 SERPL-SCNC: 21 MMOL/L (ref 22–29)
CREAT SERPL-MCNC: 1.06 MG/DL (ref 0.72–1.25)
DIFFERENTIAL METHOD BLD: ABNORMAL
EGFR (NO RACE VARIABLE) (RUSH/TITUS): 82 ML/MIN/1.73M2
EOSINOPHIL # BLD AUTO: 0.02 K/UL (ref 0–0.5)
EOSINOPHIL NFR BLD AUTO: 0.2 % (ref 1–4)
ERYTHROCYTE [DISTWIDTH] IN BLOOD BY AUTOMATED COUNT: 13.8 % (ref 11.5–14.5)
GLUCOSE SERPL-MCNC: 81 MG/DL (ref 74–100)
HCT VFR BLD AUTO: 36.1 % (ref 40–54)
HGB BLD-MCNC: 11.6 G/DL (ref 13.5–18)
IMM GRANULOCYTES # BLD AUTO: 0.03 K/UL (ref 0–0.04)
IMM GRANULOCYTES NFR BLD: 0.3 % (ref 0–0.4)
LYMPHOCYTES # BLD AUTO: 0.97 K/UL (ref 1–4.8)
LYMPHOCYTES NFR BLD AUTO: 10.2 % (ref 27–41)
MCH RBC QN AUTO: 30.5 PG (ref 27–31)
MCHC RBC AUTO-ENTMCNC: 32.1 G/DL (ref 32–36)
MCV RBC AUTO: 95 FL (ref 80–96)
MONOCYTES # BLD AUTO: 0.97 K/UL (ref 0–0.8)
MONOCYTES NFR BLD AUTO: 10.2 % (ref 2–6)
MPC BLD CALC-MCNC: 12.2 FL (ref 9.4–12.4)
NEUTROPHILS # BLD AUTO: 7.53 K/UL (ref 1.8–7.7)
NEUTROPHILS NFR BLD AUTO: 78.8 % (ref 53–65)
NRBC # BLD AUTO: 0 X10E3/UL
NRBC, AUTO (.00): 0 %
PLATELET # BLD AUTO: 235 K/UL (ref 150–400)
POTASSIUM SERPL-SCNC: 5.5 MMOL/L (ref 3.5–5.1)
RBC # BLD AUTO: 3.8 M/UL (ref 4.6–6.2)
SODIUM SERPL-SCNC: 122 MMOL/L (ref 136–145)
WBC # BLD AUTO: 9.55 K/UL (ref 4.5–11)

## 2025-03-03 PROCEDURE — 99900035 HC TECH TIME PER 15 MIN (STAT)

## 2025-03-03 PROCEDURE — 63600175 PHARM REV CODE 636 W HCPCS: Performed by: HOSPITALIST

## 2025-03-03 PROCEDURE — 25000003 PHARM REV CODE 250

## 2025-03-03 PROCEDURE — 25000003 PHARM REV CODE 250: Performed by: HOSPITALIST

## 2025-03-03 PROCEDURE — 97110 THERAPEUTIC EXERCISES: CPT

## 2025-03-03 PROCEDURE — 36415 COLL VENOUS BLD VENIPUNCTURE: CPT | Performed by: HOSPITALIST

## 2025-03-03 PROCEDURE — 25000003 PHARM REV CODE 250: Performed by: FAMILY MEDICINE

## 2025-03-03 PROCEDURE — S4991 NICOTINE PATCH NONLEGEND: HCPCS | Performed by: HOSPITALIST

## 2025-03-03 PROCEDURE — 11000001 HC ACUTE MED/SURG PRIVATE ROOM

## 2025-03-03 PROCEDURE — 80048 BASIC METABOLIC PNL TOTAL CA: CPT | Performed by: HOSPITALIST

## 2025-03-03 PROCEDURE — 85025 COMPLETE CBC W/AUTO DIFF WBC: CPT | Performed by: HOSPITALIST

## 2025-03-03 PROCEDURE — 97116 GAIT TRAINING THERAPY: CPT

## 2025-03-03 PROCEDURE — 94640 AIRWAY INHALATION TREATMENT: CPT

## 2025-03-03 PROCEDURE — 27000221 HC OXYGEN, UP TO 24 HOURS

## 2025-03-03 PROCEDURE — 25000242 PHARM REV CODE 250 ALT 637 W/ HCPCS

## 2025-03-03 PROCEDURE — 94761 N-INVAS EAR/PLS OXIMETRY MLT: CPT

## 2025-03-03 PROCEDURE — 99233 SBSQ HOSP IP/OBS HIGH 50: CPT | Mod: ,,, | Performed by: FAMILY MEDICINE

## 2025-03-03 RX ORDER — FUROSEMIDE 40 MG/1
40 TABLET ORAL DAILY
Status: DISCONTINUED | OUTPATIENT
Start: 2025-03-04 | End: 2025-03-05 | Stop reason: HOSPADM

## 2025-03-03 RX ORDER — TAMSULOSIN HYDROCHLORIDE 0.4 MG/1
0.4 CAPSULE ORAL NIGHTLY
Status: DISCONTINUED | OUTPATIENT
Start: 2025-03-03 | End: 2025-03-05 | Stop reason: HOSPADM

## 2025-03-03 RX ADMIN — IPRATROPIUM BROMIDE AND ALBUTEROL SULFATE 3 ML: .5; 3 SOLUTION RESPIRATORY (INHALATION) at 07:03

## 2025-03-03 RX ADMIN — DIPHENHYDRAMINE HYDROCHLORIDE 50 MG: 25 CAPSULE ORAL at 08:03

## 2025-03-03 RX ADMIN — CALCIUM CARBONATE (ANTACID) CHEW TAB 500 MG 500 MG: 500 CHEW TAB at 08:03

## 2025-03-03 RX ADMIN — POTASSIUM CHLORIDE 20 MEQ: 1500 TABLET, EXTENDED RELEASE ORAL at 08:03

## 2025-03-03 RX ADMIN — THIAMINE HYDROCHLORIDE 200 MG: 100 INJECTION, SOLUTION INTRAMUSCULAR; INTRAVENOUS at 09:03

## 2025-03-03 RX ADMIN — Medication 1000 UNITS: at 08:03

## 2025-03-03 RX ADMIN — METOCLOPRAMIDE 5 MG: 5 INJECTION, SOLUTION INTRAMUSCULAR; INTRAVENOUS at 02:03

## 2025-03-03 RX ADMIN — OXYCODONE 10 MG: 5 TABLET ORAL at 08:03

## 2025-03-03 RX ADMIN — ALLOPURINOL 200 MG: 100 TABLET ORAL at 08:03

## 2025-03-03 RX ADMIN — OXYCODONE 10 MG: 5 TABLET ORAL at 04:03

## 2025-03-03 RX ADMIN — ASPIRIN 81 MG: 81 TABLET, COATED ORAL at 08:03

## 2025-03-03 RX ADMIN — METOCLOPRAMIDE 5 MG: 5 INJECTION, SOLUTION INTRAMUSCULAR; INTRAVENOUS at 07:03

## 2025-03-03 RX ADMIN — IPRATROPIUM BROMIDE AND ALBUTEROL SULFATE 3 ML: .5; 3 SOLUTION RESPIRATORY (INHALATION) at 04:03

## 2025-03-03 RX ADMIN — IPRATROPIUM BROMIDE AND ALBUTEROL SULFATE 3 ML: .5; 3 SOLUTION RESPIRATORY (INHALATION) at 11:03

## 2025-03-03 RX ADMIN — Medication 6 MG: at 08:03

## 2025-03-03 RX ADMIN — NICOTINE 1 PATCH: 14 PATCH, EXTENDED RELEASE TRANSDERMAL at 08:03

## 2025-03-03 RX ADMIN — METOCLOPRAMIDE 5 MG: 5 INJECTION, SOLUTION INTRAMUSCULAR; INTRAVENOUS at 08:03

## 2025-03-03 RX ADMIN — FUROSEMIDE 20 MG: 20 TABLET ORAL at 08:03

## 2025-03-03 RX ADMIN — TAMSULOSIN HYDROCHLORIDE 0.4 MG: 0.4 CAPSULE ORAL at 10:03

## 2025-03-03 RX ADMIN — OXYCODONE 10 MG: 5 TABLET ORAL at 12:03

## 2025-03-03 RX ADMIN — IPRATROPIUM BROMIDE AND ALBUTEROL SULFATE 3 ML: .5; 3 SOLUTION RESPIRATORY (INHALATION) at 12:03

## 2025-03-03 NOTE — PLAN OF CARE
Problem: Adult Inpatient Plan of Care  Goal: Plan of Care Review  Outcome: Progressing  Goal: Patient-Specific Goal (Individualized)  Outcome: Progressing  Goal: Absence of Hospital-Acquired Illness or Injury  Outcome: Progressing     Problem: Airway Clearance Ineffective  Goal: Effective Airway Clearance  Outcome: Progressing     Problem: Skin Injury Risk Increased  Goal: Skin Health and Integrity  Outcome: Progressing

## 2025-03-03 NOTE — PT/OT/SLP PROGRESS
Physical Therapy Treatment    Patient Name:  Derek Moreno   MRN:  [id number]    Recommendations:     Discharge Recommendations: Low Intensity Therapy  Discharge Equipment Recommendations: rollator  Barriers to discharge: None    Assessment:     Derek Moreno is a 59 y.o. male admitted with a medical diagnosis of Acute on chronic combined systolic and diastolic heart failure.  He presents with the following impairments/functional limitations: weakness, impaired endurance, impaired functional mobility, impaired cardiopulmonary response to activity Pt now has cuba catheter. Completes mobility without assistance but very slowly. He has received rollator and demonstrates good use. Needed several rest breaks to complete gait training today.    Rehab Prognosis: Good; patient would benefit from acute skilled PT services to address these deficits and reach maximum level of function.    Recent Surgery: * No surgery found *      Plan:     During this hospitalization, patient to be seen 5 x/week to address the identified rehab impairments via gait training, therapeutic activities, therapeutic exercises and progress toward the following goals:    Plan of Care Expires:  03/25/25    Subjective     Chief Complaint: CHF   Patient/Family Comments/goals: Pt is agreeable to PT   Pain/Comfort:  Pain Rating 1: 0/10  Pain Rating Post-Intervention 1: 0/10      Objective:     Communicated with DENISA Aguilera RN prior to session.  Patient found HOB elevated with peripheral IV, oxygen, telemetry, cuba catheter upon PT entry to room.     General Precautions: Standard, fall  Orthopedic Precautions: N/A  Braces: N/A  Respiratory Status: Nasal cannula, flow 3 L/min     Functional Mobility:  Bed Mobility:     Scooting: modified independence  Supine to Sit: modified independence  Sit to Supine: modified independence  Transfers:     Sit to Stand:  supervision with rollator  Gait: 90 ft x 3 trials, seated rest break on rollator, STAND-BY  ASSISTANCE with rollator, slow stacy  Balance: good      AM-PAC 6 CLICK MOBILITY  Turning over in bed (including adjusting bedclothes, sheets and blankets)?: 4  Sitting down on and standing up from a chair with arms (e.g., wheelchair, bedside commode, etc.): 4  Moving from lying on back to sitting on the side of the bed?: 4  Moving to and from a bed to a chair (including a wheelchair)?: 4  Need to walk in hospital room?: 4  Climbing 3-5 steps with a railing?: 3  Basic Mobility Total Score: 23       Treatment & Education:  Ambulation as noted above    Patient left HOB elevated with all lines intact and call button in reach..    GOALS:   Multidisciplinary Problems       Physical Therapy Goals          Problem: Physical Therapy    Goal Priority Disciplines Outcome Interventions   Physical Therapy Goal     PT, PT/OT Progressing    Description: Short term goals:  1. Supine to sit with Modified [place]  2. Sit to stand transfer with Modified [place]  3. Bed to chair transfer with Modified [place] using Single-point Cane   4. Gait  x 100 feet with Modified [place] using Single-point Cane .     Long term goals:  Pt will return home to [facility] with all mobility                         DME Justifications:  No DME recommended requiring DME justifications    Time Tracking:     PT Received On: 03/03/25  PT Start Time: 0924     PT Stop Time: 0956  PT Total Time (min): 32 min     Billable Minutes: Gait Training 30    Treatment Type: Treatment  PT/PTA: PT     Number of PTA visits since last PT visit: 0     03/03/2025

## 2025-03-03 NOTE — ASSESSMENT & PLAN NOTE
Patient complained about lower abdominal pain and urinary retention.  Postvoid residual on bladder scan was greater than 400.  Wheeler catheter was placed and there was still some concern for retained urine.  Abdominal ultrasound focusing on kidneys, ureters and bladder was ordered.

## 2025-03-03 NOTE — PT/OT/SLP PROGRESS
"Occupational Therapy   Treatment    Name: Derek Moreno  MRN: [id number]  Admitting Diagnosis:  Acute on chronic combined systolic and diastolic heart failure       Recommendations:     Discharge Recommendations: Low Intensity Therapy  Discharge Equipment Recommendations:  none  Barriers to discharge:       Assessment:     Derek Moreno is a 59 y.o. male with a medical diagnosis of Acute on chronic combined systolic and diastolic heart failure.  He presents with weakness. Performance deficits affecting function are weakness, impaired endurance, impaired self care skills, impaired functional mobility, gait instability, impaired balance.     Rehab Prognosis:  Good; patient would benefit from acute skilled OT services to address these deficits and reach maximum level of function.       Plan:     Patient to be seen 5 x/week to address the above listed problems via self-care/home management, therapeutic activities, therapeutic exercises  Plan of Care Expires: 03/25/25  Plan of Care Reviewed with: patient    Subjective     Chief Complaint: Pt c/o weakness and fatigue  Patient/Family Comments/goals: return to PLOF  Pain/Comfort:       Objective:     Communicated with: RN and PT prior to session.  Patient found right sidelying with   upon OT entry to room.    General Precautions: Standard, fall    Orthopedic Precautions:N/A  Braces: N/A  Respiratory Status: Room air     Occupational Performance:     Bed Mobility:    Patient completed Supine to Sit with supervision  Patient completed Sit to Supine with supervision     Functional Mobility/Transfers:  Pt defer  Functional Mobility:     Activities of Daily Living:        [facility] 6 Click ADL:      Treatment & Education:  Pt completed BUE elbow flex with 1# wt 12x sitting unsupported EOB. Pt then threw wt at OT and stated "I am done I'm too weak to do this." Pt hen layed back down. OT educated Pt and family member in the room on the improtance of participating in therapy " in order to get stronger. Pt stated he understood but refused to do any more.    Patient left right sidelying with all lines intact, call button in reach, and family present    GOALS:   Multidisciplinary Problems       Occupational Therapy Goals          Problem: Occupational Therapy    Goal Priority Disciplines Outcome Interventions   Occupational Therapy Goal     OT, PT/OT Progressing    Description: STG:  Pt will perform grooming with setup  Pt will bathe with SBA with setup at EOB  Pt will perform UE dressing with SBA  Pt will perform LE dressing with SBA  Pt will sit EOB x 10 min with SBA   Pt will transfer bed/chair/bsc with CGA with cane  Pt will perform standing task x 2 min with CGA with RW   Pt will tolerate 15 minutes of tx without fatigue      LT.Restore to max I with self care and mobility.                           DME Justifications:  No DME recommended requiring DME justifications    Time Tracking:     OT Date of Treatment: 25  OT Start Time: 1048  OT Stop Time: 1058  OT Total Time (min): 10 min    Billable Minutes:Therapeutic Exercise 10               3/3/2025

## 2025-03-03 NOTE — PLAN OF CARE
Problem: Adult Inpatient Plan of Care  Goal: Plan of Care Review  Outcome: Progressing  Goal: Patient-Specific Goal (Individualized)  Outcome: Progressing  Goal: Absence of Hospital-Acquired Illness or Injury  Outcome: Progressing  Goal: Optimal Comfort and Wellbeing  Outcome: Progressing  Goal: Readiness for Transition of Care  Outcome: Progressing     Problem: Airway Clearance Ineffective  Goal: Effective Airway Clearance  Outcome: Progressing     Problem: Skin Injury Risk Increased  Goal: Skin Health and Integrity  Outcome: Progressing     Problem: Fall Injury Risk  Goal: Absence of Fall and Fall-Related Injury  Outcome: Progressing     Problem: Gas Exchange Impaired  Goal: Optimal Gas Exchange  Outcome: Progressing     Problem: Infection  Goal: Absence of Infection Signs and Symptoms  Outcome: Progressing

## 2025-03-03 NOTE — NURSING
MD gave verbal order to bladder scan pt r/t c/o pain and discomfort. Pt also stated that he has not been able to urinate.Pt was bladder scanned at bedside. Over 400 noted in the bladder. Pt was straight cath only 150 of urine in return. Pt stated that he still felt a lot of discomfort. Pt was bladder scan again after being straight cath. Over 500 ml was noted that remained in the bladder. After this reading the charge nurse was made aware of the situation. The charge nurse bladder scanned the pt results over 700 in bladder. A cuba was inserted afterwards less than 200 ml was drained. MD was made aware of the situation. Asked for a order to keep cuba in place to monitor urine output and also maybe a bladder ultrasound. Waiting for a response from MD.

## 2025-03-03 NOTE — PROGRESS NOTES
[facility] Hospital Medicine  Progress Note    Patient Name: Derek Moreno  MRN: [id number]  Patient Class: IP- Inpatient   Admission Date: 2/24/2025  Length of Stay: 6 days  Attending Physician: Thor Marshall MD  Primary Care Provider: Mis, Primary Doctor        Subjective     Principal Problem:Acute on chronic combined systolic and diastolic heart failure    HPI:  The patient is a 59-year-old male who presented to the emergency department with worsening shortness of breath and increased fatigue. He reported experiencing shortness of breath upon mild to moderate exertion over the past few months. He complained of accompanying non productive cough. He denied any hemoptysis, chills or fever.  He did not complain of any accompanying chest pain, leg swelling, palpitations, or dizziness, but did mention experiencing a few episodes of blackouts. He denied any falls resulting from black outs.   Patient has PMH of COPD, CHF, tobacco and alcohol dependence.  The patient has not been using oxygen at home despite being prescribed it, due to concerns that he will get dependent on supplemented oxygen.   Additionally, he has not been taking his prescribed medications for the past several months due to affordability issues.   The patient lives alone and is ambulatory with the help of a cane.   Patient has PMH of alcohol dependence, he has been drinking at least 20 oz of beer daily and drank 8 oz today.      Overview/Hospital Course:  No notes on file    Interval History:   Review of Systems  Objective:     Vital Signs (Most Recent):  Temp: 97.6 °F (36.4 °C) (03/02/25 1540)  Pulse: 89 (03/02/25 1557)  Resp: 18 (03/02/25 2200)  BP: 99/67 (03/02/25 1540)  SpO2: 98 % (03/02/25 1557) Vital Signs (24h Range):  Temp:  [96.2 °F (35.7 °C)-97.7 °F (36.5 °C)] 97.6 °F (36.4 °C)  Pulse:  [70-89] 89  Resp:  [16-20] 18  SpO2:  [95 %-100 %] 98 %  BP: ()/(57-80) 99/67     Weight: 76 kg (167  lb 8.8 oz)  Body mass index is 25.48 kg/m².    Intake/Output Summary (Last 24 hours) at 3/2/2025 2248  Last data filed at 3/2/2025 1700  Gross per 24 hour   Intake --   Output 1926 ml   Net -1926 ml         Physical Exam  Vitals and nursing note reviewed.   Constitutional:       General: He is not in acute distress.     Appearance: Normal appearance. He is ill-appearing.   HENT:      Head: Normocephalic and atraumatic.      Nose: No congestion or rhinorrhea.   Eyes:      Extraocular Movements: Extraocular movements intact.      Conjunctiva/sclera: Conjunctivae normal.      Pupils: Pupils are equal, round, and reactive to light.   Cardiovascular:      Rate and Rhythm: Normal rate and regular rhythm.      Pulses: Normal pulses.      Heart sounds: Normal heart sounds. No murmur heard.  Pulmonary:      Effort: Pulmonary effort is normal. No respiratory distress.      Breath sounds: No wheezing.      Comments: Improved breath sounds.   Abdominal:      General: Bowel sounds are normal. There is no distension.      Palpations: Abdomen is soft.      Tenderness: There is no abdominal tenderness.   Musculoskeletal:      Cervical back: Normal range of motion and neck supple. No rigidity.      Right lower leg: No edema.      Left lower leg: No edema.   Skin:     General: Skin is warm and dry.      Capillary Refill: Capillary refill takes less than 2 seconds.      Comments: Poor turgor.    Neurological:      General: No focal deficit present.      Mental Status: He is alert and oriented to person, place, and time.      GCS: GCS eye subscore is 4. GCS verbal subscore is 5. GCS motor subscore is 6.   Psychiatric:      Comments: Anxious.              Significant Labs: All pertinent labs within the past 24 hours have been reviewed.    Significant Imaging: I have reviewed all pertinent imaging results/findings within the past 24 hours.    Assessment and Plan     * Acute on chronic combined systolic and diastolic heart  failure  Patient has PMH of Chronic combined systolic and diastolic heart failure.  Results for orders placed during the hospital encounter of 01/04/24    Echo    Interpretation Summary    Left Ventricle: The left ventricle is mildly dilated. Normal wall thickness. There is eccentric hypertrophy. Severe global hypokinesis with regional variation. There is severely reduced systolic function. Biplane (2D) method of discs ejection fraction is 15%. Global longitudinal strain is reduced. There is diastolic dysfunction but grade cannot be determined.    Right Ventricle: Moderate right ventricular enlargement. Systolic function is mildly reduced.    Right Atrium: Right atrium is mildly dilated.    Aortic Valve: The aortic valve is a trileaflet valve.    Mitral Valve: There is mild bileaflet sclerosis. There is no stenosis. The mean pressure gradient across the mitral valve is 3 mmHg at a heart rate of  bpm. There is moderate regurgitation with a posterolateral eccentriccally directed jet.    Tricuspid Valve: There is moderate regurgitation.    Pulmonary Artery: The estimated pulmonary artery systolic pressure is 62 mmHg.    IVC/SVC: Elevated venous pressure at 15 mmHg.    Pericardium: Large left pleural effusion.  - patient is on CHF exacerbation pathway  - patient received 6o mg IV of furosemide in the ED  - pro BNP was 19815  - troponin levels were flat  - EKG showed sinus rhythm with no acute findings  - CXR    FINDINGS:  Cardiac silhouette is mildly enlarged.  Probable small bibasilar pleural effusions, improved from the prior study.  Mild right basilar airspace disease could represent mild infiltrate and pneumonia, mildly increased from the prior study.  No mass is detected.  No evidence of pneumothorax  - continue IV diuresis  - LOW SODIUM DIET WITH FLUID RESTRICTION  - daily weights and strict I/O's  - repeat ECHO pending    2/25 - Subtle improvement overnight.  Continue IV lasix.   2/26- Appears euvolemic now.   Change to po lasix. His Dyspnea may be HF related vs COPD.   2/27 - diuresing well, breathing better. - anticipate discharge tomorrow  2/28 - Overdiuresed, giving 500 LR back.  Change lasix to 20 po daily.   3/1:  Appears euvolemic.  3/2: appears euvolemic.     Urinary retention  Patient complained about lower abdominal pain and urinary retention.  Postvoid residual on bladder scan was greater than 400.  Wheeler catheter was placed and there was still some concern for retained urine.  Abdominal ultrasound focusing on kidneys, ureters and bladder was ordered.      Debility  Patient with Acute on chronic debility due to bed confinement status. The patient's latest AMPAC (Activity Measure for Post Acute Care) Score is listed below.    AM-PAC Score - How much help does the patient need for each activity listed  Basic Mobility Total Score: 23  Turning over in bed (including adjusting bedclothes, sheets and blankets)?: None  Sitting down on and standing up from a chair with arms (e.g., wheelchair, bedside commode, etc.): None  Moving from lying on back to sitting on the side of the bed?: None  Moving to and from a bed to a chair (including a wheelchair)?: None  Need to walk in hospital room?: None  Climbing 3-5 steps with a railing?: A little    Plan  - PT/OT consulted    3/2: Patient has a long history of ALS and has had an electric wheelchair in the past.  However he states that he was healed and he more recently has started walking.  However he feels that he is unable to ambulate at this time will like to go to a swing bed.  Social work was consulted for this.  He states that if he were to go home he will be totally dependent on his fiancee.      Intractable nausea and vomiting  Intractable nausea and vomiting.  Patient has been given Zofran and other medications.    He does have cirrhosis and he could have multiple etiologies of nausea and vomiting.  Sons at bedside and would be able to assist him at home if he is  discharged home.  [facility] may be able to work with or be placed in that system.      Alcohol dependence  Follow [facility] protocol  - patient reported drinking 12oz of beer today  - he limits alcohol to 20oz a day (beer)  Supplementing with thiamine and multivitamin      Tobacco dependency  Dangers of cigarette smoking were reviewed with patient in detail. Patient was Counseled for 3-10 minutes. Nicotine replacement options were discussed. Nicotine replacement was discussed- prescribed    Vitamin D deficiency  Continue home vitamin D       CAD (coronary artery disease)  Patient with known CAD s/p  no stent placed , which is controlled Will continue ASA and monitor for S/Sx of angina/ACS. Continue to monitor on telemetry.   Holding statin 2/2 liver enzymes elevated  - LHC was done on 1/10/2024  - troponin levels were flat  - EKG showed sinus rhythm with no acute findings    Alcoholic cirrhosis  Patient with known Cirrhosis with Child's class B. Co-morbidities are absent.  MELD-Na score calculated; MELD 3.0: 21 at 2/25/2025  4:25 AM  MELD-Na: 22 at 2/25/2025  4:25 AM  Calculated from:  Serum Creatinine: 1.36 mg/dL at 2/25/2025  4:25 AM  Serum Sodium: 132 mmol/L at 2/25/2025  4:25 AM  Total Bilirubin: 2.5 mg/dL at 2/25/2025  4:25 AM  Serum Albumin: 3.1 g/dL at 2/25/2025  4:25 AM  INR(ratio): 1.5 at 2/24/2025  4:20 PM  Age at listing (hypothetical): 59 years  Sex: Male at 2/25/2025  4:25 AM      Continue chronic meds. Etiology likely ETOH. Will avoid any hepatotoxic meds, and monitor CBC/CMP/INR for synthetic function.     - negative hepatitis panel  US Abdomen Limited  Impression:  1. Nonspecific gallbladder wall thickening with minimal pericholecystic fluid.  No stones are identified.  Mild acute cholecystitis is a consideration.  Recommend follow-up.  2. Probable small 1.3 cm minimally complex hepatic cyst in the right lobe.  3. Bibasilar pleural effusions.    Essential hypertension  Patient's blood pressure  range in the last 24 hours was: BP  Min: 95/69  Max: 107/75.The patient's inpatient anti-hypertensive regimen is listed below:  Current Antihypertensives  metoprolol succinate (TOPROL-XL) 24 hr tablet 25 mg, Daily, Oral  furosemide tablet 20 mg, 2 times daily, Oral    Plan  Holding home BP medications due to soft blood pressure and ongoing diuresis     COPD (chronic obstructive pulmonary disease)  Patient's COPD is with exacerbation noted by continued dyspnea currently.  Patient is currently off COPD Pathway. Continue scheduled inhalers  patient has not been on his home medication because of affordability issues  and monitor respiratory status closely.   - patient SpO2 is normal in room air    2/25 - No steroids at present time.  Monitor.       VTE Risk Mitigation (From admission, onward)           Ordered     enoxaparin injection 40 mg  Daily         02/24/25 2132     IP VTE HIGH RISK PATIENT  Once         02/24/25 2132                    Discharge Planning   EFREM: 2/28/2025     Code Status: Full Code   Medical Readiness for Discharge Date:   Discharge Plan A: Home with family        Please place Justification for DME        Thor Marshall MD  Department of Hospital Medicine   [facility]

## 2025-03-03 NOTE — CONSULTS
3/3/2025 Patient not appropriate for inpatient diet education at this time due to socio-economic issues.     Consult for fluid and sodium restricted diet received and appreciated. Patient is currently in CCU. Diet education will be completed on follow up as appropriate.

## 2025-03-03 NOTE — ASSESSMENT & PLAN NOTE
Patient has PMH of Chronic combined systolic and diastolic heart failure.  Results for orders placed during the hospital encounter of 01/04/24    Echo    Interpretation Summary    Left Ventricle: The left ventricle is mildly dilated. Normal wall thickness. There is eccentric hypertrophy. Severe global hypokinesis with regional variation. There is severely reduced systolic function. Biplane (2D) method of discs ejection fraction is 15%. Global longitudinal strain is reduced. There is diastolic dysfunction but grade cannot be determined.    Right Ventricle: Moderate right ventricular enlargement. Systolic function is mildly reduced.    Right Atrium: Right atrium is mildly dilated.    Aortic Valve: The aortic valve is a trileaflet valve.    Mitral Valve: There is mild bileaflet sclerosis. There is no stenosis. The mean pressure gradient across the mitral valve is 3 mmHg at a heart rate of  bpm. There is moderate regurgitation with a posterolateral eccentriccally directed jet.    Tricuspid Valve: There is moderate regurgitation.    Pulmonary Artery: The estimated pulmonary artery systolic pressure is 62 mmHg.    IVC/SVC: Elevated venous pressure at 15 mmHg.    Pericardium: Large left pleural effusion.  - patient is on CHF exacerbation pathway  - patient received 6o mg IV of furosemide in the ED  - pro BNP was 65253  - troponin levels were flat  - EKG showed sinus rhythm with no acute findings  - CXR    FINDINGS:  Cardiac silhouette is mildly enlarged.  Probable small bibasilar pleural effusions, improved from the prior study.  Mild right basilar airspace disease could represent mild infiltrate and pneumonia, mildly increased from the prior study.  No mass is detected.  No evidence of pneumothorax  - continue IV diuresis  - LOW SODIUM DIET WITH FLUID RESTRICTION  - daily weights and strict I/O's  - repeat ECHO pending    2/25 - Subtle improvement overnight.  Continue IV lasix.   2/26- Appears euvolemic now.  Change to po  lasix. His Dyspnea may be HF related vs COPD.   2/27 - diuresing well, breathing better. - anticipate discharge tomorrow  2/28 - Overdiuresed, giving 500 LR back.  Change lasix to 20 po daily.   3/1:  Appears euvolemic.  3/2: appears euvolemic.

## 2025-03-03 NOTE — SUBJECTIVE & OBJECTIVE
Interval History:   Review of Systems  Objective:     Vital Signs (Most Recent):  Temp: 97.6 °F (36.4 °C) (03/02/25 1540)  Pulse: 89 (03/02/25 1557)  Resp: 18 (03/02/25 2200)  BP: 99/67 (03/02/25 1540)  SpO2: 98 % (03/02/25 1557) Vital Signs (24h Range):  Temp:  [96.2 °F (35.7 °C)-97.7 °F (36.5 °C)] 97.6 °F (36.4 °C)  Pulse:  [70-89] 89  Resp:  [16-20] 18  SpO2:  [95 %-100 %] 98 %  BP: ()/(57-80) 99/67     Weight: 76 kg (167 lb 8.8 oz)  Body mass index is 25.48 kg/m².    Intake/Output Summary (Last 24 hours) at 3/2/2025 2248  Last data filed at 3/2/2025 1700  Gross per 24 hour   Intake --   Output 1926 ml   Net -1926 ml         Physical Exam  Vitals and nursing note reviewed.   Constitutional:       General: He is not in acute distress.     Appearance: Normal appearance. He is ill-appearing.   HENT:      Head: Normocephalic and atraumatic.      Nose: No congestion or rhinorrhea.   Eyes:      Extraocular Movements: Extraocular movements intact.      Conjunctiva/sclera: Conjunctivae normal.      Pupils: Pupils are equal, round, and reactive to light.   Cardiovascular:      Rate and Rhythm: Normal rate and regular rhythm.      Pulses: Normal pulses.      Heart sounds: Normal heart sounds. No murmur heard.  Pulmonary:      Effort: Pulmonary effort is normal. No respiratory distress.      Breath sounds: No wheezing.      Comments: Improved breath sounds.   Abdominal:      General: Bowel sounds are normal. There is no distension.      Palpations: Abdomen is soft.      Tenderness: There is no abdominal tenderness.   Musculoskeletal:      Cervical back: Normal range of motion and neck supple. No rigidity.      Right lower leg: No edema.      Left lower leg: No edema.   Skin:     General: Skin is warm and dry.      Capillary Refill: Capillary refill takes less than 2 seconds.      Comments: Poor turgor.    Neurological:      General: No focal deficit present.      Mental Status: He is alert and oriented to person,  place, and time.      GCS: GCS eye subscore is 4. GCS verbal subscore is 5. GCS motor subscore is 6.   Psychiatric:      Comments: Anxious.              Significant Labs: All pertinent labs within the past 24 hours have been reviewed.    Significant Imaging: I have reviewed all pertinent imaging results/findings within the past 24 hours.

## 2025-03-04 LAB
ANION GAP SERPL CALCULATED.3IONS-SCNC: 15 MMOL/L (ref 7–16)
BASOPHILS # BLD AUTO: 0.03 K/UL (ref 0–0.2)
BASOPHILS NFR BLD AUTO: 0.3 % (ref 0–1)
BUN SERPL-MCNC: 21 MG/DL (ref 8–26)
BUN/CREAT SERPL: 17 (ref 6–20)
CALCIUM SERPL-MCNC: 9.1 MG/DL (ref 8.4–10.2)
CHLORIDE SERPL-SCNC: 89 MMOL/L (ref 98–107)
CO2 SERPL-SCNC: 23 MMOL/L (ref 22–29)
CREAT SERPL-MCNC: 1.25 MG/DL (ref 0.72–1.25)
DIFFERENTIAL METHOD BLD: ABNORMAL
EGFR (NO RACE VARIABLE) (RUSH/TITUS): 67 ML/MIN/1.73M2
EOSINOPHIL # BLD AUTO: 0.05 K/UL (ref 0–0.5)
EOSINOPHIL NFR BLD AUTO: 0.5 % (ref 1–4)
ERYTHROCYTE [DISTWIDTH] IN BLOOD BY AUTOMATED COUNT: 14.2 % (ref 11.5–14.5)
GLUCOSE SERPL-MCNC: 81 MG/DL (ref 74–100)
HCT VFR BLD AUTO: 40.3 % (ref 40–54)
HGB BLD-MCNC: 12.6 G/DL (ref 13.5–18)
IMM GRANULOCYTES # BLD AUTO: 0.05 K/UL (ref 0–0.04)
IMM GRANULOCYTES NFR BLD: 0.5 % (ref 0–0.4)
LYMPHOCYTES # BLD AUTO: 0.99 K/UL (ref 1–4.8)
LYMPHOCYTES NFR BLD AUTO: 10.9 % (ref 27–41)
MCH RBC QN AUTO: 30.4 PG (ref 27–31)
MCHC RBC AUTO-ENTMCNC: 31.3 G/DL (ref 32–36)
MCV RBC AUTO: 97.1 FL (ref 80–96)
MONOCYTES # BLD AUTO: 0.91 K/UL (ref 0–0.8)
MONOCYTES NFR BLD AUTO: 10 % (ref 2–6)
MPC BLD CALC-MCNC: 11.5 FL (ref 9.4–12.4)
NEUTROPHILS # BLD AUTO: 7.09 K/UL (ref 1.8–7.7)
NEUTROPHILS NFR BLD AUTO: 77.8 % (ref 53–65)
NRBC # BLD AUTO: 0 X10E3/UL
NRBC, AUTO (.00): 0 %
PLATELET # BLD AUTO: 242 K/UL (ref 150–400)
POTASSIUM SERPL-SCNC: 4.8 MMOL/L (ref 3.5–5.1)
RBC # BLD AUTO: 4.15 M/UL (ref 4.6–6.2)
SODIUM SERPL-SCNC: 122 MMOL/L (ref 136–145)
WBC # BLD AUTO: 9.12 K/UL (ref 4.5–11)

## 2025-03-04 PROCEDURE — 25000003 PHARM REV CODE 250: Performed by: HOSPITALIST

## 2025-03-04 PROCEDURE — 11000001 HC ACUTE MED/SURG PRIVATE ROOM

## 2025-03-04 PROCEDURE — 99900035 HC TECH TIME PER 15 MIN (STAT)

## 2025-03-04 PROCEDURE — 97116 GAIT TRAINING THERAPY: CPT

## 2025-03-04 PROCEDURE — 25000003 PHARM REV CODE 250: Performed by: FAMILY MEDICINE

## 2025-03-04 PROCEDURE — 63600175 PHARM REV CODE 636 W HCPCS: Performed by: HOSPITALIST

## 2025-03-04 PROCEDURE — 80048 BASIC METABOLIC PNL TOTAL CA: CPT | Performed by: HOSPITALIST

## 2025-03-04 PROCEDURE — 36415 COLL VENOUS BLD VENIPUNCTURE: CPT | Performed by: HOSPITALIST

## 2025-03-04 PROCEDURE — 25000003 PHARM REV CODE 250

## 2025-03-04 PROCEDURE — 97110 THERAPEUTIC EXERCISES: CPT

## 2025-03-04 PROCEDURE — 85025 COMPLETE CBC W/AUTO DIFF WBC: CPT | Performed by: HOSPITALIST

## 2025-03-04 PROCEDURE — 25000242 PHARM REV CODE 250 ALT 637 W/ HCPCS

## 2025-03-04 PROCEDURE — 94640 AIRWAY INHALATION TREATMENT: CPT

## 2025-03-04 PROCEDURE — 27000221 HC OXYGEN, UP TO 24 HOURS

## 2025-03-04 PROCEDURE — 99232 SBSQ HOSP IP/OBS MODERATE 35: CPT | Mod: ,,,

## 2025-03-04 PROCEDURE — 94761 N-INVAS EAR/PLS OXIMETRY MLT: CPT

## 2025-03-04 RX ADMIN — OXYCODONE 10 MG: 5 TABLET ORAL at 02:03

## 2025-03-04 RX ADMIN — THERA TABS 1 TABLET: TAB at 09:03

## 2025-03-04 RX ADMIN — METOCLOPRAMIDE 5 MG: 5 INJECTION, SOLUTION INTRAMUSCULAR; INTRAVENOUS at 09:03

## 2025-03-04 RX ADMIN — TRAZODONE HYDROCHLORIDE 50 MG: 50 TABLET ORAL at 01:03

## 2025-03-04 RX ADMIN — OXYCODONE 10 MG: 5 TABLET ORAL at 01:03

## 2025-03-04 RX ADMIN — IPRATROPIUM BROMIDE AND ALBUTEROL SULFATE 3 ML: .5; 3 SOLUTION RESPIRATORY (INHALATION) at 11:03

## 2025-03-04 RX ADMIN — Medication 1000 UNITS: at 09:03

## 2025-03-04 RX ADMIN — ASPIRIN 81 MG: 81 TABLET, COATED ORAL at 09:03

## 2025-03-04 RX ADMIN — OXYCODONE 10 MG: 5 TABLET ORAL at 10:03

## 2025-03-04 RX ADMIN — IPRATROPIUM BROMIDE AND ALBUTEROL SULFATE 3 ML: .5; 3 SOLUTION RESPIRATORY (INHALATION) at 04:03

## 2025-03-04 RX ADMIN — IPRATROPIUM BROMIDE AND ALBUTEROL SULFATE 3 ML: .5; 3 SOLUTION RESPIRATORY (INHALATION) at 07:03

## 2025-03-04 RX ADMIN — CALCIUM CARBONATE (ANTACID) CHEW TAB 500 MG 500 MG: 500 CHEW TAB at 09:03

## 2025-03-04 RX ADMIN — METOCLOPRAMIDE 5 MG: 5 INJECTION, SOLUTION INTRAMUSCULAR; INTRAVENOUS at 02:03

## 2025-03-04 RX ADMIN — DIPHENHYDRAMINE HYDROCHLORIDE 50 MG: 25 CAPSULE ORAL at 06:03

## 2025-03-04 RX ADMIN — FUROSEMIDE 40 MG: 40 TABLET ORAL at 09:03

## 2025-03-04 RX ADMIN — ALLOPURINOL 200 MG: 100 TABLET ORAL at 09:03

## 2025-03-04 RX ADMIN — OXYCODONE 10 MG: 5 TABLET ORAL at 06:03

## 2025-03-04 RX ADMIN — TAMSULOSIN HYDROCHLORIDE 0.4 MG: 0.4 CAPSULE ORAL at 09:03

## 2025-03-04 NOTE — SUBJECTIVE & OBJECTIVE
Interval History:     Review of Systems  Objective:     Vital Signs (Most Recent):  Temp: 97.4 °F (36.3 °C) (03/04/25 1145)  Pulse: 81 (03/04/25 1145)  Resp: 18 (03/04/25 1145)  BP: 113/76 (03/04/25 1145)  SpO2: 96 % (03/04/25 1145) Vital Signs (24h Range):  Temp:  [97.4 °F (36.3 °C)-97.9 °F (36.6 °C)] 97.4 °F (36.3 °C)  Pulse:  [74-86] 81  Resp:  [16-20] 18  SpO2:  [96 %-99 %] 96 %  BP: ()/(69-80) 113/76     Weight: 76.1 kg (167 lb 12.3 oz)  Body mass index is 25.51 kg/m².    Intake/Output Summary (Last 24 hours) at 3/4/2025 1314  Last data filed at 3/4/2025 1047  Gross per 24 hour   Intake 60 ml   Output 2300 ml   Net -2240 ml         Physical Exam  Vitals and nursing note reviewed.   Constitutional:       General: He is not in acute distress.     Appearance: Normal appearance. He is ill-appearing.   HENT:      Head: Normocephalic and atraumatic.      Nose: No congestion or rhinorrhea.   Eyes:      Extraocular Movements: Extraocular movements intact.      Conjunctiva/sclera: Conjunctivae normal.      Pupils: Pupils are equal, round, and reactive to light.   Cardiovascular:      Rate and Rhythm: Normal rate and regular rhythm.      Pulses: Normal pulses.      Heart sounds: Normal heart sounds. No murmur heard.  Pulmonary:      Effort: Pulmonary effort is normal. No respiratory distress.      Breath sounds: No wheezing.      Comments: Improved breath sounds.   Abdominal:      General: Bowel sounds are normal. There is no distension.      Palpations: Abdomen is soft.      Tenderness: There is no abdominal tenderness.   Musculoskeletal:      Cervical back: Normal range of motion and neck supple. No rigidity.      Right lower leg: No edema.      Left lower leg: No edema.   Skin:     General: Skin is warm and dry.      Capillary Refill: Capillary refill takes less than 2 seconds.   Neurological:      General: No focal deficit present.      Mental Status: He is alert and oriented to person, place, and time.       GCS: GCS eye subscore is 4. GCS verbal subscore is 5. GCS motor subscore is 6.   Psychiatric:      Comments: Anxious.              Significant Labs: All pertinent labs within the past 24 hours have been reviewed.  BMP:   Recent Labs   Lab 03/04/25  0333   GLU 81   *   K 4.8   CL 89*   CO2 23   BUN 21   CREATININE 1.25   CALCIUM 9.1     CBC:   Recent Labs   Lab 03/03/25  0426 03/04/25  0333   WBC 9.55 9.12   HGB 11.6* 12.6*   HCT 36.1* 40.3    242       Significant Imaging: I have reviewed all pertinent imaging results/findings within the past 24 hours.

## 2025-03-04 NOTE — ASSESSMENT & PLAN NOTE
Patient complained about lower abdominal pain and urinary retention.  Postvoid residual on bladder scan was greater than 400.  Wheeler catheter was placed and there was still some concern for retained urine.  Abdominal ultrasound focusing on kidneys, ureters and bladder was ordered.    3/3 - Add flomax tonight. Trial of voiding in a couple days.

## 2025-03-04 NOTE — ASSESSMENT & PLAN NOTE
Intractable nausea and vomiting.  Patient has been given Zofran and other medications.    He does have cirrhosis and he could have multiple etiologies of nausea and vomiting.  Sons at bedside and would be able to assist him at home if he is discharged home.  [facility] and may be able to work with or be placed in that system.      Resolved

## 2025-03-04 NOTE — PROGRESS NOTES
[facility] - [facility] Tele[facility] Hospital Medicine  Progress Note    Patient Name: Derek Moreno  MRN: [id number]  Patient Class: IP- Inpatient   Admission Date: 2/24/2025  Length of Stay: 7 days  Attending Physician: Jack Valdivia Jr., MD  Primary Care Provider: Mis, Primary Doctor        Subjective     Principal Problem:Acute on chronic combined systolic and diastolic heart failure        HPI:  The patient is a 59-year-old male who presented to the emergency department with worsening shortness of breath and increased fatigue. He reported experiencing shortness of breath upon mild to moderate exertion over the past few months. He complained of accompanying non productive cough. He denied any hemoptysis, chills or fever.  He did not complain of any accompanying chest pain, leg swelling, palpitations, or dizziness, but did mention experiencing a few episodes of blackouts. He denied any falls resulting from black outs.   Patient has PMH of COPD, CHF, tobacco and alcohol dependence.  The patient has not been using oxygen at home despite being prescribed it, due to concerns that he will get dependent on supplemented oxygen.   Additionally, he has not been taking his prescribed medications for the past several months due to affordability issues.   The patient lives alone and is ambulatory with the help of a cane.   Patient has PMH of alcohol dependence, he has been drinking at least 20 oz of beer daily and drank 8 oz today.      Overview/Hospital Course:  No notes on file    Interval History: Patient with some urinary retention over the weekend. Now with [place].     Review of Systems  Objective:     Vital Signs (Most Recent):  Temp: 97.7 °F (36.5 °C) (03/03/25 2010)  Pulse: 74 (03/03/25 2010)  Resp: 18 (03/03/25 2035)  BP: 112/80 (03/03/25 2010)  SpO2: 97 % (03/03/25 2010) Vital Signs (24h Range):  Temp:  [97 °F (36.1 °C)-97.7 °F (36.5 °C)] 97.7 °F (36.5 °C)  Pulse:  [51-84] 74  Resp:  [15-20]  18  SpO2:  [95 %-100 %] 97 %  BP: ()/(67-87) 112/80     Weight: 76 kg (167 lb 8.8 oz)  Body mass index is 25.48 kg/m².    Intake/Output Summary (Last 24 hours) at 3/3/2025 2121  Last data filed at 3/3/2025 1613  Gross per 24 hour   Intake 480 ml   Output 1700 ml   Net -1220 ml         Physical Exam  Vitals and nursing note reviewed.   Constitutional:       General: He is not in acute distress.     Appearance: Normal appearance. He is ill-appearing.   HENT:      Head: Normocephalic and atraumatic.      Nose: No congestion or rhinorrhea.   Eyes:      Extraocular Movements: Extraocular movements intact.      Conjunctiva/sclera: Conjunctivae normal.      Pupils: Pupils are equal, round, and reactive to light.   Cardiovascular:      Rate and Rhythm: Normal rate and regular rhythm.      Pulses: Normal pulses.      Heart sounds: Normal heart sounds. No murmur heard.  Pulmonary:      Effort: Pulmonary effort is normal. No respiratory distress.      Breath sounds: No wheezing.      Comments: Improved breath sounds.   Abdominal:      General: Bowel sounds are normal. There is no distension.      Palpations: Abdomen is soft.      Tenderness: There is no abdominal tenderness.   Musculoskeletal:      Cervical back: Normal range of motion and neck supple. No rigidity.      Right lower leg: No edema.      Left lower leg: No edema.   Skin:     General: Skin is warm and dry.      Capillary Refill: Capillary refill takes less than 2 seconds.   Neurological:      General: No focal deficit present.      Mental Status: He is alert and oriented to person, place, and time.      GCS: GCS eye subscore is 4. GCS verbal subscore is 5. GCS motor subscore is 6.   Psychiatric:      Comments: Anxious.              Significant Labs: All pertinent labs within the past 24 hours have been reviewed.  BMP:   Recent Labs   Lab 03/03/25  0426   GLU 81   *   K 5.5*   CL 90*   CO2 21*   BUN 22   CREATININE 1.06   CALCIUM 8.8     CBC:   Recent  Labs   Lab 03/03/25  0426   WBC 9.55   HGB 11.6*   HCT 36.1*          Significant Imaging: I have reviewed all pertinent imaging results/findings within the past 24 hours.    Assessment and Plan     * Acute on chronic combined systolic and diastolic heart failure  Patient has PMH of Chronic combined systolic and diastolic heart failure.  Results for orders placed during the hospital encounter of 01/04/24    Echo    Interpretation Summary    Left Ventricle: The left ventricle is mildly dilated. Normal wall thickness. There is eccentric hypertrophy. Severe global hypokinesis with regional variation. There is severely reduced systolic function. Biplane (2D) method of discs ejection fraction is 15%. Global longitudinal strain is reduced. There is diastolic dysfunction but grade cannot be determined.    Right Ventricle: Moderate right ventricular enlargement. Systolic function is mildly reduced.    Right Atrium: Right atrium is mildly dilated.    Aortic Valve: The aortic valve is a trileaflet valve.    Mitral Valve: There is mild bileaflet sclerosis. There is no stenosis. The mean pressure gradient across the mitral valve is 3 mmHg at a heart rate of  bpm. There is moderate regurgitation with a posterolateral eccentriccally directed jet.    Tricuspid Valve: There is moderate regurgitation.    Pulmonary Artery: The estimated pulmonary artery systolic pressure is 62 mmHg.    IVC/SVC: Elevated venous pressure at 15 mmHg.    Pericardium: Large left pleural effusion.  - patient is on CHF exacerbation pathway  - patient received 6o mg IV of furosemide in the ED  - pro BNP was 42594  - troponin levels were flat  - EKG showed sinus rhythm with no acute findings  - CXR    FINDINGS:  Cardiac silhouette is mildly enlarged.  Probable small bibasilar pleural effusions, improved from the prior study.  Mild right basilar airspace disease could represent mild infiltrate and pneumonia, mildly increased from the prior study.  No  mass is detected.  No evidence of pneumothorax  - continue IV diuresis  - LOW SODIUM DIET WITH FLUID RESTRICTION  - daily weights and strict I/O's  - repeat ECHO pending    2/25 - Subtle improvement overnight.  Continue IV lasix.   2/26- Appears euvolemic now.  Change to po lasix. His Dyspnea may be HF related vs COPD.   2/27 - diuresing well, breathing better. - anticipate discharge tomorrow  2/28 - Overdiuresed, giving 500 LR back.  Change lasix to 20 po daily.   3/1:  Appears euvolemic.  3/2: appears euvolemic.   3/3 - May be slipping backwards. Na dropping to 122 indicating free water excess. Will increase lasix to 40 po daily.      Debility  Patient with Acute on chronic debility due to bed confinement status. The patient's latest AMPAC (Activity Measure for Post Acute Care) Score is listed below.    AM-PAC Score - How much help does the patient need for each activity listed  Basic Mobility Total Score: 23  Turning over in bed (including adjusting bedclothes, sheets and blankets)?: None  Sitting down on and standing up from a chair with arms (e.g., wheelchair, bedside commode, etc.): None  Moving from lying on back to sitting on the side of the bed?: None  Moving to and from a bed to a chair (including a wheelchair)?: None  Need to walk in hospital room?: None  Climbing 3-5 steps with a railing?: A little    Plan  - PT/OT consulted    3/2: Patient has a long history of ALS and has had an electric wheelchair in the past.  However he states that he was healed and he more recently has started walking.  However he feels that he is unable to ambulate at this time will like to go to a swing bed.  Social work was consulted for this.  He states that if he were to go home he will be totally dependent on his fiancee.      Urinary retention  Patient complained about lower abdominal pain and urinary retention.  Postvoid residual on bladder scan was greater than 400.  Wheeler catheter was placed and there was still some  concern for retained urine.  Abdominal ultrasound focusing on kidneys, ureters and bladder was ordered.    3/3 - Add flomax tonight. Trial of voiding in a couple days.       Intractable nausea and vomiting  Intractable nausea and vomiting.  Patient has been given Zofran and other medications.    He does have cirrhosis and he could have multiple etiologies of nausea and vomiting.  Sons at bedside and would be able to assist him at home if he is discharged home.  [facility] and may be able to work with or be placed in that system.      Resolved    Alcohol dependence  Follow [facility] protocol  - patient reported drinking 12oz of beer today  - he limits alcohol to 20oz a day (beer)  Supplementing with thiamine and multivitamin      Tobacco dependency  Dangers of cigarette smoking were reviewed with patient in detail. Patient was Counseled for 3-10 minutes. Nicotine replacement options were discussed. Nicotine replacement was discussed- prescribed    Vitamin D deficiency  Continue home vitamin D       CAD (coronary artery disease)  Patient with known CAD s/p  no stent placed , which is controlled Will continue ASA and monitor for S/Sx of angina/ACS. Continue to monitor on telemetry.   Holding statin 2/2 liver enzymes elevated  - LHC was done on 1/10/2024  - troponin levels were flat  - EKG showed sinus rhythm with no acute findings    Alcoholic cirrhosis  Patient with known Cirrhosis with Child's class B. Co-morbidities are absent.  MELD-Na score calculated; MELD 3.0: 21 at 2/25/2025  4:25 AM  MELD-Na: 22 at 2/25/2025  4:25 AM  Calculated from:  Serum Creatinine: 1.36 mg/dL at 2/25/2025  4:25 AM  Serum Sodium: 132 mmol/L at 2/25/2025  4:25 AM  Total Bilirubin: 2.5 mg/dL at 2/25/2025  4:25 AM  Serum Albumin: 3.1 g/dL at 2/25/2025  4:25 AM  INR(ratio): 1.5 at 2/24/2025  4:20 PM  Age at listing (hypothetical): 59 years  Sex: Male at 2/25/2025  4:25 AM      Continue chronic meds. Etiology likely ETOH. Will avoid any  hepatotoxic meds, and monitor CBC/CMP/INR for synthetic function.     - negative hepatitis panel  US Abdomen Limited  Impression:  1. Nonspecific gallbladder wall thickening with minimal pericholecystic fluid.  No stones are identified.  Mild acute cholecystitis is a consideration.  Recommend follow-up.  2. Probable small 1.3 cm minimally complex hepatic cyst in the right lobe.  3. Bibasilar pleural effusions.    Essential hypertension  Patient's blood pressure range in the last 24 hours was: BP  Min: 97/70  Max: 118/87.The patient's inpatient anti-hypertensive regimen is listed below:  Current Antihypertensives  metoprolol succinate (TOPROL-XL) 24 hr tablet 25 mg, Daily, Oral  furosemide tablet 40 mg, Daily, Oral    Plan  Holding home BP medications due to soft blood pressure and ongoing diuresis     COPD (chronic obstructive pulmonary disease)  Patient's COPD is with exacerbation noted by continued dyspnea currently.  Patient is currently off COPD Pathway. Continue scheduled inhalers  patient has not been on his home medication because of affordability issues  and monitor respiratory status closely.   - patient SpO2 is normal in room air    2/25 - No steroids at present time.  Monitor.       VTE Risk Mitigation (From admission, onward)           Ordered     enoxaparin injection 40 mg  Daily         02/24/25 2132     IP VTE HIGH RISK PATIENT  Once         02/24/25 2132                    Discharge Planning   EFREM: 2/28/2025     Code Status: Full Code   Medical Readiness for Discharge Date:   Discharge Plan A: Home with family                Please place Justification for DME        Jack Valdivia Jr, MD  Department of Hospital Medicine   [facility] - [address] [place] [address]

## 2025-03-04 NOTE — NURSING
Patient came up the deleon with significant other pushing him in his Rolator.  I told them she could not push him in the Rolator that it was too much risk for a fall.      I told the patient that I was trying to work it out and see if we could allow him to go outside.  He yelled at me and told me that I was not trying to work it out.    He stood up and said that he was going to walk himself down stairs.  Advised patient and yair' again that that was not smart but he stormed toward the elevator.

## 2025-03-04 NOTE — ASSESSMENT & PLAN NOTE
Patient has PMH of Chronic combined systolic and diastolic heart failure.  Results for orders placed during the hospital encounter of 01/04/24    Echo    Interpretation Summary    Left Ventricle: The left ventricle is mildly dilated. Normal wall thickness. There is eccentric hypertrophy. Severe global hypokinesis with regional variation. There is severely reduced systolic function. Biplane (2D) method of discs ejection fraction is 15%. Global longitudinal strain is reduced. There is diastolic dysfunction but grade cannot be determined.    Right Ventricle: Moderate right ventricular enlargement. Systolic function is mildly reduced.    Right Atrium: Right atrium is mildly dilated.    Aortic Valve: The aortic valve is a trileaflet valve.    Mitral Valve: There is mild bileaflet sclerosis. There is no stenosis. The mean pressure gradient across the mitral valve is 3 mmHg at a heart rate of  bpm. There is moderate regurgitation with a posterolateral eccentriccally directed jet.    Tricuspid Valve: There is moderate regurgitation.    Pulmonary Artery: The estimated pulmonary artery systolic pressure is 62 mmHg.    IVC/SVC: Elevated venous pressure at 15 mmHg.    Pericardium: Large left pleural effusion.  - patient is on CHF exacerbation pathway  - patient received 6o mg IV of furosemide in the ED  - pro BNP was 16953  - troponin levels were flat  - EKG showed sinus rhythm with no acute findings  - CXR    FINDINGS:  Cardiac silhouette is mildly enlarged.  Probable small bibasilar pleural effusions, improved from the prior study.  Mild right basilar airspace disease could represent mild infiltrate and pneumonia, mildly increased from the prior study.  No mass is detected.  No evidence of pneumothorax  - continue IV diuresis  - LOW SODIUM DIET WITH FLUID RESTRICTION  - daily weights and strict I/O's  - repeat ECHO pending    2/25 - Subtle improvement overnight.  Continue IV lasix.   2/26- Appears euvolemic now.  Change to po  lasix. His Dyspnea may be HF related vs COPD.   2/27 - diuresing well, breathing better. - anticipate discharge tomorrow  2/28 - Overdiuresed, giving 500 LR back.  Change lasix to 20 po daily.   3/1:  Appears euvolemic.  3/2: appears euvolemic.   3/3 - May be slipping backwards. Na dropping to 122 indicating free water excess. Will increase lasix to 40 po daily.    3/4 monitroing, also patient refusing some meds

## 2025-03-04 NOTE — ASSESSMENT & PLAN NOTE
Patient's blood pressure range in the last 24 hours was: BP  Min: 97/70  Max: 118/87.The patient's inpatient anti-hypertensive regimen is listed below:  Current Antihypertensives  metoprolol succinate (TOPROL-XL) 24 hr tablet 25 mg, Daily, Oral  furosemide tablet 40 mg, Daily, Oral    Plan  Holding home BP medications due to soft blood pressure and ongoing diuresis

## 2025-03-04 NOTE — NURSING
"Pt requested pain med and benadryl, once in room with med, pt states he can not take med right now and to leave med in room, instructed pt that this nurse is unable to leave med in room and that nurse has to watch pt take med, pt stated that he will take med " in a few minutes", informed pt that this nurse will return the medication and to call when he was ready for med, pt became argumentative and stated that he can take med when he wanted to, informed pt he can take med while this nurse is in room or I can leave with med and return to pyxis, pt took med before this nurse left the room  "

## 2025-03-04 NOTE — ASSESSMENT & PLAN NOTE
Patient has PMH of Chronic combined systolic and diastolic heart failure.  Results for orders placed during the hospital encounter of 01/04/24    Echo    Interpretation Summary    Left Ventricle: The left ventricle is mildly dilated. Normal wall thickness. There is eccentric hypertrophy. Severe global hypokinesis with regional variation. There is severely reduced systolic function. Biplane (2D) method of discs ejection fraction is 15%. Global longitudinal strain is reduced. There is diastolic dysfunction but grade cannot be determined.    Right Ventricle: Moderate right ventricular enlargement. Systolic function is mildly reduced.    Right Atrium: Right atrium is mildly dilated.    Aortic Valve: The aortic valve is a trileaflet valve.    Mitral Valve: There is mild bileaflet sclerosis. There is no stenosis. The mean pressure gradient across the mitral valve is 3 mmHg at a heart rate of  bpm. There is moderate regurgitation with a posterolateral eccentriccally directed jet.    Tricuspid Valve: There is moderate regurgitation.    Pulmonary Artery: The estimated pulmonary artery systolic pressure is 62 mmHg.    IVC/SVC: Elevated venous pressure at 15 mmHg.    Pericardium: Large left pleural effusion.  - patient is on CHF exacerbation pathway  - patient received 6o mg IV of furosemide in the ED  - pro BNP was 20669  - troponin levels were flat  - EKG showed sinus rhythm with no acute findings  - CXR    FINDINGS:  Cardiac silhouette is mildly enlarged.  Probable small bibasilar pleural effusions, improved from the prior study.  Mild right basilar airspace disease could represent mild infiltrate and pneumonia, mildly increased from the prior study.  No mass is detected.  No evidence of pneumothorax  - continue IV diuresis  - LOW SODIUM DIET WITH FLUID RESTRICTION  - daily weights and strict I/O's  - repeat ECHO pending    2/25 - Subtle improvement overnight.  Continue IV lasix.   2/26- Appears euvolemic now.  Change to po  lasix. His Dyspnea may be HF related vs COPD.   2/27 - diuresing well, breathing better. - anticipate discharge tomorrow  2/28 - Overdiuresed, giving 500 LR back.  Change lasix to 20 po daily.   3/1:  Appears euvolemic.  3/2: appears euvolemic.   3/3 - May be slipping backwards. Na dropping to 122 indicating free water excess. Will increase lasix to 40 po daily.

## 2025-03-04 NOTE — NURSING
Notified Dr. Dallas about patient's insistence  on going down to smoke. She messaged me that we need to follow what ever the protocol says.     Will check with nursing supervisor regarding policy.

## 2025-03-04 NOTE — NURSING
Patricia, Nursing Supervisor, confirmed that the smoking policy is that this is a non-smoking facility and he would have to go across the street.  If he chooses to do that his oxygen will have to be removed and a new one put it.      Will inform him that it is too risky to let him go off the floor and what the policy is if tries  to leave the floor again.

## 2025-03-04 NOTE — SUBJECTIVE & OBJECTIVE
Interval History: Patient with some urinary retention over the weekend. Now with [place].     Review of Systems  Objective:     Vital Signs (Most Recent):  Temp: 97.7 °F (36.5 °C) (03/03/25 2010)  Pulse: 74 (03/03/25 2010)  Resp: 18 (03/03/25 2035)  BP: 112/80 (03/03/25 2010)  SpO2: 97 % (03/03/25 2010) Vital Signs (24h Range):  Temp:  [97 °F (36.1 °C)-97.7 °F (36.5 °C)] 97.7 °F (36.5 °C)  Pulse:  [51-84] 74  Resp:  [15-20] 18  SpO2:  [95 %-100 %] 97 %  BP: ()/(67-87) 112/80     Weight: 76 kg (167 lb 8.8 oz)  Body mass index is 25.48 kg/m².    Intake/Output Summary (Last 24 hours) at 3/3/2025 2121  Last data filed at 3/3/2025 1613  Gross per 24 hour   Intake 480 ml   Output 1700 ml   Net -1220 ml         Physical Exam  Vitals and nursing note reviewed.   Constitutional:       General: He is not in acute distress.     Appearance: Normal appearance. He is ill-appearing.   HENT:      Head: Normocephalic and atraumatic.      Nose: No congestion or rhinorrhea.   Eyes:      Extraocular Movements: Extraocular movements intact.      Conjunctiva/sclera: Conjunctivae normal.      Pupils: Pupils are equal, round, and reactive to light.   Cardiovascular:      Rate and Rhythm: Normal rate and regular rhythm.      Pulses: Normal pulses.      Heart sounds: Normal heart sounds. No murmur heard.  Pulmonary:      Effort: Pulmonary effort is normal. No respiratory distress.      Breath sounds: No wheezing.      Comments: Improved breath sounds.   Abdominal:      General: Bowel sounds are normal. There is no distension.      Palpations: Abdomen is soft.      Tenderness: There is no abdominal tenderness.   Musculoskeletal:      Cervical back: Normal range of motion and neck supple. No rigidity.      Right lower leg: No edema.      Left lower leg: No edema.   Skin:     General: Skin is warm and dry.      Capillary Refill: Capillary refill takes less than 2 seconds.   Neurological:      General: No focal deficit present.      Mental  Status: He is alert and oriented to person, place, and time.      GCS: GCS eye subscore is 4. GCS verbal subscore is 5. GCS motor subscore is 6.   Psychiatric:      Comments: Anxious.              Significant Labs: All pertinent labs within the past 24 hours have been reviewed.  BMP:   Recent Labs   Lab 03/03/25 0426   GLU 81   *   K 5.5*   CL 90*   CO2 21*   BUN 22   CREATININE 1.06   CALCIUM 8.8     CBC:   Recent Labs   Lab 03/03/25 0426   WBC 9.55   HGB 11.6*   HCT 36.1*          Significant Imaging: I have reviewed all pertinent imaging results/findings within the past 24 hours.

## 2025-03-04 NOTE — PT/OT/SLP PROGRESS
Occupational Therapy   Treatment    Name: Derek Moreno  MRN: [id number]  Admitting Diagnosis:  Acute on chronic combined systolic and diastolic heart failure       Recommendations:     Discharge Recommendations: Low Intensity Therapy  Discharge Equipment Recommendations:  none  Barriers to discharge:  None    Assessment:     Derek Moreno is a 59 y.o. male with a medical diagnosis of Acute on chronic combined systolic and diastolic heart failure.  He presents with weakness and decline in ADLs. Performance deficits affecting function are weakness, impaired endurance, impaired self care skills, impaired functional mobility, gait instability, impaired balance.     Rehab Prognosis:  Good; patient would benefit from acute skilled OT services to address these deficits and reach maximum level of function.       Plan:     Patient to be seen 5 x/week to address the above listed problems via self-care/home management, therapeutic activities, therapeutic exercises  Plan of Care Expires: 03/25/25  Plan of Care Reviewed with: patient    Subjective     Chief Complaint: weakness  Patient/Family Comments/goals: pt agreeable to OT tx  Pain/Comfort:  Pain Rating 1: 6/10  Location - Orientation 1: generalized    Objective:     Communicated with: LUIS Funes prior to session.  Patient found sitting edge of bed with peripheral IV upon OT entry to room.    General Precautions: Standard, fall    Orthopedic Precautions:N/A  Braces: N/A  Respiratory Status: Room air     Occupational Performance:     Bed Mobility:    Not performed     Functional Mobility/Transfers:  Not performed    Activities of Daily Living:  Not performed      [facility] 6 Click ADL:      Treatment & Education:  Pt performed x 15 reps each bicep curls 2#db, chest press 2#db, IR/ER 2#db, and shoulder press 2#db in order to improve BUE strength and endurance to perform ADL and ADL t/f with less assist.     Patient left sitting edge of bed with all lines intact, call  button in reach, and family present    GOALS:   Multidisciplinary Problems       Occupational Therapy Goals          Problem: Occupational Therapy    Goal Priority Disciplines Outcome Interventions   Occupational Therapy Goal     OT, PT/OT Progressing    Description: STG:  Pt will perform grooming with setup  Pt will bathe with SBA with setup at EOB  Pt will perform UE dressing with SBA  Pt will perform LE dressing with SBA  Pt will sit EOB x 10 min with SBA   Pt will transfer bed/chair/bsc with CGA with cane  Pt will perform standing task x 2 min with CGA with RW   Pt will tolerate 15 minutes of tx without fatigue      LT.Restore to max I with self care and mobility.                           DME Justifications:  No DME recommended requiring DME justifications    Time Tracking:     OT Date of Treatment: 25  OT Start Time:   OT Stop Time: 1435  OT Total Time (min): 13 min    Billable Minutes:Therapeutic Exercise 13 minutes    OT/ANAY: OT          3/4/2025

## 2025-03-04 NOTE — PT/OT/SLP PROGRESS
Physical Therapy Treatment    Patient Name:  Derek Moreno   MRN:  [id number]    Recommendations:     Discharge Recommendations: Low Intensity Therapy  Discharge Equipment Recommendations: rollator  Barriers to discharge: None    Assessment:     Derek Moreno is a 59 y.o. male admitted with a medical diagnosis of Acute on chronic combined systolic and diastolic heart failure.  He presents with the following impairments/functional limitations: weakness, impaired endurance, impaired functional mobility, impaired cardiopulmonary response to activity Pt appeared very drowsy today but was able to increase ambulation distance without stopping to rest and no shortness of breath was observed. Pt is using rollator for ambulation.    Rehab Prognosis: Fair; patient would benefit from acute skilled PT services to address these deficits and reach maximum level of function.    Recent Surgery: * No surgery found *      Plan:     During this hospitalization, patient to be seen 5 x/week to address the identified rehab impairments via gait training, therapeutic activities, therapeutic exercises and progress toward the following goals:    Plan of Care Expires:  03/25/25    Subjective     Chief Complaint: shortness of breath   Patient/Family Comments/goals: Pt is agreeable to PT   Pain/Comfort:  Pain Rating 1: 6/10  Location - Side 1: Bilateral  Location 1: leg  Pain Addressed 1: Distraction  Pain Rating Post-Intervention 1: 6/10      Objective:     Communicated with DENISA Aguilera RN prior to session.  Patient found HOB elevated with peripheral IV upon PT entry to room.     General Precautions: Standard, fall  Orthopedic Precautions: N/A  Braces: N/A  Respiratory Status: Room air     Functional Mobility:  Bed Mobility:     Scooting: supervision  Supine to Sit: supervision  Sit to Supine: supervision  Transfers:     Sit to Stand:  supervision with rollator  Gait: 250 ft stand-by assistance with rollator, slow stacy  Balance:  good      AM-PAC 6 CLICK MOBILITY  Turning over in bed (including adjusting bedclothes, sheets and blankets)?: 4  Sitting down on and standing up from a chair with arms (e.g., wheelchair, bedside commode, etc.): 4  Moving from lying on back to sitting on the side of the bed?: 4  Moving to and from a bed to a chair (including a wheelchair)?: 4  Need to walk in hospital room?: 4  Climbing 3-5 steps with a railing?: 3  Basic Mobility Total Score: 23       Treatment & Education:  Pt awaken for ambulation, ambulated in hallway with rollator, very slow stacy    Patient left HOB elevated with all lines intact and call button in reach..    GOALS:   Multidisciplinary Problems       Physical Therapy Goals          Problem: Physical Therapy    Goal Priority Disciplines Outcome Interventions   Physical Therapy Goal     PT, PT/OT Progressing    Description: Short term goals:  1. Supine to sit with Modified Williamson  2. Sit to stand transfer with Modified Williamson  3. Bed to chair transfer with Modified Williamson using Single-point Cane   4. Gait  x 100 feet with Modified Williamson using Single-point Cane .     Long term goals:  Pt will return home to [facility] with all mobility                         DME Justifications:  No DME recommended requiring DME justifications    Time Tracking:     PT Received On: 03/04/25  PT Start Time: 1352     PT Stop Time: 1415  PT Total Time (min): 23 min     Billable Minutes: Gait Training 23    Treatment Type: Treatment  PT/PTA: PT     Number of PTA visits since last PT visit: 0     03/04/2025

## 2025-03-04 NOTE — ASSESSMENT & PLAN NOTE
Patient with known Cirrhosis with Child's class B. Co-morbidities are absent.  MELD-Na score calculated; MELD 3.0: 21 at 2/25/2025  4:25 AM  MELD-Na: 22 at 2/25/2025  4:25 AM  Calculated from:  Serum Creatinine: 1.36 mg/dL at 2/25/2025  4:25 AM  Serum Sodium: 132 mmol/L at 2/25/2025  4:25 AM  Total Bilirubin: 2.5 mg/dL at 2/25/2025  4:25 AM  Serum Albumin: 3.1 g/dL at 2/25/2025  4:25 AM  INR(ratio): 1.5 at 2/24/2025  4:20 PM  Age at listing (hypothetical): 59 years  Sex: Male at 2/25/2025  4:25 AM      Continue chronic meds. Etiology likely ETOH. Will avoid any hepatotoxic meds, and monitor CBC/CMP/INR for synthetic function.     - negative hepatitis panel  US Abdomen Limited  Impression:  1. Nonspecific gallbladder wall thickening with minimal pericholecystic fluid.  No stones are identified.  Mild acute cholecystitis is a consideration.  Recommend follow-up.  2. Probable small 1.3 cm minimally complex hepatic cyst in the right lobe.  3. Bibasilar pleural effusions.    3/4 source of hyponatremia

## 2025-03-04 NOTE — ASSESSMENT & PLAN NOTE
Follow [facility] protocol  - patient reported drinking 12oz of beer today  - he limits alcohol to 20oz a day (beer)  Supplementing with thiamine and multivitamin

## 2025-03-04 NOTE — ASSESSMENT & PLAN NOTE
Patient with Acute on chronic debility due to bed confinement status. The patient's latest AMPAC (Activity Measure for Post Acute Care) Score is listed below.    AM-PAC Score - How much help does the patient need for each activity listed  Basic Mobility Total Score: 23  Turning over in bed (including adjusting bedclothes, sheets and blankets)?: None  Sitting down on and standing up from a chair with arms (e.g., wheelchair, bedside commode, etc.): None  Moving from lying on back to sitting on the side of the bed?: None  Moving to and from a bed to a chair (including a wheelchair)?: None  Need to walk in hospital room?: None  Climbing 3-5 steps with a railing?: A little    Plan  - PT/OT consulted    3/2: Patient has a long history of ALS and has had an electric wheelchair in the past.  However he states that he was healed and he more recently has started walking.  However he feels that he is unable to ambulate at this time will like to go to a swing bed.  Social work was consulted for this.  He states that if he were to go home he will be totally dependent on his fiancee.    3/4 working with social for placement

## 2025-03-04 NOTE — HOSPITAL COURSE
59-year-old male with pmh alcoholic liver cirrhosis, hypertension admitted for acute on chronic systolic heart failure with an ejection fraction of 15% and ascites related to alcoholic liver cirrhosis.  Over course of stay patient has been medically managed for volume status.  He has a long history of ALS and was accepted into swing bed however due to personal reasons would rather go home with family.  Has oxygen at home.  Medications have been discussed at length with patient and his consequences of noncompliance.  He states he understands fully.  We will follow up with PCP and chosen cardiologist and GI outpatient.  Had acute urine retention on admission and Wheeler was placed.  Today Wheeler was discontinued patient does not want to stay for voiding trial and is demanding to be sent home.  We will full Wheeler and patient can follow up with PCP in the next 2-3 days.  Given instructions if he is unable to urinate on his own before being able to see PCP.

## 2025-03-04 NOTE — PROGRESS NOTES
[facility] Tele[facility] Hospital Medicine  Progress Note    Patient Name: Derek Moreno  MRN: [id number]  Patient Class: IP- Inpatient   Admission Date: 2/24/2025  Length of Stay: 8 days  Attending Physician: Liliana Dallas MD  Primary Care Provider: Mis, Primary Doctor        Subjective     Principal Problem:Acute on chronic combined systolic and diastolic heart failure        HPI:  The patient is a 59-year-old male who presented to the emergency department with worsening shortness of breath and increased fatigue. He reported experiencing shortness of breath upon mild to moderate exertion over the past few months. He complained of accompanying non productive cough. He denied any hemoptysis, chills or fever.  He did not complain of any accompanying chest pain, leg swelling, palpitations, or dizziness, but did mention experiencing a few episodes of blackouts. He denied any falls resulting from black outs.   Patient has PMH of COPD, CHF, tobacco and alcohol dependence.  The patient has not been using oxygen at home despite being prescribed it, due to concerns that he will get dependent on supplemented oxygen.   Additionally, he has not been taking his prescribed medications for the past several months due to affordability issues.   The patient lives alone and is ambulatory with the help of a cane.   Patient has PMH of alcohol dependence, he has been drinking at least 20 oz of beer daily and drank 8 oz today.      Overview/Hospital Course:  3/4  - refusing some medication including lopressor for vague reasons, did take his lasix today  - increased lasix to 40 today with continued hyponatremia  - working with social for possible placement    Interval History:     Review of Systems  Objective:     Vital Signs (Most Recent):  Temp: 97.4 °F (36.3 °C) (03/04/25 1145)  Pulse: 81 (03/04/25 1145)  Resp: 18 (03/04/25 1145)  BP: 113/76 (03/04/25 1145)  SpO2: 96 % (03/04/25 1145) Vital Signs (24h  Range):  Temp:  [97.4 °F (36.3 °C)-97.9 °F (36.6 °C)] 97.4 °F (36.3 °C)  Pulse:  [74-86] 81  Resp:  [16-20] 18  SpO2:  [96 %-99 %] 96 %  BP: ()/(69-80) 113/76     Weight: 76.1 kg (167 lb 12.3 oz)  Body mass index is 25.51 kg/m².    Intake/Output Summary (Last 24 hours) at 3/4/2025 1314  Last data filed at 3/4/2025 1047  Gross per 24 hour   Intake 60 ml   Output 2300 ml   Net -2240 ml         Physical Exam  Vitals and nursing note reviewed.   Constitutional:       General: He is not in acute distress.     Appearance: Normal appearance. He is ill-appearing.   HENT:      Head: Normocephalic and atraumatic.      Nose: No congestion or rhinorrhea.   Eyes:      Extraocular Movements: Extraocular movements intact.      Conjunctiva/sclera: Conjunctivae normal.      Pupils: Pupils are equal, round, and reactive to light.   Cardiovascular:      Rate and Rhythm: Normal rate and regular rhythm.      Pulses: Normal pulses.      Heart sounds: Normal heart sounds. No murmur heard.  Pulmonary:      Effort: Pulmonary effort is normal. No respiratory distress.      Breath sounds: No wheezing.      Comments: Improved breath sounds.   Abdominal:      General: Bowel sounds are normal. There is no distension.      Palpations: Abdomen is soft.      Tenderness: There is no abdominal tenderness.   Musculoskeletal:      Cervical back: Normal range of motion and neck supple. No rigidity.      Right lower leg: No edema.      Left lower leg: No edema.   Skin:     General: Skin is warm and dry.      Capillary Refill: Capillary refill takes less than 2 seconds.   Neurological:      General: No focal deficit present.      Mental Status: He is alert and oriented to person, place, and time.      GCS: GCS eye subscore is 4. GCS verbal subscore is 5. GCS motor subscore is 6.   Psychiatric:      Comments: Anxious.              Significant Labs: All pertinent labs within the past 24 hours have been reviewed.  BMP:   Recent Labs   Lab  03/04/25  0333   GLU 81   *   K 4.8   CL 89*   CO2 23   BUN 21   CREATININE 1.25   CALCIUM 9.1     CBC:   Recent Labs   Lab 03/03/25  0426 03/04/25  0333   WBC 9.55 9.12   HGB 11.6* 12.6*   HCT 36.1* 40.3    242       Significant Imaging: I have reviewed all pertinent imaging results/findings within the past 24 hours.    Assessment and Plan     * Acute on chronic combined systolic and diastolic heart failure  Patient has PMH of Chronic combined systolic and diastolic heart failure.  Results for orders placed during the hospital encounter of 01/04/24    Echo    Interpretation Summary    Left Ventricle: The left ventricle is mildly dilated. Normal wall thickness. There is eccentric hypertrophy. Severe global hypokinesis with regional variation. There is severely reduced systolic function. Biplane (2D) method of discs ejection fraction is 15%. Global longitudinal strain is reduced. There is diastolic dysfunction but grade cannot be determined.    Right Ventricle: Moderate right ventricular enlargement. Systolic function is mildly reduced.    Right Atrium: Right atrium is mildly dilated.    Aortic Valve: The aortic valve is a trileaflet valve.    Mitral Valve: There is mild bileaflet sclerosis. There is no stenosis. The mean pressure gradient across the mitral valve is 3 mmHg at a heart rate of  bpm. There is moderate regurgitation with a posterolateral eccentriccally directed jet.    Tricuspid Valve: There is moderate regurgitation.    Pulmonary Artery: The estimated pulmonary artery systolic pressure is 62 mmHg.    IVC/SVC: Elevated venous pressure at 15 mmHg.    Pericardium: Large left pleural effusion.  - patient is on CHF exacerbation pathway  - patient received 6o mg IV of furosemide in the ED  - pro BNP was 72235  - troponin levels were flat  - EKG showed sinus rhythm with no acute findings  - CXR    FINDINGS:  Cardiac silhouette is mildly enlarged.  Probable small bibasilar pleural effusions,  improved from the prior study.  Mild right basilar airspace disease could represent mild infiltrate and pneumonia, mildly increased from the prior study.  No mass is detected.  No evidence of pneumothorax  - continue IV diuresis  - LOW SODIUM DIET WITH FLUID RESTRICTION  - daily weights and strict I/O's  - repeat ECHO pending    2/25 - Subtle improvement overnight.  Continue IV lasix.   2/26- Appears euvolemic now.  Change to po lasix. His Dyspnea may be HF related vs COPD.   2/27 - diuresing well, breathing better. - anticipate discharge tomorrow  2/28 - Overdiuresed, giving 500 LR back.  Change lasix to 20 po daily.   3/1:  Appears euvolemic.  3/2: appears euvolemic.   3/3 - May be slipping backwards. Na dropping to 122 indicating free water excess. Will increase lasix to 40 po daily.    3/4 monitroing, also patient refusing some meds    Debility  Patient with Acute on chronic debility due to bed confinement status. The patient's latest AMPAC (Activity Measure for Post Acute Care) Score is listed below.    AM-PAC Score - How much help does the patient need for each activity listed  Basic Mobility Total Score: 23  Turning over in bed (including adjusting bedclothes, sheets and blankets)?: None  Sitting down on and standing up from a chair with arms (e.g., wheelchair, bedside commode, etc.): None  Moving from lying on back to sitting on the side of the bed?: None  Moving to and from a bed to a chair (including a wheelchair)?: None  Need to walk in hospital room?: None  Climbing 3-5 steps with a railing?: A little    Plan  - PT/OT consulted    3/2: Patient has a long history of ALS and has had an electric wheelchair in the past.  However he states that he was healed and he more recently has started walking.  However he feels that he is unable to ambulate at this time will like to go to a swing bed.  Social work was consulted for this.  He states that if he were to go home he will be totally dependent on his  osmani.    3/4 working with social for placement    Urinary retention  Patient complained about lower abdominal pain and urinary retention.  Postvoid residual on bladder scan was greater than 400.  Wheeler catheter was placed and there was still some concern for retained urine.  Abdominal ultrasound focusing on kidneys, ureters and bladder was ordered.    3/3 - Add flomax tonight. Trial of voiding in a couple days.       Intractable nausea and vomiting  Intractable nausea and vomiting.  Patient has been given Zofran and other medications.    He does have cirrhosis and he could have multiple etiologies of nausea and vomiting.  Sons at bedside and would be able to assist him at home if he is discharged home.  [facility] and may be able to work with or be placed in that system.      Resolved    Alcohol dependence  Follow [facility] protocol  - patient reported drinking 12oz of beer today  - he limits alcohol to 20oz a day (beer)  Supplementing with thiamine and multivitamin      Tobacco dependency  Dangers of cigarette smoking were reviewed with patient in detail. Patient was Counseled for 3-10 minutes. Nicotine replacement options were discussed. Nicotine replacement was discussed- prescribed    Vitamin D deficiency  Continue home vitamin D       CAD (coronary artery disease)  Patient with known CAD s/p  no stent placed , which is controlled Will continue ASA and monitor for S/Sx of angina/ACS. Continue to monitor on telemetry.   Holding statin 2/2 liver enzymes elevated  - LHC was done on 1/10/2024  - troponin levels were flat  - EKG showed sinus rhythm with no acute findings    Alcoholic cirrhosis  Patient with known Cirrhosis with Child's class B. Co-morbidities are absent.  MELD-Na score calculated; MELD 3.0: 21 at 2/25/2025  4:25 AM  MELD-Na: 22 at 2/25/2025  4:25 AM  Calculated from:  Serum Creatinine: 1.36 mg/dL at 2/25/2025  4:25 AM  Serum Sodium: 132 mmol/L at 2/25/2025  4:25 AM  Total Bilirubin: 2.5 mg/dL at  2/25/2025  4:25 AM  Serum Albumin: 3.1 g/dL at 2/25/2025  4:25 AM  INR(ratio): 1.5 at 2/24/2025  4:20 PM  Age at listing (hypothetical): 59 years  Sex: Male at 2/25/2025  4:25 AM      Continue chronic meds. Etiology likely ETOH. Will avoid any hepatotoxic meds, and monitor CBC/CMP/INR for synthetic function.     - negative hepatitis panel  US Abdomen Limited  Impression:  1. Nonspecific gallbladder wall thickening with minimal pericholecystic fluid.  No stones are identified.  Mild acute cholecystitis is a consideration.  Recommend follow-up.  2. Probable small 1.3 cm minimally complex hepatic cyst in the right lobe.  3. Bibasilar pleural effusions.    3/4 source of hyponatremia    Essential hypertension  Patient's blood pressure range in the last 24 hours was: BP  Min: 97/70  Max: 118/87.The patient's inpatient anti-hypertensive regimen is listed below:  Current Antihypertensives  metoprolol succinate (TOPROL-XL) 24 hr tablet 25 mg, Daily, Oral  furosemide tablet 40 mg, Daily, Oral    Plan  Holding home BP medications due to soft blood pressure and ongoing diuresis     COPD (chronic obstructive pulmonary disease)  Patient's COPD is with exacerbation noted by continued dyspnea currently.  Patient is currently off COPD Pathway. Continue scheduled inhalers  patient has not been on his home medication because of affordability issues  and monitor respiratory status closely.   - patient SpO2 is normal in room air    2/25 - No steroids at present time.  Monitor.       VTE Risk Mitigation (From admission, onward)           Ordered     enoxaparin injection 40 mg  Daily         02/24/25 2132     IP VTE HIGH RISK PATIENT  Once         02/24/25 2132                    Discharge Planning   EFREM: 2/28/2025     Code Status: Full Code   Medical Readiness for Discharge Date:   Discharge Plan A: Home with family                Please place Justification for DME        Liliana Dallas MD  Department of Hospital Medicine   [facility]  [facility]

## 2025-03-05 VITALS
BODY MASS INDEX: 21.79 KG/M2 | HEART RATE: 104 BPM | HEIGHT: 68 IN | OXYGEN SATURATION: 95 % | WEIGHT: 143.81 LBS | TEMPERATURE: 98 F | SYSTOLIC BLOOD PRESSURE: 102 MMHG | RESPIRATION RATE: 18 BRPM | DIASTOLIC BLOOD PRESSURE: 76 MMHG

## 2025-03-05 LAB
ANION GAP SERPL CALCULATED.3IONS-SCNC: 15 MMOL/L (ref 7–16)
BASOPHILS # BLD AUTO: 0.05 K/UL (ref 0–0.2)
BASOPHILS NFR BLD AUTO: 0.8 % (ref 0–1)
BUN SERPL-MCNC: 20 MG/DL (ref 8–26)
BUN/CREAT SERPL: 15 (ref 6–20)
CALCIUM SERPL-MCNC: 8.3 MG/DL (ref 8.4–10.2)
CHLORIDE SERPL-SCNC: 92 MMOL/L (ref 98–107)
CO2 SERPL-SCNC: 21 MMOL/L (ref 22–29)
CREAT SERPL-MCNC: 1.33 MG/DL (ref 0.72–1.25)
DIFFERENTIAL METHOD BLD: ABNORMAL
EGFR (NO RACE VARIABLE) (RUSH/TITUS): 62 ML/MIN/1.73M2
EOSINOPHIL # BLD AUTO: 0.1 K/UL (ref 0–0.5)
EOSINOPHIL NFR BLD AUTO: 1.6 % (ref 1–4)
ERYTHROCYTE [DISTWIDTH] IN BLOOD BY AUTOMATED COUNT: 14.3 % (ref 11.5–14.5)
GLUCOSE SERPL-MCNC: 68 MG/DL (ref 74–100)
HCT VFR BLD AUTO: 38.5 % (ref 40–54)
HGB BLD-MCNC: 11.5 G/DL (ref 13.5–18)
IMM GRANULOCYTES # BLD AUTO: 0.03 K/UL (ref 0–0.04)
IMM GRANULOCYTES NFR BLD: 0.5 % (ref 0–0.4)
LYMPHOCYTES # BLD AUTO: 1.02 K/UL (ref 1–4.8)
LYMPHOCYTES NFR BLD AUTO: 16.4 % (ref 27–41)
MCH RBC QN AUTO: 30.3 PG (ref 27–31)
MCHC RBC AUTO-ENTMCNC: 29.9 G/DL (ref 32–36)
MCV RBC AUTO: 101.6 FL (ref 80–96)
MONOCYTES # BLD AUTO: 0.64 K/UL (ref 0–0.8)
MONOCYTES NFR BLD AUTO: 10.3 % (ref 2–6)
MPC BLD CALC-MCNC: 12.2 FL (ref 9.4–12.4)
NEUTROPHILS # BLD AUTO: 4.37 K/UL (ref 1.8–7.7)
NEUTROPHILS NFR BLD AUTO: 70.4 % (ref 53–65)
NRBC # BLD AUTO: 0 X10E3/UL
NRBC, AUTO (.00): 0 %
PLATELET # BLD AUTO: 172 K/UL (ref 150–400)
POTASSIUM SERPL-SCNC: 4.7 MMOL/L (ref 3.5–5.1)
RBC # BLD AUTO: 3.79 M/UL (ref 4.6–6.2)
SODIUM SERPL-SCNC: 123 MMOL/L (ref 136–145)
WBC # BLD AUTO: 6.21 K/UL (ref 4.5–11)

## 2025-03-05 PROCEDURE — 99900035 HC TECH TIME PER 15 MIN (STAT)

## 2025-03-05 PROCEDURE — 85025 COMPLETE CBC W/AUTO DIFF WBC: CPT | Performed by: HOSPITALIST

## 2025-03-05 PROCEDURE — 99239 HOSP IP/OBS DSCHRG MGMT >30: CPT | Mod: ,,,

## 2025-03-05 PROCEDURE — 97116 GAIT TRAINING THERAPY: CPT

## 2025-03-05 PROCEDURE — 25000003 PHARM REV CODE 250: Performed by: HOSPITALIST

## 2025-03-05 PROCEDURE — 94761 N-INVAS EAR/PLS OXIMETRY MLT: CPT

## 2025-03-05 PROCEDURE — 25000242 PHARM REV CODE 250 ALT 637 W/ HCPCS

## 2025-03-05 PROCEDURE — 36415 COLL VENOUS BLD VENIPUNCTURE: CPT | Performed by: HOSPITALIST

## 2025-03-05 PROCEDURE — 80048 BASIC METABOLIC PNL TOTAL CA: CPT | Performed by: HOSPITALIST

## 2025-03-05 PROCEDURE — 25000003 PHARM REV CODE 250

## 2025-03-05 PROCEDURE — 94640 AIRWAY INHALATION TREATMENT: CPT

## 2025-03-05 PROCEDURE — 27000221 HC OXYGEN, UP TO 24 HOURS

## 2025-03-05 PROCEDURE — 63600175 PHARM REV CODE 636 W HCPCS: Performed by: HOSPITALIST

## 2025-03-05 PROCEDURE — 25000003 PHARM REV CODE 250: Performed by: FAMILY MEDICINE

## 2025-03-05 RX ORDER — IBUPROFEN 200 MG
1 TABLET ORAL DAILY
Qty: 30 PATCH | Refills: 0 | Status: SHIPPED | OUTPATIENT
Start: 2025-03-06 | End: 2025-04-05

## 2025-03-05 RX ORDER — METOPROLOL SUCCINATE 25 MG/1
25 TABLET, EXTENDED RELEASE ORAL DAILY
Qty: 30 TABLET | Refills: 5 | Status: SHIPPED | OUTPATIENT
Start: 2025-03-05 | End: 2026-03-05

## 2025-03-05 RX ORDER — CHOLECALCIFEROL (VITAMIN D3) 25 MCG
1000 TABLET ORAL DAILY
Qty: 30 TABLET | Refills: 5 | Status: SHIPPED | OUTPATIENT
Start: 2025-03-05

## 2025-03-05 RX ORDER — CALCIUM CARBONATE 200(500)MG
500 TABLET,CHEWABLE ORAL 2 TIMES DAILY
Qty: 60 TABLET | Refills: 11 | Status: SHIPPED | OUTPATIENT
Start: 2025-03-05 | End: 2026-03-05

## 2025-03-05 RX ORDER — ALLOPURINOL 100 MG/1
200 TABLET ORAL DAILY
Qty: 60 TABLET | Refills: 5 | Status: SHIPPED | OUTPATIENT
Start: 2025-03-05

## 2025-03-05 RX ORDER — ASPIRIN 81 MG/1
81 TABLET ORAL DAILY
Qty: 90 TABLET | Refills: 3 | Status: SHIPPED | OUTPATIENT
Start: 2025-03-05 | End: 2026-03-05

## 2025-03-05 RX ORDER — FUROSEMIDE 40 MG/1
40 TABLET ORAL DAILY
Qty: 30 TABLET | Refills: 11 | Status: SHIPPED | OUTPATIENT
Start: 2025-03-06 | End: 2026-03-06

## 2025-03-05 RX ORDER — TAMSULOSIN HYDROCHLORIDE 0.4 MG/1
0.4 CAPSULE ORAL NIGHTLY
Qty: 30 CAPSULE | Refills: 11 | Status: SHIPPED | OUTPATIENT
Start: 2025-03-05 | End: 2026-03-05

## 2025-03-05 RX ADMIN — IPRATROPIUM BROMIDE AND ALBUTEROL SULFATE 3 ML: .5; 3 SOLUTION RESPIRATORY (INHALATION) at 07:03

## 2025-03-05 RX ADMIN — OXYCODONE 10 MG: 5 TABLET ORAL at 11:03

## 2025-03-05 RX ADMIN — OXYCODONE 10 MG: 5 TABLET ORAL at 06:03

## 2025-03-05 RX ADMIN — ALLOPURINOL 200 MG: 100 TABLET ORAL at 09:03

## 2025-03-05 RX ADMIN — DIPHENHYDRAMINE HYDROCHLORIDE 50 MG: 25 CAPSULE ORAL at 06:03

## 2025-03-05 RX ADMIN — Medication 1000 UNITS: at 09:03

## 2025-03-05 RX ADMIN — CALCIUM CARBONATE (ANTACID) CHEW TAB 500 MG 500 MG: 500 CHEW TAB at 09:03

## 2025-03-05 RX ADMIN — ASPIRIN 81 MG: 81 TABLET, COATED ORAL at 09:03

## 2025-03-05 RX ADMIN — METOCLOPRAMIDE 5 MG: 5 INJECTION, SOLUTION INTRAMUSCULAR; INTRAVENOUS at 09:03

## 2025-03-05 RX ADMIN — FUROSEMIDE 40 MG: 40 TABLET ORAL at 09:03

## 2025-03-05 NOTE — ASSESSMENT & PLAN NOTE
Patient's blood pressure range in the last 24 hours was: BP  Min: 94/54  Max: 110/85.The patient's inpatient anti-hypertensive regimen is listed below:  Current Antihypertensives  metoprolol succinate (TOPROL-XL) 24 hr tablet 25 mg, Daily, Oral  furosemide tablet 40 mg, Daily, Oral  metoprolol succinate (TOPROL-XL) 24 hr tablet, Daily, Oral  furosemide (LASIX) tablet, Daily, Oral    Plan  Holding home BP medications due to soft blood pressure and ongoing diuresis

## 2025-03-05 NOTE — ASSESSMENT & PLAN NOTE
Patient complained about lower abdominal pain and urinary retention.  Postvoid residual on bladder scan was greater than 400.  Wheeler catheter was placed and there was still some concern for retained urine.  Abdominal ultrasound focusing on kidneys, ureters and bladder was ordered.    3/3 - Add flomax tonight. Trial of voiding in a couple days.   3/5 - refused to stay for voiding trial. Given full instructions on when to see pcp and if he doesn't urinate by tomorrow. Discharging home with family

## 2025-03-05 NOTE — NURSING
Pt dc instructions reviewed with patient and spouse. Verbalized understanding. Pt awaiting transportation to home or self care.

## 2025-03-05 NOTE — ASSESSMENT & PLAN NOTE
Patient with known CAD s/p no stent placed, which is controlled Will continue ASA and monitor for S/Sx of angina/ACS. Continue to monitor on telemetry.   Holding statin 2/2 liver enzymes elevated  - LHC was done on 1/10/2024  - troponin levels were flat  - EKG showed sinus rhythm with no acute findings

## 2025-03-05 NOTE — ASSESSMENT & PLAN NOTE
Patient with Acute on chronic debility due to bed confinement status. The patient's latest AMPAC (Activity Measure for Post Acute Care) Score is listed below.    AM-PAC Score - How much help does the patient need for each activity listed  Basic Mobility Total Score: 23  Turning over in bed (including adjusting bedclothes, sheets and blankets)?: None  Sitting down on and standing up from a chair with arms (e.g., wheelchair, bedside commode, etc.): None  Moving from lying on back to sitting on the side of the bed?: None  Moving to and from a bed to a chair (including a wheelchair)?: None  Need to walk in hospital room?: None  Climbing 3-5 steps with a railing?: A little    Plan  - PT/OT consulted    3/2: Patient has a long history of ALS and has had an electric wheelchair in the past.  However he states that he was healed and he more recently has started walking.  However he feels that he is unable to ambulate at this time will like to go to a swing bed.  Social work was consulted for this.  He states that if he were to go home he will be totally dependent on his fiancee.    3/4 working with social for placement    3/5 refused placement. Discharging home today

## 2025-03-05 NOTE — PLAN OF CARE
[facility] - [facility] Telemetry  Discharge Final Note    Primary Care Provider: No, Primary Doctor    Expected Discharge Date: 2/28/2025    Final Discharge Note (most recent)       Final Note - 03/05/25 1301          Final Note    Assessment Type Final Discharge Note     Anticipated Discharge Disposition Home or Self Care        Post-Acute Status    Discharge Delays None known at this time                     Important Message from Medicare             After-discharge care                Durable Medical Equipment       [facility]   Service: Durable Medical Equipment    [address] [place] [id number]   Phone: [phone]                     Pt refuses to go [facility], [employer]. Pt states he will go home. Wife at bedside and aware of pt desires. 0 further DC needs.

## 2025-03-05 NOTE — DISCHARGE SUMMARY
[facility] Hospital Medicine  Discharge Summary      Patient Name: Derek Moreno  MRN: [id number]  [facility]: [id number]  Patient Class: IP- Inpatient  Admission Date: 2/24/2025  Hospital Length of Stay: 9 days  Discharge Date and Time:  03/05/2025 1:57 PM  Attending Physician: Liliana Dallas MD   Discharging Provider: Liliana Dallas MD  Primary Care Provider: Mis, Primary Doctor    Primary Care Team: Networked reference to record PCT     HPI:   The patient is a 59-year-old male who presented to the emergency department with worsening shortness of breath and increased fatigue. He reported experiencing shortness of breath upon mild to moderate exertion over the past few months. He complained of accompanying non productive cough. He denied any hemoptysis, chills or fever.  He did not complain of any accompanying chest pain, leg swelling, palpitations, or dizziness, but did mention experiencing a few episodes of blackouts. He denied any falls resulting from black outs.   Patient has PMH of COPD, CHF, tobacco and alcohol dependence.  The patient has not been using oxygen at home despite being prescribed it, due to concerns that he will get dependent on supplemented oxygen.   Additionally, he has not been taking his prescribed medications for the past several months due to affordability issues.   The patient lives alone and is ambulatory with the help of a cane.   Patient has PMH of alcohol dependence, he has been drinking at least 20 oz of beer daily and drank 8 oz today.      * No surgery found *      Hospital Course:   59-year-old male with pmh alcoholic liver cirrhosis, hypertension admitted for acute on chronic systolic heart failure with an ejection fraction of 15% and ascites related to alcoholic liver cirrhosis.  Over course of stay patient has been medically managed for volume status.  He has a long history of ALS and was accepted into swing bed however due to personal reasons  would rather go home with family.  Has oxygen at home.  Medications have been discussed at length with patient and his consequences of noncompliance.  He states he understands fully.  We will follow up with PCP and chosen cardiologist and GI outpatient.  Had acute urine retention on admission and Wheeler was placed.  Today Wheeler was discontinued patient does not want to stay for voiding trial and is demanding to be sent home.  We will full Wheeler and patient can follow up with PCP in the next 2-3 days.  Given instructions if he is unable to urinate on his own before being able to see PCP.     Goals of Care Treatment Preferences:  Code Status: Full Code      SDOH Screening:  The patient was screened for utility difficulties, food insecurity, transport difficulties, housing insecurity, and interpersonal safety and there were no concerns identified this admission.     Consults:   Consults (From admission, onward)          Status Ordering Provider     Inpatient consult to Social Work  Once        Provider:  (Not yet assigned)    Completed WILLIE OLMEDO     Inpatient consult to Social Work  Once        Provider:  (Not yet assigned)    Completed VIRGINIA ZAVALA JR     Inpatient consult to Social Work/Case Management  Once        Provider:  (Not yet assigned)    Completed KAITLIN PERRY     Inpatient consult to Registered Dietitian/Nutritionist  Once        Provider:  (Not yet assigned)    Completed KAITLIN PERRY            Assessment & Plan  Acute on chronic combined systolic and diastolic heart failure  Patient has PMH of Chronic combined systolic and diastolic heart failure.  Results for orders placed during the hospital encounter of 01/04/24    Echo    Interpretation Summary    Left Ventricle: The left ventricle is mildly dilated. Normal wall thickness. There is eccentric hypertrophy. Severe global hypokinesis with regional variation. There is severely reduced systolic function. Biplane (2D) method of  discs ejection fraction is 15%. Global longitudinal strain is reduced. There is diastolic dysfunction but grade cannot be determined.    Right Ventricle: Moderate right ventricular enlargement. Systolic function is mildly reduced.    Right Atrium: Right atrium is mildly dilated.    Aortic Valve: The aortic valve is a trileaflet valve.    Mitral Valve: There is mild bileaflet sclerosis. There is no stenosis. The mean pressure gradient across the mitral valve is 3 mmHg at a heart rate of  bpm. There is moderate regurgitation with a posterolateral eccentriccally directed jet.    Tricuspid Valve: There is moderate regurgitation.    Pulmonary Artery: The estimated pulmonary artery systolic pressure is 62 mmHg.    IVC/SVC: Elevated venous pressure at 15 mmHg.    Pericardium: Large left pleural effusion.  - patient is on CHF exacerbation pathway  - patient received 6o mg IV of furosemide in the ED  - pro BNP was 07127  - troponin levels were flat  - EKG showed sinus rhythm with no acute findings  - CXR    FINDINGS:  Cardiac silhouette is mildly enlarged.  Probable small bibasilar pleural effusions, improved from the prior study.  Mild right basilar airspace disease could represent mild infiltrate and pneumonia, mildly increased from the prior study.  No mass is detected.  No evidence of pneumothorax  - continue IV diuresis  - LOW SODIUM DIET WITH FLUID RESTRICTION  - daily weights and strict I/O's  - repeat ECHO pending    2/25 - Subtle improvement overnight.  Continue IV lasix.   2/26- Appears euvolemic now.  Change to po lasix. His Dyspnea may be HF related vs COPD.   2/27 - diuresing well, breathing better. - anticipate discharge tomorrow  2/28 - Overdiuresed, giving 500 LR back.  Change lasix to 20 po daily.   3/1:  Appears euvolemic.  3/2: appears euvolemic.   3/3 - May be slipping backwards. Na dropping to 122 indicating free water excess. Will increase lasix to 40 po daily.    3/4 monitroing, also patient refusing  some meds  3/5 ok for dc today, medications are optimized however patient is noncompliant. Educated on risk and benefits  COPD (chronic obstructive pulmonary disease)  Patient's COPD is with exacerbation noted by continued dyspnea currently.  Patient is currently off COPD Pathway. Continue scheduled inhalers  patient has not been on his home medication because of affordability issues  and monitor respiratory status closely.   - patient SpO2 is normal in room air    2/25 - No steroids at present time.  Monitor.   Essential hypertension  Patient's blood pressure range in the last 24 hours was: BP  Min: 94/54  Max: 110/85.The patient's inpatient anti-hypertensive regimen is listed below:  Current Antihypertensives  metoprolol succinate (TOPROL-XL) 24 hr tablet 25 mg, Daily, Oral  furosemide tablet 40 mg, Daily, Oral  metoprolol succinate (TOPROL-XL) 24 hr tablet, Daily, Oral  furosemide (LASIX) tablet, Daily, Oral    Plan  Holding home BP medications due to soft blood pressure and ongoing diuresis   Alcoholic cirrhosis  Patient with known Cirrhosis with Child's class B. Co-morbidities are absent.  MELD-Na score calculated; MELD 3.0: 21 at 2/25/2025  4:25 AM  MELD-Na: 22 at 2/25/2025  4:25 AM  Calculated from:  Serum Creatinine: 1.36 mg/dL at 2/25/2025  4:25 AM  Serum Sodium: 132 mmol/L at 2/25/2025  4:25 AM  Total Bilirubin: 2.5 mg/dL at 2/25/2025  4:25 AM  Serum Albumin: 3.1 g/dL at 2/25/2025  4:25 AM  INR(ratio): 1.5 at 2/24/2025  4:20 PM  Age at listing (hypothetical): 59 years  Sex: Male at 2/25/2025  4:25 AM      Continue chronic meds. Etiology likely ETOH. Will avoid any hepatotoxic meds, and monitor CBC/CMP/INR for synthetic function.     - negative hepatitis panel  US Abdomen Limited  Impression:  1. Nonspecific gallbladder wall thickening with minimal pericholecystic fluid.  No stones are identified.  Mild acute cholecystitis is a consideration.  Recommend follow-up.  2. Probable small 1.3 cm minimally complex  hepatic cyst in the right lobe.  3. Bibasilar pleural effusions.    3/4 source of hyponatremia  CAD (coronary artery disease)  Patient with known CAD s/p  no stent placed , which is controlled Will continue ASA and monitor for S/Sx of angina/ACS. Continue to monitor on telemetry.   Holding statin 2/2 liver enzymes elevated  - LHC was done on 1/10/2024  - troponin levels were flat  - EKG showed sinus rhythm with no acute findings  Vitamin D deficiency  Continue home vitamin D     Tobacco dependency  Dangers of cigarette smoking were reviewed with patient in detail. Patient was Counseled for 3-10 minutes. Nicotine replacement options were discussed. Nicotine replacement was discussed- prescribed  Alcohol dependence  Follow [facility] protocol  - patient reported drinking 12oz of beer today  - he limits alcohol to 20oz a day (beer)  Supplementing with thiamine and multivitamin    Intractable nausea and vomiting  Intractable nausea and vomiting.  Patient has been given Zofran and other medications.    He does have cirrhosis and he could have multiple etiologies of nausea and vomiting.  Sons at bedside and would be able to assist him at home if he is discharged home.  [facility] and may be able to work with or be placed in that system.      Resolved  Urinary retention  Patient complained about lower abdominal pain and urinary retention.  Postvoid residual on bladder scan was greater than 400.  Wheeler catheter was placed and there was still some concern for retained urine.  Abdominal ultrasound focusing on kidneys, ureters and bladder was ordered.    3/3 - Add flomax tonight. Trial of voiding in a couple days.   3/5 - refused to stay for voiding trial. Given full instructions on when to see pcp and if he doesn't urinate by tomorrow. Discharging home with family  Debility  Patient with Acute on chronic debility due to bed confinement status. The patient's latest AMPAC (Activity Measure for Post Acute Care) Score is listed  below.    AM-PAC Score - How much help does the patient need for each activity listed  Basic Mobility Total Score: 23  Turning over in bed (including adjusting bedclothes, sheets and blankets)?: None  Sitting down on and standing up from a chair with arms (e.g., wheelchair, bedside commode, etc.): None  Moving from lying on back to sitting on the side of the bed?: None  Moving to and from a bed to a chair (including a wheelchair)?: None  Need to walk in hospital room?: None  Climbing 3-5 steps with a railing?: A little    Plan  - PT/OT consulted    3/2: Patient has a long history of ALS and has had an electric wheelchair in the past.  However he states that he was healed and he more recently has started walking.  However he feels that he is unable to ambulate at this time will like to go to a swing bed.  Social work was consulted for this.  He states that if he were to go home he will be totally dependent on his fiancee.    3/4 working with social for placement    3/5 refused placement. Discharging home today  Final Active Diagnoses:    Diagnosis Date Noted POA    PRINCIPAL PROBLEM:  Acute on chronic combined systolic and diastolic heart failure [I50.43] 01/31/2024 Yes    Urinary retention [R33.9] 03/02/2025 Yes    Debility [R53.81] 03/02/2025 Yes    Intractable nausea and vomiting [R11.2] 03/01/2025 Yes    Tobacco dependency [F17.200] 02/24/2025 Yes    Alcohol dependence [F10.20] 02/24/2025 Yes    Vitamin D deficiency [E55.9] 01/30/2024 Yes    CAD (coronary artery disease) [I25.10] 01/22/2024 Yes    Alcoholic cirrhosis [K70.30] 01/05/2024 Yes    COPD (chronic obstructive pulmonary disease) [J44.9] 01/04/2024 Yes    Essential hypertension [I10] 01/04/2024 Yes      Problems Resolved During this Admission:       Discharged Condition: stable    Disposition: Home or Self Care    Follow Up:   Contact information for follow-up providers       No, Primary Doctor Follow up in 1 week(s).                       Contact  information for after-discharge care       Durable Medical Equipment       [facility] .    Service: Durable Medical Equipment  Contact information:  [address] [facility] [address]  [phone]                                 Patient Instructions:      Diet Cardiac     Call MD for:  temperature >100.4     Call MD for:  persistent nausea and vomiting or diarrhea     Call MD for:  severe uncontrolled pain     Call MD for:  redness, tenderness, or signs of infection (pain, swelling, redness, odor or green/yellow discharge around incision site)     Call MD for:  difficulty breathing or increased cough     Call MD for:  severe persistent headache     Call MD for:  worsening rash     Call MD for:  persistent dizziness, light-headedness, or visual disturbances     Call MD for:  increased confusion or weakness     Activity as tolerated       Significant Diagnostic Studies: N/A    Pending Diagnostic Studies:       None           Medications:  Reconciled Home Medications:      Medication List        START taking these medications      calcium carbonate 200 mg calcium (500 mg) chewable tablet  Commonly known as: TUMS  Take 1 tablet (500 mg total) by mouth 2 (two) times a day.     furosemide 40 MG tablet  Commonly known as: LASIX  Take 1 tablet (40 mg total) by mouth once daily.  Start taking on: March 6, 2025     nicotine 14 mg/24 hr  Commonly known as: NICODERM CQ  Place 1 patch onto the skin once daily.  Start taking on: March 6, 2025     tamsulosin 0.4 mg Cap  Commonly known as: FLOMAX  Take 1 capsule (0.4 mg total) by mouth every evening.            CONTINUE taking these medications      allopurinoL 100 MG tablet  Commonly known as: ZYLOPRIM  Take 2 tablets (200 mg total) by mouth once daily.     aspirin 81 MG EC tablet  Commonly known as: ECOTRIN  Take 1 tablet (81 mg total) by mouth once daily.     metoprolol succinate 25 MG 24 hr tablet  Commonly known as: TOPROL-XL  Take 1 tablet (25 mg total) by  mouth once daily.     vitamin D 1000 units Tab  Commonly known as: VITAMIN D3  Take 1 tablet (1,000 Units total) by mouth once daily.              Indwelling Lines/Drains at time of discharge:   Lines/Drains/Airways       None                   Time spent on the discharge of patient: 45 minutes         Liliana Dallas MD  Department of Hospital Medicine  [facility]

## 2025-03-05 NOTE — PLAN OF CARE
Problem: Adult Inpatient Plan of Care  Goal: Plan of Care Review  Outcome: Progressing  Goal: Patient-Specific Goal (Individualized)  Outcome: Progressing  Goal: Absence of Hospital-Acquired Illness or Injury  Outcome: Progressing  Goal: Optimal Comfort and Wellbeing  Outcome: Progressing  Goal: Readiness for Transition of Care  Outcome: Progressing     Problem: Airway Clearance Ineffective  Goal: Effective Airway Clearance  Outcome: Progressing     Problem: Gas Exchange Impaired  Goal: Optimal Gas Exchange  Outcome: Progressing

## 2025-03-05 NOTE — ASSESSMENT & PLAN NOTE
Follow [facility] protocol  - patient reported drinking 12oz of beer today  - he limits alcohol to 20oz a day (beer)  Supplementing with thiamine and multivitamin

## 2025-03-05 NOTE — PT/OT/SLP PROGRESS
Physical Therapy Treatment    Patient Name:  Derek Moreno   MRN:  [id number]    Recommendations:     Discharge Recommendations: Low Intensity Therapy  Discharge Equipment Recommendations: rollator  Barriers to discharge: None    Assessment:     Derek Moreno is a 59 y.o. male admitted with a medical diagnosis of Acute on chronic combined systolic and diastolic heart failure.  He presents with the following impairments/functional limitations: weakness, impaired endurance, impaired functional mobility, impaired cardiopulmonary response to activity Pt more drowsy today. Per RN, BP has been low. He completes all mobility without assistance and is ambulating into hallway with rollator.    Rehab Prognosis: Fair; patient would benefit from acute skilled PT services to address these deficits and reach maximum level of function.    Recent Surgery: * No surgery found *      Plan:     During this hospitalization, patient to be seen 5 x/week to address the identified rehab impairments via gait training, therapeutic activities, therapeutic exercises and progress toward the following goals:    Plan of Care Expires:  03/25/25    Subjective     Chief Complaint: shortness of breath   Patient/Family Comments/goals: Pt is agreeable to PT   Pain/Comfort:  Pain Rating 1: 8/10  Location - Side 1: Bilateral  Location 1: leg  Pain Addressed 1: Nurse notified      Objective:     Communicated with DENISA Aguilera Rn prior to session.  Patient found HOB elevated with peripheral IV, cuba catheter upon PT entry to room.     General Precautions: Standard, fall  Orthopedic Precautions: N/A  Braces: N/A  Respiratory Status: Room air     Functional Mobility:  Bed Mobility:     Scooting: supervision  Supine to Sit: supervision  Transfers:     Sit to Stand:  supervision with rollator  Bed to Chair: supervision with  rollator  using  Step Transfer  Gait: 100 ft stand-by assistance with rollator  Balance: good      AM-PAC 6 CLICK  MOBILITY  Turning over in bed (including adjusting bedclothes, sheets and blankets)?: 4  Sitting down on and standing up from a chair with arms (e.g., wheelchair, bedside commode, etc.): 4  Moving from lying on back to sitting on the side of the bed?: 4  Moving to and from a bed to a chair (including a wheelchair)?: 4  Need to walk in hospital room?: 4  Climbing 3-5 steps with a railing?: 3  Basic Mobility Total Score: 23       Treatment & Education:  Ambulation as noted above, increased time allowed for independence    Patient left up in chair with all lines intact and call button in reach..    GOALS:   Multidisciplinary Problems       Physical Therapy Goals          Problem: Physical Therapy    Goal Priority Disciplines Outcome Interventions   Physical Therapy Goal     PT, PT/OT Progressing    Description: Short term goals:  1. Supine to sit with Modified [place]  2. Sit to stand transfer with Modified [place]  3. Bed to chair transfer with Modified [place] using Single-point Cane   4. Gait  x 100 feet with Modified [place] using Single-point Cane .     Long term goals:  Pt will return home to [facility] with all mobility                         DME Justifications:  No DME recommended requiring DME justifications    Time Tracking:     PT Received On: 03/05/25  PT Start Time: 1032     PT Stop Time: 1047  PT Total Time (min): 15 min     Billable Minutes: Gait Training 15    Treatment Type: Treatment  PT/PTA: PT     Number of PTA visits since last PT visit: 0     03/05/2025

## 2025-03-05 NOTE — ASSESSMENT & PLAN NOTE
Patient has PMH of Chronic combined systolic and diastolic heart failure.  Results for orders placed during the hospital encounter of 01/04/24    Echo    Interpretation Summary    Left Ventricle: The left ventricle is mildly dilated. Normal wall thickness. There is eccentric hypertrophy. Severe global hypokinesis with regional variation. There is severely reduced systolic function. Biplane (2D) method of discs ejection fraction is 15%. Global longitudinal strain is reduced. There is diastolic dysfunction but grade cannot be determined.    Right Ventricle: Moderate right ventricular enlargement. Systolic function is mildly reduced.    Right Atrium: Right atrium is mildly dilated.    Aortic Valve: The aortic valve is a trileaflet valve.    Mitral Valve: There is mild bileaflet sclerosis. There is no stenosis. The mean pressure gradient across the mitral valve is 3 mmHg at a heart rate of  bpm. There is moderate regurgitation with a posterolateral eccentriccally directed jet.    Tricuspid Valve: There is moderate regurgitation.    Pulmonary Artery: The estimated pulmonary artery systolic pressure is 62 mmHg.    IVC/SVC: Elevated venous pressure at 15 mmHg.    Pericardium: Large left pleural effusion.  - patient is on CHF exacerbation pathway  - patient received 6o mg IV of furosemide in the ED  - pro BNP was 26686  - troponin levels were flat  - EKG showed sinus rhythm with no acute findings  - CXR    FINDINGS:  Cardiac silhouette is mildly enlarged.  Probable small bibasilar pleural effusions, improved from the prior study.  Mild right basilar airspace disease could represent mild infiltrate and pneumonia, mildly increased from the prior study.  No mass is detected.  No evidence of pneumothorax  - continue IV diuresis  - LOW SODIUM DIET WITH FLUID RESTRICTION  - daily weights and strict I/O's  - repeat ECHO pending    2/25 - Subtle improvement overnight.  Continue IV lasix.   2/26- Appears euvolemic now.  Change to po  lasix. His Dyspnea may be HF related vs COPD.   2/27 - diuresing well, breathing better. - anticipate discharge tomorrow  2/28 - Overdiuresed, giving 500 LR back.  Change lasix to 20 po daily.   3/1:  Appears euvolemic.  3/2: appears euvolemic.   3/3 - May be slipping backwards. Na dropping to 122 indicating free water excess. Will increase lasix to 40 po daily.    3/4 monitroing, also patient refusing some meds  3/5 ok for dc today, medications are optimized however patient is noncompliant. Educated on risk and benefits

## (undated) DEVICE — BOWL FLUID - BACK STOP

## (undated) DEVICE — DRESSING TRANS 4X4 TEGADERM

## (undated) DEVICE — TUBING MICROSTREAM O2 ADV ST

## (undated) DEVICE — KIT GLIDESHEATH SLEND 6FR 10CM

## (undated) DEVICE — PROTECTOR ULNAR NERVE FOAM

## (undated) DEVICE — SET IV PRIMARY

## (undated) DEVICE — OXISENSOR ADULT DIGIT N/S

## (undated) DEVICE — CHLORAPREP 10.5 ML APPLICATOR

## (undated) DEVICE — GLOVE SENSICARE PI SLT 6.5

## (undated) DEVICE — Device

## (undated) DEVICE — HEMOSTAT VASC BAND REG 24CM

## (undated) DEVICE — CONTRAST ISOVUE 370 100ML

## (undated) DEVICE — DECANTER FLUID TRNSF WHITE 9IN

## (undated) DEVICE — NDL PERCUTANEOUS 2.5CM 21G

## (undated) DEVICE — COVER PROBE US GEL BAND

## (undated) DEVICE — DRAPE ANGIO BRACH 38X44IN

## (undated) DEVICE — CATH OPTITORQUE RADIAL 5FR

## (undated) DEVICE — SET EXTENSION CLEARLINK 2INJ